# Patient Record
Sex: MALE | Race: BLACK OR AFRICAN AMERICAN | NOT HISPANIC OR LATINO | Employment: OTHER | URBAN - METROPOLITAN AREA
[De-identification: names, ages, dates, MRNs, and addresses within clinical notes are randomized per-mention and may not be internally consistent; named-entity substitution may affect disease eponyms.]

---

## 2018-03-20 LAB
ALBUMIN SERPL BCP-MCNC: 4.2 G/DL (ref 3.5–5.7)
ALP SERPL-CCNC: 49 IU/L (ref 55–165)
ALT SERPL W P-5'-P-CCNC: 14 IU/L (ref 10–35)
ANION GAP SERPL CALCULATED.3IONS-SCNC: 11 MM/L
AST SERPL W P-5'-P-CCNC: 14 U/L (ref 8–27)
BASOPHILS # BLD AUTO: 0 X3/UL (ref 0–0.3)
BASOPHILS # BLD AUTO: 0.3 % (ref 0–2)
BILIRUB SERPL-MCNC: 0.4 MG/DL (ref 0.3–1)
BUN SERPL-MCNC: 36 MG/DL (ref 7–25)
CALCIUM SERPL-MCNC: 9.2 MG/DL (ref 8.6–10.5)
CHLORIDE SERPL-SCNC: 97 MM/L (ref 98–107)
CHOLEST SERPL-MCNC: 86 MG/DL (ref 0–200)
CO2 SERPL-SCNC: 36 MM/L (ref 21–31)
CREAT SERPL-MCNC: 7.78 MG/DL (ref 0.7–1.3)
DEPRECATED RDW RBC AUTO: 16.4 % (ref 11.5–14.5)
EGFR (HISTORICAL): 7 GFR
EGFR AFRICAN AMERICAN (HISTORICAL): 8 GFR
EOSINOPHIL # BLD AUTO: 0.1 X3/UL (ref 0–0.5)
EOSINOPHIL NFR BLD AUTO: 2.8 % (ref 0–5)
GLUCOSE (HISTORICAL): 117 MG/DL (ref 65–99)
HCT VFR BLD AUTO: 35.9 % (ref 42–52)
HDLC SERPL-MCNC: 30 MG/DL (ref 40–60)
HGB BLD-MCNC: 12 G/DL (ref 14–18)
LDLC SERPL CALC-MCNC: 36.9 MG/DL (ref 75–193)
LYMPHOCYTES # BLD AUTO: 1.4 X3/UL (ref 1.2–4.2)
LYMPHOCYTES NFR BLD AUTO: 29.9 % (ref 20.5–51.1)
MCH RBC QN AUTO: 29.7 PG (ref 26–34)
MCHC RBC AUTO-ENTMCNC: 33.3 G/DL (ref 31–36)
MCV RBC AUTO: 89.2 FL (ref 81–99)
MONOCYTES # BLD AUTO: 0.7 X3/UL (ref 0–1)
MONOCYTES NFR BLD AUTO: 15 % (ref 1.7–12)
NEUTROPHILS # BLD AUTO: 2.4 X3/UL (ref 1.4–6.5)
NEUTS SEG NFR BLD AUTO: 52 % (ref 42.2–75.2)
OSMOLALITY, SERUM (HISTORICAL): 287 MOSM (ref 262–291)
OVALOCYTOSIS (HISTORICAL): ABNORMAL
PLATELET # BLD AUTO: 91 X3/UL (ref 130–400)
PLATELET ESTIMATE (HISTORICAL): ABNORMAL
PMV BLD AUTO: 10.7 FL (ref 8.6–11.7)
POTASSIUM SERPL-SCNC: 4.7 MM/L (ref 3.5–5.5)
RBC # BLD AUTO: 4.02 X6/UL (ref 4.3–5.9)
SODIUM SERPL-SCNC: 139 MM/L (ref 134–143)
TOTAL PROTEIN (HISTORICAL): 7.7 G/DL (ref 6.4–8.9)
TRIGL SERPL-MCNC: 95 MG/DL (ref 44–166)
VLDL CHOLESTEROL (HISTORICAL): 19 MG/DL (ref 5–51)
WBC # BLD AUTO: 4.6 X3/UL (ref 4.8–10.8)

## 2018-03-21 LAB
EST. AVERAGE GLUCOSE BLD GHB EST-MCNC: 144 MG/DL
HBA1C MFR BLD HPLC: 6.6 % (ref 4–6.2)

## 2018-03-24 LAB — HEPATITIS B SURFACE ANTIGEN (HISTORICAL): NEGATIVE

## 2019-03-28 ENCOUNTER — TRANSCRIBE ORDERS (OUTPATIENT)
Dept: LAB | Facility: CLINIC | Age: 69
End: 2019-03-28

## 2019-03-28 ENCOUNTER — APPOINTMENT (OUTPATIENT)
Dept: LAB | Facility: CLINIC | Age: 69
End: 2019-03-28
Payer: MEDICARE

## 2019-03-28 DIAGNOSIS — E11.649 UNCONTROLLED TYPE 2 DIABETES MELLITUS WITH HYPOGLYCEMIA, UNSPECIFIED HYPOGLYCEMIA COMA STATUS (HCC): ICD-10-CM

## 2019-03-28 DIAGNOSIS — E78.00 PURE HYPERCHOLESTEROLEMIA: ICD-10-CM

## 2019-03-28 DIAGNOSIS — E78.00 PURE HYPERCHOLESTEROLEMIA: Primary | ICD-10-CM

## 2019-03-28 LAB
ALBUMIN SERPL BCP-MCNC: 3.5 G/DL (ref 3.5–5)
ALP SERPL-CCNC: 54 U/L (ref 46–116)
ALT SERPL W P-5'-P-CCNC: 20 U/L (ref 12–78)
ANION GAP SERPL CALCULATED.3IONS-SCNC: 3 MMOL/L (ref 4–13)
AST SERPL W P-5'-P-CCNC: 18 U/L (ref 5–45)
BASOPHILS # BLD AUTO: 0.02 THOUSANDS/ΜL (ref 0–0.1)
BASOPHILS NFR BLD AUTO: 0 % (ref 0–1)
BILIRUB SERPL-MCNC: 0.35 MG/DL (ref 0.2–1)
BUN SERPL-MCNC: 26 MG/DL (ref 5–25)
CALCIUM SERPL-MCNC: 8.9 MG/DL (ref 8.3–10.1)
CHLORIDE SERPL-SCNC: 99 MMOL/L (ref 100–108)
CHOLEST SERPL-MCNC: 79 MG/DL (ref 50–200)
CO2 SERPL-SCNC: 33 MMOL/L (ref 21–32)
CREAT SERPL-MCNC: 7.03 MG/DL (ref 0.6–1.3)
EOSINOPHIL # BLD AUTO: 0.14 THOUSAND/ΜL (ref 0–0.61)
EOSINOPHIL NFR BLD AUTO: 3 % (ref 0–6)
ERYTHROCYTE [DISTWIDTH] IN BLOOD BY AUTOMATED COUNT: 15.3 % (ref 11.6–15.1)
EST. AVERAGE GLUCOSE BLD GHB EST-MCNC: 131 MG/DL
GFR SERPL CREATININE-BSD FRML MDRD: 7 ML/MIN/1.73SQ M
GLUCOSE P FAST SERPL-MCNC: 119 MG/DL (ref 65–99)
HBA1C MFR BLD: 6.2 % (ref 4.2–6.3)
HCT VFR BLD AUTO: 35.3 % (ref 36.5–49.3)
HDLC SERPL-MCNC: 37 MG/DL (ref 40–60)
HGB BLD-MCNC: 10.9 G/DL (ref 12–17)
IMM GRANULOCYTES # BLD AUTO: 0.01 THOUSAND/UL (ref 0–0.2)
IMM GRANULOCYTES NFR BLD AUTO: 0 % (ref 0–2)
LDLC SERPL CALC-MCNC: 25 MG/DL (ref 0–100)
LYMPHOCYTES # BLD AUTO: 1.33 THOUSANDS/ΜL (ref 0.6–4.47)
LYMPHOCYTES NFR BLD AUTO: 25 % (ref 14–44)
MCH RBC QN AUTO: 29 PG (ref 26.8–34.3)
MCHC RBC AUTO-ENTMCNC: 30.9 G/DL (ref 31.4–37.4)
MCV RBC AUTO: 94 FL (ref 82–98)
MONOCYTES # BLD AUTO: 0.72 THOUSAND/ΜL (ref 0.17–1.22)
MONOCYTES NFR BLD AUTO: 14 % (ref 4–12)
NEUTROPHILS # BLD AUTO: 3.03 THOUSANDS/ΜL (ref 1.85–7.62)
NEUTS SEG NFR BLD AUTO: 58 % (ref 43–75)
NONHDLC SERPL-MCNC: 42 MG/DL
NRBC BLD AUTO-RTO: 0 /100 WBCS
PMV BLD AUTO: 12.3 FL (ref 8.9–12.7)
POTASSIUM SERPL-SCNC: 4.5 MMOL/L (ref 3.5–5.3)
PROT SERPL-MCNC: 8 G/DL (ref 6.4–8.2)
RBC # BLD AUTO: 3.76 MILLION/UL (ref 3.88–5.62)
SODIUM SERPL-SCNC: 135 MMOL/L (ref 136–145)
TRIGL SERPL-MCNC: 84 MG/DL
WBC # BLD AUTO: 5.25 THOUSAND/UL (ref 4.31–10.16)

## 2019-03-28 PROCEDURE — 80053 COMPREHEN METABOLIC PANEL: CPT

## 2019-03-28 PROCEDURE — 80061 LIPID PANEL: CPT

## 2019-03-28 PROCEDURE — 83036 HEMOGLOBIN GLYCOSYLATED A1C: CPT

## 2019-03-28 PROCEDURE — 85025 COMPLETE CBC W/AUTO DIFF WBC: CPT

## 2019-03-28 PROCEDURE — 36415 COLL VENOUS BLD VENIPUNCTURE: CPT

## 2020-03-19 ENCOUNTER — APPOINTMENT (OUTPATIENT)
Dept: LAB | Facility: CLINIC | Age: 70
End: 2020-03-19
Payer: MEDICARE

## 2020-03-19 ENCOUNTER — TRANSCRIBE ORDERS (OUTPATIENT)
Dept: LAB | Facility: CLINIC | Age: 70
End: 2020-03-19

## 2020-03-19 DIAGNOSIS — Z99.2 ENCOUNTER FOR EXTRACORPOREAL DIALYSIS (HCC): ICD-10-CM

## 2020-03-19 DIAGNOSIS — E11.65 UNCONTROLLED TYPE 2 DIABETES MELLITUS WITH HYPERGLYCEMIA (HCC): ICD-10-CM

## 2020-03-19 DIAGNOSIS — N18.6 END STAGE RENAL DISEASE (HCC): ICD-10-CM

## 2020-03-19 DIAGNOSIS — E78.2 MIXED HYPERLIPIDEMIA: ICD-10-CM

## 2020-03-19 DIAGNOSIS — E11.22 TYPE 2 DIABETES MELLITUS WITH DIABETIC CHRONIC KIDNEY DISEASE, UNSPECIFIED CKD STAGE, UNSPECIFIED WHETHER LONG TERM INSULIN USE (HCC): Primary | ICD-10-CM

## 2020-03-19 DIAGNOSIS — E11.22 TYPE 2 DIABETES MELLITUS WITH DIABETIC CHRONIC KIDNEY DISEASE, UNSPECIFIED CKD STAGE, UNSPECIFIED WHETHER LONG TERM INSULIN USE (HCC): ICD-10-CM

## 2020-03-19 LAB
ALBUMIN SERPL BCP-MCNC: 3.6 G/DL (ref 3.5–5)
ALP SERPL-CCNC: 53 U/L (ref 46–116)
ALT SERPL W P-5'-P-CCNC: 16 U/L (ref 12–78)
ANION GAP SERPL CALCULATED.3IONS-SCNC: 0 MMOL/L (ref 4–13)
AST SERPL W P-5'-P-CCNC: 13 U/L (ref 5–45)
BASOPHILS # BLD AUTO: 0.03 THOUSANDS/ΜL (ref 0–0.1)
BASOPHILS NFR BLD AUTO: 1 % (ref 0–1)
BILIRUB SERPL-MCNC: 0.41 MG/DL (ref 0.2–1)
BUN SERPL-MCNC: 28 MG/DL (ref 5–25)
CALCIUM SERPL-MCNC: 9.4 MG/DL (ref 8.3–10.1)
CHLORIDE SERPL-SCNC: 99 MMOL/L (ref 100–108)
CHOLEST SERPL-MCNC: 91 MG/DL (ref 50–200)
CO2 SERPL-SCNC: 37 MMOL/L (ref 21–32)
CREAT SERPL-MCNC: 7.64 MG/DL (ref 0.6–1.3)
EOSINOPHIL # BLD AUTO: 0.22 THOUSAND/ΜL (ref 0–0.61)
EOSINOPHIL NFR BLD AUTO: 4 % (ref 0–6)
ERYTHROCYTE [DISTWIDTH] IN BLOOD BY AUTOMATED COUNT: 16.4 % (ref 11.6–15.1)
EST. AVERAGE GLUCOSE BLD GHB EST-MCNC: 137 MG/DL
GFR SERPL CREATININE-BSD FRML MDRD: 7 ML/MIN/1.73SQ M
GLUCOSE P FAST SERPL-MCNC: 101 MG/DL (ref 65–99)
HBA1C MFR BLD: 6.4 %
HCT VFR BLD AUTO: 33.9 % (ref 36.5–49.3)
HDLC SERPL-MCNC: 40 MG/DL
HGB BLD-MCNC: 10.3 G/DL (ref 12–17)
IMM GRANULOCYTES # BLD AUTO: 0.01 THOUSAND/UL (ref 0–0.2)
IMM GRANULOCYTES NFR BLD AUTO: 0 % (ref 0–2)
LDLC SERPL CALC-MCNC: 35 MG/DL (ref 0–100)
LYMPHOCYTES # BLD AUTO: 1.41 THOUSANDS/ΜL (ref 0.6–4.47)
LYMPHOCYTES NFR BLD AUTO: 28 % (ref 14–44)
MCH RBC QN AUTO: 28.8 PG (ref 26.8–34.3)
MCHC RBC AUTO-ENTMCNC: 30.4 G/DL (ref 31.4–37.4)
MCV RBC AUTO: 95 FL (ref 82–98)
MONOCYTES # BLD AUTO: 0.78 THOUSAND/ΜL (ref 0.17–1.22)
MONOCYTES NFR BLD AUTO: 15 % (ref 4–12)
NEUTROPHILS # BLD AUTO: 2.63 THOUSANDS/ΜL (ref 1.85–7.62)
NEUTS SEG NFR BLD AUTO: 52 % (ref 43–75)
NONHDLC SERPL-MCNC: 51 MG/DL
NRBC BLD AUTO-RTO: 0 /100 WBCS
PLATELET # BLD AUTO: 94 THOUSANDS/UL (ref 149–390)
PMV BLD AUTO: 11.5 FL (ref 8.9–12.7)
POTASSIUM SERPL-SCNC: 5.4 MMOL/L (ref 3.5–5.3)
PROT SERPL-MCNC: 8.1 G/DL (ref 6.4–8.2)
RBC # BLD AUTO: 3.58 MILLION/UL (ref 3.88–5.62)
SODIUM SERPL-SCNC: 136 MMOL/L (ref 136–145)
TRIGL SERPL-MCNC: 78 MG/DL
WBC # BLD AUTO: 5.08 THOUSAND/UL (ref 4.31–10.16)

## 2020-03-19 PROCEDURE — 85025 COMPLETE CBC W/AUTO DIFF WBC: CPT

## 2020-03-19 PROCEDURE — 36415 COLL VENOUS BLD VENIPUNCTURE: CPT

## 2020-03-19 PROCEDURE — 80053 COMPREHEN METABOLIC PANEL: CPT

## 2020-03-19 PROCEDURE — 80061 LIPID PANEL: CPT

## 2020-03-19 PROCEDURE — 83036 HEMOGLOBIN GLYCOSYLATED A1C: CPT

## 2021-01-18 ENCOUNTER — HOSPITAL ENCOUNTER (INPATIENT)
Facility: HOSPITAL | Age: 71
LOS: 2 days | Discharge: HOME | DRG: 304 | End: 2021-01-20
Attending: INTERNAL MEDICINE | Admitting: HOSPITALIST
Payer: COMMERCIAL

## 2021-01-18 ENCOUNTER — APPOINTMENT (EMERGENCY)
Dept: RADIOLOGY | Facility: HOSPITAL | Age: 71
DRG: 304 | End: 2021-01-18
Payer: COMMERCIAL

## 2021-01-18 DIAGNOSIS — R77.8 ELEVATED TROPONIN: ICD-10-CM

## 2021-01-18 DIAGNOSIS — N18.6 ESRD (END STAGE RENAL DISEASE) (HCC): ICD-10-CM

## 2021-01-18 DIAGNOSIS — I21.4 NSTEMI (NON-ST ELEVATED MYOCARDIAL INFARCTION) (HCC): Primary | ICD-10-CM

## 2021-01-18 DIAGNOSIS — I10 ESSENTIAL HYPERTENSION: ICD-10-CM

## 2021-01-18 PROBLEM — Z79.4 TYPE 2 DIABETES MELLITUS WITHOUT COMPLICATION, WITH LONG-TERM CURRENT USE OF INSULIN (HCC): Status: ACTIVE | Noted: 2021-01-18

## 2021-01-18 PROBLEM — E11.9 TYPE 2 DIABETES MELLITUS WITHOUT COMPLICATION, WITH LONG-TERM CURRENT USE OF INSULIN (HCC): Status: ACTIVE | Noted: 2021-01-18

## 2021-01-18 PROBLEM — R07.9 CHEST PAIN, UNSPECIFIED: Status: ACTIVE | Noted: 2021-01-18

## 2021-01-18 PROBLEM — E78.2 MIXED HYPERLIPIDEMIA: Status: ACTIVE | Noted: 2021-01-18

## 2021-01-18 LAB
ALBUMIN SERPL BCP-MCNC: 4.2 G/DL (ref 3.5–5.7)
ALP SERPL-CCNC: 36 U/L (ref 55–165)
ALT SERPL W P-5'-P-CCNC: 6 U/L (ref 7–52)
ANION GAP SERPL CALCULATED.3IONS-SCNC: 7 MMOL/L (ref 4–13)
ANISOCYTOSIS BLD QL SMEAR: PRESENT
AST SERPL W P-5'-P-CCNC: 10 U/L (ref 13–39)
ATRIAL RATE: 70 BPM
BASOPHILS # BLD AUTO: 0 THOUSANDS/ΜL (ref 0–0.1)
BASOPHILS NFR BLD AUTO: 0 % (ref 0–2)
BILIRUB SERPL-MCNC: 0.4 MG/DL (ref 0.2–1)
BUN SERPL-MCNC: 23 MG/DL (ref 7–25)
CALCIUM SERPL-MCNC: 9.9 MG/DL (ref 8.6–10.5)
CHLORIDE SERPL-SCNC: 92 MMOL/L (ref 98–107)
CO2 SERPL-SCNC: 36 MMOL/L (ref 21–31)
CREAT SERPL-MCNC: 5.43 MG/DL (ref 0.7–1.3)
D DIMER PPP FEU-MCNC: 1 UG/ML FEU
DACRYOCYTES BLD QL SMEAR: PRESENT
EOSINOPHIL # BLD AUTO: 0.1 THOUSAND/ΜL (ref 0–0.61)
EOSINOPHIL NFR BLD AUTO: 2 % (ref 0–5)
ERYTHROCYTE [DISTWIDTH] IN BLOOD BY AUTOMATED COUNT: 15.7 % (ref 11.5–14.5)
GFR SERPL CREATININE-BSD FRML MDRD: 11 ML/MIN/1.73SQ M
GLUCOSE SERPL-MCNC: 120 MG/DL (ref 65–99)
HCT VFR BLD AUTO: 36.9 % (ref 42–47)
HGB BLD-MCNC: 11.8 G/DL (ref 14–18)
LYMPHOCYTES # BLD AUTO: 1 THOUSANDS/ΜL (ref 0.6–4.47)
LYMPHOCYTES NFR BLD AUTO: 18 % (ref 21–51)
MCH RBC QN AUTO: 28.4 PG (ref 26–34)
MCHC RBC AUTO-ENTMCNC: 31.9 G/DL (ref 31–37)
MCV RBC AUTO: 89 FL (ref 81–99)
MONOCYTES # BLD AUTO: 0.7 THOUSAND/ΜL (ref 0.17–1.22)
MONOCYTES NFR BLD AUTO: 12 % (ref 2–12)
NEUTROPHILS # BLD AUTO: 3.8 THOUSANDS/ΜL (ref 1.4–6.5)
NEUTS SEG NFR BLD AUTO: 67 % (ref 42–75)
OVALOCYTES BLD QL SMEAR: PRESENT
P AXIS: 72 DEGREES
PLATELET # BLD AUTO: 87 THOUSANDS/UL (ref 149–390)
PLATELET BLD QL SMEAR: ABNORMAL
PMV BLD AUTO: 8 FL (ref 8.6–11.7)
POIKILOCYTOSIS BLD QL SMEAR: PRESENT
POTASSIUM SERPL-SCNC: 3.8 MMOL/L (ref 3.5–5.5)
PR INTERVAL: 174 MS
PROT SERPL-MCNC: 8 G/DL (ref 6.4–8.9)
QRS AXIS: 61 DEGREES
QRSD INTERVAL: 94 MS
QT INTERVAL: 392 MS
QTC INTERVAL: 423 MS
RBC # BLD AUTO: 4.15 MILLION/UL (ref 4.3–5.9)
RBC MORPH BLD: ABNORMAL
SODIUM SERPL-SCNC: 135 MMOL/L (ref 134–143)
T WAVE AXIS: 75 DEGREES
TARGETS BLD QL SMEAR: PRESENT
TROPONIN I SERPL-MCNC: 0.13 NG/ML
TROPONIN I SERPL-MCNC: 0.17 NG/ML
TROPONIN I SERPL-MCNC: 0.23 NG/ML
VENTRICULAR RATE: 70 BPM
WBC # BLD AUTO: 5.7 THOUSAND/UL (ref 4.8–10.8)

## 2021-01-18 PROCEDURE — 84484 ASSAY OF TROPONIN QUANT: CPT | Performed by: INTERNAL MEDICINE

## 2021-01-18 PROCEDURE — 84484 ASSAY OF TROPONIN QUANT: CPT | Performed by: STUDENT IN AN ORGANIZED HEALTH CARE EDUCATION/TRAINING PROGRAM

## 2021-01-18 PROCEDURE — 93005 ELECTROCARDIOGRAM TRACING: CPT

## 2021-01-18 PROCEDURE — 99285 EMERGENCY DEPT VISIT HI MDM: CPT

## 2021-01-18 PROCEDURE — 85379 FIBRIN DEGRADATION QUANT: CPT | Performed by: INTERNAL MEDICINE

## 2021-01-18 PROCEDURE — 99285 EMERGENCY DEPT VISIT HI MDM: CPT | Performed by: INTERNAL MEDICINE

## 2021-01-18 PROCEDURE — 93010 ELECTROCARDIOGRAM REPORT: CPT | Performed by: INTERNAL MEDICINE

## 2021-01-18 PROCEDURE — 85025 COMPLETE CBC W/AUTO DIFF WBC: CPT | Performed by: INTERNAL MEDICINE

## 2021-01-18 PROCEDURE — 99223 1ST HOSP IP/OBS HIGH 75: CPT | Performed by: STUDENT IN AN ORGANIZED HEALTH CARE EDUCATION/TRAINING PROGRAM

## 2021-01-18 PROCEDURE — 80053 COMPREHEN METABOLIC PANEL: CPT | Performed by: INTERNAL MEDICINE

## 2021-01-18 PROCEDURE — 71045 X-RAY EXAM CHEST 1 VIEW: CPT

## 2021-01-18 PROCEDURE — 36415 COLL VENOUS BLD VENIPUNCTURE: CPT | Performed by: INTERNAL MEDICINE

## 2021-01-18 RX ORDER — ATORVASTATIN CALCIUM 40 MG/1
40 TABLET, FILM COATED ORAL DAILY
COMMUNITY

## 2021-01-18 RX ORDER — CARVEDILOL 25 MG/1
25 TABLET ORAL 2 TIMES DAILY WITH MEALS
Status: DISCONTINUED | OUTPATIENT
Start: 2021-01-19 | End: 2021-01-20 | Stop reason: HOSPADM

## 2021-01-18 RX ORDER — CARVEDILOL 25 MG/1
25 TABLET ORAL 2 TIMES DAILY WITH MEALS
COMMUNITY

## 2021-01-18 RX ORDER — ASPIRIN 81 MG/1
TABLET ORAL
COMMUNITY

## 2021-01-18 RX ORDER — DOXAZOSIN MESYLATE 4 MG/1
4 TABLET ORAL
COMMUNITY

## 2021-01-18 RX ORDER — ATORVASTATIN CALCIUM 40 MG/1
40 TABLET, FILM COATED ORAL DAILY
Status: DISCONTINUED | OUTPATIENT
Start: 2021-01-19 | End: 2021-01-20 | Stop reason: HOSPADM

## 2021-01-18 RX ORDER — LISINOPRIL 40 MG/1
40 TABLET ORAL DAILY
COMMUNITY
End: 2022-01-13

## 2021-01-18 RX ORDER — ONDANSETRON 2 MG/ML
4 INJECTION INTRAMUSCULAR; INTRAVENOUS EVERY 6 HOURS PRN
Status: DISCONTINUED | OUTPATIENT
Start: 2021-01-18 | End: 2021-01-20 | Stop reason: HOSPADM

## 2021-01-18 RX ORDER — NITROGLYCERIN 0.4 MG/1
0.4 TABLET SUBLINGUAL
Status: DISCONTINUED | OUTPATIENT
Start: 2021-01-18 | End: 2021-01-20 | Stop reason: HOSPADM

## 2021-01-18 RX ORDER — SEVELAMER CARBONATE 800 MG/1
800 TABLET, FILM COATED ORAL 3 TIMES DAILY
COMMUNITY
Start: 2021-01-09

## 2021-01-18 RX ORDER — HEPARIN SODIUM 5000 [USP'U]/ML
5000 INJECTION, SOLUTION INTRAVENOUS; SUBCUTANEOUS EVERY 8 HOURS SCHEDULED
Status: DISCONTINUED | OUTPATIENT
Start: 2021-01-18 | End: 2021-01-20 | Stop reason: HOSPADM

## 2021-01-18 RX ORDER — DOXAZOSIN 2 MG/1
4 TABLET ORAL DAILY
Status: DISCONTINUED | OUTPATIENT
Start: 2021-01-19 | End: 2021-01-20 | Stop reason: HOSPADM

## 2021-01-18 RX ORDER — SODIUM CHLORIDE 9 MG/ML
3 INJECTION INTRAVENOUS
Status: DISCONTINUED | OUTPATIENT
Start: 2021-01-18 | End: 2021-01-20 | Stop reason: HOSPADM

## 2021-01-18 RX ORDER — HYDRALAZINE HYDROCHLORIDE 20 MG/ML
10 INJECTION INTRAMUSCULAR; INTRAVENOUS EVERY 6 HOURS PRN
Status: DISCONTINUED | OUTPATIENT
Start: 2021-01-18 | End: 2021-01-20 | Stop reason: HOSPADM

## 2021-01-18 RX ORDER — AMLODIPINE BESYLATE 5 MG/1
2.5 TABLET ORAL
COMMUNITY

## 2021-01-18 RX ORDER — AMLODIPINE BESYLATE 2.5 MG/1
2.5 TABLET ORAL DAILY
Status: DISCONTINUED | OUTPATIENT
Start: 2021-01-19 | End: 2021-01-18

## 2021-01-18 RX ORDER — SEVELAMER HYDROCHLORIDE 800 MG/1
1600 TABLET, FILM COATED ORAL
Status: DISCONTINUED | OUTPATIENT
Start: 2021-01-18 | End: 2021-01-20 | Stop reason: HOSPADM

## 2021-01-18 RX ORDER — LISINOPRIL 20 MG/1
40 TABLET ORAL DAILY
Status: DISCONTINUED | OUTPATIENT
Start: 2021-01-19 | End: 2021-01-20 | Stop reason: HOSPADM

## 2021-01-18 RX ORDER — ASPIRIN 81 MG/1
81 TABLET ORAL DAILY
Status: DISCONTINUED | OUTPATIENT
Start: 2021-01-19 | End: 2021-01-20 | Stop reason: HOSPADM

## 2021-01-18 RX ORDER — INSULIN ASPART 100 [IU]/ML
INJECTION, SOLUTION INTRAVENOUS; SUBCUTANEOUS 3 TIMES DAILY
COMMUNITY
Start: 2020-08-12

## 2021-01-18 RX ORDER — ACETAMINOPHEN 325 MG/1
650 TABLET ORAL EVERY 6 HOURS PRN
Status: DISCONTINUED | OUTPATIENT
Start: 2021-01-18 | End: 2021-01-20 | Stop reason: HOSPADM

## 2021-01-18 RX ORDER — HYDRALAZINE HYDROCHLORIDE 25 MG/1
50 TABLET, FILM COATED ORAL EVERY 8 HOURS SCHEDULED
Status: DISCONTINUED | OUTPATIENT
Start: 2021-01-18 | End: 2021-01-20 | Stop reason: HOSPADM

## 2021-01-18 RX ORDER — AMLODIPINE BESYLATE 10 MG/1
10 TABLET ORAL DAILY
Status: DISCONTINUED | OUTPATIENT
Start: 2021-01-18 | End: 2021-01-20 | Stop reason: HOSPADM

## 2021-01-18 RX ADMIN — SEVELAMER HYDROCHLORIDE 1600 MG: 800 TABLET, FILM COATED PARENTERAL at 19:07

## 2021-01-18 RX ADMIN — HYDRALAZINE HYDROCHLORIDE 50 MG: 25 TABLET, FILM COATED ORAL at 21:44

## 2021-01-18 RX ADMIN — HEPARIN SODIUM 5000 UNITS: 5000 INJECTION INTRAVENOUS; SUBCUTANEOUS at 21:43

## 2021-01-18 RX ADMIN — AMLODIPINE BESYLATE 10 MG: 10 TABLET ORAL at 19:38

## 2021-01-18 RX ADMIN — HYDRALAZINE HYDROCHLORIDE 10 MG: 20 INJECTION INTRAMUSCULAR; INTRAVENOUS at 19:38

## 2021-01-18 NOTE — PLAN OF CARE
Problem: CARDIOVASCULAR - ADULT  Goal: Maintains optimal cardiac output and hemodynamic stability  Description: INTERVENTIONS:  - Monitor I/O, vital signs and rhythm  - Monitor for S/S and trends of decreased cardiac output  - Administer and titrate ordered vasoactive medications to optimize hemodynamic stability  - Assess quality of pulses, skin color and temperature  - Assess for signs of decreased coronary artery perfusion  - Instruct patient to report change in severity of symptoms  Outcome: Progressing     Problem: SKIN/TISSUE INTEGRITY - ADULT  Goal: Skin integrity remains intact  Description: INTERVENTIONS  - Identify patients at risk for skin breakdown  - Assess and monitor skin integrity  - Assess and monitor nutrition and hydration status  - Monitor labs (i e  albumin)  - Assess for incontinence   - Turn and reposition patient  - Assist with mobility/ambulation  - Relieve pressure over bony prominences  - Avoid friction and shearing  - Provide appropriate hygiene as needed including keeping skin clean and dry  - Evaluate need for skin moisturizer/barrier cream  - Collaborate with interdisciplinary team (i e  Nutrition, Rehabilitation, etc )   - Patient/family teaching  Outcome: Progressing     Problem: SAFETY ADULT  Goal: Patient will remain free of falls  Description: INTERVENTIONS:  - Assess patient frequently for physical needs  -  Identify cognitive and physical deficits and behaviors that affect risk of falls    -  [place] fall precautions as indicated by assessment   - Educate patient/family on patient safety including physical limitations  - Instruct patient to call for assistance with activity based on assessment  - Modify environment to reduce risk of injury  - Consider OT/PT consult to assist with strengthening/mobility  Outcome: Progressing  Goal: Maintain or return to baseline ADL function  Description: INTERVENTIONS:  -  Assess patient's ability to carry out ADLs; assess patient's baseline for ADL function and identify physical deficits which impact ability to perform ADLs (bathing, care of mouth/teeth, toileting, grooming, dressing, etc )  - Assess/evaluate cause of self-care deficits   - Assess range of motion  - Assess patient's mobility; develop plan if impaired  - Assess patient's need for assistive devices and provide as appropriate  - Encourage maximum independence but intervene and supervise when necessary  - Involve family in performance of ADLs  - Assess for home care needs following discharge   - Consider OT consult to assist with ADL evaluation and planning for discharge  - Provide patient education as appropriate  Outcome: Progressing  Goal: Maintain or return mobility status to optimal level  Description: INTERVENTIONS:  - Assess patient's baseline mobility status (ambulation, transfers, stairs, etc )    - Identify cognitive and physical deficits and behaviors that affect mobility  - Identify mobility aids required to assist with transfers and/or ambulation (gait belt, sit-to-stand, lift, walker, cane, etc )  - [place] fall precautions as indicated by assessment  - Record patient progress and toleration of activity level on Mobility SBAR; progress patient to next Phase/Stage  - Instruct patient to call for assistance with activity based on assessment  - Consider rehabilitation consult to assist with strengthening/weightbearing, etc   Outcome: Progressing

## 2021-01-18 NOTE — ASSESSMENT & PLAN NOTE
28-year-old male with past medical history of ESRD, CAD, hypertension and type 2 diabetes presented with acute onset of chest pains  Chest pains reportedly began last night in bed, recurred again today during dialysis  Troponins elevated at 0 13  ED discussed with Cardiology, recommend trending troponins, or any 2D echo and Cardiology will follow along tomorrow  EKG shows NSR, non specific ST changes  CXR neg for acute processes  Cardiology consult  Trend troponins  Monitor on telemetry  Nitroglycerin p r n    Aspirin, statin, beta-blockers  2D echo

## 2021-01-18 NOTE — ASSESSMENT & PLAN NOTE
Lab Results   Component Value Date    EGFR 11 01/18/2021    EGFR 7 03/19/2020    EGFR 7 03/28/2019    CREATININE 5 43 (H) 01/18/2021    CREATININE 7 64 (H) 03/19/2020    CREATININE 7 03 (H) 03/28/2019     End-stage renal disease schedule Monday Wednesday Friday, av fistula left forearm  Nephrologist at [facility]  Nephrology consulted

## 2021-01-18 NOTE — ASSESSMENT & PLAN NOTE
Lab Results   Component Value Date    HGBA1C 6 4 (H) 03/19/2020     Home insulin regimen Lantus 6 units with Accu-Cheks sliding scale  Sinus scale, Accu-Cheks      No results for input(s): POCGLU in the last 72 hours      Blood Sugar Average: Last 72 hrs:

## 2021-01-18 NOTE — ED PROVIDER NOTES
History  Chief Complaint   Patient presents with    Chest Pain     STARTED LAST NIGHT , FEELS BETTER AFTER VOMITING THIS AM     Very pleasant 64-year-old male accompanied by his wife presents emergency room complaining of chest pain  The patient states he had chest pain after going to bed last evening and upon vomiting last evening or early this morning x1 his pain went away  Patient states the pain was midsternal nonradiating it was not associated with shortness of breath he did have some nausea he vomited this morning  The patient denies any lightheadedness dizziness or diaphoresis  The patient went to dialysis today he was dialyzed any felt a lot better at this time  Patient has a complicated history had an MI in 2016 which presented differently at that time he state he had a lot of pressure and heaviness and had some radiation  Patient underwent a cardiac catheterization [place] states there was a blockage but no stents or bypass was performed  Patient has a history of hypertension, end-stage renal disease on dialysis, diabetes and hyperlipidemia  He is a former smoker he quit about 20 years ago  Has about a 10-15 pack-year history as per his wife  At this time the patient states he has no pain pressure heaviness tightness or shortness of breath  None       Past Medical History:   Diagnosis Date    Coronary artery disease     [facility] mellitus ([facility] [address] )     Hypertension     Renal disorder        Past Surgical History:   Procedure Laterality Date    DIALYSIS FISTULA CREATION         History reviewed  No pertinent family history  I have reviewed and agree with the history as documented      E-Cigarette/Vaping     E-Cigarette/Vaping Substances     Social History     Tobacco Use    Smoking status: Former Smoker    Smokeless tobacco: Never Used    Tobacco comment: STATES QUIT 20 YEARS AGO   Substance Use Topics    Alcohol use: Not Currently    Drug use: Never       Review of Systems Constitutional: Negative  HENT: Negative  Respiratory: Negative  Cardiovascular: Positive for chest pain  Negative for palpitations and leg swelling  Gastrointestinal: Negative  Genitourinary: Negative  Skin: Negative  Neurological: Negative  Hematological: Negative  Psychiatric/Behavioral: Negative  Physical Exam  Physical Exam  Vitals signs and nursing note reviewed  Constitutional:       General: He is not in acute distress  Appearance: He is well-developed and normal weight  He is not ill-appearing, toxic-appearing or diaphoretic  HENT:      Head: Normocephalic and atraumatic  Eyes:      Extraocular Movements: Extraocular movements intact  Pupils: Pupils are equal, round, and reactive to light  Neck:      Musculoskeletal: Normal range of motion and neck supple  Cardiovascular:      Rate and Rhythm: Normal rate and regular rhythm  Heart sounds: Normal heart sounds  Pulmonary:      Effort: Pulmonary effort is normal  No tachypnea or respiratory distress  Breath sounds: Normal breath sounds  Chest:      Chest wall: No mass or deformity  Abdominal:      General: Bowel sounds are normal       Palpations: Abdomen is soft  Skin:     General: Skin is warm and dry  Neurological:      General: No focal deficit present  Mental Status: He is alert and oriented to person, place, and time     Psychiatric:         Mood and Affect: Mood normal          Behavior: Behavior normal          Vital Signs  ED Triage Vitals   Temperature Pulse Respirations Blood Pressure SpO2   01/18/21 1558 01/18/21 1554 01/18/21 1554 01/18/21 1554 01/18/21 1554   (!) 96 9 °F (36 1 °C) 68 18 (!) 218/94 100 %      Temp Source Heart Rate Source Patient Position - Orthostatic VS BP Location FiO2 (%)   01/18/21 1558 01/18/21 1554 -- 01/18/21 1554 --   Tympanic Monitor  Left arm       Pain Score       01/18/21 1554       No Pain           Vitals:    01/18/21 1554   BP: (!) 218/94 Pulse: 68         Visual Acuity      ED Medications  Medications - No data to display    Diagnostic Studies  Results Reviewed     None                 No orders to display              Procedures  Procedures         ED Course                                           MDM    Disposition  Final diagnoses:   None     ED Disposition     None      Follow-up Information    None         Patient's Medications    No medications on file     No discharge procedures on file      PDMP Review     None          ED Provider  Electronically Signed by           Rosemarie Hurtado MD  01/18/21 8235

## 2021-01-18 NOTE — H&P
H&P- Delvin Lancaster 1950, 80 y o  male MRN: [id number]    Unit/Bed#: ICU 01 Encounter: [id number]    Primary Care Provider: Onel Florian MD   Date and time admitted to hospital: 1/18/2021  3:51 PM        Type 2 diabetes mellitus without complication, with long-term current use of insulin [facility])  Assessment & Plan  Lab Results   Component Value Date    HGBA1C 6 4 (H) 03/19/2020     Home insulin regimen Lantus 6 units with Accu-Cheks sliding scale  Sinus scale, Accu-Cheks      No results for input(s): POCGLU in the last 72 hours  Blood Sugar Average: Last 72 hrs:      Essential hypertension  Assessment & Plan  Continue blood pressure medications amlodipine, Coreg, Cardura and lisinopril    ESRD (end stage renal disease) [facility])  Assessment & Plan  Lab Results   Component Value Date    EGFR 11 01/18/2021    EGFR 7 03/19/2020    EGFR 7 03/28/2019    CREATININE 5 43 (H) 01/18/2021    CREATININE 7 64 (H) 03/19/2020    CREATININE 7 03 (H) 03/28/2019     End-stage renal disease schedule Monday Wednesday Friday, av fistula left forearm  Nephrologist at [facility]  Nephrology consulted    * Chest pain, unspecified  Assessment & Plan  50-year-old male with past medical history of ESRD, CAD, hypertension and type 2 diabetes presented with acute onset of chest pains  Chest pains reportedly began last night in bed, recurred again today during dialysis  Troponins elevated at 0 13  ED discussed with Cardiology, recommend trending troponins, or any 2D echo and Cardiology will follow along tomorrow  EKG shows NSR, non specific ST changes  CXR neg for acute processes  Cardiology consult  Trend troponins  Monitor on telemetry  Nitroglycerin p r n    Aspirin, statin, beta-blockers  2D echo      VTE Prophylaxis: Heparin  / sequential compression device   Code Status: Full Code  POLST: There is no POLST form on file for this patient (pre-hospital)  Discussion with family: Patient    Anticipated Length of Stay:  Patient will be admitted on an Inpatient basis with an anticipated length of stay of  2 midnights  Justification for Hospital Stay: Chest pains    Total Time for Visit, including Counseling / Coordination of Care: 45 minutes  Greater than 50% of this total time spent on direct patient counseling and coordination of care  Chief Complaint:   CP    History of Present Illness:    Cami An is a 80 y o  male who presents with CP  Hx of ESRD MWF, CAD, HTN and DM2  CP this morning when he woke up  Had CP again at dialysis  Substernal, non-radiating, denies any SOB or diaphoresis  BP was noted to be elevated 190 and in the  systolic  Seen today, reports no further CP  States he last had a cardiac cath 3 years ago with known cardiac disease after an abnormal stress test  Denies any tobacco history, abdominal pain, nausea, vomiting or     Review of Systems:    Review of Systems    Past Medical and Surgical History:     Past Medical History:   Diagnosis Date    Coronary artery disease     Diabetes mellitus ([facility] [address] )     Hypertension     Renal disorder        Past Surgical History:   Procedure Laterality Date    DIALYSIS FISTULA CREATION         Meds/Allergies:    Prior to Admission medications    Medication Sig Start Date End Date Taking?  Authorizing Provider   sevelamer carbonate (Renvela) 800 mg tablet Take 2 tablets by mouth Three times a day 1/9/21  Yes Historical Provider, MD   amLODIPine (NORVASC) 2 5 mg tablet Take 2 5 mg by mouth daily    Historical Provider, MD   aspirin (Aspirin 81) 81 mg EC tablet Take by mouth    Historical Provider, MD   atorvastatin (LIPITOR) 40 mg tablet Take 40 mg by mouth daily    Historical Provider, MD   CALCIUM ACETATE-MAGNESIUM CARB PO Take 1 tablet by mouth    Historical Provider, MD   carvedilol (COREG) 25 mg tablet Take by mouth    Historical Provider, MD   doxazosin (CARDURA) 4 mg tablet Take 4 mg by mouth    Historical Provider, MD   insulin aspart (NovoLOG FlexPen) 100 UNIT/ML injection pen Inject under the skin Three times a day 8/12/20   Historical Provider, MD   insulin glargine (LANTUS SOLOSTAR) 100 units/mL injection pen Inject 6 Units under the skin    Historical Provider, MD   lisinopril (ZESTRIL) 40 mg tablet Take 40 mg by mouth    Historical Provider, MD     I have reviewed home medications with patient personally  Allergies: No Known Allergies    Social History:     Marital Status: /Civil Union   Occupation: Retired  Patient Pre-hospital Living Situation: home  Patient Pre-hospital Level of Mobility: ambulatory  Patient Pre-hospital Diet Restrictions: none  Substance Use History:   Social History     Substance and Sexual Activity   Alcohol Use Not Currently     Social History     Tobacco Use   Smoking Status Former Smoker   Smokeless Tobacco Never Used   Tobacco Comment    STATES QUIT 20 YEARS AGO     Social History     Substance and Sexual Activity   Drug Use Never       Family History:    History reviewed  No pertinent family history  Physical Exam:     Vitals:   Blood Pressure: (!) 190/84 (01/18/21 1836)  Pulse: 72 (01/18/21 1836)  Temperature: 97 8 °F (36 6 °C) (01/18/21 1836)  Temp Source: Tympanic (01/18/21 1836)  Respirations: 20 (01/18/21 1836)  Height: 6' (182 9 cm) (01/18/21 1836)  Weight - Scale: 96 4 kg (212 lb 8 4 oz) (01/18/21 1836)  SpO2: 98 % (01/18/21 1836)    Physical Exam  Vitals signs and nursing note reviewed  Constitutional:       Appearance: Normal appearance  He is normal weight  HENT:      Head: Normocephalic and atraumatic  Eyes:      Conjunctiva/sclera: Conjunctivae normal       Pupils: Pupils are equal, round, and reactive to light  Cardiovascular:      Rate and Rhythm: Normal rate and regular rhythm  Heart sounds: Normal heart sounds  Pulmonary:      Breath sounds: Normal breath sounds  No wheezing or rhonchi  Abdominal:      General: Bowel sounds are normal  There is no distension  Palpations: Abdomen is soft  Tenderness: There is no abdominal tenderness  Musculoskeletal: Normal range of motion  General: No swelling  Comments: Left forearm av fistula   Skin:     General: Skin is warm and dry  Neurological:      General: No focal deficit present  Mental Status: He is alert and oriented to person, place, and time  Mental status is at baseline  Additional Data:     Lab Results: I have personally reviewed pertinent reports  Results from last 7 days   Lab Units 01/18/21  1622   WBC Thousand/uL 5 70   HEMOGLOBIN g/dL 11 8*   HEMATOCRIT % 36 9*   PLATELETS Thousands/uL 87*   NEUTROS PCT % 67   LYMPHS PCT % 18*   MONOS PCT % 12   EOS PCT % 2     Results from last 7 days   Lab Units 01/18/21  1622   SODIUM mmol/L 135   POTASSIUM mmol/L 3 8   CHLORIDE mmol/L 92*   CO2 mmol/L 36*   BUN mg/dL 23   CREATININE mg/dL 5 43*   ANION GAP mmol/L 7   CALCIUM mg/dL 9 9   ALBUMIN g/dL 4 2   TOTAL BILIRUBIN mg/dL 0 40   ALK PHOS U/L 36*   ALT U/L 6*   AST U/L 10*   GLUCOSE RANDOM mg/dL 120*                       Imaging: I have personally reviewed pertinent reports  X-ray chest 1 view portable    (Results Pending)       EKG, Pathology, and Other Studies Reviewed on Admission:   · EKG: NSR, nonspecific ST changes    Allscripts / Epic Records Reviewed: Yes     ** Please Note: This note has been constructed using a voice recognition system   **

## 2021-01-19 ENCOUNTER — APPOINTMENT (INPATIENT)
Dept: NON INVASIVE DIAGNOSTICS | Facility: HOSPITAL | Age: 71
DRG: 304 | End: 2021-01-19
Payer: COMMERCIAL

## 2021-01-19 PROBLEM — R79.89 ELEVATED TROPONIN: Status: ACTIVE | Noted: 2021-01-19

## 2021-01-19 PROBLEM — R77.8 ELEVATED TROPONIN: Status: ACTIVE | Noted: 2021-01-19

## 2021-01-19 LAB
ANION GAP SERPL CALCULATED.3IONS-SCNC: 10 MMOL/L (ref 4–13)
ANISOCYTOSIS BLD QL SMEAR: PRESENT
BASOPHILS # BLD AUTO: 0 THOUSANDS/ΜL (ref 0–0.1)
BASOPHILS NFR BLD AUTO: 0 % (ref 0–2)
BUN SERPL-MCNC: 35 MG/DL (ref 7–25)
CALCIUM SERPL-MCNC: 9.4 MG/DL (ref 8.6–10.5)
CHLORIDE SERPL-SCNC: 94 MMOL/L (ref 98–107)
CHOLEST SERPL-MCNC: 79 MG/DL (ref 0–200)
CO2 SERPL-SCNC: 31 MMOL/L (ref 21–31)
CREAT SERPL-MCNC: 7.84 MG/DL (ref 0.7–1.3)
DACRYOCYTES BLD QL SMEAR: PRESENT
EOSINOPHIL # BLD AUTO: 0.1 THOUSAND/ΜL (ref 0–0.61)
EOSINOPHIL NFR BLD AUTO: 2 % (ref 0–5)
ERYTHROCYTE [DISTWIDTH] IN BLOOD BY AUTOMATED COUNT: 15.4 % (ref 11.5–14.5)
EST. AVERAGE GLUCOSE BLD GHB EST-MCNC: 151 MG/DL
GFR SERPL CREATININE-BSD FRML MDRD: 7 ML/MIN/1.73SQ M
GLUCOSE SERPL-MCNC: 103 MG/DL (ref 65–140)
GLUCOSE SERPL-MCNC: 108 MG/DL (ref 65–99)
GLUCOSE SERPL-MCNC: 138 MG/DL (ref 65–140)
GLUCOSE SERPL-MCNC: 197 MG/DL (ref 65–140)
GLUCOSE SERPL-MCNC: 97 MG/DL (ref 65–140)
HBA1C MFR BLD: 6.9 %
HCT VFR BLD AUTO: 33.5 % (ref 42–47)
HDLC SERPL-MCNC: 30 MG/DL
HGB BLD-MCNC: 11.1 G/DL (ref 14–18)
LDLC SERPL CALC-MCNC: 34 MG/DL (ref 0–100)
LYMPHOCYTES # BLD AUTO: 1.1 THOUSANDS/ΜL (ref 0.6–4.47)
LYMPHOCYTES NFR BLD AUTO: 19 % (ref 21–51)
MCH RBC QN AUTO: 29.3 PG (ref 26–34)
MCHC RBC AUTO-ENTMCNC: 33 G/DL (ref 31–37)
MCV RBC AUTO: 89 FL (ref 81–99)
MONOCYTES # BLD AUTO: 1 THOUSAND/ΜL (ref 0.17–1.22)
MONOCYTES NFR BLD AUTO: 16 % (ref 2–12)
NEUTROPHILS # BLD AUTO: 3.8 THOUSANDS/ΜL (ref 1.4–6.5)
NEUTS SEG NFR BLD AUTO: 63 % (ref 42–75)
NONHDLC SERPL-MCNC: 49 MG/DL
OVALOCYTES BLD QL SMEAR: PRESENT
PLATELET # BLD AUTO: 87 THOUSANDS/UL (ref 149–390)
PLATELET BLD QL SMEAR: ABNORMAL
PMV BLD AUTO: 8.7 FL (ref 8.6–11.7)
POTASSIUM SERPL-SCNC: 4.7 MMOL/L (ref 3.5–5.5)
RBC # BLD AUTO: 3.77 MILLION/UL (ref 4.3–5.9)
RBC MORPH BLD: ABNORMAL
SODIUM SERPL-SCNC: 135 MMOL/L (ref 134–143)
TRIGL SERPL-MCNC: 76 MG/DL (ref 44–166)
TROPONIN I SERPL-MCNC: 0.41 NG/ML
WBC # BLD AUTO: 6 THOUSAND/UL (ref 4.8–10.8)

## 2021-01-19 PROCEDURE — 80048 BASIC METABOLIC PNL TOTAL CA: CPT | Performed by: STUDENT IN AN ORGANIZED HEALTH CARE EDUCATION/TRAINING PROGRAM

## 2021-01-19 PROCEDURE — 87081 CULTURE SCREEN ONLY: CPT | Performed by: HOSPITALIST

## 2021-01-19 PROCEDURE — 85025 COMPLETE CBC W/AUTO DIFF WBC: CPT | Performed by: STUDENT IN AN ORGANIZED HEALTH CARE EDUCATION/TRAINING PROGRAM

## 2021-01-19 PROCEDURE — 93306 TTE W/DOPPLER COMPLETE: CPT | Performed by: INTERNAL MEDICINE

## 2021-01-19 PROCEDURE — 82948 REAGENT STRIP/BLOOD GLUCOSE: CPT

## 2021-01-19 PROCEDURE — 99223 1ST HOSP IP/OBS HIGH 75: CPT | Performed by: INTERNAL MEDICINE

## 2021-01-19 PROCEDURE — 83036 HEMOGLOBIN GLYCOSYLATED A1C: CPT | Performed by: STUDENT IN AN ORGANIZED HEALTH CARE EDUCATION/TRAINING PROGRAM

## 2021-01-19 PROCEDURE — 99222 1ST HOSP IP/OBS MODERATE 55: CPT | Performed by: INTERNAL MEDICINE

## 2021-01-19 PROCEDURE — 99232 SBSQ HOSP IP/OBS MODERATE 35: CPT | Performed by: HOSPITALIST

## 2021-01-19 PROCEDURE — 93306 TTE W/DOPPLER COMPLETE: CPT

## 2021-01-19 PROCEDURE — 80061 LIPID PANEL: CPT | Performed by: STUDENT IN AN ORGANIZED HEALTH CARE EDUCATION/TRAINING PROGRAM

## 2021-01-19 PROCEDURE — 84484 ASSAY OF TROPONIN QUANT: CPT | Performed by: PHYSICIAN ASSISTANT

## 2021-01-19 RX ORDER — INSULIN GLARGINE 100 [IU]/ML
6 INJECTION, SOLUTION SUBCUTANEOUS
Status: DISCONTINUED | OUTPATIENT
Start: 2021-01-19 | End: 2021-01-20 | Stop reason: HOSPADM

## 2021-01-19 RX ADMIN — HYDRALAZINE HYDROCHLORIDE 50 MG: 25 TABLET, FILM COATED ORAL at 22:16

## 2021-01-19 RX ADMIN — INSULIN GLARGINE 6 UNITS: 100 INJECTION, SOLUTION SUBCUTANEOUS at 22:14

## 2021-01-19 RX ADMIN — HEPARIN SODIUM 5000 UNITS: 5000 INJECTION INTRAVENOUS; SUBCUTANEOUS at 14:10

## 2021-01-19 RX ADMIN — AMLODIPINE BESYLATE 10 MG: 10 TABLET ORAL at 08:27

## 2021-01-19 RX ADMIN — SEVELAMER HYDROCHLORIDE 1600 MG: 800 TABLET, FILM COATED PARENTERAL at 16:49

## 2021-01-19 RX ADMIN — CARVEDILOL 25 MG: 25 TABLET, FILM COATED ORAL at 16:49

## 2021-01-19 RX ADMIN — SEVELAMER HYDROCHLORIDE 1600 MG: 800 TABLET, FILM COATED PARENTERAL at 11:34

## 2021-01-19 RX ADMIN — HEPARIN SODIUM 5000 UNITS: 5000 INJECTION INTRAVENOUS; SUBCUTANEOUS at 05:05

## 2021-01-19 RX ADMIN — LISINOPRIL 40 MG: 20 TABLET ORAL at 08:28

## 2021-01-19 RX ADMIN — ASPIRIN 81 MG: 81 TABLET, COATED ORAL at 08:27

## 2021-01-19 RX ADMIN — ATORVASTATIN CALCIUM 40 MG: 40 TABLET, FILM COATED ORAL at 08:28

## 2021-01-19 RX ADMIN — HYDRALAZINE HYDROCHLORIDE 50 MG: 25 TABLET, FILM COATED ORAL at 14:10

## 2021-01-19 RX ADMIN — SEVELAMER HYDROCHLORIDE 1600 MG: 800 TABLET, FILM COATED PARENTERAL at 07:45

## 2021-01-19 RX ADMIN — INSULIN LISPRO 1 UNITS: 100 INJECTION, SOLUTION INTRAVENOUS; SUBCUTANEOUS at 11:34

## 2021-01-19 RX ADMIN — HYDRALAZINE HYDROCHLORIDE 50 MG: 25 TABLET, FILM COATED ORAL at 05:05

## 2021-01-19 RX ADMIN — HEPARIN SODIUM 5000 UNITS: 5000 INJECTION INTRAVENOUS; SUBCUTANEOUS at 22:15

## 2021-01-19 RX ADMIN — CARVEDILOL 25 MG: 25 TABLET, FILM COATED ORAL at 07:45

## 2021-01-19 RX ADMIN — DOXAZOSIN 4 MG: 2 TABLET ORAL at 08:28

## 2021-01-19 NOTE — NURSING NOTE
Patient resting comfortably in bed, NSR on monitor  Verbalizing needs  Call bell in reach  Set up for supper

## 2021-01-19 NOTE — NURSING NOTE
Resting with HOB at [address]  No c/o chest discomfort, lightheadedness or dizziness  O2 sats remaining in mid 90's  Will receive dialysis tomorrow after stress test  Breath sounds remaining clear  No distress for now  Rails up times 3 ; callbell in reach

## 2021-01-19 NOTE — UTILIZATION REVIEW
Initial Clinical Review    Admission: Date/Time/Statement:   Admission Orders (From admission, onward)     Ordered        01/18/21 1731  Inpatient Admission  Once                   Orders Placed This Encounter   Procedures    Inpatient Admission     Standing Status:   Standing     Number of Occurrences:   1     Order Specific Question:   Level of Care     Answer:   Critical Care [15]     Order Specific Question:   Estimated length of stay     Answer:   More than 2 Midnights     Order Specific Question:   Certification     Answer:   I certify that inpatient services are medically necessary for this patient for a duration of greater than two midnights  See H&P and MD Progress Notes for additional information about the patient's course of treatment  ED Arrival Information     Expected Arrival Acuity Means of Arrival Escorted By Service Admission Type    - 1/18/2021 15:38 Urgent Walk-In Spouse Critical Care/ICU Urgent    Arrival Complaint    chest tightness has cardiac history        Chief Complaint   Patient presents with    Chest Pain     STARTED LAST NIGHT , FEELS BETTER AFTER VOMITING THIS AM     Assessment/Plan:   80 yom to er from home c/o substernal chest pain when he woke up & again at dialysis, with associated N&V  Hx ESRD MWF, CAD, HTN and DM2  Presents hypertensive  Admission work-up showing +troponin, elevated d-dimer  Admitted to inpatient status for chest pain, placed on telemetry, cardio & renal consulted  1/19:  Tmax 99 5  Chest pain with elevated troponin likely secondary to the initial hypertensive urgency versus unstable angina the setting having history of CAD, diabetes and end-stage renal disease  Per cardio:  Type II NSTEMI due to accel HTN with underlying ESRD, known (99%) D1 and (70%) small RPDA disease to PTCI in 2017  Echo no RWMA, Mod LVH  High risk patient, scheduled for inpatient LNST    Per renal:  1  ESRD hemodialysis dependent at [facility] MWF    Last treatment (full treatment) yesterday  He left at 1 5 kg above EDW of 94-95  Etiology of ESRD hypertensive nephrosclerosis  He has been on dialysis for 5 years  Will arrange a treatment tomorrow after his stress test in patient  2   Access  LUE AVF - functional    ED Triage Vitals   Temperature Pulse Respirations Blood Pressure SpO2   01/18/21 1558 01/18/21 1554 01/18/21 1554 01/18/21 1554 01/18/21 1554   (!) 96 9 °F (36 1 °C) 68 18 (!) 218/94 100 %      Temp Source Heart Rate Source Patient Position - Orthostatic VS BP Location FiO2 (%)   01/18/21 1558 01/18/21 1554 01/18/21 1836 01/18/21 1554 --   Tympanic Monitor Sitting Left arm       Pain Score       01/18/21 1554       No Pain          Wt Readings from Last 1 Encounters:   01/18/21 96 4 kg (212 lb 8 4 oz)     Additional Vital Signs:   01/18/21 1945    69  25  Abnormal   198/91  Abnormal   131  97 %       01/18/21 1930    67  22  205/141  Abnormal   167  97 %       01/18/21 1915    67  22  206/88  Abnormal   126  96 %  None (Room air)  Lying   01/18/21 1900    65  28  Abnormal   181/92  Abnormal   125  96 %       01/18/21 1845    63  34  Abnormal   185/90  Abnormal   129  97 %  None (Room air)  Lying   01/18/21 1836  97 8 °F (36 6 °C)  72  20  190/84  Abnormal   121  98 %  None (Room air)  Sitting   01/18/21 1558  96 9 °F (36 1 °C)Abnormal                  01/18/21 1554    68  18  218/94  Abnormal     100 %  None (Room air)       Pertinent Labs/Diagnostic Test Results:   Results from last 7 days   Lab Units 01/19/21  0503 01/18/21  1622   WBC Thousand/uL 6 00 5 70   HEMOGLOBIN g/dL 11 1* 11 8*   HEMATOCRIT % 33 5* 36 9*   PLATELETS Thousands/uL 87* 87*   NEUTROS ABS Thousands/µL 3 80 3 80     Results from last 7 days   Lab Units 01/19/21  0504 01/18/21  1622   SODIUM mmol/L 135 135   POTASSIUM mmol/L 4 7 3 8   CHLORIDE mmol/L 94* 92*   CO2 mmol/L 31 36*   ANION GAP mmol/L 10 7   BUN mg/dL 35* 23   CREATININE mg/dL 7 84* 5 43*   EGFR ml/min/1 73sq m 7 11 CALCIUM mg/dL 9 4 9 9     Results from last 7 days   Lab Units 01/18/21  1622   AST U/L 10*   ALT U/L 6*   ALK PHOS U/L 36*   TOTAL PROTEIN g/dL 8 0   ALBUMIN g/dL 4 2   TOTAL BILIRUBIN mg/dL 0 40     Results from last 7 days   Lab Units 01/19/21  0509   POC GLUCOSE mg/dl 97     Results from last 7 days   Lab Units 01/19/21  0504 01/18/21  1622   GLUCOSE RANDOM mg/dL 108* 120*     Results from last 7 days   Lab Units 01/19/21  0503 01/18/21  2204 01/18/21  1858 01/18/21  1622   TROPONIN I ng/mL 0 41* 0 23* 0 17* 0 13*     Results from last 7 days   Lab Units 01/18/21  1622   D-DIMER QUANTITATIVE ug/ml FEU 1 00*     1/18  Cxr=  No acute cardiopulmonary disease    Ekg=   Normal sinus rhythm    Past Medical History:   Diagnosis Date    Coronary artery disease     Diabetes mellitus ([facility] )     Hypertension     Renal disorder      Admitting Diagnosis: Chest pain [R07 9]  Elevated troponin [R77 8]  [facility] [I21 4]  Age/Sex: 80 y o  male  Admission Orders:  Cont pulse ox  accuchecks with coverage scale  Telemetry  Consult cardio  Scd/foot pumps  Consult renal  Hemodialysis 3x/wk    Scheduled Medications:  amLODIPine, 10 mg, Oral, Daily  aspirin, 81 mg, Oral, Daily  atorvastatin, 40 mg, Oral, Daily  carvedilol, 25 mg, Oral, BID With Meals  doxazosin, 4 mg, Oral, Daily  heparin (porcine), 5,000 Units, Subcutaneous, Q8H ETHAN  hydrALAZINE, 50 mg, Oral, Q8H ETHAN  insulin lispro, 1-5 Units, Subcutaneous, TID AC  lisinopril, 40 mg, Oral, Daily  sevelamer, 1,600 mg, Oral, TID With Meals    PRN Meds:  acetaminophen, 650 mg, Oral, Q6H PRN  hydrALAZINE, 10 mg, Intravenous, Q6H PRN  nitroglycerin, 0 4 mg, Sublingual, Q5 Min PRN  ondansetron, 4 mg, Intravenous, Q6H PRN  sodium chloride (PF), 3 mL, Intravenous, Q1H PRN    Network Utilization Review Department  ATTENTION: Please call with any questions or concerns to [phone] and carefully listen to the prompts so that you are directed to the right person  All voicemails are confidential   Higinio Stroud all requests for admission clinical reviews, approved or denied determinations and any other requests to dedicated fax number below belonging to the campus where the patient is receiving treatment   List of dedicated fax numbers for the Facilities:  [address] [place] [address] DENIALS (Administrative/Medical Necessity) [phone]   [address] (Maternity/NICU/Pediatrics) [phone]   [address] Juliet Carrillo 3714 (  John Soriano [facility]" 103) [address] [place] [address] Sonia Valenzuela [address] [place], [place] [address] [phone]

## 2021-01-19 NOTE — CONSULTS
Consult- Naseem Shepherd 1950, 80 y o  male MRN: [id number]    Unit/Bed#: ICU 01 Encounter: [id number]    Primary Care Provider: Joshua Hamilton MD   Date and time admitted to hospital: 1/18/2021  3:51 PM      Inpatient consult to Cardiology  Consult performed by: LAN Whittington  Consult ordered by: Jt Rai MD          Elevated troponin  Assessment & Plan  Likely related to hypertensive urgency on admission  Pt with ESRD as well    Essential hypertension  Assessment & Plan  BP markedly elevated on admission   Improved at this time    Continue Norvasc, Coreg, Hydralazine, Lisinopril     ESRD (end stage renal disease) [facility])  Assessment & Plan  Lab Results   Component Value Date    EGFR 7 01/19/2021    EGFR 11 01/18/2021    EGFR 7 03/19/2020    CREATININE 7 84 (H) 01/19/2021    CREATININE 5 43 (H) 01/18/2021    CREATININE 7 64 (H) 03/19/2020     Continue HD, M/W/F per renal    * Chest pain, unspecified  Assessment & Plan  Chest pain, resolved   Known CAD (per pt report)    Trop mildly elevated, no ischemic changes on EKG, echo without wall motion abnormalities    Plan for in-patient nuclear stress 1/20/2021        Other summary comments:   Chest pain free  Was likely related to hypertensive urgency on admission  Echo without WMA so will plan for inpatient nuclear stress test tomorrow  Continue current BP medications    OK for M/S    Outpatient Cardiologist:  none     HPI: Naseem Shepherd is a 80y o  year old male who presented with chest pain  He states the discomfort started out on Sunday evening and was persistent until he went to dialysis on Monday  He describes it as a mild substernal discomfort  It was less severe than what he experienced prior to an NSTEMI in 2012  There was no radiation or associated symptoms  He is currently chest pain free  Review of records available in [facility], show that he had a NSTEMI in 9/2012 with no intervention at that time    He underwent stress testing for renal transplant evaluation in 2017  The nuclear stress testing was abnormal and a cath was performed  This revealed a 70% lesion of a small R PDA and a 99% stenosis of D1 (previously noted on the cath in )  Neither lesion was amenable to PCI  He has been medically treated since that time  An echocardiogram was performed this morning; no wall motion abnormalities were noted  Other medical history is significant for htn, ESRD on HD since 2016, HLD and DM2  [id number] NSR      MOST  RECENT CARDIAC IMAGING:   Echo 2021 EF 75%, no WMA, moderate LVH  Very mild dynamic obstruction at rest, with a peak velocity of 1 5 cm/s    Review of Systems: a 10 point review of systems was conducted and is negative except for as mentioned in the HPI or as below          Historical Information   Past Medical History:   Diagnosis Date    Coronary artery disease     Diabetes mellitus ([address] )     Hypertension     Renal disorder      Past Surgical History:   Procedure Laterality Date    DIALYSIS FISTULA CREATION       Social History     Substance and Sexual Activity   Alcohol Use Not Currently     Social History     Substance and Sexual Activity   Drug Use Never     Social History     Tobacco Use   Smoking Status Former Smoker   Smokeless Tobacco Never Used   Tobacco Comment    STATES QUIT 21 YEARS AGO       Family History:   Non-contributory    Meds/Allergies   all current active meds have been reviewed  Medications Prior to Admission   Medication    amLODIPine (NORVASC) 2 5 mg tablet    aspirin (Aspirin 81) 81 mg EC tablet    atorvastatin (LIPITOR) 40 mg tablet    CALCIUM ACETATE-MAGNESIUM CARB PO    carvedilol (COREG) 25 mg tablet    doxazosin (CARDURA) 4 mg tablet    insulin aspart (NovoLOG FlexPen) 100 UNIT/ML injection pen    insulin glargine (LANTUS SOLOSTAR) 100 units/mL injection pen    lisinopril (ZESTRIL) 40 mg tablet    sevelamer carbonate (Renvela) 800 mg tablet       No Known Allergies    Objective   Vitals: Blood pressure 141/65, pulse 78, temperature 98 6 °F (37 °C), temperature source Tympanic, resp  rate 22, height 6' (1 829 m), weight 96 4 kg (212 lb 8 4 oz), SpO2 95 %  , Body mass index is 28 82 kg/m² ,   Orthostatic Blood Pressures      Most Recent Value   Blood Pressure  141/65 filed at 01/19/2021 0827   Patient Position - Orthostatic VS  Lying filed at 01/19/2021 4134          Systolic (15VDV), VDF:036 , Min:141 , GFF:231     Diastolic (30XUN), YZI:01, Min:61, Max:141    Physical Exam:    General:  Normal appearance in no distress  Eyes:  Anicteric  Oral mucosa:  Moist   Neck:  No JVD  Carotid upstrokes are brisk without bruits  No masses  Chest:  Clear to auscultation and percussion  Cardiac:  No palpable PMI  Normal S1 and S2  No murmur gallop or rub  Abdomen:  Soft and nontender  No palpable organomegaly or aortic enlargement  Extremities:  No peripheral edema  Musculoskeletal:  Symmetric  Vascular:  Pedal pulses are intact  L forearm bruit and thrill  Neuro:  Grossly symmetric  Psych:  Alert and oriented x3          Lab Results:     Troponins:   Results from last 7 days   Lab Units 01/19/21  0503 01/18/21  2204 01/18/21  1858   TROPONIN I ng/mL 0 41* 0 23* 0 17*     BNP:       CBC :   Results from last 7 days   Lab Units 01/19/21  0503 01/18/21  1622   WBC Thousand/uL 6 00 5 70   HEMOGLOBIN g/dL 11 1* 11 8*   HEMATOCRIT % 33 5* 36 9*   MCV fL 89 89   PLATELETS Thousands/uL 87* 87*     TSH:     CMP:   Results from last 7 days   Lab Units 01/19/21  0504 01/18/21  1622   POTASSIUM mmol/L 4 7 3 8   CHLORIDE mmol/L 94* 92*   CO2 mmol/L 31 36*   BUN mg/dL 35* 23   CREATININE mg/dL 7 84* 5 43*   AST U/L  --  10*   ALT U/L  --  6*   EGFR ml/min/1 73sq m 7 11     Lipid Profile:   Results from last 7 days   Lab Units 01/19/21  0504   TRIGLYCERIDES mg/dL 76   HDL mg/dL 30*     Coags:

## 2021-01-19 NOTE — NURSING NOTE
Pt admit to icu room 1  Pt was able to ambulate without difficulty to the bed  Full assessment complete and documented  Pt attached to all monitors and oriented to room and call bell  pts bp high  Notified hospitalist orders received for meds and pt medicated per orders  Will cont to monitor

## 2021-01-19 NOTE — ASSESSMENT & PLAN NOTE
BP markedly elevated on admission   Improved at this time    Continue Norvasc, Coreg, Hydralazine, Lisinopril

## 2021-01-19 NOTE — PLAN OF CARE
Problem: PAIN - ADULT  Goal: Verbalizes/displays adequate comfort level or baseline comfort level  Description: Interventions:  - Encourage patient to monitor pain and request assistance  - Assess pain using appropriate pain scale  - Administer analgesics based on type and severity of pain and evaluate response  - Implement non-pharmacological measures as appropriate and evaluate response  - Consider cultural and social influences on pain and pain management  - Notify physician/advanced practitioner if interventions unsuccessful or patient reports new pain  Outcome: Progressing     Problem: INFECTION - ADULT  Goal: Absence or prevention of progression during hospitalization  Description: INTERVENTIONS:  - Assess and monitor for signs and symptoms of infection  - Monitor lab/diagnostic results  - Monitor all insertion sites, i e  indwelling lines, tubes, and drains  - Monitor endotracheal if appropriate and nasal secretions for changes in amount and color  - [place] appropriate cooling/warming therapies per order  - Administer medications as ordered  - Instruct and encourage patient and family to use good hand hygiene technique  - Identify and instruct in appropriate isolation precautions for identified infection/condition  Outcome: Progressing     Problem: SAFETY ADULT  Goal: Patient will remain free of falls  Description: INTERVENTIONS:  - Assess patient frequently for physical needs  -  Identify cognitive and physical deficits and behaviors that affect risk of falls    -  [place] fall precautions as indicated by assessment   - Educate patient/family on patient safety including physical limitations  - Instruct patient to call for assistance with activity based on assessment  - Modify environment to reduce risk of injury  - Consider OT/PT consult to assist with strengthening/mobility  Outcome: Progressing  Goal: Maintain or return to baseline ADL function  Description: INTERVENTIONS:  -  Assess patient's ability to carry out ADLs; assess patient's baseline for ADL function and identify physical deficits which impact ability to perform ADLs (bathing, care of mouth/teeth, toileting, grooming, dressing, etc )  - Assess/evaluate cause of self-care deficits   - Assess range of motion  - Assess patient's mobility; develop plan if impaired  - Assess patient's need for assistive devices and provide as appropriate  - Encourage maximum independence but intervene and supervise when necessary  - Involve family in performance of ADLs  - Assess for home care needs following discharge   - Consider OT consult to assist with ADL evaluation and planning for discharge  - Provide patient education as appropriate  Outcome: Progressing  Goal: Maintain or return mobility status to optimal level  Description: INTERVENTIONS:  - Assess patient's baseline mobility status (ambulation, transfers, stairs, etc )    - Identify cognitive and physical deficits and behaviors that affect mobility  - Identify mobility aids required to assist with transfers and/or ambulation (gait belt, sit-to-stand, lift, walker, cane, etc )  - [place] fall precautions as indicated by assessment  - Record patient progress and toleration of activity level on Mobility SBAR; progress patient to next Phase/Stage  - Instruct patient to call for assistance with activity based on assessment  - Consider rehabilitation consult to assist with strengthening/weightbearing, etc   Outcome: Progressing     Problem: DISCHARGE PLANNING  Goal: Discharge to home or other facility with appropriate resources  Description: INTERVENTIONS:  - Identify barriers to discharge w/patient and caregiver  - Arrange for needed discharge resources and transportation as appropriate  - Identify discharge learning needs (meds, wound care, etc )  - Arrange for interpretive services to assist at discharge as needed  - Refer to Case Management Department for coordinating discharge planning if the patient needs post-hospital services based on physician/advanced practitioner order or complex needs related to functional status, cognitive ability, or social support system  Outcome: Progressing     Problem: Knowledge Deficit  Goal: Patient/family/caregiver demonstrates understanding of disease process, treatment plan, medications, and discharge instructions  Description: Complete learning assessment and assess knowledge base    Interventions:  - Provide teaching at level of understanding  - Provide teaching via preferred learning methods  Outcome: Progressing     Problem: CARDIOVASCULAR - ADULT  Goal: Maintains optimal cardiac output and hemodynamic stability  Description: INTERVENTIONS:  - Monitor I/O, vital signs and rhythm  - Monitor for S/S and trends of decreased cardiac output  - Administer and titrate ordered vasoactive medications to optimize hemodynamic stability  - Assess quality of pulses, skin color and temperature  - Assess for signs of decreased coronary artery perfusion  - Instruct patient to report change in severity of symptoms  Outcome: Progressing     Problem: SKIN/TISSUE INTEGRITY - ADULT  Goal: Skin integrity remains intact  Description: INTERVENTIONS  - Identify patients at risk for skin breakdown  - Assess and monitor skin integrity  - Assess and monitor nutrition and hydration status  - Monitor labs (i e  albumin)  - Assess for incontinence   - Turn and reposition patient  - Assist with mobility/ambulation  - Relieve pressure over bony prominences  - Avoid friction and shearing  - Provide appropriate hygiene as needed including keeping skin clean and dry  - Evaluate need for skin moisturizer/barrier cream  - Collaborate with interdisciplinary team (i e  Nutrition, Rehabilitation, etc )   - Patient/family teaching  Outcome: Progressing     Problem: HEMATOLOGIC - ADULT  Goal: Maintains hematologic stability  Description: INTERVENTIONS  - Assess for signs and symptoms of bleeding or hemorrhage  - Monitor labs  - Administer supportive blood products/factors as ordered and appropriate  Outcome: Progressing     Problem: Nutrition/Hydration-ADULT  Goal: Nutrient/Hydration intake appropriate for improving, restoring or maintaining nutritional needs  Description: Monitor and assess patient's nutrition/hydration status for malnutrition  Collaborate with interdisciplinary team and initiate plan and interventions as ordered  Monitor patient's weight and dietary intake as ordered or per policy  Utilize nutrition screening tool and intervene as necessary  Determine patient's food preferences and provide high-protein, high-caloric foods as appropriate  INTERVENTIONS:  - Monitor oral intake, urinary output, labs, and treatment plans  - Assess nutrition and hydration status and recommend course of action  - Evaluate amount of meals eaten  - Assist patient with eating if necessary   - Allow adequate time for meals  - Recommend/ encourage appropriate diets, oral nutritional supplements, and vitamin/mineral supplements  - Order, calculate, and assess calorie counts as needed  - Recommend, monitor, and adjust tube feedings and TPN/PPN based on assessed needs  - Assess need for intravenous fluids  - Provide specific nutrition/hydration education as appropriate  - Include patient/family/caregiver in decisions related to nutrition  Outcome: Progressing     Problem: Potential for Falls  Goal: Patient will remain free of falls  Description: INTERVENTIONS:  - Assess patient frequently for physical needs  -  Identify cognitive and physical deficits and behaviors that affect risk of falls    -  [place] fall precautions as indicated by assessment   - Educate patient/family on patient safety including physical limitations  - Instruct patient to call for assistance with activity based on assessment  - Modify environment to reduce risk of injury  - Consider OT/PT consult to assist with strengthening/mobility  Outcome: Progressing

## 2021-01-19 NOTE — ASSESSMENT & PLAN NOTE
· Troponin trend 0 13> 0 17> 0 23> 0 41  · Most likely secondary to hypertensive urgency in the setting having end-stage renal disease hemodialysis  · Type 2 non STEMI  · Further management as per Cardiology

## 2021-01-19 NOTE — CASE MANAGEMENT
Evaluated the pt at the bedside  Pt was admitted to the hospital for CP  Explained the role of CM and the options of discharge planning with the pt  Pt states he lives in a 2 story home with his wife  Pt has 2 ROSANNE and 13 steps to the bedroom  Pt has a bathroom on both levels  Pt states he is independent in ADL's, IADl's and can drive  Pt 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  Pt has no DME at home  Pt PCP is Dr Garry Campa  Pt gets his medications at [facility] in [place]  Pt indicated he would have no needs upon discharge  Pt had a cardiology consult  Pt will be having a stress test tomorrow  Pt reports his wife will transport him home upon discharge  Patient/caregiver received discharge checklist   Content reviewed  Patient/caregiver encouraged to participate in discharge plan of care prior to discharge home  CM reviewed d/c planning process including the following: identifying help at home, patient preference for d/c planning needs, availability of treatment team to discuss questions or concerns patient and/or family may have regarding understanding medications and recognizing signs and symptoms once discharged  CM also encouraged patient to follow up with all recommended appointments after discharge  Patient advised of importance for patient and family to participate in managing patients medical well being

## 2021-01-19 NOTE — CONSULTS
Consultation - Nephrology   Abundio Singh 80 y o  male MRN: [id number]  Unit/Bed#: ICU 01 Encounter: [id number]      A/P:  1  ESRD hemodialysis dependent at [address] [facility] MWF  Last treatment (full treatment) yesterday  He left at 1 5 kg above EDW of 94-95  Etiology of ESRD hypertensive nephrosclerosis  He has been on dialysis for 5 years  Will arrange a treatment tomorrow after his stress test in patient  2  Access  LUE AVF - functional  3  Anemia  Hemoglobin 11 1 g - no indication for erythropoietin  4  Acid base  Bicarbonate excellent at 31 mmol/l  5  Bone Mineral  No phosphorus available  He is followed at his outpatient clinch  6  Chest pain  Had mid sternal chest pain with mild dyspnea ssociated with hypertensive urgency  He will  Have a stress test tomorrow  He says he monitors his blood pressure at home daily and it does drop with medications  He is not on outpatient diuretic therapy on non dialysis days which might be helpful           Thank you for allowing us to participate in the care of your patient  Please feel free to contact us regarding the care of this patient, or any other questions/concerns that may be applicable  Patient Active Problem List   Diagnosis    Chest pain, unspecified    ESRD (end stage renal disease) ([facility] )    Essential hypertension    Type 2 diabetes mellitus without complication, with long-term current use of insulin (HCC)    Mixed hyperlipidemia    Elevated troponin       History of Present Illness   Physician Requesting Consult: Inessa Sprague MD  Reason for Consult / Principal Problem: ESRD  Hx and PE limited by:   HPI: Abundio Singh is a 80y o  year old male who presents with chest pain starting on Sunday night  It was described as midsternal   He was not sure but was cardiac  Went to dialysis on Monday at 10:00 a m  He told the nurse he did have chest discomfort but it was better  Was placed on the machine and the chest pain did audrey    Was 4 kilos over his dry weight of 94-95 kilos  However, it was the weekend he does gain more  After his treatment his wife took him to the emergency department as he still had chest discomfort  He has a history of hypertensive nephrosclerosis and type 2 diabetes mellitus  He has been on dialysis for 5 years  Still urinates but is not on the diuretic therapy on non dialysis day    History obtained from chart review and the patient    Review of Systems - Negative except as mentioned above in HPI, more specifics as mentioned below  Review of Systems - General ROS: negative  Ophthalmic ROS: negative  ENT ROS: negative  Respiratory ROS: positive for - shortness of breath  Cardiovascular ROS: positive for - chest pain and dyspnea on exertion  Gastrointestinal ROS: no abdominal pain, change in bowel habits, or black or bloody stools  Genito-Urinary ROS: no dysuria, trouble voiding, or hematuria  Musculoskeletal ROS: negative  Neurological ROS: no TIA or stroke symptoms  Dermatological ROS: negative    Historical Information   Past Medical History:   Diagnosis Date    Coronary artery disease     Diabetes mellitus ([facility] [address] )     Hypertension     Renal disorder      Past Surgical History:   Procedure Laterality Date    DIALYSIS FISTULA CREATION       Social History   Social History     Substance and Sexual Activity   Alcohol Use Not Currently     Social History     Substance and Sexual Activity   Drug Use Never     Social History     Tobacco Use   Smoking Status Former Smoker   Smokeless Tobacco Never Used   Tobacco Comment    STATES QUIT 20 YEARS AGO     History reviewed  No pertinent family history      Meds/Allergies   all current active meds have been reviewed, current meds:   Current Facility-Administered Medications   Medication Dose Route Frequency    acetaminophen (TYLENOL) tablet 650 mg  650 mg Oral Q6H PRN    amLODIPine (NORVASC) tablet 10 mg  10 mg Oral Daily    aspirin (ECOTRIN LOW STRENGTH) EC tablet 81 mg  81 mg Oral Daily    atorvastatin (LIPITOR) tablet 40 mg  40 mg Oral Daily    carvedilol (COREG) tablet 25 mg  25 mg Oral BID With Meals    doxazosin (CARDURA) tablet 4 mg  4 mg Oral Daily    heparin (porcine) subcutaneous injection 5,000 Units  5,000 Units Subcutaneous Q8H [address]    hydrALAZINE (APRESOLINE) injection 10 mg  10 mg Intravenous Q6H PRN    hydrALAZINE (APRESOLINE) tablet 50 mg  50 mg Oral Q8H [address]    insulin glargine (LANTUS) subcutaneous injection 6 Units 0 06 mL  6 Units Subcutaneous HS    insulin lispro (HumaLOG) 100 units/mL subcutaneous injection 1-5 Units  1-5 Units Subcutaneous TID AC    lisinopril (ZESTRIL) tablet 40 mg  40 mg Oral Daily    nitroglycerin (NITROSTAT) SL tablet 0 4 mg  0 4 mg Sublingual Q5 Min PRN    ondansetron (ZOFRAN) injection 4 mg  4 mg Intravenous Q6H PRN    sevelamer (RENAGEL) tablet 1,600 mg  1,600 mg Oral TID With Meals    sodium chloride (PF) 0 9 % injection 3 mL  3 mL Intravenous Q1H PRN    and PTA meds:    Medications Prior to Admission   Medication    amLODIPine (NORVASC) 2 5 mg tablet    aspirin (Aspirin 81) 81 mg EC tablet    atorvastatin (LIPITOR) 40 mg tablet    CALCIUM ACETATE-MAGNESIUM CARB PO    carvedilol (COREG) 25 mg tablet    doxazosin (CARDURA) 4 mg tablet    insulin aspart (NovoLOG FlexPen) 100 UNIT/ML injection pen    insulin glargine (LANTUS SOLOSTAR) 100 units/mL injection pen    lisinopril (ZESTRIL) 40 mg tablet    sevelamer carbonate (Renvela) 800 mg tablet         No Known Allergies    Objective     Intake/Output Summary (Last 24 hours) at 1/19/2021 1216  Last data filed at 1/19/2021 1156  Gross per 24 hour   Intake 360 ml   Output 200 ml   Net 160 ml       Invasive Devices:        Physical Exam      I/O last 3 completed shifts:  In: -   Out: 100 [Urine:100]    Vitals:    01/19/21 1100   BP: 153/68   Pulse: 65   Resp: 16   Temp:    SpO2: 95%       Gen: in NAD, Alert/Awake  HEENT: no sclerous icterus, MMM, neck supple  CV: +S1/S2, RRR  Lungs: CTA bilaterally  Abd: +BS, soft NT/ND  Ext: all four extremities are warm with functional fistula left forearm  Skin: no rashes noted  Neuro: CN II-XII intact    Current Weight: Weight - Scale: 96 4 kg (212 lb 8 4 oz)  First Weight: Weight - Scale: 95 3 kg (210 lb)    Lab Results:  I have personally reviewed pertinent labs  CBC:   Lab Results   Component Value Date    WBC 6 00 01/19/2021    HGB 11 1 (L) 01/19/2021    HCT 33 5 (L) 01/19/2021    MCV 89 01/19/2021    PLT 87 (L) 01/19/2021    MCH 29 3 01/19/2021    MCHC 33 0 01/19/2021    RDW 15 4 (H) 01/19/2021    MPV 8 7 01/19/2021     CMP:   Lab Results   Component Value Date    K 4 7 01/19/2021    CL 94 (L) 01/19/2021    CO2 31 01/19/2021    BUN 35 (H) 01/19/2021    CREATININE 7 84 (H) 01/19/2021    CALCIUM 9 4 01/19/2021    AST 10 (L) 01/18/2021    ALT 6 (L) 01/18/2021    ALKPHOS 36 (L) 01/18/2021    EGFR 7 01/19/2021     Phosphorus: No results found for: PHOS  Magnesium: No results found for: MG  Urinalysis: No results found for: COLORU, CLARITYU, SPECGRAV, PHUR, LEUKOCYTESUR, NITRITE, PROTEINUA, GLUCOSEU, KETONESU, BILIRUBINUR, BLOODU  Ionized Calcium: No results found for: CAION  Coagulation: No results found for: PT, INR, APTT  Troponin:   Lab Results   Component Value Date    TROPONINI 0 41 (H) 01/19/2021     ABG: No results found for: PHART, KYP0BYQ, PO2ART, SIQ6KGM, D1VJDODF, BEART, SOURCE    Results from last 7 days   Lab Units 01/19/21  0504 01/18/21  1622   POTASSIUM mmol/L 4 7 3 8   CHLORIDE mmol/L 94* 92*   CO2 mmol/L 31 36*   BUN mg/dL 35* 23   CREATININE mg/dL 7 84* 5 43*   CALCIUM mg/dL 9 4 9 9   ALK PHOS U/L  --  36*   ALT U/L  --  6*   AST U/L  --  10*       Radiology review:  Procedure: X-ray Chest 1 View Portable    Result Date: 1/19/2021  Narrative: CHEST INDICATION:   chest pain   COMPARISON:  9/4/2012 EXAM PERFORMED/VIEWS:  XR CHEST PORTABLE FINDINGS:  Monitoring leads and clips project over the chest  Cardiomediastinal silhouette appears unremarkable  The lungs are clear  No pneumothorax or pleural effusion  Osseous structures appear within normal limits for patient age  Impression: No acute cardiopulmonary disease  Workstation performed: [id number]         EKG, Pathology, and Other Studies: I have reviewed all notes and labs in [facility] and in Care Everywhere  Dialysis arranged for tomorrow      Counseling / Coordination of Care  Total ADDITIONAL floor / unit time spent today 30 minutes  Greater than 50% of total time was spent with the patient and / or family counseling and / or coordination of care  A description of the counseling / coordination of care: follows    Miya Low MD      This consultation note was produced in part using a dictation device which may document imprecise wording from author's original intent

## 2021-01-19 NOTE — ASSESSMENT & PLAN NOTE
Chest pain, resolved   Known CAD (per pt report)    Trop mildly elevated, no ischemic changes on EKG, echo without wall motion abnormalities    Plan for in-patient nuclear stress 1/20/2021

## 2021-01-19 NOTE — ASSESSMENT & PLAN NOTE
· Currently resolved  · Possibly secondary to the initial hypertensive urgency versus unstable angina the setting having history of CAD, diabetes and end-stage renal disease  · Status post a Cardiology evaluation  · Status post 2D echocardiogram testing-no regional wall motion abnormalities  · Case reviewed with Cardiology- patient to remain in-house to get an inpatient nuclear stress test tomorrow on 01/20/2021  · Continue current cardiac based medications which include aspirin, carvedilol, lisinopril, and atorvastatin  · Potential discharge planning for 01/20/2021 after the stress test

## 2021-01-19 NOTE — ASSESSMENT & PLAN NOTE
Lab Results   Component Value Date    EGFR 7 01/19/2021    EGFR 11 01/18/2021    EGFR 7 03/19/2020    CREATININE 7 84 (H) 01/19/2021    CREATININE 5 43 (H) 01/18/2021    CREATININE 7 64 (H) 03/19/2020     Continue HD, M/W/F per renal

## 2021-01-19 NOTE — ASSESSMENT & PLAN NOTE
Lab Results   Component Value Date    EGFR 7 01/19/2021    EGFR 11 01/18/2021    EGFR 7 03/19/2020    CREATININE 7 84 (H) 01/19/2021    CREATININE 5 43 (H) 01/18/2021    CREATININE 7 64 (H) 03/19/2020     · Dialysis days Mondays, Wednesdays and Fridays  · Formal nephrology consultation is pending  · Patient usually follows with [facility]

## 2021-01-20 ENCOUNTER — APPOINTMENT (INPATIENT)
Dept: NUCLEAR MEDICINE | Facility: HOSPITAL | Age: 71
DRG: 304 | End: 2021-01-20
Payer: COMMERCIAL

## 2021-01-20 ENCOUNTER — APPOINTMENT (INPATIENT)
Dept: NON INVASIVE DIAGNOSTICS | Facility: HOSPITAL | Age: 71
DRG: 304 | End: 2021-01-20
Payer: COMMERCIAL

## 2021-01-20 ENCOUNTER — APPOINTMENT (INPATIENT)
Dept: DIALYSIS | Facility: HOSPITAL | Age: 71
DRG: 304 | End: 2021-01-20
Payer: COMMERCIAL

## 2021-01-20 VITALS
WEIGHT: 215.17 LBS | BODY MASS INDEX: 29.14 KG/M2 | RESPIRATION RATE: 32 BRPM | SYSTOLIC BLOOD PRESSURE: 154 MMHG | HEIGHT: 72 IN | HEART RATE: 65 BPM | DIASTOLIC BLOOD PRESSURE: 67 MMHG | TEMPERATURE: 97.2 F | OXYGEN SATURATION: 96 %

## 2021-01-20 LAB
ANION GAP SERPL CALCULATED.3IONS-SCNC: 10 MMOL/L (ref 4–13)
ANISOCYTOSIS BLD QL SMEAR: PRESENT
BASOPHILS # BLD AUTO: 0 THOUSANDS/ΜL (ref 0–0.1)
BASOPHILS NFR BLD AUTO: 0 % (ref 0–2)
BUN SERPL-MCNC: 51 MG/DL (ref 7–25)
CALCIUM SERPL-MCNC: 9.2 MG/DL (ref 8.6–10.5)
CHLORIDE SERPL-SCNC: 93 MMOL/L (ref 98–107)
CO2 SERPL-SCNC: 29 MMOL/L (ref 21–31)
CREAT SERPL-MCNC: 10.07 MG/DL (ref 0.7–1.3)
DACRYOCYTES BLD QL SMEAR: PRESENT
EOSINOPHIL # BLD AUTO: 0.1 THOUSAND/ΜL (ref 0–0.61)
EOSINOPHIL NFR BLD AUTO: 2 % (ref 0–5)
ERYTHROCYTE [DISTWIDTH] IN BLOOD BY AUTOMATED COUNT: 15.3 % (ref 11.5–14.5)
GFR SERPL CREATININE-BSD FRML MDRD: 5 ML/MIN/1.73SQ M
GLUCOSE SERPL-MCNC: 107 MG/DL (ref 65–140)
GLUCOSE SERPL-MCNC: 205 MG/DL (ref 65–140)
GLUCOSE SERPL-MCNC: 95 MG/DL (ref 65–99)
HCT VFR BLD AUTO: 34.3 % (ref 42–47)
HGB BLD-MCNC: 11.1 G/DL (ref 14–18)
LYMPHOCYTES # BLD AUTO: 1.4 THOUSANDS/ΜL (ref 0.6–4.47)
LYMPHOCYTES NFR BLD AUTO: 22 % (ref 21–51)
MCH RBC QN AUTO: 29.1 PG (ref 26–34)
MCHC RBC AUTO-ENTMCNC: 32.4 G/DL (ref 31–37)
MCV RBC AUTO: 90 FL (ref 81–99)
MONOCYTES # BLD AUTO: 0.9 THOUSAND/ΜL (ref 0.17–1.22)
MONOCYTES NFR BLD AUTO: 15 % (ref 2–12)
NEUTROPHILS # BLD AUTO: 3.9 THOUSANDS/ΜL (ref 1.4–6.5)
NEUTS SEG NFR BLD AUTO: 61 % (ref 42–75)
OVALOCYTES BLD QL SMEAR: PRESENT
PLATELET # BLD AUTO: 83 THOUSANDS/UL (ref 149–390)
PLATELET BLD QL SMEAR: ABNORMAL
PMV BLD AUTO: 8.9 FL (ref 8.6–11.7)
POTASSIUM SERPL-SCNC: 5.4 MMOL/L (ref 3.5–5.5)
RBC # BLD AUTO: 3.83 MILLION/UL (ref 4.3–5.9)
RBC MORPH BLD: ABNORMAL
SODIUM SERPL-SCNC: 132 MMOL/L (ref 134–143)
TROPONIN I SERPL-MCNC: 0.41 NG/ML
WBC # BLD AUTO: 6.3 THOUSAND/UL (ref 4.8–10.8)

## 2021-01-20 PROCEDURE — 5A1D70Z PERFORMANCE OF URINARY FILTRATION, INTERMITTENT, LESS THAN 6 HOURS PER DAY: ICD-10-PCS | Performed by: HOSPITALIST

## 2021-01-20 PROCEDURE — NC001 PR NO CHARGE: Performed by: INTERNAL MEDICINE

## 2021-01-20 PROCEDURE — 99232 SBSQ HOSP IP/OBS MODERATE 35: CPT | Performed by: NURSE PRACTITIONER

## 2021-01-20 PROCEDURE — 84484 ASSAY OF TROPONIN QUANT: CPT | Performed by: INTERNAL MEDICINE

## 2021-01-20 PROCEDURE — 99239 HOSP IP/OBS DSCHRG MGMT >30: CPT | Performed by: HOSPITALIST

## 2021-01-20 PROCEDURE — 82948 REAGENT STRIP/BLOOD GLUCOSE: CPT

## 2021-01-20 PROCEDURE — A9502 TC99M TETROFOSMIN: HCPCS

## 2021-01-20 PROCEDURE — 93017 CV STRESS TEST TRACING ONLY: CPT

## 2021-01-20 PROCEDURE — 85025 COMPLETE CBC W/AUTO DIFF WBC: CPT | Performed by: HOSPITALIST

## 2021-01-20 PROCEDURE — 99232 SBSQ HOSP IP/OBS MODERATE 35: CPT | Performed by: INTERNAL MEDICINE

## 2021-01-20 PROCEDURE — 80048 BASIC METABOLIC PNL TOTAL CA: CPT | Performed by: HOSPITALIST

## 2021-01-20 PROCEDURE — 93016 CV STRESS TEST SUPVJ ONLY: CPT | Performed by: INTERNAL MEDICINE

## 2021-01-20 PROCEDURE — 78452 HT MUSCLE IMAGE SPECT MULT: CPT

## 2021-01-20 PROCEDURE — 93018 CV STRESS TEST I&R ONLY: CPT | Performed by: INTERNAL MEDICINE

## 2021-01-20 PROCEDURE — G1004 CDSM NDSC: HCPCS

## 2021-01-20 RX ORDER — HYDRALAZINE HYDROCHLORIDE 50 MG/1
50 TABLET, FILM COATED ORAL EVERY 8 HOURS SCHEDULED
Qty: 90 TABLET | Refills: 0 | Status: SHIPPED | OUTPATIENT
Start: 2021-01-20 | End: 2021-02-19

## 2021-01-20 RX ADMIN — HYDRALAZINE HYDROCHLORIDE 50 MG: 25 TABLET, FILM COATED ORAL at 05:32

## 2021-01-20 RX ADMIN — SEVELAMER HYDROCHLORIDE 1600 MG: 800 TABLET, FILM COATED PARENTERAL at 16:48

## 2021-01-20 RX ADMIN — REGADENOSON 0.4 MG: 0.08 INJECTION, SOLUTION INTRAVENOUS at 09:10

## 2021-01-20 RX ADMIN — ASPIRIN 81 MG: 81 TABLET, COATED ORAL at 10:52

## 2021-01-20 RX ADMIN — CARVEDILOL 25 MG: 25 TABLET, FILM COATED ORAL at 07:27

## 2021-01-20 RX ADMIN — ATORVASTATIN CALCIUM 40 MG: 40 TABLET, FILM COATED ORAL at 10:52

## 2021-01-20 RX ADMIN — INSULIN LISPRO 1 UNITS: 100 INJECTION, SOLUTION INTRAVENOUS; SUBCUTANEOUS at 11:48

## 2021-01-20 RX ADMIN — DOXAZOSIN 4 MG: 2 TABLET ORAL at 10:51

## 2021-01-20 RX ADMIN — LISINOPRIL 40 MG: 20 TABLET ORAL at 10:53

## 2021-01-20 RX ADMIN — SEVELAMER HYDROCHLORIDE 1600 MG: 800 TABLET, FILM COATED PARENTERAL at 07:27

## 2021-01-20 RX ADMIN — HEPARIN SODIUM 5000 UNITS: 5000 INJECTION INTRAVENOUS; SUBCUTANEOUS at 05:30

## 2021-01-20 RX ADMIN — AMLODIPINE BESYLATE 10 MG: 10 TABLET ORAL at 10:51

## 2021-01-20 RX ADMIN — CARVEDILOL 25 MG: 25 TABLET, FILM COATED ORAL at 16:48

## 2021-01-20 RX ADMIN — SEVELAMER HYDROCHLORIDE 1600 MG: 800 TABLET, FILM COATED PARENTERAL at 11:48

## 2021-01-20 RX ADMIN — INSULIN LISPRO 2 UNITS: 100 INJECTION, SOLUTION INTRAVENOUS; SUBCUTANEOUS at 16:48

## 2021-01-20 NOTE — ASSESSMENT & PLAN NOTE
Lab Results   Component Value Date    EGFR 5 01/20/2021    EGFR 7 01/19/2021    EGFR 11 01/18/2021    CREATININE 10 07 (H) 01/20/2021    CREATININE 7 84 (H) 01/19/2021    CREATININE 5 43 (H) 01/18/2021     Continue HD, M/W/F per renal

## 2021-01-20 NOTE — PLAN OF CARE
Problem: PAIN - ADULT  Goal: Verbalizes/displays adequate comfort level or baseline comfort level  Description: Interventions:  - Encourage patient to monitor pain and request assistance  - Assess pain using appropriate pain scale  - Administer analgesics based on type and severity of pain and evaluate response  - Implement non-pharmacological measures as appropriate and evaluate response  - Consider cultural and social influences on pain and pain management  - Notify physician/advanced practitioner if interventions unsuccessful or patient reports new pain  Outcome: Completed     Problem: INFECTION - ADULT  Goal: Absence or prevention of progression during hospitalization  Description: INTERVENTIONS:  - Assess and monitor for signs and symptoms of infection  - Monitor lab/diagnostic results  - Monitor all insertion sites, i e  indwelling lines, tubes, and drains  - Monitor endotracheal if appropriate and nasal secretions for changes in amount and color  - [place] appropriate cooling/warming therapies per order  - Administer medications as ordered  - Instruct and encourage patient and family to use good hand hygiene technique  - Identify and instruct in appropriate isolation precautions for identified infection/condition  Outcome: Completed     Problem: SAFETY ADULT  Goal: Patient will remain free of falls  Description: INTERVENTIONS:  - Assess patient frequently for physical needs  -  Identify cognitive and physical deficits and behaviors that affect risk of falls    -  [place] fall precautions as indicated by assessment   - Educate patient/family on patient safety including physical limitations  - Instruct patient to call for assistance with activity based on assessment  - Modify environment to reduce risk of injury  - Consider OT/PT consult to assist with strengthening/mobility  Outcome: Completed  Goal: Maintain or return to baseline ADL function  Description: INTERVENTIONS:  -  Assess patient's ability to carry out ADLs; assess patient's baseline for ADL function and identify physical deficits which impact ability to perform ADLs (bathing, care of mouth/teeth, toileting, grooming, dressing, etc )  - Assess/evaluate cause of self-care deficits   - Assess range of motion  - Assess patient's mobility; develop plan if impaired  - Assess patient's need for assistive devices and provide as appropriate  - Encourage maximum independence but intervene and supervise when necessary  - Involve family in performance of ADLs  - Assess for home care needs following discharge   - Consider OT consult to assist with ADL evaluation and planning for discharge  - Provide patient education as appropriate  Outcome: Completed  Goal: Maintain or return mobility status to optimal level  Description: INTERVENTIONS:  - Assess patient's baseline mobility status (ambulation, transfers, stairs, etc )    - Identify cognitive and physical deficits and behaviors that affect mobility  - Identify mobility aids required to assist with transfers and/or ambulation (gait belt, sit-to-stand, lift, walker, cane, etc )  - [place] fall precautions as indicated by assessment  - Record patient progress and toleration of activity level on Mobility SBAR; progress patient to next Phase/Stage  - Instruct patient to call for assistance with activity based on assessment  - Consider rehabilitation consult to assist with strengthening/weightbearing, etc   Outcome: Completed     Problem: DISCHARGE PLANNING  Goal: Discharge to home or other facility with appropriate resources  Description: INTERVENTIONS:  - Identify barriers to discharge w/patient and caregiver  - Arrange for needed discharge resources and transportation as appropriate  - Identify discharge learning needs (meds, wound care, etc )  - Arrange for interpretive services to assist at discharge as needed  - Refer to Case Management Department for coordinating discharge planning if the patient needs post-hospital services based on physician/advanced practitioner order or complex needs related to functional status, cognitive ability, or social support system  Outcome: Completed     Problem: Knowledge Deficit  Goal: Patient/family/caregiver demonstrates understanding of disease process, treatment plan, medications, and discharge instructions  Description: Complete learning assessment and assess knowledge base    Interventions:  - Provide teaching at level of understanding  - Provide teaching via preferred learning methods  Outcome: Completed     Problem: CARDIOVASCULAR - ADULT  Goal: Maintains optimal cardiac output and hemodynamic stability  Description: INTERVENTIONS:  - Monitor I/O, vital signs and rhythm  - Monitor for S/S and trends of decreased cardiac output  - Administer and titrate ordered vasoactive medications to optimize hemodynamic stability  - Assess quality of pulses, skin color and temperature  - Assess for signs of decreased coronary artery perfusion  - Instruct patient to report change in severity of symptoms  Outcome: Completed     Problem: SKIN/TISSUE INTEGRITY - ADULT  Goal: Skin integrity remains intact  Description: INTERVENTIONS  - Identify patients at risk for skin breakdown  - Assess and monitor skin integrity  - Assess and monitor nutrition and hydration status  - Monitor labs (i e  albumin)  - Assess for incontinence   - Turn and reposition patient  - Assist with mobility/ambulation  - Relieve pressure over bony prominences  - Avoid friction and shearing  - Provide appropriate hygiene as needed including keeping skin clean and dry  - Evaluate need for skin moisturizer/barrier cream  - Collaborate with interdisciplinary team (i e  Nutrition, Rehabilitation, etc )   - Patient/family teaching  Outcome: Completed     Problem: HEMATOLOGIC - ADULT  Goal: Maintains hematologic stability  Description: INTERVENTIONS  - Assess for signs and symptoms of bleeding or hemorrhage  - Monitor labs  - Administer supportive blood products/factors as ordered and appropriate  Outcome: Completed     Problem: Nutrition/Hydration-ADULT  Goal: Nutrient/Hydration intake appropriate for improving, restoring or maintaining nutritional needs  Description: Monitor and assess patient's nutrition/hydration status for malnutrition  Collaborate with interdisciplinary team and initiate plan and interventions as ordered  Monitor patient's weight and dietary intake as ordered or per policy  Utilize nutrition screening tool and intervene as necessary  Determine patient's food preferences and provide high-protein, high-caloric foods as appropriate  INTERVENTIONS:  - Monitor oral intake, urinary output, labs, and treatment plans  - Assess nutrition and hydration status and recommend course of action  - Evaluate amount of meals eaten  - Assist patient with eating if necessary   - Allow adequate time for meals  - Recommend/ encourage appropriate diets, oral nutritional supplements, and vitamin/mineral supplements  - Order, calculate, and assess calorie counts as needed  - Recommend, monitor, and adjust tube feedings and TPN/PPN based on assessed needs  - Assess need for intravenous fluids  - Provide specific nutrition/hydration education as appropriate  - Include patient/family/caregiver in decisions related to nutrition  Outcome: Completed     Problem: Potential for Falls  Goal: Patient will remain free of falls  Description: INTERVENTIONS:  - Assess patient frequently for physical needs  -  Identify cognitive and physical deficits and behaviors that affect risk of falls    -  [place] fall precautions as indicated by assessment   - Educate patient/family on patient safety including physical limitations  - Instruct patient to call for assistance with activity based on assessment  - Modify environment to reduce risk of injury  - Consider OT/PT consult to assist with strengthening/mobility  Outcome: Completed

## 2021-01-20 NOTE — ASSESSMENT & PLAN NOTE
Lab Results   Component Value Date    EGFR 5 01/20/2021    EGFR 7 01/19/2021    EGFR 11 01/18/2021    CREATININE 10 07 (H) 01/20/2021    CREATININE 7 84 (H) 01/19/2021    CREATININE 5 43 (H) 01/18/2021     · Patient be dialyzed today, after which she can be discharged home  · Patient usually follows with [facility]  · He will follow-up with them in the outpatient setting for continued regularly scheduled dialysis

## 2021-01-20 NOTE — DISCHARGE SUMMARY
Discharge- Nathen Guardado 1950, 80 y o  male MRN: [id number]    Unit/Bed#: ICU 01 Encounter: [id number]    Primary Care Provider: Gayatri Lux MD   Date and time admitted to hospital: 1/18/2021  3:51 PM        * Chest pain, unspecified  Assessment & Plan  · Currently resolved  · In retrospect most likely secondary to the initial hypertensive urgency in the setting of having CAD, diabetes, and end-stage renal disease  · Status post a Cardiology evaluation  · Status post 2D echocardiogram testing-no regional wall motion abnormalities  · Status post a nuclear adenosine stress test earlier this morning-no ischemia  · Case reviewed with Cardiology - irasema for DC home current cardiac based medications which include aspirin, carvedilol, lisinopril, and atorvastatin  · DC home  · Outpatient follow-up with PCP and Cardiology    Elevated troponin  Assessment & Plan  · Troponin trend 0 13> 0 17> 0 23> 0 41  · Most likely secondary to hypertensive urgency in the setting having end-stage renal disease hemodialysis  · Non MI related troponinemia    Type 2 diabetes mellitus without complication, with long-term current use of insulin ([facility] [address] )  Assessment & Plan  · DC home on pre-admit meds at pre-admit dosages    Essential hypertension  Assessment & Plan  · Continue blood pressure medications amlodipine, Coreg, Cardura and lisinopril  · Additionally for discharge, hydralazine has been added-prescription provided for the same    ESRD (end stage renal disease) [facility])  Assessment & Plan  Lab Results   Component Value Date    EGFR 5 01/20/2021    EGFR 7 01/19/2021    EGFR 11 01/18/2021    CREATININE 10 07 (H) 01/20/2021    CREATININE 7 84 (H) 01/19/2021    CREATININE 5 43 (H) 01/18/2021     · Patient be dialyzed today, after which she can be discharged home  · Patient usually follows with [facility] at [facility]  · He will follow-up with them in the outpatient setting for continued regularly scheduled dialysis    Mixed hyperlipidemia  Assessment & Plan  · Continue atorvastatin          Discharging Physician / Practitioner: Peyman Jolley MD  PCP: Vic Flowers MD  Admission Date:   Admission Orders (From admission, onward)     Ordered        01/18/21 1731  Inpatient Admission  Once                   Discharge Date: 01/20/21    Resolved Problems  Date Reviewed: 1/20/2021    None          Consultations During Hospital Stay:  · Cardiology  · Nephrology    Procedures Performed:   · None    Significant Findings / Test Results:   · Chest x-ray-no acute cardiopulmonary disease  · 2D echocardiogram-Systolic function was vigorous  Ejection fraction was estimated to be 75 %  There were no regional wall motion abnormalities  Wall thickness was moderately increased  There was moderate concentric hypertrophy  There was very mild dynamic obstruction at rest, with a peak velocity of 1 5 cm/s  Unable to cooperate for Brittani Joe  Doppler parameters were consistent with abnormal left ventricular relaxation (grade 1 diastolic dysfunction)  · Nuclear adenosine stress test: IMPRESSIONS: No significant ischemia or infarct after pharmacologic vasodilation  Myocardial perfusion imaging was normal at rest and with stress  Left ventricular systolic function was normal     Incidental Findings:   · None     Test Results Pending at Discharge (will require follow up): · None     Outpatient Tests Requested:  · None    Complications:     None    Reason for Admission:  Chest pain rule out ACS    Hospital Course:     Lucas Knott is a 80 y o  male patient who originally presented to the hospital on 1/18/2021 due to chest pain  Please refer to the initial history and physical examination completed by Dr Lorna Payton for the initial presenting features and complaints  In brief, the patient is a pleasant 67-year-old male, was admitted to a step-down level unit, after he came into the emergency room complaining of chest pain    He was also found to have accelerated hypertension at the time  Of note, the patient has a history of end-stage renal disease on hemodialysis  He is dialyzed every Monday, Wednesday and Friday  After being admitted to the hospital, he had minimally elevated troponins  High as troponin was 0 41, low is troponin was 0 13  A cardiology consultation was obtained  After performing a routine 2D echocardiogram, and it was noted that the patient did not have any regional wall motion abnormalities  In view of his history of having coronary artery disease, cardiology felt that the patient would benefit from an inpatient adenosine stress test   The patient went down to the cardiac lab on the morning of 01/20/2021, and had an inpatient cardiac stress test performed  The results were negative  Patient was deemed stable from a cardiac standpoint for discharge  Additionally, nephrology were consulted for maintenance dialysis  On 01/20/2021 the patient was also dialyzed after his stress test was completed  He was deemed medically stable for discharge  In regards to his accelerated hypertension, he was given a new prescription for hydralazine 50 mg Q 8 hours  He was given a prescription for the same  He will follow up in the outpatient setting with his PCP, and Cardiology  He was discharged home on all of his other pre-admission medications at the preadmission dosages  Please see above list of diagnoses and related plan for additional information       Condition at Discharge: good     Discharge Day Visit / Exam:     Subjective:  Patient seen and examined, doing well, no complaints, no distress  Vitals: Blood Pressure: (!) 183/80 (01/20/21 1216)  Pulse: 69 (01/20/21 1216)  Temperature: (!) 97 2 °F (36 2 °C) (01/20/21 1216)  Temp Source: Tympanic (01/20/21 1216)  Respirations: 20 (01/20/21 1216)  Height: 6' (182 9 cm) (01/18/21 1836)  Weight - Scale: 97 6 kg (215 lb 2 7 oz) (01/20/21 0500)  SpO2: 96 % (01/20/21 1216)  Exam: Physical Exam  Vitals signs and nursing note reviewed  Constitutional:       General: He is not in acute distress  Appearance: Normal appearance  He is not ill-appearing  HENT:      Head: Normocephalic and atraumatic  Nose: Nose normal    Eyes:      Extraocular Movements: Extraocular movements intact  Pupils: Pupils are equal, round, and reactive to light  Neck:      Musculoskeletal: Normal range of motion and neck supple  No neck rigidity or muscular tenderness  Cardiovascular:      Rate and Rhythm: Normal rate and regular rhythm  Pulses: Normal pulses  Heart sounds: Normal heart sounds  No murmur  No friction rub  No gallop  Pulmonary:      Effort: Pulmonary effort is normal       Breath sounds: Normal breath sounds  Abdominal:      General: There is no distension  Palpations: Abdomen is soft  There is no mass  Tenderness: There is no abdominal tenderness  There is no guarding or rebound  Musculoskeletal: Normal range of motion  General: No swelling or tenderness  Right lower leg: No edema  Left lower leg: No edema  Skin:     General: Skin is warm  Capillary Refill: Capillary refill takes less than 2 seconds  Findings: No erythema or rash  Neurological:      General: No focal deficit present  Mental Status: He is alert and oriented to person, place, and time  Mental status is at baseline  Psychiatric:         Mood and Affect: Mood normal          Behavior: Behavior normal          Discussion with Family:  Patient did not want me to call any family member to update them    Discharge instructions/Information to patient and family:   See after visit summary for information provided to patient and family  Provisions for Follow-Up Care:  See after visit summary for information related to follow-up care and any pertinent home health orders        Disposition:     Home      Planned Readmission:    None     Discharge Statement:  I spent 45 minutes discharging the patient  This time was spent on the day of discharge  I had direct contact with the patient on the day of discharge  Greater than 50% of the total time was spent examining patient, answering all patient questions, arranging and discussing plan of care with patient as well as directly providing post-discharge instructions  Additional time then spent on discharge activities  Discharge Medications:  See after visit summary for reconciled discharge medications provided to patient and family        ** Please Note: This note has been constructed using a voice recognition system **

## 2021-01-20 NOTE — CASE MANAGEMENT
As per SLIM the pt will be able to be discharged to home today after dialysis  Spoke to pt about [facility] services, pt declined same  Wife will transport home after dialysis

## 2021-01-20 NOTE — NURSING NOTE
DC instructions reviewed with patient; voicing full understanding  No current needs at this time; will discharge home when ride available  Safety maintained

## 2021-01-20 NOTE — PROGRESS NOTES
NEPHROLOGY PROGRESS NOTE   Inez Kwon 80 y o  male MRN: [id number]  Unit/Bed#: ICU 01 Encounter: [id number]    Assessment/Plan:    In summary this is a 80-year-old male with ESRD on HD admitted 01/18 for chest pain status post stress test today and being followed closely by Cardiology  Renal following along for dialysis needs  He has tentative discharge today after dialysis    1  ESRD on HD at [facility] Monday, Wednesday, Friday  · Last hemodialysis treatment was 1/18  He is for treatment today and then he will be discharged  His estimated dry weight is around 94-95 kg  Etiology of chronicity is hypertensive nephrosclerosis and has been dialysis dependent 5 years  · Recommend renal diet with fluid restriction   2  Access  · Left upper extremity AV fistula with bruit and thrill  3  Anemia of ESRD  · Hemoglobin 11 1 grams/deciliter and no MARTHA is warranted at this time  4  Secondary hyperparathyroidism of renal origin  · Continue to monitor PTH and phosphorus in the outpatient setting  5  Chest pain  · Being followed closely by Cardiology  Status post stress test today  6  Hypertensive nephrosclerosis  · Blood pressure is elevated on exam today  Cardiology is considering addition in all therapy if he is still hypertensive post dialysis treatment  Appreciate their recommendations  Will attempt to control blood pressure with volume removal    ROS  No Physical complaints on exam states he is going home today  A complete 10 point review of systems have been performed and are otherwise negative         Historical Information   Past Medical History:   Diagnosis Date    Coronary artery disease     Diabetes mellitus ([address] )     Hypertension     Renal disorder      Past Surgical History:   Procedure Laterality Date    DIALYSIS FISTULA CREATION       Social History   Social History     Substance and Sexual Activity   Alcohol Use Not Currently     Social History     Substance and Sexual Activity   Drug Use Never Social History     Tobacco Use   Smoking Status Former Smoker   Smokeless Tobacco Never Used   Tobacco Comment    STATES QUIT 20 YEARS AGO       Family History:   History reviewed  No pertinent family history  Medications:  Pertinent medications were reviewed  Current Facility-Administered Medications   Medication Dose Route Frequency Provider Last Rate    acetaminophen  650 mg Oral Q6H PRN Abad Leyva MD      amLODIPine  10 mg Oral Daily Abad Leyva MD      aspirin  81 mg Oral Daily Abad Leyva MD      atorvastatin  40 mg Oral Daily Abad Leyva MD      carvedilol  25 mg Oral BID With Meals Abad Leyva MD      doxazosin  4 mg Oral Daily Abad Leyva MD      heparin (porcine)  5,000 Units Subcutaneous [facility] Abad Leyva MD      hydrALAZINE  10 mg Intravenous Q6H PRN Abad Leyva MD      hydrALAZINE  50 mg Oral [facility] Abad Leyva MD      insulin glargine  6 Units Subcutaneous HS Abad Leyva MD      insulin lispro  1-5 Units Subcutaneous TID [facility] Abad Leyva MD      lisinopril  40 mg Oral Daily Abad Leyva MD      nitroglycerin  0 4 mg Sublingual Q5 Min PRN Abad Leyva MD      ondansetron  4 mg Intravenous Q6H PRN Abad Leyva MD      sevelamer  1,600 mg Oral TID With Meals Abad Leyva MD      sodium chloride (PF)  3 mL Intravenous Q1H PRN Abad Leyva MD           No Known Allergies      Vitals:   BP (!) 183/80 ([place] Right arm)   Pulse 69   Temp (!) 97 2 °F (36 2 °C) (Tympanic)   Resp 20   Ht 6' (1 829 m)   Wt 97 6 kg (215 lb 2 7 oz)   SpO2 96%   BMI 29 18 kg/m²   Body mass index is 29 18 kg/m²    SpO2: 96 %,   SpO2 Activity: At Rest,   O2 Device: None (Room air)      Intake/Output Summary (Last 24 hours) at 1/20/2021 1249  Last data filed at 1/20/2021 1216  Gross per 24 hour   Intake 600 ml   Output 75 ml   Net 525 ml Invasive Devices     Peripheral Intravenous Line            Peripheral IV 01/18/21 Right Antecubital 1 day          Line            Hemodialysis AV Fistula 01/18/21 Forearm 1 day                Physical Exam  General: conscious, cooperative, in no acute distress  Eyes: conjunctivae pink, anicteric sclerae  ENT: lips and mucous membranes moist  Neck: supple, no JVD, no masses  Chest: clear breath sounds bilateral, no crackles, ronchus or wheezings  CVS: distinct S1 & S2, normal rate, regular rhythm  Abdomen: soft, non-tender, non-distended, normoactive bowel sounds  Extremities: no edema of both legs,lt fistula  Skin: no rash  Neuro: awake, alert, oriented      Diagnostic Data:  Lab: I have personally reviewed pertinent lab results  ,   CBC:  Results from last 7 days   Lab Units 01/20/21  0542   WBC Thousand/uL 6 30   HEMOGLOBIN g/dL 11 1*   HEMATOCRIT % 34 3*   PLATELETS Thousands/uL 83*      CMP:   Lab Results   Component Value Date    SODIUM 132 (L) 01/20/2021    K 5 4 01/20/2021    CL 93 (L) 01/20/2021    CO2 29 01/20/2021    BUN 51 (H) 01/20/2021    CREATININE 10 07 (H) 01/20/2021    CALCIUM 9 2 01/20/2021    EGFR 5 01/20/2021   ,   PT/INR: No results found for: PT, INR,   Magnesium: No components found for: MAG,  Phosphorous: No results found for: PHOS    Microbiology:  @LABRCNTIP,(urinecx:7)@        [facility] [place]    Portions of the record may have been created with voice recognition software  Occasional wrong word or "sound a like" substitutions may have occurred due to the inherent limitations of voice recognition software  Read the chart carefully and recognize, using context, where substitutions have occurred

## 2021-01-20 NOTE — ASSESSMENT & PLAN NOTE
· Troponin trend 0 13> 0 17> 0 23> 0 41  · Most likely secondary to hypertensive urgency in the setting having end-stage renal disease hemodialysis  · Non MI related troponinemia

## 2021-01-20 NOTE — ASSESSMENT & PLAN NOTE
· Continue blood pressure medications amlodipine, Coreg, Cardura and lisinopril  · Additionally for discharge, hydralazine has been added-prescription provided for the same

## 2021-01-20 NOTE — PROGRESS NOTES
Renal Quick Note  Patient had an uneventful night  He is currently having a stress test  He is off the floor   Will have dialysis later

## 2021-01-20 NOTE — ASSESSMENT & PLAN NOTE
· Currently resolved  · In retrospect most likely secondary to the initial hypertensive urgency in the setting of having CAD, diabetes, and end-stage renal disease  · Status post a Cardiology evaluation  · Status post 2D echocardiogram testing-no regional wall motion abnormalities  · Status post a nuclear adenosine stress test earlier this morning-no ischemia  · Case reviewed with Cardiology - oksven for DC home current cardiac based medications which include aspirin, carvedilol, lisinopril, and atorvastatin  · DC home  · Outpatient follow-up with PCP and Cardiology

## 2021-01-20 NOTE — ASSESSMENT & PLAN NOTE
Chest pain, resolved   Known CAD     Trop mildly elevated, no ischemic changes on EKG, echo without wall motion abnormalities    Nuclear stress completed, no ischemia

## 2021-01-20 NOTE — PLAN OF CARE
Problem: PAIN - ADULT  Goal: Verbalizes/displays adequate comfort level or baseline comfort level  Description: Interventions:  - Encourage patient to monitor pain and request assistance  - Assess pain using appropriate pain scale  - Administer analgesics based on type and severity of pain and evaluate response  - Implement non-pharmacological measures as appropriate and evaluate response  - Consider cultural and social influences on pain and pain management  - Notify physician/advanced practitioner if interventions unsuccessful or patient reports new pain  Outcome: Completed     Problem: INFECTION - ADULT  Goal: Absence or prevention of progression during hospitalization  Description: INTERVENTIONS:  - Assess and monitor for signs and symptoms of infection  - Monitor lab/diagnostic results  - Monitor all insertion sites, i e  indwelling lines, tubes, and drains  - Monitor endotracheal if appropriate and nasal secretions for changes in amount and color  - [place] appropriate cooling/warming therapies per order  - Administer medications as ordered  - Instruct and encourage patient and family to use good hand hygiene technique  - Identify and instruct in appropriate isolation precautions for identified infection/condition  Outcome: Completed     Problem: SAFETY ADULT  Goal: Patient will remain free of falls  Description: INTERVENTIONS:  - Assess patient frequently for physical needs  -  Identify cognitive and physical deficits and behaviors that affect risk of falls    -  [place] fall precautions as indicated by assessment   - Educate patient/family on patient safety including physical limitations  - Instruct patient to call for assistance with activity based on assessment  - Modify environment to reduce risk of injury  - Consider OT/PT consult to assist with strengthening/mobility  Outcome: Completed  Goal: Maintain or return to baseline ADL function  Description: INTERVENTIONS:  -  Assess patient's ability to carry out ADLs; assess patient's baseline for ADL function and identify physical deficits which impact ability to perform ADLs (bathing, care of mouth/teeth, toileting, grooming, dressing, etc )  - Assess/evaluate cause of self-care deficits   - Assess range of motion  - Assess patient's mobility; develop plan if impaired  - Assess patient's need for assistive devices and provide as appropriate  - Encourage maximum independence but intervene and supervise when necessary  - Involve family in performance of ADLs  - Assess for home care needs following discharge   - Consider OT consult to assist with ADL evaluation and planning for discharge  - Provide patient education as appropriate  Outcome: Completed  Goal: Maintain or return mobility status to optimal level  Description: INTERVENTIONS:  - Assess patient's baseline mobility status (ambulation, transfers, stairs, etc )    - Identify cognitive and physical deficits and behaviors that affect mobility  - Identify mobility aids required to assist with transfers and/or ambulation (gait belt, sit-to-stand, lift, walker, cane, etc )  - [place] fall precautions as indicated by assessment  - Record patient progress and toleration of activity level on Mobility SBAR; progress patient to next Phase/Stage  - Instruct patient to call for assistance with activity based on assessment  - Consider rehabilitation consult to assist with strengthening/weightbearing, etc   Outcome: Completed     Problem: DISCHARGE PLANNING  Goal: Discharge to home or other facility with appropriate resources  Description: INTERVENTIONS:  - Identify barriers to discharge w/patient and caregiver  - Arrange for needed discharge resources and transportation as appropriate  - Identify discharge learning needs (meds, wound care, etc )  - Arrange for interpretive services to assist at discharge as needed  - Refer to Case Management Department for coordinating discharge planning if the patient needs post-hospital services based on physician/advanced practitioner order or complex needs related to functional status, cognitive ability, or social support system  Outcome: Completed     Problem: Knowledge Deficit  Goal: Patient/family/caregiver demonstrates understanding of disease process, treatment plan, medications, and discharge instructions  Description: Complete learning assessment and assess knowledge base    Interventions:  - Provide teaching at level of understanding  - Provide teaching via preferred learning methods  Outcome: Completed     Problem: CARDIOVASCULAR - ADULT  Goal: Maintains optimal cardiac output and hemodynamic stability  Description: INTERVENTIONS:  - Monitor I/O, vital signs and rhythm  - Monitor for S/S and trends of decreased cardiac output  - Administer and titrate ordered vasoactive medications to optimize hemodynamic stability  - Assess quality of pulses, skin color and temperature  - Assess for signs of decreased coronary artery perfusion  - Instruct patient to report change in severity of symptoms  Outcome: Completed     Problem: SKIN/TISSUE INTEGRITY - ADULT  Goal: Skin integrity remains intact  Description: INTERVENTIONS  - Identify patients at risk for skin breakdown  - Assess and monitor skin integrity  - Assess and monitor nutrition and hydration status  - Monitor labs (i e  albumin)  - Assess for incontinence   - Turn and reposition patient  - Assist with mobility/ambulation  - Relieve pressure over bony prominences  - Avoid friction and shearing  - Provide appropriate hygiene as needed including keeping skin clean and dry  - Evaluate need for skin moisturizer/barrier cream  - Collaborate with interdisciplinary team (i e  Nutrition, Rehabilitation, etc )   - Patient/family teaching  Outcome: Completed     Problem: HEMATOLOGIC - ADULT  Goal: Maintains hematologic stability  Description: INTERVENTIONS  - Assess for signs and symptoms of bleeding or hemorrhage  - Monitor labs  - Administer supportive blood products/factors as ordered and appropriate  Outcome: Completed     Problem: Nutrition/Hydration-ADULT  Goal: Nutrient/Hydration intake appropriate for improving, restoring or maintaining nutritional needs  Description: Monitor and assess patient's nutrition/hydration status for malnutrition  Collaborate with interdisciplinary team and initiate plan and interventions as ordered  Monitor patient's weight and dietary intake as ordered or per policy  Utilize nutrition screening tool and intervene as necessary  Determine patient's food preferences and provide high-protein, high-caloric foods as appropriate  INTERVENTIONS:  - Monitor oral intake, urinary output, labs, and treatment plans  - Assess nutrition and hydration status and recommend course of action  - Evaluate amount of meals eaten  - Assist patient with eating if necessary   - Allow adequate time for meals  - Recommend/ encourage appropriate diets, oral nutritional supplements, and vitamin/mineral supplements  - Order, calculate, and assess calorie counts as needed  - Recommend, monitor, and adjust tube feedings and TPN/PPN based on assessed needs  - Assess need for intravenous fluids  - Provide specific nutrition/hydration education as appropriate  - Include patient/family/caregiver in decisions related to nutrition  Outcome: Completed     Problem: Potential for Falls  Goal: Patient will remain free of falls  Description: INTERVENTIONS:  - Assess patient frequently for physical needs  -  Identify cognitive and physical deficits and behaviors that affect risk of falls    -  [place] fall precautions as indicated by assessment   - Educate patient/family on patient safety including physical limitations  - Instruct patient to call for assistance with activity based on assessment  - Modify environment to reduce risk of injury  - Consider OT/PT consult to assist with strengthening/mobility  Outcome: Completed

## 2021-01-20 NOTE — NURSING NOTE
Noted to chart; IV site removed with no redness/swelling  2X2 to cover with silk tape to secure  All valuables with patient; awaiting transportion for discharge home  Safety maintained

## 2021-01-20 NOTE — DISCHARGE INSTR - AVS FIRST PAGE
Dear Megan Granados,     It was our pleasure to care for you here at [facility]  It is our hope that we were always able to exceed the expected standards for your care during your stay  You were hospitalized due to chest pain  You were cared for on the ICU floor by Ana Connell MD with the [facility] who covers for your primary care physician (PCP), Barbie Feliciano MD, while you were hospitalized  You were also seen by the [facility] Rust  If you have any questions or concerns related to this hospitalization, you may contact us at [phone]  For follow up as well as any medication refills, we recommend that you follow up with your primary care physician  A registered nurse will reach out to you by phone within a few days after your discharge to answer any additional questions that you may have after going home  However, at this time we provide for you here, the most important instructions / recommendations at discharge:     · Notable Medication Adjustments -   · New medication hydralazine 50 mg-take 1 tablet 3 times a day for blood pressure  · Okay to resume all other pre-admission medications at the preadmission dosages  · Testing Required after Discharge -   · To be further determined by your outpatient primary care provider, and or cardiology  · Important follow up information -   · Please follow-up with the providers as outlined in this discharge package  · Other Instructions -   · Please maintain a healthy diabetic, renal, cardiac diet  · Please follow-up with your regularly scheduled dialysis sessions  · Please review this entire after visit summary as additional general instructions including medication list, appointments, activity, diet, any pertinent wound care, and other additional recommendations from your care team that may be provided for you        Sincerely,     Ana Connell MD

## 2021-01-20 NOTE — NURSING NOTE
Alert and oriented x 4  Denies pain  Moves well  No focal deficit  Heart tones are distant  Denies chest pain  Pulses are weak to both lower extremities  No edema noted  Skin is warm to touch  + bruit and thrill to left forearm AV graft  Lungs are clear with no distress  Abdomen is softly distended  No void seen

## 2021-01-20 NOTE — PROGRESS NOTES
Progress Note - Krunal Malhotra 1950, 80 y o  male MRN: [id number]    Unit/Bed#: ICU 01 Encounter: [id number]    Primary Care Provider: Ria Mehta MD   Date and time admitted to hospital: 1/18/2021  3:51 PM    Elevated troponin  Assessment & Plan  Likely related to hypertensive urgency on admission  Pt with ESRD as well    ESRD (end stage renal disease) [facility])  Assessment & Plan  Lab Results   Component Value Date    EGFR 5 01/20/2021    EGFR 7 01/19/2021    EGFR 11 01/18/2021    CREATININE 10 07 (H) 01/20/2021    CREATININE 7 84 (H) 01/19/2021    CREATININE 5 43 (H) 01/18/2021     Continue HD, M/W/F per renal    * Chest pain, unspecified  Assessment & Plan  Chest pain, resolved   Known CAD     Trop mildly elevated, no ischemic changes on EKG, echo without wall motion abnormalities    Nuclear stress completed, no ischemia          Subjective:   Patient seen and examined  No significant events overnight  Feels well rested, denies CP, SOB  Nuclear stress just completed  Scheduled for HD later today  Summary comments:  BP elevated at the moment, but pt just back from stress test   Overall, BP much better controlled  Denies further chest pain  Nuclear stress neg for ischemia  Stable for discharge from cardiac standpoint  Will arrange for outpatient follow up  Telemetry/ECG/Cardiac testing:   Tele:      Nuclear Stress ronna 1/20/2021: EF 78%, no ischemia, normal myocardial perfusion    Echo 1/19/2021 EF 75%, no WMA, mod LVH, mild DD    Vitals: Blood pressure (!) 197/84, pulse 65, temperature 97 5 °F (36 4 °C), temperature source Tympanic, resp   rate 19, height 6' (1 829 m), weight 97 6 kg (215 lb 2 7 oz), SpO2 97 % ,   Orthostatic Blood Pressures      Most Recent Value   Blood Pressure  (!) 197/84 filed at 01/20/2021 1051   Patient Position - Orthostatic VS  Lying filed at 01/20/2021 0745      ,   Weight (last 2 days)     Date/Time   Weight    01/20/21 0500   97 6 (215 17)    01/18/21 1836 96 4 (212 52)    01/18/21 1602   95 3 (210)              Physical Exam:    General:  Normal appearance in no distress  Eyes:  Anicteric  Oral mucosa:  Moist   Neck:  No JVD  Carotid upstrokes are brisk without bruits  No masses  Chest:  Clear to auscultation and percussion  Cardiac:  No palpable PMI  Normal S1 and S2  No murmur gallop or rub  Abdomen:  Soft and nontender  No palpable organomegaly or aortic enlargement  Extremities:  No peripheral edema  Neuro:  Grossly symmetric  Psych:  Alert and oriented x3        Medications:      Current Facility-Administered Medications:     acetaminophen (TYLENOL) tablet 650 mg, 650 mg, Oral, Q6H PRN, Brittany Doan MD    amLODIPine (NORVASC) tablet 10 mg, 10 mg, Oral, Daily, Brittany Doan MD, 10 mg at 01/20/21 1051    aspirin (ECOTRIN LOW STRENGTH) EC tablet 81 mg, 81 mg, Oral, Daily, Brittany Doan MD, 81 mg at 01/20/21 1052    atorvastatin (LIPITOR) tablet 40 mg, 40 mg, Oral, Daily, Brittany Doan MD, 40 mg at 01/20/21 1052    carvedilol (COREG) tablet 25 mg, 25 mg, Oral, BID With Meals, Brittany Doan MD, 25 mg at 01/20/21 4629    doxazosin (CARDURA) tablet 4 mg, 4 mg, Oral, Daily, Brittany Doan MD, 4 mg at 01/20/21 1051    heparin (porcine) subcutaneous injection 5,000 Units, 5,000 Units, Subcutaneous, Q8H [facility], 5,000 Units at 01/20/21 0530 **AND** [CANCELED] Platelet count, , , Once, Linda Triplett MD    hydrALAZINE (APRESOLINE) injection 10 mg, 10 mg, Intravenous, Q6H PRN, Brittany Doan MD, 10 mg at 01/18/21 1938    hydrALAZINE (APRESOLINE) tablet 50 mg, 50 mg, Oral, Q8H [facility], Brittany Doan MD, 50 mg at 01/20/21 0532    insulin glargine (LANTUS) subcutaneous injection 6 Units 0 06 mL, 6 Units, Subcutaneous, HS, Brittany Doan MD, 6 Units at 01/19/21 1532    insulin lispro (HumaLOG) 100 units/mL subcutaneous injection 1-5 Units, 1-5 Units, Subcutaneous, TID AC, 1 Units at 01/19/21 1136 **AND** Fingerstick Glucose (POCT), , , TID AC, Bhaskar Rahman MD    lisinopril (ZESTRIL) tablet 40 mg, 40 mg, Oral, Daily, Bhaskar Rahman MD, 40 mg at 01/20/21 1053    nitroglycerin (NITROSTAT) SL tablet 0 4 mg, 0 4 mg, Sublingual, Q5 Min PRN, Bhaskar Rahman MD    ondansetron [facility]) injection 4 mg, 4 mg, Intravenous, Q6H PRN, Bhaskar Rahman MD    sevelamer (RENAGEL) tablet 1,600 mg, 1,600 mg, Oral, TID With Meals, Bhaskar Rahman MD, 1,600 mg at 01/20/21 0727    Insert peripheral IV, , , Once **AND** sodium chloride (PF) 0 9 % injection 3 mL, 3 mL, Intravenous, Q1H PRN, Bhaskar Rahman MD     Labs & Results:    Troponins:   Results from last 7 days   Lab Units 01/20/21  0541 01/19/21  0503 01/18/21  2204   TROPONIN I ng/mL 0 41* 0 41* 0 23*      BNP:       CBC with diff:   Results from last 7 days   Lab Units 01/20/21  0542 01/19/21  0503   WBC Thousand/uL 6 30 6 00   HEMOGLOBIN g/dL 11 1* 11 1*   HEMATOCRIT % 34 3* 33 5*   MCV fL 90 89   PLATELETS Thousands/uL 83* 87*     TSH:     CMP:   Results from last 7 days   Lab Units 01/20/21  0539 01/19/21  0504 01/18/21  1622   POTASSIUM mmol/L 5 4 4 7 3 8   CHLORIDE mmol/L 93* 94* 92*   CO2 mmol/L 29 31 36*   BUN mg/dL 51* 35* 23   CREATININE mg/dL 10 07* 7 84* 5 43*   AST U/L  --   --  10*   ALT U/L  --   --  6*   EGFR ml/min/1 73sq m 5 7 11     Lipid Profile:   Results from last 7 days   Lab Units 01/19/21  0504   TRIGLYCERIDES mg/dL 76   HDL mg/dL 30*     Coags:

## 2021-01-20 NOTE — NURSING NOTE
Hemodialysis continuing for now  Will discharge home this evening when stable  Stress test completed as charted  No current needs  Stable; safety maintained

## 2021-01-20 NOTE — PLAN OF CARE
Post-Dialysis RN Treatment Note    Blood Pressure:  Pre 154/67 mm/Hg  Post 131/63 mmHg   EDW  95 kg    Weight:  Pre 97 9 kg   Post 95 9 kg   Mode of weight measurement:  bed scale   Volume Removed  2305 ml    Treatment duration 180 minutes    NS given:  100 ml x 1 for sbp <100    Treatment shortened:  no   Medications given during Rx:  none   Estimated Kt/V:  none   Access type:  left forearm fistula   Access Status:  maintains 400 bfr   Report called to primary nurse:  Cesilia Kemp RN              Chronic hemodialysis treatment planned for 180 minutes using 2 K+ bath for serum potassium 5 4 this morning  Fluid goal 2500 ml per orders of Dr Shepard Blizzard        Problem: METABOLIC, FLUID AND ELECTROLYTES - ADULT  Goal: Electrolytes maintained within normal limits  Description: INTERVENTIONS:  - Monitor labs and assess patient for signs and symptoms of electrolyte imbalances  - Administer electrolyte replacement as ordered  - Monitor response to electrolyte replacements, including repeat lab results as appropriate  - Instruct patient on fluid and nutrition as appropriate  Outcome: Progressing  Goal: Fluid balance maintained  Description: INTERVENTIONS:  - Monitor labs   - Monitor I/O and WT  - Instruct patient on fluid and nutrition as appropriate  - Assess for signs & symptoms of volume excess or deficit  Outcome: Progressing

## 2021-01-21 LAB
ATRIAL RATE: 68 BPM
ATRIAL RATE: 69 BPM
CHEST PAIN STATEMENT: NORMAL
GLUCOSE SERPL-MCNC: 232 MG/DL (ref 65–140)
MAX DIASTOLIC BP: 68 MMHG
MAX HEART RATE: 81 BPM
MAX PREDICTED HEART RATE: 150 BPM
MAX. SYSTOLIC BP: 132 MMHG
MRSA NOSE QL CULT: NORMAL
P AXIS: 72 DEGREES
P AXIS: 72 DEGREES
PR INTERVAL: 180 MS
PR INTERVAL: 180 MS
PROTOCOL NAME: NORMAL
QRS AXIS: 66 DEGREES
QRS AXIS: 70 DEGREES
QRSD INTERVAL: 92 MS
QRSD INTERVAL: 96 MS
QT INTERVAL: 390 MS
QT INTERVAL: 410 MS
QTC INTERVAL: 417 MS
QTC INTERVAL: 435 MS
REASON FOR TERMINATION: NORMAL
T WAVE AXIS: 78 DEGREES
T WAVE AXIS: 79 DEGREES
TARGET HR FORMULA: NORMAL
TEST INDICATION: NORMAL
TIME IN EXERCISE PHASE: NORMAL
VENTRICULAR RATE: 68 BPM
VENTRICULAR RATE: 69 BPM

## 2021-01-21 PROCEDURE — 93010 ELECTROCARDIOGRAM REPORT: CPT | Performed by: INTERNAL MEDICINE

## 2021-02-11 ENCOUNTER — OFFICE VISIT (OUTPATIENT)
Dept: CARDIOLOGY CLINIC | Facility: CLINIC | Age: 71
End: 2021-02-11
Payer: COMMERCIAL

## 2021-02-11 VITALS
SYSTOLIC BLOOD PRESSURE: 160 MMHG | HEART RATE: 76 BPM | DIASTOLIC BLOOD PRESSURE: 80 MMHG | WEIGHT: 211.64 LBS | HEIGHT: 72 IN | BODY MASS INDEX: 28.67 KG/M2

## 2021-02-11 DIAGNOSIS — E78.2 MIXED HYPERLIPIDEMIA: ICD-10-CM

## 2021-02-11 DIAGNOSIS — R07.9 CHEST PAIN, UNSPECIFIED TYPE: ICD-10-CM

## 2021-02-11 DIAGNOSIS — I10 ESSENTIAL HYPERTENSION: Primary | ICD-10-CM

## 2021-02-11 DIAGNOSIS — R77.8 ELEVATED TROPONIN: ICD-10-CM

## 2021-02-11 PROCEDURE — 99214 OFFICE O/P EST MOD 30 MIN: CPT | Performed by: NURSE PRACTITIONER

## 2021-02-11 RX ORDER — SODIUM ZIRCONIUM CYCLOSILICATE 10 G/10G
POWDER, FOR SUSPENSION ORAL
COMMUNITY
Start: 2020-12-18

## 2021-02-11 RX ORDER — CALCIUM ACETATE 667 MG/1
CAPSULE ORAL 3 TIMES DAILY
COMMUNITY
Start: 2021-01-14

## 2021-02-11 NOTE — ASSESSMENT & PLAN NOTE
Recent hospitalization for chest pain with mild trop elevation  Underwent nuclear stress test which was non-ischemic  No recurrence since hospitalization

## 2021-02-11 NOTE — ASSESSMENT & PLAN NOTE
Mild trop elevation during hospitalization 1/18/2021 likely Type 2  In the setting of hypertensive urgency and pt with ESRD  Nuclear stress test negative

## 2021-02-11 NOTE — ASSESSMENT & PLAN NOTE
Blood pressure a little bit high  Patient states he was prescribed hydralazine by his nephrologist but he has not yet started this    Encouraged him to begin this medication as directed by Renal

## 2021-02-11 NOTE — PROGRESS NOTES
Patient ID: Vanessa Ricardo is a 80 y o  male  Plan:      Mixed hyperlipidemia  Continue Lipitor    Chest pain, unspecified  Recent hospitalization for chest pain with mild trop elevation  Underwent nuclear stress test which was non-ischemic  No recurrence since hospitalization     Elevated troponin  Mild trop elevation during hospitalization 1/18/2021 likely Type 2  In the setting of hypertensive urgency and pt with ESRD  Nuclear stress test negative    Essential hypertension  Blood pressure a little bit high  Patient states he was prescribed hydralazine by his nephrologist but he has not yet started this  Encouraged him to begin this medication as directed by Renal        Follow up Plan/Summary Comments:  [facility] is stable from a cardiac standpoint  He has not experienced any further chest pain or discomfort since his hospitalization  Blood pressure remains elevated  He has been prescribed hydralazine by his nephrologist, however he has not yet started this medication  I encouraged him to do so  I will see him back in 6 months for a follow-up visit, sooner if needed  HPI:   I had the pleasure of seeing the well in the office today for a hospital follow-up visit  Julissa Menjivar was hospitalized at [facility] on January 18th with chest pain and hypertension  During his hospitalization, his troponins were noted to be slightly elevated  This was thought to be related to hypertensive urgency and his end-stage renal disease  No wall motion abnormalities were noted on echo and no ischemic changes were noted on EKG  Prior to discharge he underwent stress testing which was negative for any ischemia and normal LV function  Julissa Menjivar presents today for follow-up and states he has been doing well  He has not experienced any recurrent chest pain, discomfort, shortness of breath  He continues with dialysis treatments  He notes that his blood pressure has been a little elevated    He was prescribed hydralazine by his nephrologist at the dialysis clinic, however he has not yet started this medication  He tells me he has been trying different dietary changes to improve his blood pressure, unfortunately this has not been successful  He is agreeable to begin the hydralazine for improved blood pressure control  Cardiac history is significant for known coronary artery disease,  Identified by catheterization after a non-STEMI in September 2012  He underwent stress testing for renal transplant evaluation in 2017  This was abnormal therefore a catheterization was performed  This revealed a 70% lesion of a small R PDA and a 99% stenosis of D1, previously noted on the catheterization in 2012  Neither lesion was amenable to PCI  He has been medically treated since that time  Other medical history is significant for hypertension, ESRD on HD since 09/2016, hyperlipidemia and diabetes type 2  Review of Systems   10  point ROS  was otherwise non pertinent or negative except as per HPI or as below  Gait: Normal      Most recent or relevant cardiac/vascular testing:    Echo 1/19/2021 EF 75%, no WMA, moderate LVH  Very mild dynamic obstruction at rest, with a peak velocity of 1 5 cm/s    Nuclear stress test 01/20/2021 no ischemia, LV systolic function was normal      Objective:     /80   Pulse 76   Ht 6' (1 829 m)   Wt 96 kg (211 lb 10 3 oz)   BMI 28 70 kg/m²     PHYSICAL EXAM:    General:  Normal appearance, no acute distress  Eyes:  Anicteric  Oral mucosa:  Wearing a mask  Neck:  No JVD  Carotid upstrokes are brisk without bruits  No masses  Chest:  Clear to auscultation   Cardiac:  No palpable PMI  Normal S1 and S2  No murmur gallop or rub  Abdomen:  Soft and nontender  No palpable organomegaly or aortic enlargement  Extremities:  No peripheral edema  Left forearm fistula, positive bruit and thrill  Musculoskeletal:  Symmetric  Vascular: Pedal pulses are intact    Neuro:  Grossly symmetric  Psych:  Alert and oriented x3  No Known Allergies    Current Outpatient Medications:     amLODIPine (NORVASC) 2 5 mg tablet, Take 2 5 mg by mouth daily, Disp: , Rfl:     aspirin (Aspirin 81) 81 mg EC tablet, Take by mouth, Disp: , Rfl:     atorvastatin (LIPITOR) 40 mg tablet, Take 40 mg by mouth daily, Disp: , Rfl:     calcium acetate (PHOSLO) capsule, 3 (three) times a day Not on dialysis days, Disp: , Rfl:     CALCIUM ACETATE-MAGNESIUM CARB PO, Take 1 tablet by mouth, Disp: , Rfl:     carvedilol (COREG) 25 mg tablet, Take by mouth, Disp: , Rfl:     doxazosin (CARDURA) 4 mg tablet, Take 4 mg by mouth, Disp: , Rfl:     hydrALAZINE (APRESOLINE) 50 mg tablet, Take 1 tablet (50 mg total) by mouth every 8 (eight) hours, Disp: 90 tablet, Rfl: 0    insulin aspart (NovoLOG FlexPen) 100 UNIT/ML injection pen, Inject under the skin Three times a day , Disp: , Rfl:     insulin glargine (LANTUS SOLOSTAR) 100 units/mL injection pen, Inject 6 Units under the skin, Disp: , Rfl:     lisinopril (ZESTRIL) 40 mg tablet, Take 40 mg by mouth, Disp: , Rfl:     sevelamer carbonate (Renvela) 800 mg tablet, Take 2 tablets by mouth Three times a day, Disp: , Rfl:     Lokelma 10 g PACK, EMPTY CONTENTS OF 1 PACKET IN 3 TABLESPOONSFUL OF WATER OR AS DIRECTED BY CLINIC  STIR WELL AND DRINK IMMEDIATELY ON NON-DIALYSIS DAYS, Disp: , Rfl:   Past Medical History:   Diagnosis Date    Coronary artery disease     Diabetes mellitus     End stage renal disease     Hyperlipidemia     Hypertension     NSTEMI (non-ST elevated myocardial infarction) 2012     Past Surgical History:   Procedure Laterality Date    CARDIAC CATHETERIZATION  2017    70% lesion of a small R PDA and a 99% stenosis of D1 (previously noted on the cath in 2012)  Neither lesion was amenable to PCI  Medical management      DIALYSIS FISTULA CREATION         CMP:   Lab Results   Component Value Date     03/20/2018    K 5 4 01/20/2021    K 4 7 03/20/2018    CL 93 (L) 01/20/2021    CL 97 (L) 03/20/2018    CO2 29 01/20/2021    CO2 36 (H) 03/20/2018    BUN 51 (H) 01/20/2021    BUN 36 (H) 03/20/2018    CREATININE 10 07 (H) 01/20/2021    CREATININE 7 78 (HH) 03/20/2018    EGFR 5 01/20/2021     Lipid Profile:    Lab Results   Component Value Date    CHOL 86 03/20/2018    TRIG 76 01/19/2021    TRIG 95 03/20/2018    HDL 30 (L) 01/19/2021    HDL 30 (L) 03/20/2018         Social History     Tobacco Use   Smoking Status Former Smoker   Smokeless Tobacco Never Used   Tobacco Comment    STATES QUIT 20 YEARS AGO

## 2021-05-30 ENCOUNTER — HOSPITAL ENCOUNTER (EMERGENCY)
Facility: HOSPITAL | Age: 71
Discharge: HOME | End: 2021-05-30
Attending: EMERGENCY MEDICINE | Admitting: EMERGENCY MEDICINE
Payer: COMMERCIAL

## 2021-05-30 ENCOUNTER — APPOINTMENT (EMERGENCY)
Dept: RADIOLOGY | Facility: HOSPITAL | Age: 71
End: 2021-05-30
Payer: COMMERCIAL

## 2021-05-30 VITALS
DIASTOLIC BLOOD PRESSURE: 94 MMHG | TEMPERATURE: 98.4 F | HEART RATE: 68 BPM | SYSTOLIC BLOOD PRESSURE: 180 MMHG | OXYGEN SATURATION: 98 % | RESPIRATION RATE: 16 BRPM

## 2021-05-30 DIAGNOSIS — R05.9 COUGH: Primary | ICD-10-CM

## 2021-05-30 LAB
ALBUMIN SERPL BCP-MCNC: 3.6 G/DL (ref 3.5–5.7)
ALP SERPL-CCNC: 42 U/L (ref 55–165)
ALT SERPL W P-5'-P-CCNC: 5 U/L (ref 7–52)
ANION GAP SERPL CALCULATED.3IONS-SCNC: 16 MMOL/L (ref 4–13)
AST SERPL W P-5'-P-CCNC: 11 U/L (ref 13–39)
BASOPHILS # BLD AUTO: 0 THOUSANDS/ΜL (ref 0–0.1)
BASOPHILS NFR BLD AUTO: 1 % (ref 0–2)
BILIRUB SERPL-MCNC: 0.3 MG/DL (ref 0.2–1)
BUN SERPL-MCNC: 63 MG/DL (ref 7–25)
CALCIUM SERPL-MCNC: 9 MG/DL (ref 8.6–10.5)
CHLORIDE SERPL-SCNC: 93 MMOL/L (ref 98–107)
CO2 SERPL-SCNC: 26 MMOL/L (ref 21–31)
CREAT SERPL-MCNC: 10.39 MG/DL (ref 0.7–1.3)
EOSINOPHIL # BLD AUTO: 0.2 THOUSAND/ΜL (ref 0–0.61)
EOSINOPHIL NFR BLD AUTO: 2 % (ref 0–5)
ERYTHROCYTE [DISTWIDTH] IN BLOOD BY AUTOMATED COUNT: 17.3 % (ref 11.5–14.5)
GFR SERPL CREATININE-BSD FRML MDRD: 5 ML/MIN/1.73SQ M
GLUCOSE SERPL-MCNC: 257 MG/DL (ref 65–99)
HCT VFR BLD AUTO: 34.9 % (ref 42–47)
HGB BLD-MCNC: 11.2 G/DL (ref 14–18)
LYMPHOCYTES # BLD AUTO: 1.2 THOUSANDS/ΜL (ref 0.6–4.47)
LYMPHOCYTES NFR BLD AUTO: 18 % (ref 21–51)
MCH RBC QN AUTO: 29.2 PG (ref 26–34)
MCHC RBC AUTO-ENTMCNC: 32.2 G/DL (ref 31–37)
MCV RBC AUTO: 91 FL (ref 81–99)
MONOCYTES # BLD AUTO: 1.2 THOUSAND/ΜL (ref 0.17–1.22)
MONOCYTES NFR BLD AUTO: 19 % (ref 2–12)
NEUTROPHILS # BLD AUTO: 4 THOUSANDS/ΜL (ref 1.4–6.5)
NEUTS SEG NFR BLD AUTO: 60 % (ref 42–75)
PLATELET # BLD AUTO: 104 THOUSANDS/UL (ref 149–390)
PMV BLD AUTO: 8.1 FL (ref 8.6–11.7)
POTASSIUM SERPL-SCNC: 4.7 MMOL/L (ref 3.5–5.5)
PROT SERPL-MCNC: 7.5 G/DL (ref 6.4–8.9)
RBC # BLD AUTO: 3.85 MILLION/UL (ref 4.3–5.9)
SODIUM SERPL-SCNC: 135 MMOL/L (ref 134–143)
WBC # BLD AUTO: 6.6 THOUSAND/UL (ref 4.8–10.8)

## 2021-05-30 PROCEDURE — 36415 COLL VENOUS BLD VENIPUNCTURE: CPT | Performed by: EMERGENCY MEDICINE

## 2021-05-30 PROCEDURE — 80053 COMPREHEN METABOLIC PANEL: CPT | Performed by: EMERGENCY MEDICINE

## 2021-05-30 PROCEDURE — 99283 EMERGENCY DEPT VISIT LOW MDM: CPT

## 2021-05-30 PROCEDURE — 99284 EMERGENCY DEPT VISIT MOD MDM: CPT | Performed by: EMERGENCY MEDICINE

## 2021-05-30 PROCEDURE — 85025 COMPLETE CBC W/AUTO DIFF WBC: CPT | Performed by: EMERGENCY MEDICINE

## 2021-05-30 PROCEDURE — 71045 X-RAY EXAM CHEST 1 VIEW: CPT

## 2021-05-30 RX ORDER — BENZONATATE 100 MG/1
100 CAPSULE ORAL EVERY 8 HOURS
Qty: 15 CAPSULE | Refills: 0 | Status: SHIPPED | OUTPATIENT
Start: 2021-05-30 | End: 2021-06-04

## 2021-05-30 NOTE — DISCHARGE INSTRUCTIONS
Try Tessalon Perles as directed for cough  I would recommend you consider talking your family doctor about seeing a pulmonologist for further evaluation of this cough  Please talk to your doctor about this during the week  You may also try taking medication like a antihistamine which may decrease postnasal drip and decreasing cough  Your medication, lisinopril, has also been known to cause cough

## 2021-05-30 NOTE — ED PROVIDER NOTES
History  Chief Complaint   Patient presents with    Cough     78-year-old male presents emergency room noting that he has had a chronic mostly nonproductive cough since the beginning of the year  He denies any fever chills, but he notes that at times the cough seems to be worse at night to where he has to sit up due to gasping from a chronic cough  States that initially he did not have any mucus production but as of more recently, he has used medications like Mucinex with some increase in clear sputum production  Patient has a history of renal failure and is on dialysis  He states he asked his primary care and his nephrologist what he can do for a chronic cough and they had pointed him in the direction of taking over-the-counter cough and cold medicines  Patient notes that the symptoms are concerning him because of the length of time that he has had them so he decided come into the ER  He denies any chest pain or shortness of breath at present  He denies any fever chills or URI symptoms  Patient does not smoke          Prior to Admission Medications   Prescriptions Last Dose Informant Patient Reported? Taking? CALCIUM ACETATE-MAGNESIUM CARB PO   Yes No   Sig: Take 1 tablet by mouth   Lokelma 10 g PACK   Yes Yes   Sig: EMPTY CONTENTS OF 1 PACKET IN 3 TABLESPOONSFUL OF WATER OR AS DIRECTED BY CLINIC   STIR WELL AND DRINK IMMEDIATELY ON NON-DIALYSIS DAYS   amLODIPine (NORVASC) 2 5 mg tablet   Yes Yes   Sig: Take 2 5 mg by mouth daily   aspirin (Aspirin 81) 81 mg EC tablet   Yes Yes   Sig: Take by mouth   atorvastatin (LIPITOR) 40 mg tablet   Yes Yes   Sig: Take 40 mg by mouth daily   calcium acetate (PHOSLO) capsule   Yes Yes   Sig: 3 (three) times a day Not on dialysis days   carvedilol (COREG) 25 mg tablet   Yes Yes   Sig: Take by mouth   doxazosin (CARDURA) 4 mg tablet   Yes Yes   Sig: Take 4 mg by mouth   hydrALAZINE (APRESOLINE) 50 mg tablet   No No   Sig: Take 1 tablet (50 mg total) by mouth every 8 (eight) hours   insulin aspart (NovoLOG FlexPen) 100 UNIT/ML injection pen   Yes Yes   Sig: Inject under the skin Three times a day    insulin glargine (LANTUS SOLOSTAR) 100 units/mL injection pen   Yes Yes   Sig: Inject 6 Units under the skin   lisinopril (ZESTRIL) 40 mg tablet   Yes Yes   Sig: Take 40 mg by mouth   sevelamer carbonate (Renvela) 800 mg tablet   Yes Yes   Sig: Take 2 tablets by mouth Three times a day      Facility-Administered Medications: None       Past Medical History:   Diagnosis Date    Coronary artery disease     Diabetes mellitus     End stage renal disease     Hyperlipidemia     Hypertension     NSTEMI (non-ST elevated myocardial infarction) 2012       Past Surgical History:   Procedure Laterality Date    CARDIAC CATHETERIZATION  2017    70% lesion of a small R PDA and a 99% stenosis of D1 (previously noted on the cath in 2012)  Neither lesion was amenable to PCI  Medical management   DIALYSIS FISTULA CREATION         History reviewed  No pertinent family history  I have reviewed and agree with the history as documented  E-Cigarette/Vaping    E-Cigarette Use Never User      E-Cigarette/Vaping Substances     Social History     Tobacco Use    Smoking status: Former Smoker    Smokeless tobacco: Never Used    Tobacco comment: STATES QUIT 20 YEARS AGO   Substance Use Topics    Alcohol use: Not Currently    Drug use: Never       Review of Systems   Constitutional: Negative for chills and fever  HENT: Negative for ear pain and sore throat  Eyes: Negative for pain and visual disturbance  Respiratory: Positive for cough  Negative for choking and shortness of breath  Cardiovascular: Negative for chest pain and palpitations  Gastrointestinal: Negative for abdominal pain and vomiting  Genitourinary: Negative for dysuria and hematuria  Musculoskeletal: Negative for arthralgias and back pain  Skin: Negative for color change and rash     Neurological: Negative for seizures, syncope and weakness  All other systems reviewed and are negative  Physical Exam  Physical Exam  Constitutional:       General: He is not in acute distress  Appearance: Normal appearance  He is normal weight  He is not ill-appearing  HENT:      Head: Normocephalic and atraumatic  Right Ear: External ear normal       Left Ear: External ear normal       Nose: Nose normal       Mouth/Throat:      Mouth: Mucous membranes are moist       Pharynx: Oropharyngeal exudate present  No posterior oropharyngeal erythema  Comments: Torus palatinus is appreciated  Patient notes that is been there for many years  Eyes:      Conjunctiva/sclera: Conjunctivae normal    Neck:      Musculoskeletal: Normal range of motion  Cardiovascular:      Rate and Rhythm: Normal rate and regular rhythm  Pulses: Normal pulses  Heart sounds: Normal heart sounds  Pulmonary:      Effort: Pulmonary effort is normal       Breath sounds: Normal breath sounds  Abdominal:      General: Abdomen is flat  There is no distension  Palpations: Abdomen is soft  There is no mass  Musculoskeletal: Normal range of motion  General: No swelling, tenderness or deformity  Skin:     General: Skin is warm and dry  Capillary Refill: Capillary refill takes 2 to 3 seconds  Coloration: Skin is not pale  Neurological:      General: No focal deficit present  Mental Status: He is alert and oriented to person, place, and time  Mental status is at baseline     Psychiatric:         Mood and Affect: Mood normal          Vital Signs  ED Triage Vitals   Temperature Pulse Respirations Blood Pressure SpO2   05/30/21 1050 05/30/21 1050 05/30/21 1050 05/30/21 1052 05/30/21 1050   98 4 °F (36 9 °C) 76 16 (!) 235/100 99 %      Temp Source Heart Rate Source Patient Position - Orthostatic VS BP Location FiO2 (%)   05/30/21 1050 05/30/21 1050 -- 05/30/21 1215 --   Oral Monitor  Left arm       Pain Score       -- Vitals:    05/30/21 1052 05/30/21 1057 05/30/21 1159 05/30/21 1215   BP: (!) 235/100 (!) 209/96 (!) 231/102 (!) 180/94   Pulse:   72 68         Visual Acuity      ED Medications  Medications - No data to display    Diagnostic Studies  Results Reviewed     Procedure Component Value Units Date/Time    Comprehensive metabolic panel [[id number]]  (Abnormal) Collected: 05/30/21 1124    Lab Status: Final result Specimen: Blood from Arm, Right Updated: 05/30/21 1153     Sodium 135 mmol/L      Potassium 4 7 mmol/L      Chloride 93 mmol/L      CO2 26 mmol/L      ANION GAP 16 mmol/L      BUN 63 mg/dL      Creatinine 10 39 mg/dL      Glucose 257 mg/dL      Calcium 9 0 mg/dL      AST 11 U/L      ALT 5 U/L      Alkaline Phosphatase 42 U/L      Total Protein 7 5 g/dL      Albumin 3 6 g/dL      Total Bilirubin 0 30 mg/dL      eGFR 5 ml/min/1 73sq m     Narrative:      National Kidney Disease Foundation guidelines for Chronic Kidney Disease (CKD):     Stage 1 with normal or high GFR (GFR > 90 mL/min/1 73 square meters)    Stage 2 Mild CKD (GFR = 60-89 mL/min/1 73 square meters)    Stage 3A Moderate CKD (GFR = 45-59 mL/min/1 73 square meters)    Stage 3B Moderate CKD (GFR = 30-44 mL/min/1 73 square meters)    Stage 4 Severe CKD (GFR = 15-29 mL/min/1 73 square meters)    Stage 5 End Stage CKD (GFR <15 mL/min/1 73 square meters)  Note: GFR calculation is accurate only with a steady state creatinine    CBC and differential [[id number]]  (Abnormal) Collected: 05/30/21 1124    Lab Status: Final result Specimen: Blood from Arm, Right Updated: 05/30/21 1148     WBC 6 60 Thousand/uL      RBC 3 85 Million/uL      Hemoglobin 11 2 g/dL      Hematocrit 34 9 %      MCV 91 fL      MCH 29 2 pg      MCHC 32 2 g/dL      RDW 17 3 %      MPV 8 1 fL      Platelets 421 Thousands/uL      Neutrophils Relative 60 %      Lymphocytes Relative 18 %      Monocytes Relative 19 %      Eosinophils Relative 2 %      Basophils Relative 1 % Neutrophils Absolute 4 00 Thousands/µL      Lymphocytes Absolute 1 20 Thousands/µL      Monocytes Absolute 1 20 Thousand/µL      Eosinophils Absolute 0 20 Thousand/µL      Basophils Absolute 0 00 Thousands/µL                  XR chest 1 view portable   ED Interpretation by Candis Lyles DO (05/30 1153)   No definitive acute pulmonary pathology  Final Result by Zaheer Gilbert MD (05/30 1215)      No acute cardiopulmonary disease  Workstation performed: [id number]                    Procedures  Procedures         ED Course  ED Course as of May 30 1813   Isrrael Audra May 30, 2021   1117 Discussed with patient that if symptoms persist, the patient will likely need to have a bronchoscopy, or least a pulmonologist evaluation to start  It is also possible that the patient has a cough from his ACE-inhibitor  1149 CBC reviewed and shows similar results as prior lab studies  CBC and differential(!)   1156 Blood pressure elevated however patient is not yet taking his morning medicines  The patient was urged to do so  MDM    Disposition  Final diagnoses:   Cough     Time reflects when diagnosis was documented in both MDM as applicable and the Disposition within this note     Time User Action Codes Description Comment    5/30/2021 11:53 AM Inessa Florian Add [R05] Cough       ED Disposition     ED Disposition Condition Date/Time Comment    Discharge Stable Sun May 30, 2021 11:54 AM Keily Magallanes discharge to home/self care              Follow-up Information     Follow up With Specialties Details Why Contact Info    Angelito Smyth MD Family Medicine In 3 days  [address] [facility]  [phone]            Discharge Medication List as of 5/30/2021 11:56 AM      START taking these medications    Details   benzonatate (TESSALON PERLES) 100 mg capsule Take 1 capsule (100 mg total) by mouth every 8 (eight) hours for 15 doses, Starting Sun 5/30/2021, Until Fri 6/4/2021, Normal         CONTINUE these medications which have NOT CHANGED    Details   amLODIPine (NORVASC) 2 5 mg tablet Take 2 5 mg by mouth daily, Historical Med      aspirin (Aspirin 81) 81 mg EC tablet Take by mouth, Historical Med      atorvastatin (LIPITOR) 40 mg tablet Take 40 mg by mouth daily, Historical Med      calcium acetate (PHOSLO) capsule 3 (three) times a day Not on dialysis days, Starting Thu 1/14/2021, Historical Med      carvedilol (COREG) 25 mg tablet Take by mouth, Historical Med      doxazosin (CARDURA) 4 mg tablet Take 4 mg by mouth, Historical Med      insulin aspart (NovoLOG FlexPen) 100 UNIT/ML injection pen Inject under the skin Three times a day , Starting Wed 8/12/2020, Historical Med      insulin glargine (LANTUS SOLOSTAR) 100 units/mL injection pen Inject 6 Units under the skin, Historical Med      lisinopril (ZESTRIL) 40 mg tablet Take 40 mg by mouth, Historical Med      Lokelma 10 g PACK EMPTY CONTENTS OF 1 PACKET IN 3 TABLESPOONSFUL OF WATER OR AS DIRECTED BY CLINIC  STIR WELL AND DRINK IMMEDIATELY ON NON-DIALYSIS DAYS, Historical Med      sevelamer carbonate (Renvela) 800 mg tablet Take 2 tablets by mouth Three times a day, Starting Sat 1/9/2021, Historical Med      CALCIUM ACETATE-MAGNESIUM CARB PO Take 1 tablet by mouth, Historical Med      hydrALAZINE (APRESOLINE) 50 mg tablet Take 1 tablet (50 mg total) by mouth every 8 (eight) hours, Starting Wed 1/20/2021, Until Fri 2/19/2021, Normal           No discharge procedures on file      PDMP Review     None          ED Provider  Electronically Signed by           Iona Barrett DO  05/30/21 6153

## 2021-06-01 ENCOUNTER — OFFICE VISIT (OUTPATIENT)
Dept: PULMONOLOGY | Facility: CLINIC | Age: 71
End: 2021-06-01
Payer: COMMERCIAL

## 2021-06-01 VITALS
TEMPERATURE: 97.8 F | HEIGHT: 72 IN | WEIGHT: 218.6 LBS | SYSTOLIC BLOOD PRESSURE: 136 MMHG | DIASTOLIC BLOOD PRESSURE: 42 MMHG | OXYGEN SATURATION: 96 % | RESPIRATION RATE: 16 BRPM | BODY MASS INDEX: 29.61 KG/M2 | HEART RATE: 75 BPM

## 2021-06-01 DIAGNOSIS — R05.9 COUGH: Primary | ICD-10-CM

## 2021-06-01 DIAGNOSIS — G47.33 OSA (OBSTRUCTIVE SLEEP APNEA): ICD-10-CM

## 2021-06-01 PROCEDURE — 99204 OFFICE O/P NEW MOD 45 MIN: CPT | Performed by: INTERNAL MEDICINE

## 2021-06-01 RX ORDER — HEPARIN SODIUM 1000 [USP'U]/ML
INJECTION, SOLUTION INTRAVENOUS; SUBCUTANEOUS
COMMUNITY
Start: 2021-02-19 | End: 2022-02-18

## 2021-06-01 RX ORDER — PEN NEEDLE, DIABETIC 31 GX5/16"
NEEDLE, DISPOSABLE MISCELLANEOUS
COMMUNITY
Start: 2021-03-24 | End: 2021-06-01

## 2021-06-01 RX ORDER — SODIUM POLYSTYRENE SULFONATE 15 G/60ML
SUSPENSION ORAL; RECTAL
COMMUNITY
Start: 2021-05-27 | End: 2022-01-13

## 2021-06-01 RX ORDER — LIDOCAINE AND PRILOCAINE 25; 25 MG/G; MG/G
CREAM TOPICAL
COMMUNITY
Start: 2021-03-26

## 2021-06-01 NOTE — PROGRESS NOTES
Pulmonary Consultation   Zakiya Wynne 80 y o  male MRN: [id number]    Encounter: [id number]      Reason for consultation:   Cough    Requesting physician:  Cassie Marroquin MD       Impressions:   · Cough  · Obstructive sleep apnea  · Chronic kidney disease on hemodialysis  · Seven hypertension  Recommendations:  · Substitute lisinopril with another pressure medication  · Polysomnogram home study  · Follow-up in 3 months  Discussion:  The patient's cough is most likely due to the lisinopril  I have advised patient to call his family doctor and have the the syncopal substitute it with another blood pressure medication  He needs close monitoring of his blood pressure after the solution is done  He has clinical picture of obstructive sleep apnea  I have ordered a polysomnogram and the patient prefers a home study  If cough does not resolve after the lisinopril has been substituted then will do further testing including PFTs and may consider bronchoscopy  I will see him after the sleep study is done  History of Present Illness   HPI:  Zakiya Wynne is a 80 y o  male who is here for evaluation of cough  That the cough started about 5 months ago  It is dry  He denies shortness of breath  Denies any chest pain or palpitations  No wheezing  Denies postnasal dripping  No heartburn  Denies dysphagia  The patient has chronic kidney disease on hemodialysis 3 times a week  Also has essential hypertension  One of his medications is lisinopril  Review of systems:    12 point review of systems was completed and was otherwise negative except as listed in HPI        Historical Information   Past Medical History:   Diagnosis Date    Coronary artery disease     Diabetes mellitus     End stage renal disease     Hyperlipidemia     Hypertension     NSTEMI (non-ST elevated myocardial infarction) 2012     Past Surgical History:   Procedure Laterality Date    CARDIAC CATHETERIZATION  2017 70% lesion of a small R PDA and a 99% stenosis of D1 (previously noted on the cath in 2012)  Neither lesion was amenable to PCI  Medical management   DIALYSIS FISTULA CREATION       History reviewed  No pertinent family history  Family History:  No family history of chronic lung disease  Social History:  The patient is from [place]  No significant smoking history  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  No pets  Meds/Allergies   No current facility-administered medications for this visit  (Not in a hospital admission)    No Known Allergies    Vitals: Blood pressure (!) 136/42, pulse 75, temperature 97 8 °F (36 6 °C), resp  rate 16, height 6' (1 829 m), weight 99 2 kg (218 lb 9 6 oz), SpO2 96 %  ,      Physical exam:        Head/eyes:    Normocephalic, without obvious abnormality, atraumatic,         PERRL, extraocular muscles intact, no scleral icterus    Nose:   Nares normal, septum midline, mucosa normal, no drainage    or sinus tenderness   Throat:   Moist mucous membranes, no thrush   Neck:   Supple, trachea midline, no adenopathy; no carotid    bruit or JVD   Lungs:     Clear breath sounds  No wheezing or rhonchi  Heart:    Regular rate and rhythm, S1 and S2 normal, no murmur, rub   or gallop   Abdomen:     Soft, non-tender, bowel sounds active all four quadrants,     no masses, no organomegaly   Extremities:   Extremities normal, atraumatic, no cyanosis or edema   Skin:   Warm, dry, turgor normal, no rashes or lesions   Neurologic:   CNII-XII intact, normal strength, non-focal             Imaging and other studies: I have personally reviewed pertinent films in PACS chest ray is reviewed on the [facility] system  There is no acute or chronic pulmonary findings      Lab Results   Component Value Date    WBC 6 60 05/30/2021    HGB 11 2 (L) 05/30/2021    HCT 34 9 (L) 05/30/2021    MCV 91 05/30/2021     (L) 05/30/2021     Lab Results Component Value Date    SODIUM 135 05/30/2021    K 4 7 05/30/2021    CL 93 (L) 05/30/2021    CO2 26 05/30/2021    BUN 63 (H) 05/30/2021    CREATININE 10 39 (H) 05/30/2021    GLUC 257 (H) 05/30/2021    CALCIUM 9 0 05/30/2021             Forrest Venegas MD

## 2021-06-04 ENCOUNTER — TELEPHONE (OUTPATIENT)
Dept: SLEEP CENTER | Facility: CLINIC | Age: 71
End: 2021-06-04

## 2021-08-31 ENCOUNTER — OFFICE VISIT (OUTPATIENT)
Dept: CARDIOLOGY CLINIC | Facility: CLINIC | Age: 71
End: 2021-08-31
Payer: COMMERCIAL

## 2021-08-31 VITALS
HEART RATE: 64 BPM | HEIGHT: 72 IN | WEIGHT: 216 LBS | SYSTOLIC BLOOD PRESSURE: 120 MMHG | BODY MASS INDEX: 29.26 KG/M2 | DIASTOLIC BLOOD PRESSURE: 64 MMHG

## 2021-08-31 DIAGNOSIS — I10 ESSENTIAL HYPERTENSION: Primary | ICD-10-CM

## 2021-08-31 DIAGNOSIS — E78.2 MIXED HYPERLIPIDEMIA: ICD-10-CM

## 2021-08-31 DIAGNOSIS — N18.6 ESRD (END STAGE RENAL DISEASE) (HCC): ICD-10-CM

## 2021-08-31 PROCEDURE — 99214 OFFICE O/P EST MOD 30 MIN: CPT | Performed by: NURSE PRACTITIONER

## 2021-08-31 RX ORDER — LOSARTAN POTASSIUM 100 MG/1
TABLET ORAL
COMMUNITY
Start: 2021-08-29 | End: 2022-01-13

## 2021-09-01 NOTE — ASSESSMENT & PLAN NOTE
Blood pressure well controlled   Since his last visit, lisinopril has been discontinued by another provider due to a suspected ACE-inhibitor induced cough  He has started losartan and has been doing well      Continue losartan 100 mg daily, amlodipine 2 5 mg daily

## 2021-09-01 NOTE — ASSESSMENT & PLAN NOTE
Lab Results   Component Value Date    EGFR 5 05/30/2021    EGFR 5 01/20/2021    EGFR 7 01/19/2021    CREATININE 10 39 (H) 05/30/2021    CREATININE 10 07 (H) 01/20/2021    CREATININE 7 84 (H) 01/19/2021      hemodialysis Monday Wednesday Friday

## 2021-09-01 NOTE — PROGRESS NOTES
Patient ID: Divine Kim is a 80 y o  male  Plan:      Essential hypertension  Blood pressure well controlled   Since his last visit, lisinopril has been discontinued by another provider due to a suspected ACE-inhibitor induced cough  He has started losartan and has been doing well  Continue losartan 100 mg daily, amlodipine 2 5 mg daily    Mixed hyperlipidemia   Continue Lipitor    ESRD (end stage renal disease) ([facility] [address] )  Lab Results   Component Value Date    EGFR 5 05/30/2021    EGFR 5 01/20/2021    EGFR 7 01/19/2021    CREATININE 10 39 (H) 05/30/2021    CREATININE 10 07 (H) 01/20/2021    CREATININE 7 84 (H) 01/19/2021      hemodialysis Monday Wednesday Friday       Follow up Plan/Summary Comments:  Continue current medications without change  Blood pressure remains adequately controlled on his current regimen  No indication for additional cardiac testing at this time  Follow-up in 9 months, sooner if a problem should arise  HPI:  I had the pleasure seeing Ellen Strickland in the office today for a follow-up visit  Patric has been doing well from a cardiac standpoint  He denies any recurrent episodes of chest pain  Blood pressure has remained well controlled on his current regimen which includes a recent change due to an ACE-inhibitor induced cough  He notes that his cough has improved since the discontinuation of lisinopril  He denies any chest pain, pressure, tightness, burning  He denies any palpitations or changes in breathing  Review of Systems   10  point ROS  was otherwise non pertinent or negative except as per HPI or as below     Gait: Normal      Most recent or relevant cardiac/vascular testing:    Echo 1/19/2021 EF 75%, no WMA, moderate LVH  Very mild dynamic obstruction at rest, with a peak velocity of 1 5 cm/s     Nuclear stress test 01/20/2021 no ischemia, LV systolic function was normal    Objective:     /64   Pulse 64   Ht 6' (1 829 m)   Wt 98 kg (216 lb)   BMI 29 29 kg/m²     PHYSICAL EXAM:    General:  Normal appearance, no acute distress  Eyes:  Anicteric  Oral mucosa:   Wearing a mask  Neck:  No JVD  Carotid upstrokes are brisk without bruits  No masses  Chest:  Clear to auscultation   Cardiac:  No palpable PMI  Normal S1 and S2  No murmur gallop or rub  Abdomen:  Soft and nontender  No palpable organomegaly or aortic enlargement  Extremities:  No peripheral edema  Left forearm fistula  Musculoskeletal:  Symmetric  Vascular:  Pedal pulses are intact  Neuro:  Grossly symmetric  Psych:  Alert and oriented x3  No Known Allergies    Current Outpatient Medications:     amLODIPine (NORVASC) 2 5 mg tablet, Take 2 5 mg by mouth daily, Disp: , Rfl:     aspirin (Aspirin 81) 81 mg EC tablet, Take by mouth, Disp: , Rfl:     atorvastatin (LIPITOR) 40 mg tablet, Take 40 mg by mouth daily, Disp: , Rfl:     calcium acetate (PHOSLO) capsule, 3 (three) times a day Not on dialysis days, Disp: , Rfl:     carvedilol (COREG) 25 mg tablet, Take 25 mg by mouth 2 (two) times a day with meals , Disp: , Rfl:     doxazosin (CARDURA) 4 mg tablet, Take 4 mg by mouth daily at bedtime , Disp: , Rfl:     Heparin Sodium, Porcine, (heparin, porcine,) 1,000 units/mL, At dialysis, Disp: , Rfl:     insulin aspart (NovoLOG FlexPen) 100 UNIT/ML injection pen, Inject under the skin Three times a day , Disp: , Rfl:     insulin glargine (LANTUS SOLOSTAR) 100 units/mL injection pen, Inject 6 Units under the skin, Disp: , Rfl:     Insulin Pen Needle (UltiCare Short Pen Needles) 31G X 8 MM MISC, INJECT 4 TIMES DAILY;E11 9, Disp: , Rfl:     lidocaine-prilocaine (EMLA) cream, APPLY SMALL AMOUNT TO ACCESS SITE (AVF) 1 TO 2 HOURS BEFORE DIALYSIS  COVER WITH OCCLUSIVE DRESSING (SARAN WRAP), Disp: , Rfl:     Lokelma 10 g PACK, EMPTY CONTENTS OF 1 PACKET IN 3 TABLESPOONSFUL OF WATER OR AS DIRECTED BY CLINIC   STIR WELL AND DRINK IMMEDIATELY ON NON-DIALYSIS DAYS, Disp: , Rfl:     losartan (COZAAR) 100 MG tablet, , Disp: , Rfl:     Methoxy PEG-Epoetin Beta (MIRCERA IJ), 100 mcg Once every 2 weeks , Disp: , Rfl:     sevelamer carbonate (Renvela) 800 mg tablet, Take 800 mg by mouth Three times a day , Disp: , Rfl:     CALCIUM ACETATE-MAGNESIUM CARB PO, Take 1 tablet by mouth (Patient not taking: Reported on 8/31/2021), Disp: , Rfl:     hydrALAZINE (APRESOLINE) 50 mg tablet, Take 1 tablet (50 mg total) by mouth every 8 (eight) hours, Disp: 90 tablet, Rfl: 0    lisinopril (ZESTRIL) 40 mg tablet, Take 40 mg by mouth daily , Disp: , Rfl:     SPS 15 GM/60ML suspension, , Disp: , Rfl:     Tuberculin PPD (TUBERSOL ID), Inject 0 1 mL into the skin (Patient not taking: Reported on 8/31/2021), Disp: , Rfl:     VITAMIN D PO, Take 1 mcg by mouth (Patient not taking: Reported on 8/31/2021), Disp: , Rfl:   Past Medical History:   Diagnosis Date    Coronary artery disease     Diabetes mellitus     End stage renal disease     Hyperlipidemia     Hypertension     NSTEMI (non-ST elevated myocardial infarction) 2012     Past Surgical History:   Procedure Laterality Date    CARDIAC CATHETERIZATION  2017    70% lesion of a small R PDA and a 99% stenosis of D1 (previously noted on the cath in 2012)  Neither lesion was amenable to PCI  Medical management      DIALYSIS FISTULA CREATION         CMP:   Lab Results   Component Value Date     03/20/2018    K 4 7 05/30/2021    K 4 7 03/20/2018    CL 93 (L) 05/30/2021    CL 97 (L) 03/20/2018    CO2 26 05/30/2021    CO2 36 (H) 03/20/2018    BUN 63 (H) 05/30/2021    BUN 36 (H) 03/20/2018    CREATININE 10 39 (H) 05/30/2021    CREATININE 7 78 (HH) 03/20/2018    EGFR 5 05/30/2021     Lipid Profile:    Lab Results   Component Value Date    CHOL 86 03/20/2018    TRIG 76 01/19/2021    TRIG 95 03/20/2018    HDL 30 (L) 01/19/2021    HDL 30 (L) 03/20/2018         Social History     Tobacco Use   Smoking Status Former Smoker    Types: Cigarettes   Smokeless Tobacco Never Used Tobacco Comment    STATES QUIT 30 YEARS AGO/ ABOUT ONE CIGARETTE DAILY

## 2021-11-17 ENCOUNTER — TELEPHONE (OUTPATIENT)
Dept: PULMONOLOGY | Facility: CLINIC | Age: 71
End: 2021-11-17

## 2021-11-18 ENCOUNTER — OFFICE VISIT (OUTPATIENT)
Dept: PULMONOLOGY | Facility: CLINIC | Age: 71
End: 2021-11-18
Payer: COMMERCIAL

## 2021-11-18 VITALS
SYSTOLIC BLOOD PRESSURE: 154 MMHG | DIASTOLIC BLOOD PRESSURE: 74 MMHG | TEMPERATURE: 97.6 F | HEART RATE: 68 BPM | HEIGHT: 72 IN | BODY MASS INDEX: 29.26 KG/M2 | WEIGHT: 216 LBS | OXYGEN SATURATION: 97 % | RESPIRATION RATE: 18 BRPM

## 2021-11-18 DIAGNOSIS — G47.33 OSA (OBSTRUCTIVE SLEEP APNEA): ICD-10-CM

## 2021-11-18 DIAGNOSIS — R05.9 COUGH: Primary | ICD-10-CM

## 2021-11-18 PROCEDURE — 99213 OFFICE O/P EST LOW 20 MIN: CPT | Performed by: INTERNAL MEDICINE

## 2022-01-13 ENCOUNTER — APPOINTMENT (EMERGENCY)
Dept: CT IMAGING | Facility: HOSPITAL | Age: 72
DRG: 069 | End: 2022-01-13
Payer: COMMERCIAL

## 2022-01-13 ENCOUNTER — APPOINTMENT (EMERGENCY)
Dept: RADIOLOGY | Facility: HOSPITAL | Age: 72
DRG: 069 | End: 2022-01-13
Payer: COMMERCIAL

## 2022-01-13 ENCOUNTER — HOSPITAL ENCOUNTER (INPATIENT)
Facility: HOSPITAL | Age: 72
LOS: 1 days | Discharge: HOME | DRG: 069 | End: 2022-01-15
Attending: EMERGENCY MEDICINE | Admitting: STUDENT IN AN ORGANIZED HEALTH CARE EDUCATION/TRAINING PROGRAM
Payer: COMMERCIAL

## 2022-01-13 DIAGNOSIS — G45.9 TIA (TRANSIENT ISCHEMIC ATTACK): ICD-10-CM

## 2022-01-13 DIAGNOSIS — E16.2 HYPOGLYCEMIA: ICD-10-CM

## 2022-01-13 DIAGNOSIS — N18.6 ESRD (END STAGE RENAL DISEASE) (HCC): ICD-10-CM

## 2022-01-13 DIAGNOSIS — R29.90 STROKE-LIKE SYMPTOMS: Primary | ICD-10-CM

## 2022-01-13 PROBLEM — I10 ESSENTIAL HYPERTENSION: Chronic | Status: ACTIVE | Noted: 2021-01-18

## 2022-01-13 PROBLEM — E78.2 MIXED HYPERLIPIDEMIA: Chronic | Status: ACTIVE | Noted: 2021-01-18

## 2022-01-13 PROBLEM — R77.8 ELEVATED TROPONIN: Status: RESOLVED | Noted: 2021-01-19 | Resolved: 2022-01-13

## 2022-01-13 PROBLEM — E11.9 TYPE 2 DIABETES MELLITUS WITHOUT COMPLICATION, WITH LONG-TERM CURRENT USE OF INSULIN (HCC): Chronic | Status: ACTIVE | Noted: 2021-01-18

## 2022-01-13 PROBLEM — R79.89 ELEVATED TROPONIN: Status: RESOLVED | Noted: 2021-01-19 | Resolved: 2022-01-13

## 2022-01-13 PROBLEM — Z79.4 TYPE 2 DIABETES MELLITUS WITHOUT COMPLICATION, WITH LONG-TERM CURRENT USE OF INSULIN (HCC): Chronic | Status: ACTIVE | Noted: 2021-01-18

## 2022-01-13 PROBLEM — R07.9 CHEST PAIN, UNSPECIFIED: Status: RESOLVED | Noted: 2021-01-18 | Resolved: 2022-01-13

## 2022-01-13 LAB
2HR DELTA HS TROPONIN: -2 NG/L
ANION GAP SERPL CALCULATED.3IONS-SCNC: 12 MMOL/L (ref 4–13)
APTT PPP: 28 SECONDS (ref 23–37)
BUN SERPL-MCNC: 43 MG/DL (ref 5–25)
CALCIUM SERPL-MCNC: 10.1 MG/DL (ref 8.4–10.2)
CARDIAC TROPONIN I PNL SERPL HS: 15 NG/L
CARDIAC TROPONIN I PNL SERPL HS: 17 NG/L
CHLORIDE SERPL-SCNC: 91 MMOL/L (ref 96–108)
CO2 SERPL-SCNC: 33 MMOL/L (ref 21–32)
CREAT SERPL-MCNC: 8.48 MG/DL (ref 0.6–1.3)
ERYTHROCYTE [DISTWIDTH] IN BLOOD BY AUTOMATED COUNT: 16.9 % (ref 11.6–15.1)
FLUAV RNA RESP QL NAA+PROBE: NEGATIVE
FLUBV RNA RESP QL NAA+PROBE: NEGATIVE
GFR SERPL CREATININE-BSD FRML MDRD: 5 ML/MIN/1.73SQ M
GLUCOSE SERPL-MCNC: 179 MG/DL (ref 65–140)
GLUCOSE SERPL-MCNC: 182 MG/DL (ref 65–140)
GLUCOSE SERPL-MCNC: 38 MG/DL (ref 65–140)
HCT VFR BLD AUTO: 42.1 % (ref 36.5–49.3)
HGB BLD-MCNC: 13 G/DL (ref 12–17)
INR PPP: 1.12 (ref 0.84–1.19)
MCH RBC QN AUTO: 28.3 PG (ref 26.8–34.3)
MCHC RBC AUTO-ENTMCNC: 30.9 G/DL (ref 31.4–37.4)
MCV RBC AUTO: 92 FL (ref 82–98)
PLATELET # BLD AUTO: 113 THOUSANDS/UL (ref 149–390)
PMV BLD AUTO: 10.6 FL (ref 8.9–12.7)
POTASSIUM SERPL-SCNC: 5.3 MMOL/L (ref 3.5–5.3)
PROTHROMBIN TIME: 14.3 SECONDS (ref 11.6–14.5)
RBC # BLD AUTO: 4.59 MILLION/UL (ref 3.88–5.62)
RSV RNA RESP QL NAA+PROBE: NEGATIVE
SARS-COV-2 RNA RESP QL NAA+PROBE: NEGATIVE
SODIUM SERPL-SCNC: 136 MMOL/L (ref 135–147)
WBC # BLD AUTO: 6.54 THOUSAND/UL (ref 4.31–10.16)

## 2022-01-13 PROCEDURE — 85610 PROTHROMBIN TIME: CPT | Performed by: EMERGENCY MEDICINE

## 2022-01-13 PROCEDURE — 99285 EMERGENCY DEPT VISIT HI MDM: CPT

## 2022-01-13 PROCEDURE — 70496 CT ANGIOGRAPHY HEAD: CPT

## 2022-01-13 PROCEDURE — 96374 THER/PROPH/DIAG INJ IV PUSH: CPT

## 2022-01-13 PROCEDURE — 99285 EMERGENCY DEPT VISIT HI MDM: CPT | Performed by: EMERGENCY MEDICINE

## 2022-01-13 PROCEDURE — 82948 REAGENT STRIP/BLOOD GLUCOSE: CPT

## 2022-01-13 PROCEDURE — 36415 COLL VENOUS BLD VENIPUNCTURE: CPT | Performed by: EMERGENCY MEDICINE

## 2022-01-13 PROCEDURE — 80048 BASIC METABOLIC PNL TOTAL CA: CPT | Performed by: EMERGENCY MEDICINE

## 2022-01-13 PROCEDURE — 71045 X-RAY EXAM CHEST 1 VIEW: CPT

## 2022-01-13 PROCEDURE — 0241U HB NFCT DS VIR RESP RNA 4 TRGT: CPT | Performed by: EMERGENCY MEDICINE

## 2022-01-13 PROCEDURE — 84484 ASSAY OF TROPONIN QUANT: CPT | Performed by: EMERGENCY MEDICINE

## 2022-01-13 PROCEDURE — 99220 PR INITIAL OBSERVATION CARE/DAY 70 MINUTES: CPT | Performed by: PHYSICIAN ASSISTANT

## 2022-01-13 PROCEDURE — G1004 CDSM NDSC: HCPCS

## 2022-01-13 PROCEDURE — 70498 CT ANGIOGRAPHY NECK: CPT

## 2022-01-13 PROCEDURE — 85027 COMPLETE CBC AUTOMATED: CPT | Performed by: EMERGENCY MEDICINE

## 2022-01-13 PROCEDURE — 85730 THROMBOPLASTIN TIME PARTIAL: CPT | Performed by: EMERGENCY MEDICINE

## 2022-01-13 PROCEDURE — 93005 ELECTROCARDIOGRAM TRACING: CPT

## 2022-01-13 RX ORDER — AMLODIPINE BESYLATE 2.5 MG/1
2.5 TABLET ORAL
Status: DISCONTINUED | OUTPATIENT
Start: 2022-01-13 | End: 2022-01-15 | Stop reason: HOSPADM

## 2022-01-13 RX ORDER — SEVELAMER HYDROCHLORIDE 800 MG/1
800 TABLET, FILM COATED ORAL
Status: DISCONTINUED | OUTPATIENT
Start: 2022-01-14 | End: 2022-01-15 | Stop reason: HOSPADM

## 2022-01-13 RX ORDER — ASPIRIN 81 MG/1
81 TABLET ORAL DAILY
Status: DISCONTINUED | OUTPATIENT
Start: 2022-01-14 | End: 2022-01-15 | Stop reason: HOSPADM

## 2022-01-13 RX ORDER — ATORVASTATIN CALCIUM 40 MG/1
40 TABLET, FILM COATED ORAL EVERY EVENING
Status: DISCONTINUED | OUTPATIENT
Start: 2022-01-13 | End: 2022-01-15 | Stop reason: HOSPADM

## 2022-01-13 RX ORDER — HYDRALAZINE HYDROCHLORIDE 20 MG/ML
10 INJECTION INTRAMUSCULAR; INTRAVENOUS EVERY 6 HOURS PRN
Status: DISCONTINUED | OUTPATIENT
Start: 2022-01-13 | End: 2022-01-15 | Stop reason: HOSPADM

## 2022-01-13 RX ORDER — CLOPIDOGREL BISULFATE 75 MG/1
600 TABLET ORAL ONCE
Status: COMPLETED | OUTPATIENT
Start: 2022-01-13 | End: 2022-01-13

## 2022-01-13 RX ORDER — ONDANSETRON 2 MG/ML
4 INJECTION INTRAMUSCULAR; INTRAVENOUS EVERY 6 HOURS PRN
Status: DISCONTINUED | OUTPATIENT
Start: 2022-01-13 | End: 2022-01-15 | Stop reason: HOSPADM

## 2022-01-13 RX ORDER — CALCIUM ACETATE 667 MG/1
667 CAPSULE ORAL
Status: DISCONTINUED | OUTPATIENT
Start: 2022-01-14 | End: 2022-01-14

## 2022-01-13 RX ORDER — DEXTROSE MONOHYDRATE 25 G/50ML
50 INJECTION, SOLUTION INTRAVENOUS ONCE
Status: COMPLETED | OUTPATIENT
Start: 2022-01-13 | End: 2022-01-13

## 2022-01-13 RX ORDER — HEPARIN SODIUM 5000 [USP'U]/ML
5000 INJECTION, SOLUTION INTRAVENOUS; SUBCUTANEOUS EVERY 8 HOURS SCHEDULED
Status: DISCONTINUED | OUTPATIENT
Start: 2022-01-13 | End: 2022-01-15 | Stop reason: HOSPADM

## 2022-01-13 RX ORDER — DOXAZOSIN MESYLATE 4 MG/1
4 TABLET ORAL
Status: DISCONTINUED | OUTPATIENT
Start: 2022-01-13 | End: 2022-01-15 | Stop reason: HOSPADM

## 2022-01-13 RX ORDER — CLOPIDOGREL BISULFATE 75 MG/1
75 TABLET ORAL DAILY
Status: DISCONTINUED | OUTPATIENT
Start: 2022-01-14 | End: 2022-01-15 | Stop reason: HOSPADM

## 2022-01-13 RX ORDER — DEXTROSE AND SODIUM CHLORIDE 5; .45 G/100ML; G/100ML
75 INJECTION, SOLUTION INTRAVENOUS CONTINUOUS
Status: DISPENSED | OUTPATIENT
Start: 2022-01-13 | End: 2022-01-14

## 2022-01-13 RX ADMIN — ATORVASTATIN CALCIUM 40 MG: 40 TABLET, FILM COATED ORAL at 22:29

## 2022-01-13 RX ADMIN — DEXTROSE AND SODIUM CHLORIDE 75 ML/HR: 5; .45 INJECTION, SOLUTION INTRAVENOUS at 22:37

## 2022-01-13 RX ADMIN — HEPARIN SODIUM 5000 UNITS: 5000 INJECTION INTRAVENOUS; SUBCUTANEOUS at 22:31

## 2022-01-13 RX ADMIN — HYDRALAZINE HYDROCHLORIDE 10 MG: 20 INJECTION INTRAMUSCULAR; INTRAVENOUS at 22:42

## 2022-01-13 RX ADMIN — DEXTROSE MONOHYDRATE 50 ML: 500 INJECTION PARENTERAL at 19:34

## 2022-01-13 RX ADMIN — CLOPIDOGREL BISULFATE 600 MG: 75 TABLET ORAL at 20:29

## 2022-01-13 RX ADMIN — IOHEXOL 90 ML: 350 INJECTION, SOLUTION INTRAVENOUS at 18:42

## 2022-01-13 NOTE — QUICK NOTE
Stroke Alert    Called 5:42pm, prehospital    72 y/o M with HTN, HLD, DM, CAD, and ESRD that presents with onset of L facial droop and dysarthria, now resolved  LKN 2pm today  Was then found at 4:50pm by wife, who saw pt on the ground with dysarthria and L facial droop  EMS was called and during transport to the ED, pt's symptoms completely resolved  Upon arrival to the ED, NIHSS now 0  No deficits noted  HCT and CTA reviewed - no evidence of acute ischemia/hemorrhage; no significant vascular abnormality  Not a tPA candidate given no disabling deficit  Load with 600mg Plavix and continue 75mg daily along with home ASA 81mg daily  Admit to stroke pathway

## 2022-01-13 NOTE — ED PROVIDER NOTES
History  Chief Complaint   Patient presents with   Hra[address] 22     found down by wife, diaphoretic, not following command and facial droop  completely resolved on arrival      70 yo male presenting to the ed as prehospital stroke alert as he was found down by his wife around [phone] today on the ground and she called ems and was noted to have L sided facial droop and slurred speech  Last known well was about 1400  Resolution of symptoms en-route with ems  Patient states he doesn't remember what happened or why he was on the floor but without complaints at this itme  No hx of seizures, no reported loss of bowel or bladder control  Reportedly diaphoretic when found  Does state he has had a recent URI  Is fully vaccinated and boosted  Prior to Admission Medications   Prescriptions Last Dose Informant Patient Reported? Taking? CALCIUM ACETATE-MAGNESIUM CARB PO  Self Yes No   Sig: Take 1 tablet by mouth   Patient not taking: Reported on 2021   Heparin Sodium, Porcine, (heparin, porcine,) 1,000 units/mL  Self Yes No   Sig: At dialysis   Insulin Pen Needle (UltiCare Short Pen Needles) 31G X 8 MM MISC  Self Yes No   Sig: INJECT 4 TIMES DAILY;E11 9   Lokelma 10 g PACK  Self Yes No   Sig: EMPTY CONTENTS OF 1 PACKET IN 3 TABLESPOONSFUL OF WATER OR AS DIRECTED BY CLINIC   STIR WELL AND DRINK IMMEDIATELY ON NON-DIALYSIS DAYS   Methoxy PEG-Epoetin Beta (MIRCERA IJ)  Self Yes No   Si mcg Once every 2 weeks    SPS 15 GM/60ML suspension  Self Yes No   Patient not taking: Reported on 2021   Tuberculin PPD (TUBERSOL ID)  Self Yes No   Sig: Inject 0 1 mL into the skin   Patient not taking: Reported on 2021   VITAMIN D PO  Self Yes No   Sig: Take 1 mcg by mouth   Patient not taking: Reported on 2021   amLODIPine (NORVASC) 2 5 mg tablet  Self Yes No   Sig: Take 2 5 mg by mouth daily   aspirin (Aspirin 81) 81 mg EC tablet  Self Yes No   Sig: Take by mouth   atorvastatin (LIPITOR) 40 mg tablet Self Yes No   Sig: Take 40 mg by mouth daily   calcium acetate (PHOSLO) capsule  Self Yes No   Sig: 3 (three) times a day Not on dialysis days   carvedilol (COREG) 25 mg tablet  Self Yes No   Sig: Take 25 mg by mouth 2 (two) times a day with meals    doxazosin (CARDURA) 4 mg tablet  Self Yes No   Sig: Take 4 mg by mouth daily at bedtime     hydrALAZINE (APRESOLINE) 50 mg tablet   No No   Sig: Take 1 tablet (50 mg total) by mouth every 8 (eight) hours   Patient not taking: Reported on 11/18/2021    insulin aspart (NovoLOG FlexPen) 100 UNIT/ML injection pen  Self Yes No   Sig: Inject under the skin Three times a day    insulin glargine (LANTUS SOLOSTAR) 100 units/mL injection pen  Self Yes No   Sig: Inject 6 Units under the skin   lidocaine-prilocaine (EMLA) cream  Self Yes No   Sig: APPLY SMALL AMOUNT TO ACCESS SITE (AVF) 1 TO 2 HOURS BEFORE DIALYSIS  COVER WITH OCCLUSIVE DRESSING (SARAN WRAP)   lisinopril (ZESTRIL) 40 mg tablet  Self Yes No   Sig: Take 40 mg by mouth daily     losartan (COZAAR) 100 MG tablet  Self Yes No   Patient not taking: Reported on 11/18/2021    sevelamer carbonate (Renvela) 800 mg tablet  Self Yes No   Sig: Take 800 mg by mouth Three times a day       Facility-Administered Medications: None       Past Medical History:   Diagnosis Date    Coronary artery disease     Diabetes mellitus     End stage renal disease     Hyperlipidemia     Hypertension     NSTEMI (non-ST elevated myocardial infarction) 2012       Past Surgical History:   Procedure Laterality Date    CARDIAC CATHETERIZATION  2017    70% lesion of a small R PDA and a 99% stenosis of D1 (previously noted on the cath in 2012)  Neither lesion was amenable to PCI  Medical management   DIALYSIS FISTULA CREATION         History reviewed  No pertinent family history  I have reviewed and agree with the history as documented      E-Cigarette/Vaping    E-Cigarette Use Never User      E-Cigarette/Vaping Substances    Nicotine No  THC No     CBD No     Flavoring No     Other No     Unknown No      Social History     Tobacco Use    Smoking status: Former Smoker     Packs/day: 1 00     Years: 20 00     Pack years: 20 00     Types: Cigarettes     Start date:      Quit date:      Years since quittin 0    Smokeless tobacco: Never Used    Tobacco comment: STATES QUIT 30 YEARS AGO/ ABOUT ONE CIGARETTE DAILY   Vaping Use    Vaping Use: Never used   Substance Use Topics    Alcohol use: Not Currently    Drug use: Never       Review of Systems   Neurological: Positive for syncope, facial asymmetry and speech difficulty  All other systems reviewed and are negative  Physical Exam  Physical Exam  Vitals and nursing note reviewed  Constitutional:       General: He is not in acute distress  Appearance: He is well-developed  He is not diaphoretic  HENT:      Head: Normocephalic and atraumatic  Right Ear: External ear normal       Left Ear: External ear normal       Nose: Nose normal       Mouth/Throat:      Mouth: Mucous membranes are moist       Pharynx: Oropharynx is clear  No oropharyngeal exudate or posterior oropharyngeal erythema  Comments: No injuries noted to the tongue  Eyes:      General: No visual field deficit or scleral icterus  Right eye: No discharge  Left eye: No discharge  Conjunctiva/sclera: Conjunctivae normal       Pupils: Pupils are equal, round, and reactive to light  Cardiovascular:      Rate and Rhythm: Normal rate and regular rhythm  Heart sounds: Normal heart sounds  No murmur heard  No friction rub  No gallop  Pulmonary:      Effort: Pulmonary effort is normal  No respiratory distress  Breath sounds: Normal breath sounds  No wheezing or rales  Abdominal:      General: Bowel sounds are normal  There is no distension  Palpations: Abdomen is soft  There is no mass  Tenderness: There is no abdominal tenderness  There is no guarding  Musculoskeletal:         General: No tenderness or deformity  Normal range of motion  Cervical back: Normal range of motion and neck supple  Right lower leg: No edema  Left lower leg: No edema  Skin:     General: Skin is warm and dry  Capillary Refill: Capillary refill takes less than 2 seconds  Coloration: Skin is not pale  Findings: No erythema or rash  Neurological:      General: No focal deficit present  Mental Status: He is alert and oriented to person, place, and time  Mental status is at baseline  GCS: GCS eye subscore is 4  GCS verbal subscore is 5  GCS motor subscore is 6  Cranial Nerves: Cranial nerves are intact  No cranial nerve deficit, dysarthria or facial asymmetry  Sensory: Sensation is intact  No sensory deficit  Motor: Motor function is intact  No weakness, tremor, atrophy, abnormal muscle tone, seizure activity or pronator drift  Coordination: Coordination is intact  Coordination normal  Finger-Nose-Finger Test normal       Comments: 5/5 strength x 4 extremities  Gross sensation intact  CN intact  No Dysmetria or Dysdiadochokinesia  GCS 15  No pronator drift     Psychiatric:         Mood and Affect: Mood normal          Behavior: Behavior normal          Thought Content:  Thought content normal          Judgment: Judgment normal          Vital Signs  ED Triage Vitals   Temperature Pulse Respirations Blood Pressure SpO2   01/13/22 1802 01/13/22 1802 01/13/22 1802 01/13/22 1809 01/13/22 1802   (!) 91 6 °F (33 1 °C) (!) 50 16 (!) 200/88 99 %      Temp Source Heart Rate Source Patient Position - Orthostatic VS BP Location FiO2 (%)   01/13/22 1802 01/13/22 1802 01/13/22 1817 -- --   Tympanic Monitor Lying        Pain Score       --                  Vitals:    01/13/22 1832 01/13/22 1847 01/13/22 1927 01/13/22 1957   BP: (!) 198/82 (!) 197/77 (!) 218/99 (!) 223/84   Pulse: (!) 50 (!) 52 (!) 54 59   Patient Position - Orthostatic VS: Lying Lying Lying         Visual Acuity  Visual Acuity      Most Recent Value   L Pupil Size (mm) 3   R Pupil Size (mm) 3          ED Medications  Medications   atorvastatin (LIPITOR) tablet 40 mg (has no administration in time range)   ondansetron (ZOFRAN) injection 4 mg (has no administration in time range)   clopidogrel (PLAVIX) tablet 75 mg (has no administration in time range)   heparin (porcine) subcutaneous injection 5,000 Units (has no administration in time range)   dextrose 5 % and sodium chloride 0 45 % infusion (has no administration in time range)   hydrALAZINE (APRESOLINE) injection 10 mg (has no administration in time range)   iohexol (OMNIPAQUE) 350 MG/ML injection (SINGLE-DOSE) 90 mL (90 mL Intravenous Given 1/13/22 1842)   dextrose 50 % IV solution 50 mL (50 mL Intravenous Given 1/13/22 1934)   clopidogrel (PLAVIX) tablet 600 mg (600 mg Oral Given 1/13/22 2029)       Diagnostic Studies  Results Reviewed     Procedure Component Value Units Date/Time    HS Troponin I 4hr [[id number]]     Lab Status: No result Specimen: Blood     Fingerstick Glucose (POCT) [[id number]]  (Abnormal) Collected: 01/13/22 1956    Lab Status: Final result Updated: 01/13/22 2002     POC Glucose 182 mg/dl     COVID/FLU/RSV - 2 hour TAT [[id number]]  (Normal) Collected: 01/13/22 1853    Lab Status: Final result Specimen: Nares from Nose Updated: 01/13/22 1950     SARS-CoV-2 Negative     INFLUENZA A PCR Negative     INFLUENZA B PCR Negative     RSV PCR Negative    Narrative:      FOR PEDIATRIC PATIENTS - copy/paste COVID Guidelines URL to browser: [email or web address]    SARS-CoV-2 assay is a Nucleic Acid Amplification assay intended for the  qualitative detection of nucleic acid from SARS-CoV-2 in nasopharyngeal  swabs  Results are for the presumptive identification of SARS-CoV-2 RNA      Positive results are indicative of infection with SARS-CoV-2, the virus  causing COVID-19, but do not rule out bacterial infection or co-infection  with other viruses  Laboratories within the [place] and its  territories are required to report all positive results to the appropriate  public health authorities  Negative results do not preclude SARS-CoV-2  infection and should not be used as the sole basis for treatment or other  patient management decisions  Negative results must be combined with  clinical observations, patient history, and epidemiological information  This test has not been FDA cleared or approved  This test has been authorized by FDA under an Emergency Use Authorization  (EUA)  This test is only authorized for the duration of time the  declaration that circumstances exist justifying the authorization of the  emergency use of an in vitro diagnostic tests for detection of SARS-CoV-2  virus and/or diagnosis of COVID-19 infection under section 564(b)(1) of  the Act, 21 U  S C  123FWO-8(J)(5), unless the authorization is terminated  or revoked sooner  The test has been validated but independent review by FDA  and CLIA is pending  Test performed using [employer] Gene[employer]pert: This RT-PCR assay targets N2,  a region unique to SARS-CoV-2  A conserved region in the E-gene was chosen  for pan-Sarbecovirus detection which includes SARS-CoV-2      HS Troponin 0hr (reflex protocol) [[id number]]  (Normal) Collected: 01/13/22 1853    Lab Status: Final result Specimen: Blood from Arm, Right Updated: 01/13/22 1943     hs TnI 0hr 17 ng/L     HS Troponin I 2hr [[id number]]     Lab Status: No result Specimen: Blood     CBC and Platelet [[id number]]  (Abnormal) Collected: 01/13/22 1853    Lab Status: Final result Specimen: Blood from Arm, Right Updated: 01/13/22 1934     WBC 6 54 Thousand/uL      RBC 4 59 Million/uL      Hemoglobin 13 0 g/dL      Hematocrit 42 1 %      MCV 92 fL      MCH 28 3 pg      MCHC 30 9 g/dL      RDW 16 9 %      Platelets 922 Thousands/uL      MPV 10 6 fL     Basic metabolic panel [[id number]]  (Abnormal) Collected: 01/13/22 1853    Lab Status: Final result Specimen: Blood from Arm, Right Updated: 01/13/22 1931     Sodium 136 mmol/L      Potassium 5 3 mmol/L      Chloride 91 mmol/L      CO2 33 mmol/L      ANION GAP 12 mmol/L      BUN 43 mg/dL      Creatinine 8 48 mg/dL      Glucose 38 mg/dL      Calcium 10 1 mg/dL      eGFR 5 ml/min/1 73sq m     Narrative:      [place] guidelines for Chronic Kidney Disease (CKD):     Stage 1 with normal or high GFR (GFR > 90 mL/min/1 73 square meters)    Stage 2 Mild CKD (GFR = 60-89 mL/min/1 73 square meters)    Stage 3A Moderate CKD (GFR = 45-59 mL/min/1 73 square meters)    Stage 3B Moderate CKD (GFR = 30-44 mL/min/1 73 square meters)    Stage 4 Severe CKD (GFR = 15-29 mL/min/1 73 square meters)    Stage 5 End Stage CKD (GFR <15 mL/min/1 73 square meters)  Note: GFR calculation is accurate only with a steady state creatinine    Protime-INR [[id number]]  (Normal) Collected: 01/13/22 1853    Lab Status: Final result Specimen: Blood from Arm, Right Updated: 01/13/22 1925     Protime 14 3 seconds      INR 1 12    APTT [[id number]]  (Normal) Collected: 01/13/22 1853    Lab Status: Final result Specimen: Blood from Arm, Right Updated: 01/13/22 1925     PTT 28 seconds                  CT stroke alert brain    (Results Pending)   CTA stroke alert (head/neck)    (Results Pending)   X-ray chest 1 view portable    (Results Pending)   MRI Inpatient Order    (Results Pending)              Procedures  ECG 12 Lead Documentation Only    Date/Time: 1/13/2022 6:48 PM  Performed by: Mallorie Ortiz DO  Authorized by: Mallorie Ortiz DO     Patient location:  ED  Interpretation:     Interpretation: normal    Rate:     ECG rate:  52    ECG rate assessment: bradycardic    Rhythm:     Rhythm: sinus bradycardia    Ectopy:     Ectopy: none    QRS:     QRS axis:  Normal    QRS intervals:  Normal  Conduction:     Conduction: normal    ST segments:     ST segments:  Normal  T waves:     T waves: normal               ED Course  ED Course as of 01/13/22 2155   Thu Jan 13, 2022   9623 Procedure Note for Ultrasound Guided Peripheral Line:  Skin prepared using Chloraprep  In the R basilic vein, using ultrasound guidance (CPT code 59556), an 20G peripheral IV long angiocatheter was placed successfully by physician secondary to difficulty placing IV by nursing staff  Successful  One attempt  Good blood return  Good palpable flush  Adhered to skin using Tegederm  U/S image in chart  2018 POC Glucose(!): 182  After D50 administered             HEART Risk Score      Most Recent Value   Heart Score Risk Calculator    History 0 Filed at: 01/13/2022 2155   ECG 1 Filed at: 01/13/2022 2155   Age 2 Filed at: 01/13/2022 2155   Risk Factors 2 Filed at: 01/13/2022 2155   Troponin 1 Filed at: 01/13/2022 2155   HEART Score 6 Filed at: 01/13/2022 2155           Stroke Assessment     Row Name 01/13/22 1815             NIH Stroke Scale    Interval Baseline      Level of Consciousness (1a ) 0      LOC Questions (1b ) 0      LOC Commands (1c ) 0      Best Gaze (2 ) 0      Visual (3 ) 0      Facial Palsy (4 ) 0      Motor Arm, Left (5a ) 0      Motor Arm, Right (5b ) 0      Motor Leg, Left (6a ) 0      Motor Leg, Right (6b ) 0      Limb Ataxia (7 ) 0      Sensory (8 ) 0      Best Language (9 ) 0      Dysarthria (10 ) 0      Extinction and Inattention (11 ) (Formerly Neglect) 0      Total 0                Most Recent Value   TPA Decision Options    TPA Decision Patient not a TPA candidate  Patient is not a candidate options Symptoms resolved/clearly non disabling                                    MDM  Number of Diagnoses or Management Options  Hypoglycemia  Stroke-like symptoms  TIA (transient ischemic attack)  Diagnosis management comments: Pre-hospital stroke alert with resolution of symptoms prior to arrival    Stroke workup and admission  Patient placed on [employer] as he was noted to be hypothermic  Noted to be hypoglycemic and given D50  Inpatient team notified the patient will require dialysis tomorrow as is his normal day for dialysis  Also received contrast load  Placed on Plavix secondary to Neurology recommendation  Disposition  Final diagnoses:   Stroke-like symptoms   TIA (transient ischemic attack)   Hypoglycemia     Time reflects when diagnosis was documented in both MDM as applicable and the Disposition within this note     Time User Action Codes Description Comment    1/13/2022  6:05 PM Sheryl Adryan Add [R29 90] Stroke-like symptoms     1/13/2022  8:43 PM Sheryl Adryan Add [G45 9] TIA (transient ischemic attack)     1/13/2022  8:43 PM Sheryl Adryan Add [E16 2] Hypoglycemia       ED Disposition     ED Disposition Condition Date/Time Comment    Admit Stable Thu Jan 13, 2022  8:42 PM Case was discussed with DOUG and the patient's admission status was agreed to be Admission Status: observation status to the service of Dr Natalya John   Follow-up Information    None         Patient's Medications   Discharge Prescriptions    No medications on file       No discharge procedures on file      PDMP Review     None          ED Provider  Electronically Signed by           Huber Arango DO  01/13/22 7733

## 2022-01-14 ENCOUNTER — APPOINTMENT (OUTPATIENT)
Dept: NON INVASIVE DIAGNOSTICS | Facility: HOSPITAL | Age: 72
DRG: 069 | End: 2022-01-14
Payer: COMMERCIAL

## 2022-01-14 ENCOUNTER — APPOINTMENT (INPATIENT)
Dept: MRI IMAGING | Facility: HOSPITAL | Age: 72
DRG: 069 | End: 2022-01-14
Payer: COMMERCIAL

## 2022-01-14 LAB
ANION GAP SERPL CALCULATED.3IONS-SCNC: 12 MMOL/L (ref 4–13)
AORTIC ROOT: 3.7 CM
AORTIC VALVE MEAN VELOCITY: 9.7 M/S
APICAL FOUR CHAMBER EJECTION FRACTION: 43 %
ATRIAL RATE: 70 BPM
AV LVOT MEAN GRADIENT: 4 MMHG
AV LVOT PEAK GRADIENT: 7 MMHG
AV MEAN GRADIENT: 4 MMHG
AV PEAK GRADIENT: 9 MMHG
AV VELOCITY RATIO: 0.9
BUN SERPL-MCNC: 53 MG/DL (ref 5–25)
CALCIUM SERPL-MCNC: 9 MG/DL (ref 8.4–10.2)
CHLORIDE SERPL-SCNC: 93 MMOL/L (ref 96–108)
CHOLEST SERPL-MCNC: 127 MG/DL
CO2 SERPL-SCNC: 29 MMOL/L (ref 21–32)
CREAT SERPL-MCNC: 9.69 MG/DL (ref 0.6–1.3)
DOP CALC AO PEAK VEL: 1.47 M/S
DOP CALC AO VTI: 35.41 CM
DOP CALC LVOT PEAK VEL VTI: 33.1 CM
DOP CALC LVOT PEAK VEL: 1.32 M/S
DOP CALC MV VTI: 30.69 CM
DOP CALC RVOT PEAK VEL: 0.95 M/S
DOP CALC RVOT VTI: 18.87 CM
E WAVE DECELERATION TIME: 286 MS
EST. AVERAGE GLUCOSE BLD GHB EST-MCNC: 146 MG/DL
FRACTIONAL SHORTENING: 60 % (ref 28–44)
GFR SERPL CREATININE-BSD FRML MDRD: 4 ML/MIN/1.73SQ M
GLUCOSE P FAST SERPL-MCNC: 160 MG/DL (ref 65–99)
GLUCOSE SERPL-MCNC: 113 MG/DL (ref 65–140)
GLUCOSE SERPL-MCNC: 116 MG/DL (ref 65–140)
GLUCOSE SERPL-MCNC: 123 MG/DL (ref 65–140)
GLUCOSE SERPL-MCNC: 124 MG/DL (ref 65–140)
GLUCOSE SERPL-MCNC: 130 MG/DL (ref 65–140)
GLUCOSE SERPL-MCNC: 145 MG/DL (ref 65–140)
GLUCOSE SERPL-MCNC: 160 MG/DL (ref 65–140)
GLUCOSE SERPL-MCNC: 161 MG/DL (ref 65–140)
GLUCOSE SERPL-MCNC: 166 MG/DL (ref 65–140)
GLUCOSE SERPL-MCNC: 177 MG/DL (ref 65–140)
GLUCOSE SERPL-MCNC: 192 MG/DL (ref 65–140)
GLUCOSE SERPL-MCNC: 193 MG/DL (ref 65–140)
GLUCOSE SERPL-MCNC: 231 MG/DL (ref 65–140)
GLUCOSE SERPL-MCNC: 232 MG/DL (ref 65–140)
GLUCOSE SERPL-MCNC: 245 MG/DL (ref 65–140)
GLUCOSE SERPL-MCNC: 256 MG/DL (ref 65–140)
HBA1C MFR BLD: 6.7 %
HDLC SERPL-MCNC: 35 MG/DL
INTERVENTRICULAR SEPTUM IN DIASTOLE (PARASTERNAL SHORT AXIS VIEW): 1.5 CM
LDLC SERPL CALC-MCNC: 74 MG/DL (ref 0–100)
LEFT ATRIUM AREA SYSTOLE SINGLE PLANE A4C: 21.8 CM2
LEFT ATRIUM SIZE: 3.8 CM
LEFT INTERNAL DIMENSION IN SYSTOLE: 1.9 CM (ref 2.1–4)
LEFT VENTRICULAR INTERNAL DIMENSION IN DIASTOLE: 4.8 CM (ref 6.91–10.3)
LEFT VENTRICULAR POSTERIOR WALL IN END DIASTOLE: 1.4 CM
LEFT VENTRICULAR STROKE VOLUME: 98 ML
MAGNESIUM SERPL-MCNC: 2.5 MG/DL (ref 1.9–2.7)
MV E'TISSUE VEL-SEP: 5 CM/S
MV MEAN GRADIENT: 1 MMHG
MV PEAK A VEL: 1.04 M/S
MV PEAK E VEL: 83 CM/S
MV PEAK GRADIENT: 4 MMHG
P AXIS: 72 DEGREES
PHOSPHATE SERPL-MCNC: 6.1 MG/DL (ref 2.3–4.1)
POTASSIUM SERPL-SCNC: 4.4 MMOL/L (ref 3.5–5.3)
PR INTERVAL: 180 MS
PV MEAN GRADIENT: 2 MMHG
PV MEAN VELOCITY: 68 CM/S
PV PEAK GRADIENT: 4 MMHG
PV VTI: 21.64 CM
QRS AXIS: 72 DEGREES
QRSD INTERVAL: 88 MS
QT INTERVAL: 434 MS
QTC INTERVAL: 468 MS
RIGHT ATRIUM AREA SYSTOLE A4C: 19.6 CM2
RIGHT VENTRICLE ID DIMENSION: 3.8 CM
SL CV LV EF: 80
SL CV PED ECHO LEFT VENTRICLE DIASTOLIC VOLUME (MOD BIPLANE) 2D: 110 ML
SL CV PED ECHO LEFT VENTRICLE SYSTOLIC VOLUME (MOD BIPLANE) 2D: 12 ML
SL CV RVOT MAX GRADIENT: 4 MMHG
SL CV RVOT MEAN GRADIENT: 2 MMHG
SL CV RVOT VMEAN: 0.58 M/S
SODIUM SERPL-SCNC: 134 MMOL/L (ref 135–147)
T WAVE AXIS: 78 DEGREES
TRIGL SERPL-MCNC: 90 MG/DL
VENTRICULAR RATE: 70 BPM
Z-SCORE OF LEFT VENTRICULAR DIMENSION IN END SYSTOLE: -5.64

## 2022-01-14 PROCEDURE — 80048 BASIC METABOLIC PNL TOTAL CA: CPT | Performed by: PHYSICIAN ASSISTANT

## 2022-01-14 PROCEDURE — 97162 PT EVAL MOD COMPLEX 30 MIN: CPT

## 2022-01-14 PROCEDURE — G1004 CDSM NDSC: HCPCS

## 2022-01-14 PROCEDURE — 93306 TTE W/DOPPLER COMPLETE: CPT

## 2022-01-14 PROCEDURE — 93306 TTE W/DOPPLER COMPLETE: CPT | Performed by: INTERNAL MEDICINE

## 2022-01-14 PROCEDURE — 80061 LIPID PANEL: CPT | Performed by: PHYSICIAN ASSISTANT

## 2022-01-14 PROCEDURE — 99232 SBSQ HOSP IP/OBS MODERATE 35: CPT | Performed by: PHYSICIAN ASSISTANT

## 2022-01-14 PROCEDURE — 92523 SPEECH SOUND LANG COMPREHEN: CPT

## 2022-01-14 PROCEDURE — 87081 CULTURE SCREEN ONLY: CPT | Performed by: STUDENT IN AN ORGANIZED HEALTH CARE EDUCATION/TRAINING PROGRAM

## 2022-01-14 PROCEDURE — 84100 ASSAY OF PHOSPHORUS: CPT | Performed by: PHYSICIAN ASSISTANT

## 2022-01-14 PROCEDURE — G0427 INPT/ED TELECONSULT70: HCPCS | Performed by: PSYCHIATRY & NEUROLOGY

## 2022-01-14 PROCEDURE — 83036 HEMOGLOBIN GLYCOSYLATED A1C: CPT | Performed by: PHYSICIAN ASSISTANT

## 2022-01-14 PROCEDURE — 70551 MRI BRAIN STEM W/O DYE: CPT

## 2022-01-14 PROCEDURE — 99223 1ST HOSP IP/OBS HIGH 75: CPT | Performed by: INTERNAL MEDICINE

## 2022-01-14 PROCEDURE — 82948 REAGENT STRIP/BLOOD GLUCOSE: CPT

## 2022-01-14 PROCEDURE — 93010 ELECTROCARDIOGRAM REPORT: CPT | Performed by: INTERNAL MEDICINE

## 2022-01-14 PROCEDURE — 83735 ASSAY OF MAGNESIUM: CPT | Performed by: PHYSICIAN ASSISTANT

## 2022-01-14 PROCEDURE — 97166 OT EVAL MOD COMPLEX 45 MIN: CPT

## 2022-01-14 RX ORDER — CALCIUM ACETATE 667 MG/1
1334 CAPSULE ORAL
Status: DISCONTINUED | OUTPATIENT
Start: 2022-01-14 | End: 2022-01-15 | Stop reason: HOSPADM

## 2022-01-14 RX ORDER — DEXTROSE AND SODIUM CHLORIDE 5; .45 G/100ML; G/100ML
50 INJECTION, SOLUTION INTRAVENOUS CONTINUOUS
Status: DISCONTINUED | OUTPATIENT
Start: 2022-01-14 | End: 2022-01-14

## 2022-01-14 RX ADMIN — AMLODIPINE BESYLATE 2.5 MG: 2.5 TABLET ORAL at 00:38

## 2022-01-14 RX ADMIN — CLOPIDOGREL BISULFATE 75 MG: 75 TABLET ORAL at 09:26

## 2022-01-14 RX ADMIN — ATORVASTATIN CALCIUM 40 MG: 40 TABLET, FILM COATED ORAL at 19:11

## 2022-01-14 RX ADMIN — HEPARIN SODIUM 5000 UNITS: 5000 INJECTION INTRAVENOUS; SUBCUTANEOUS at 16:54

## 2022-01-14 RX ADMIN — HYDRALAZINE HYDROCHLORIDE 10 MG: 20 INJECTION INTRAMUSCULAR; INTRAVENOUS at 00:11

## 2022-01-14 RX ADMIN — AMLODIPINE BESYLATE 2.5 MG: 2.5 TABLET ORAL at 22:24

## 2022-01-14 RX ADMIN — CALCIUM ACETATE 1334 MG: 667 CAPSULE ORAL at 16:53

## 2022-01-14 RX ADMIN — DOXAZOSIN 4 MG: 4 TABLET ORAL at 22:24

## 2022-01-14 RX ADMIN — DEXTROSE AND SODIUM CHLORIDE 50 ML/HR: 5; .45 INJECTION, SOLUTION INTRAVENOUS at 06:23

## 2022-01-14 RX ADMIN — HYDRALAZINE HYDROCHLORIDE 10 MG: 20 INJECTION INTRAMUSCULAR; INTRAVENOUS at 20:13

## 2022-01-14 RX ADMIN — CALCIUM ACETATE 667 MG: 667 CAPSULE ORAL at 09:26

## 2022-01-14 RX ADMIN — SEVELAMER HYDROCHLORIDE 800 MG: 800 TABLET, FILM COATED PARENTERAL at 16:53

## 2022-01-14 RX ADMIN — DOXAZOSIN 4 MG: 4 TABLET ORAL at 00:38

## 2022-01-14 RX ADMIN — HEPARIN SODIUM 5000 UNITS: 5000 INJECTION INTRAVENOUS; SUBCUTANEOUS at 22:24

## 2022-01-14 RX ADMIN — INSULIN LISPRO 3 UNITS: 100 INJECTION, SOLUTION INTRAVENOUS; SUBCUTANEOUS at 13:27

## 2022-01-14 RX ADMIN — HEPARIN SODIUM 5000 UNITS: 5000 INJECTION INTRAVENOUS; SUBCUTANEOUS at 05:58

## 2022-01-14 RX ADMIN — CALCIUM ACETATE 667 MG: 667 CAPSULE ORAL at 12:17

## 2022-01-14 RX ADMIN — SEVELAMER HYDROCHLORIDE 800 MG: 800 TABLET, FILM COATED PARENTERAL at 12:17

## 2022-01-14 RX ADMIN — ASPIRIN 81 MG: 81 TABLET, COATED ORAL at 09:27

## 2022-01-14 NOTE — ASSESSMENT & PLAN NOTE
Lab Results   Component Value Date    EGFR 5 01/13/2022    EGFR 5 05/30/2021    EGFR 5 01/20/2021    CREATININE 8 48 (H) 01/13/2022    CREATININE 10 39 (H) 05/30/2021    CREATININE 10 07 (H) 01/20/2021   · Patient had contrast with CT  · Normally receives dialysis Monday Wednesday Friday  · Nephrology notified

## 2022-01-14 NOTE — CONSULTS
Consultation - Nephrology   Anika Patel 71 y o  male MRN: [id number]  Unit/Bed#: ICU 03-01 Encounter: [id number]      A/P:  1  End stage renal disease dialyzed MWF at [facility]   - Due to nurse availability and his lab work, will defer dialysis till tomorrow morning   - Orders written and dialysis nurse consulted   - Has underlying diabetic nephropathy and hypertensive nephrosclerosis    2  Secondary hyperparathyroidism of renal origin   - Will receive phosphate binders for a phosphorus of 6 1   - Inc calcium acetate to 1334 tid    3  Volume status   - He exams compensated    4  Hypertension with chronic kidney disease   - BP running 150-160   - Defer to hospitalist and neurology for goal BP    5  Stroke like symptoms   - Had a left facial drooop and dysarthria   - Was hypoglycemic as glucose was 30   - Treated with Plavix and MRI obtained    7  Mixed hyperlipidemia   - Aim for an LDL < 70 with statin therapy    8  Type 2 DM with long term current use of insulin   - Sugars will be covered         Thank you for allowing us to participate in the care of your patient  Please feel free to contact us regarding the care of this patient, or any other questions/concerns that may be applicable  Patient Active Problem List   Diagnosis    [facility]    Essential hypertension    Type 2 diabetes mellitus without complication, with long-term current use of insulin ([facility] )    Mixed hyperlipidemia    Stroke-like symptoms       History of Present Illness   Physician Requesting Consult: Nilsa Aschoff, DO  Reason for Consult / Principal Problem: ESRD  Hx and PE limited by:   HPI: Anika Patel is a 71y o  year old male who presents with stroke like symptoms which he attributed to hypoglycemia from too much insulin coverage  He has ahistory of eSRD hemodialysis dependent for 6 years at [facility]  He had his last treatment on Wednesday   He had facial dropping and weakness and was brought to the eD as a stroke alert  He has a history of diabetic nephropathy with diabetes since 2000  He has hypertension contributing to ESRD and he has had an MI    History obtained from chart review and the patient    Constitutional ROS- Denies fatigue, fever, chills, night sweats, weight changes  HEENT ROS- Has history of eye surgeries,no  glaucoma, headaches  blurred vision, cataracts   Endocrine ROS- Has history diabetes mellitus since 2000 no thyroid disease  Cardiovascular ROS- Denies chest pain, palpitation, dyspnea exertion, orthopnea, claudication  Pulmonary ROS- Denies history of COPD, asthma  Denies cough, hemoptysis, shortness of breath  GI ROS- Denies abdominal pain, diarrhea, nausea, swallowing problems, vomiting, constipation, blood in stools, fecal incontinence  Denies liver disease,   Patient still has gallbladder and appendix  Hematological ROS- Denies history of easy bruising,   Genitourinary ROS- Denies recent hematuria, pyuria, flank pain, change in urinary stream, decreased urinary output, increased urinary frequency, nocturia, foamy urine, or urinary incontinence  Lymphatic ROS- Denies lymphadenopathy  Musculoskeletal ROS- Denies history of gout, muscle weakness, joint pain  Dermatological ROS- Denies rash, wounds, ulcers, itching, jaundice  Psychiatric ROS- Denies anxiety, depression, hallucinations, disorientation  Neurological ROS- Hadstroke/ symptoms  Denies dizziness, paresthesias, history of stroke, history of peripheral neuropathy  Historical Information   Past Medical History:   Diagnosis Date    Coronary artery disease     Diabetes mellitus     End stage renal disease     Hyperlipidemia     Hypertension     NSTEMI (non-ST elevated myocardial infarction) 2012     Past Surgical History:   Procedure Laterality Date    CARDIAC CATHETERIZATION  2017    70% lesion of a small R PDA and a 99% stenosis of D1 (previously noted on the cath in 2012)  Neither lesion was amenable to PCI  Medical management      DIALYSIS FISTULA CREATION       Social History   Social History     Substance and Sexual Activity   Alcohol Use Never     Social History     Substance and Sexual Activity   Drug Use Never     Social History     Tobacco Use   Smoking Status Former Smoker    Packs/day: 1 00    Years: 20 00    Pack years: 20 00    Types: Cigarettes    Start date:     Quit date: 12    Years since quittin 0   Smokeless Tobacco Never Used   Tobacco Comment    STATES QUIT 27 YEARS AGO/ ABOUT ONE CIGARETTE DAILY     Family History   Problem Relation Age of Onset    Hypertension Mother     Hypertension Father        Meds/Allergies   all current active meds have been reviewed, current meds:   Current Facility-Administered Medications   Medication Dose Route Frequency    amLODIPine (NORVASC) tablet 2 5 mg  2 5 mg Oral HS    aspirin (ECOTRIN LOW STRENGTH) EC tablet 81 mg  81 mg Oral Daily    atorvastatin (LIPITOR) tablet 40 mg  40 mg Oral QPM    calcium acetate (PHOSLO) capsule 667 mg  667 mg Oral TID With Meals    clopidogrel (PLAVIX) tablet 75 mg  75 mg Oral Daily    doxazosin (CARDURA) tablet 4 mg  4 mg Oral HS    heparin (porcine) subcutaneous injection 5,000 Units  5,000 Units Subcutaneous Q8H [facility]    hydrALAZINE (APRESOLINE) injection 10 mg  10 mg Intravenous Q6H PRN    insulin lispro (HumaLOG) 100 units/mL subcutaneous injection 1-5 Units  1-5 Units Subcutaneous HS    insulin lispro (HumaLOG) 100 units/mL subcutaneous injection 1-6 Units  1-6 Units Subcutaneous TID AC    ondansetron (ZOFRAN) injection 4 mg  4 mg Intravenous Q6H PRN    sevelamer (RENAGEL) tablet 800 mg  800 mg Oral TID With Meals    and PTA meds:    Medications Prior to Admission   Medication    amLODIPine (NORVASC) 5 mg tablet    aspirin (Aspirin 81) 81 mg EC tablet    atorvastatin (LIPITOR) 40 mg tablet    calcium acetate (PHOSLO) capsule    Heparin Sodium, Porcine, (heparin, porcine,) 1,000 units/mL    insulin aspart (NovoLOG FlexPen) 100 UNIT/ML injection pen    insulin glargine (LANTUS SOLOSTAR) 100 units/mL injection pen    Insulin Pen Needle (UltiCare Short Pen Needles) 31G X 8 MM MISC    lidocaine-prilocaine (EMLA) cream    Lokelma 10 g PACK    Methoxy PEG-Epoetin Beta (MIRCERA IJ)    sevelamer carbonate (Renvela) 800 mg tablet    carvedilol (COREG) 25 mg tablet    doxazosin (CARDURA) 4 mg tablet    hydrALAZINE (APRESOLINE) 50 mg tablet         No Known Allergies    Objective     Intake/Output Summary (Last 24 hours) at 1/14/2022 1525  Last data filed at 1/14/2022 1301  Gross per 24 hour   Intake 557 5 ml   Output 0 ml   Net 557 5 ml       Invasive Devices:        Physical Exam      I/O last 3 completed shifts: In: 437 5 [I V :437 5]  Out: 0     Vitals:    01/14/22 1416   BP: 144/88   Pulse: 90   Resp:    Temp:    SpO2:        General Appearance:    No acute distress  Cooperative  Appears stated age  Head:    Normocephalic  Atraumatic  Normal jaw occlusion  Eyes:    Lids, conjunctiva normal  No scleral icterus  Ears:    Normal external ears  Nose:   Nares normal  No drainage  Mouth:   Lips, tongue normal  Mucosa normal  Phonation normal    Neck:   Supple  Symmetrical No JVD or bruits or adenopathy   Back:     Symmetric  No CVA tenderness  Lungs:     Normal respiratory effort  Clear to auscultation bilaterally  Chest wall:    No tenderness or deformity  Heart:    Regular rate and rhythm  Normal S1 and S2  No murmur  No JVD  No edema  Abdomen:     Soft  Non-tender  Bowel sounds active  Genitourinary:   No Meehan catheter present  Extremities:   Extremities normal  Atraumatic  No cyanosis  Skin:   Warm and dry  No pallor, jaundice, rash, ecchymoses  Neurologic:   Alert and oriented to person, place, time  No focal deficit           Current Weight: Weight - Scale: 99 3 kg (219 lb)  First Weight: Weight - Scale: 99 7 kg (219 lb 12 8 oz)    Lab Results:  I have personally reviewed pertinent labs  CBC:   Lab Results   Component Value Date    WBC 6 54 01/13/2022    HGB 13 0 01/13/2022    HCT 42 1 01/13/2022    MCV 92 01/13/2022     (L) 01/13/2022    MCH 28 3 01/13/2022    MCHC 30 9 (L) 01/13/2022    RDW 16 9 (H) 01/13/2022    MPV 10 6 01/13/2022     CMP:   Lab Results   Component Value Date    K 4 4 01/14/2022    CL 93 (L) 01/14/2022    CO2 29 01/14/2022    BUN 53 (H) 01/14/2022    CREATININE 9 69 (H) 01/14/2022    CALCIUM 9 0 01/14/2022    EGFR 4 01/14/2022     Phosphorus:   Lab Results   Component Value Date    PHOS 6 1 (H) 01/14/2022     Magnesium:   Lab Results   Component Value Date    MG 2 5 01/14/2022     Urinalysis: No results found for: Andrea Climes, SPECGRAV, PHUR, LEUKOCYTESUR, NITRITE, PROTEINUA, GLUCOSEU, KETONESU, BILIRUBINUR, BLOODU  Ionized Calcium: No results found for: CAION  Coagulation:   Lab Results   Component Value Date    INR 1 12 01/13/2022     Troponin: No results found for: TROPONINI  ABG: No results found for: PHART, DSF7GWZ, PO2ART, OLA1HZH, M2ZPIVRF, BEART, SOURCE    Results from last 7 days   Lab Units 01/14/22  0440 01/13/22  1853   POTASSIUM mmol/L 4 4 5 3   CHLORIDE mmol/L 93* 91*   CO2 mmol/L 29 33*   BUN mg/dL 53* 43*   CREATININE mg/dL 9 69* 8 48*   CALCIUM mg/dL 9 0 10 1       Radiology review:  Procedure: X-ray chest 1 view portable    Result Date: 1/14/2022  Narrative: CHEST INDICATION:   stroke  COMPARISON:  5/30/2021 EXAM PERFORMED/VIEWS:  XR CHEST PORTABLE FINDINGS:  Monitoring leads and clips project over the chest  Cardiomediastinal silhouette appears unremarkable  The lungs are clear  No pneumothorax or pleural effusion  Osseous structures appear within normal limits for patient age  Impression: No acute cardiopulmonary disease  Workstation performed: [id number]     Procedure: CT stroke alert brain    Result Date: 1/14/2022  Narrative: CT BRAIN - STROKE ALERT PROTOCOL INDICATION:   Left facial droop and dysarthria, result  COMPARISON:  None  TECHNIQUE:  CT examination of the brain was performed  In addition to axial images, coronal reformatted images were created and submitted for interpretation  Radiation dose length product (DLP) for this visit:  875 mGy-cm   This examination, like all CT scans performed in the [facility], was performed utilizing techniques to minimize radiation dose exposure, including the use of iterative reconstruction and automated exposure control  IMAGE QUALITY:  Diagnostic  FINDINGS:  PARENCHYMA: Periventricular and subcortical hypoattenuating foci consistent with mild microangiopathic disease  No acute intracranial hemorrhage or mass effect  VENTRICLES AND EXTRA-AXIAL SPACES:  No hydrocephalus or extra-axial collection  VISUALIZED ORBITS AND PARANASAL SINUSES:  Intact globes and orbits  Clear paranasal sinuses  CALVARIUM AND EXTRACRANIAL SOFT TISSUES:  No lytic or blastic lesion  Impression: No evidence of acute vascular territorial infarction, intracranial hemorrhage or mass effect  Findings were directly discussed with Kaylee Collier at 6:58 PM  Workstation performed: [id number]     Procedure: CTA stroke alert (head/neck)    Result Date: 1/14/2022  Narrative: CTA NECK AND BRAIN WITH CONTRAST INDICATION: Dizziness and right-sided weakness  COMPARISON:   1/13/2022  TECHNIQUE:   Post contrast imaging was performed after administration of iodinated contrast through the neck and brain  Post contrast axial 0 625 mm images timed to opacify the arterial system  3D rendering was performed on an independent workstation  MIP reconstructions performed  Coronal reconstructions were performed of the noncontrast portion of the brain  Radiation dose length product (DLP) for this visit:  483 mGy-cm     This examination, like all CT scans performed in the [facility], was performed utilizing techniques to minimize radiation dose exposure, including the use of iterative reconstruction and automated exposure control  IV Contrast:  90 mL of iohexol (OMNIPAQUE)  IMAGE QUALITY:   Diagnostic FINDINGS: CERVICAL VASCULATURE AORTIC ARCH AND GREAT VESSELS:  Three-vessel configuration of the aortic arch  No stenosis in the subclavian arteries  RIGHT VERTEBRAL ARTERY CERVICAL SEGMENT:  Normal origin  The vessel is normal in caliber throughout the neck  LEFT VERTEBRAL ARTERY CERVICAL SEGMENT:  Normal origin  The vessel is normal in caliber throughout the neck  RIGHT EXTRACRANIAL CAROTID SEGMENT:  Normal caliber common carotid artery  Calcified plaque at the bifurcation and internal carotid artery origin without significant stenosis  LEFT EXTRACRANIAL CAROTID SEGMENT:  Normal caliber common carotid artery  Calcified plaque at the bifurcation and internal carotid artery origin without significant stenosis  NASCET criteria was used to determine the degree of internal carotid artery diameter stenosis  INTRACRANIAL VASCULATURE INTERNAL CAROTID ARTERIES:  Calcified plaque throughout the carotid siphons  Mild (less than 50%) stenosis in the right terminal segments  ANTERIOR CIRCULATION:  Nondominant right A1 segment  Anterior communicating artery identified  No stenosis in the proximal anterior cerebral arteries MIDDLE CEREBRAL ARTERY CIRCULATION:  No stenosis in the proximal middle cerebral arteries  DISTAL VERTEBRAL ARTERIES:  Calcified plaque in the bilateral V4 segments resulting in mild narrowing  Posterior inferior cerebellar arteries are patent  BASILAR ARTERY:  Basilar artery is normal in caliber  Patent superior cerebellar arteries  POSTERIOR CEREBRAL ARTERIES: Prominent bilateral posterior to indicating arteries and somewhat atretic P1 segments suggesting nearly fetal type posterior cerebral arteries  No stenosis in the proximal segments  VENOUS STRUCTURES:  Limited evaluation due to contrast bolus timing  NON VASCULAR ANATOMY BONY STRUCTURES:  No lytic or blastic lesion   SOFT TISSUES OF THE NECK:  No mass or lymphadenopathy  THORACIC INLET:  Unremarkable  Impression: No hemodynamically significant stenosis, dissection or occlusion of the carotid or vertebral arteries or visualized proximal portions of the major vessels of the [place] Findings were directly discussed with Jovan Timmons at 6:58 PM  Workstation performed: [id number]         EKG, Pathology, and Other Studies: I have reviewed all labs and notes      Counseling / Coordination of Care  Total ADDITIONAL floor / unit time spent today 40 minutes  Greater than 50% of total time was spent with the patient and / or family counseling and / or coordination of care  A description of the counseling / coordination of care: follows  Case discussed with hospitalist    Tom Yao MD      This consultation note was produced in part using a dictation device which may document imprecise wording from author's original intent

## 2022-01-14 NOTE — OCCUPATIONAL THERAPY NOTE
Occupational Therapy Evaluation      Patient Name: Isidoro Burkitt  [id number] Date: 1/14/2022  Problem List  Principal Problem:    Stroke-like symptoms  Active Problems:    ESRD (end stage renal disease) ([facility] [address] )    Essential hypertension    Type 2 diabetes mellitus without complication, with long-term current use of insulin ([facility] )    Mixed hyperlipidemia          01/14/22 1030   OT Last Visit   OT Visit Date 01/14/22   Note Type   Note type Evaluation   Additional Comments Pt agreeable to OT eval  Upon arrival pt supine in bed with HOB elevated and wife present    Restrictions/Precautions   Weight Bearing Precautions Per Order No   Other Precautions Multiple lines;Telemetry; Fall Risk   Pain Assessment   Pain Assessment Tool 0-10   Pain Score No Pain   Home Living   Type of Home House   Home Layout Two level;Bed/bath upstairs;Stairs to enter with rails  (1 [address]AN[address], 13 steps to 2nd floor )   Bathroom Shower/Tub Tub/shower unit   Bathroom Toilet Standard   Bathroom Equipment   (no DME reported )   [address]   (no AD used at baseline )   Prior Function   Level of [place] Independent with ADLs and functional mobility   Lives With [facility] Help From Family   ADL Assistance Independent   IADLs Independent   Falls in the last 6 months 0  (pt was found down by wife PTA)   Vocational Retired   Comments pt occasionally drives   Lifestyle   Autonomy Patient reporting being independent with ADLs/IADLs, ambulatory with no AD and lives with wife in a two story house   Reciprocal Relationships supportive wife present during eval   Service to Others retired    Intrinsic Gratification enjoys reading and watching TV    ADL   Eating Assistance 7  Independent   Grooming Assistance 7  Independent   [address]  [address]  [address]  6  Modified independent   [address] [facility] Assistance  5  Supervision/Setup   Bed Mobility   Supine to Sit 6  Modified independent   Additional items HOB elevated   Sit to Supine   (DNT: pt seated OOB in recliner at end of eval )   Transfers   Sit to Stand 6  Modified independent   Additional items Bedrails   Stand to Sit 6  Modified independent   Additional items Armrests   Additional Comments pt denies any lightheadedness/dizziness    Functional Mobility   Functional Mobility 7  Independent   Additional Comments Pt ambulated in room with no overt LOB or SOB  Additional items   (pt ambulated with no AD)   Balance   Static Sitting Normal   Dynamic Sitting Normal   Static Standing Good   Dynamic Standing Good   Activity Tolerance   Activity Tolerance Patient tolerated treatment well   Medical Staff Made Aware Pt seen as a co-eval with PT due to the patient's co-morbidities & clinically unstable presentation   Nurse Made Aware RN made aware of outcomes    RUE Assessment   RUE Assessment WFL  (full AROM, 5/5 MMT)   LUE Assessment   LUE Assessment WFL  (full AROM, 5/5 MMT)   Hand Function   Gross Motor Coordination Functional   Fine Motor Coordination Impaired   Sensation   Light Touch Severe deficits in the RUE;Severe deficits in the LUE;Severe deficits in the RLE; Severe deficits in the LLE  (numbness in B hands and feet reported )   Vision-Basic Assessment   Current Vision No visual deficits   Vision - Complex Assessment   Acuity Able to read clock/calendar on wall without difficulty; Able to read employee name badge without difficulty   Cognition   Overall Cognitive Status [facility]   Arousal/Participation Alert; Responsive; Cooperative   Attention Within functional limits   Orientation Level Oriented X4   Memory Within functional limits   Following Commands Follows all commands and directions without difficulty   Assessment   Prognosis Good   Assessment Pt is a 71 y o  male who was admitted to [place] [address] on 1/13/2022 with Stroke-like symptoms    At this time, patient is also affected by the comorbidities of: ESRD, HTN, HLD and DM2  Additionally, patient is affected by the following personal factors:difficulty performing IADLS   Orders placed for OT evaluation/treatment with activity as tolerated orders  Prior to admission, Patient reporting being independent with ADLs/IADLs, ambulatory with no AD and lives with wife in a two story house  Upon OT evaluation, patient requires modified independent  for UB ADLs and supervision/set-up for LB ADLs  Patient presents with functional use of BUEs, with intact prehension and fine motor coordination, and symmetrical muscle strength  Patient appears to be functioning at baseline, no further Acute OT needs identified at this time to warrant OT services  D/C OT and from OT standpoint, recommendation at time of d/c would be No rehabilitation needs  Goals   Patient Goals to go home    Plan   OT Frequency Eval only   Recommendation   OT Discharge Recommendation No rehabilitation needs   OT - OK to Discharge Yes  (Once medically cleared )   Additional Comments  At end of eval, pt seated OOB in recliner with call bell within reach    Additional Comments 2 The patient's raw score on the AM-PAC Daily Activity inpatient short form is 21, standardized score is 44 27, greater than 39 4  Patients at this level are likely to benefit from discharge to home  Please refer to the recommendation of the Occupational Therapist for safe discharge planning     AM-PAC Daily Activity Inpatient   Lower Body Dressing 3   Bathing 3   Toileting 3   Upper Body Dressing 4   Grooming 4   Eating 4   Daily Activity Raw Score 21   Daily Activity Standardized Score (Calc for Raw Score >=11) 44 27   AM-PAC Applied Cognition Inpatient   Following a Speech/Presentation 4   Understanding Ordinary Conversation 4   Taking Medications 4   Remembering Where Things Are Placed or Put Away 4   Remembering List of 4-5 Errands 4   Taking Care of Complicated Tasks 4 Applied Cognition Raw Score 24   Applied Cognition Standardized Score 62 21     Robert Castro, OT

## 2022-01-14 NOTE — ASSESSMENT & PLAN NOTE
· Patient with left facial droop and dysarthria prior to arriving to the ED  · Possibly secondary to hypoglycemia with glucose of 30 was given D50 blood sugar improved to 182  · Evaluated by Neurology recommended admit under stroke pathway and to load with Plavix  · CT head: No evidence of acute vascular territorial infarction, intracranial hemorrhage or mass effect  · CTA head: No hemodynamically significant stenosis, dissection or occlusion of the carotid or vertebral arteries or visualized proximal portions of the major vessels of the [place]  · Echo- reviewed  · MRI brain pending  · Neurology consultation appreciated- no need for permissive HTN as patient is asymptomatic now  Continue aspirin 81 mg daily and Plavix 75 mg daily x 3 weeks, then aspirin monotherapy thereafter if MRI negative for stroke     · Continue Lipitor 40 mg   · PT/OT- no rehab needs  · Outpatient follow-up with Neurology

## 2022-01-14 NOTE — UTILIZATION REVIEW
Initial Clinical Review    Admission: Date/Time/Statement:   Admission Orders (From admission, onward)     Ordered        01/13/22 2043  Place in Observation  Once            01/13/22 2042  Inpatient Admission  Once,   Status:  Canceled                      Orders Placed This Encounter   Procedures    Place in Observation     Standing Status:   Standing     Number of Occurrences:   1     Order Specific Question:   Level of Care     Answer:   Level 2 Stepdown / HOT [14]     ED Arrival Information     Expected Arrival Acuity    - 1/13/2022 17:58 Immediate         Means of arrival Escorted by Service Admission type    Ambulance [facility] Emergency         Ar[facility] complaint    [address] [facility]        Chief Complaint   Patient presents with   [address] 21     found down by wife, diaphoretic, not following command and facial droop  completely resolved on arrival        Initial Presentation:   72 yo male presenting to the ed as prehospital stroke alert as he was found down by his wife around 66 91 21 today on the ground and she called ems and was noted to have L sided facial droop and slurred speech  Last known well was about 1400  Resolution of symptoms en-route with ems  Patient states he doesn't remember what happened or why he was on the floor but without complaints at this itme  No hx of seizures, no reported loss of bowel or bladder control  Reportedly diaphoretic when found  Does state he has had a recent URI  Is fully vaccinated and boosted  Stroke-like symptoms  Assessment & Plan  · Patient with left facial droop and dysarthria prior to arriving to the ED  · Possibly secondary to hypoglycemia with glucose of 30 was given D50 blood sugar improved to 182  · Evaluated by Neurology recommended admit under stroke pathway and to load with Plavix    Per neuro:  70 y/o M with HTN, HLD, DM, CAD, and ESRD that presents with onset of L facial droop and dysarthria, now resolved  LKN 2pm today   Was then found at 4:50pm by wife, who saw pt on the ground with dysarthria and L facial droop  EMS was called and during transport to the ED, pt's symptoms completely resolved  Upon arrival to the ED, NIHSS now 0  No deficits noted      HCT and CTA reviewed - no evidence of acute ischemia/hemorrhage; no significant vascular abnormality      Not a tPA candidate given no disabling deficit  Load with 600mg Plavix and continue 75mg daily along with home ASA 81mg daily  Admit to stroke pathway        ED Triage Vitals   Temperature Pulse Respirations Blood Pressure SpO2   01/13/22 1802 01/13/22 1802 01/13/22 1802 01/13/22 1809 01/13/22 1802   (!) 91 6 °F (33 1 °C) (!) 50 16 (!) 200/88 99 %      Temp Source Heart Rate Source Patient Position - Orthostatic VS BP Location FiO2 (%)   01/13/22 1802 01/13/22 1802 01/13/22 1817 01/13/22 2326 --   Tympanic Monitor Lying Right arm       Pain Score       01/13/22 2326       No Pain          Wt Readings from Last 1 Encounters:   01/13/22 99 7 kg (219 lb 12 8 oz)     Additional Vital Signs:   01/14/22 0500 -- 67 13 136/59 85 94 % None (Room air) Lying   01/14/22 0400 -- 75 11 Abnormal  132/58 84 95 % None (Room air) Lying   01/14/22 0300 -- 69 16 129/59 85 95 % None (Room air) Lying   01/14/22 0200 -- 68 13 148/67 96 95 % None (Room air) Lying   01/14/22 0100 94 7 °F (34 8 °C) Abnormal  70 16 166/71 102 95 % None (Room air) Lying   01/14/22 0000 -- 65 14 195/86 Abnormal  124 96 % None (Room air) Lying   01/13/22 2326 94 1 °F (34 5 °C) Abnormal   71 16 217/94 Abnormal  135 97 % None (Room air) Lying   01/13/22 2237 -- -- -- 222/96 Abnormal  -- -- -- --     Pertinent Labs/Diagnostic Test Results:   Results from last 7 days   Lab Units 01/13/22  1853   SARS-COV-2  Negative     Results from last 7 days   Lab Units 01/13/22  1853   WBC Thousand/uL 6 54   HEMOGLOBIN g/dL 13 0   HEMATOCRIT % 42 1   PLATELETS Thousands/uL 113*         Results from last 7 days   Lab Units 01/14/22  0440 01/13/22 1853 SODIUM mmol/L 134* 136   POTASSIUM mmol/L 4 4 5 3   CHLORIDE mmol/L 93* 91*   CO2 mmol/L 29 33*   ANION GAP mmol/L 12 12   BUN mg/dL 53* 43*   CREATININE mg/dL 9 69* 8 48*   EGFR ml/min/1 73sq m 4 5   CALCIUM mg/dL 9 0 10 1   MAGNESIUM mg/dL 2 5  --    PHOSPHORUS mg/dL 6 1*  --          Results from last 7 days   Lab Units 01/14/22  0601 01/14/22  0503 01/14/22  0405 01/14/22  0305 01/14/22  0203 01/14/22  0122 01/13/22  2325 01/13/22  1956   POC GLUCOSE mg/dl 116 145* 161* 166* 192* 193* 179* 182*     Results from last 7 days   Lab Units 01/14/22  0440 01/13/22  1853   GLUCOSE RANDOM mg/dL 160* 38*             No results found for: BETA-HYDROXYBUTYRATE                   Results from last 7 days   Lab Units 01/13/22  2311 01/13/22  1853   HS TNI 0HR ng/L  --  17   HS TNI 2HR ng/L 15  --    HSTNI D2 ng/L -2  --          Results from last 7 days   Lab Units 01/13/22  1853   PROTIME seconds 14 3   INR  1 12   PTT seconds 28                                                         Results from last 7 days   Lab Units 01/13/22  1853   INFLUENZA A PCR  Negative   INFLUENZA B PCR  Negative   RSV PCR  Negative                                         1/13  CT brain=  No evidence of acute vascular territorial infarction, intracranial hemorrhage or mass effect    CTA head & neck=  No hemodynamically significant stenosis, dissection or occlusion of the carotid or vertebral arteries or visualized proximal portions of the major vessels of the [place]  Cxr=    Ekg=  Rhythm: sinus bradycardia    Ectopy:     Ectopy: none    QRS:     QRS axis:  Normal    QRS intervals:  Normal  Conduction:     Conduction: normal    ST segments:     ST segments:  Normal  T waves:     T waves: normal    1/14  MRI brain=      ED Treatment:   Medication Administration from 01/13/2022 1758 to 01/13/2022 2323       Date/Time Order Dose Route Action     01/13/2022 1842 iohexol (OMNIPAQUE) 350 MG/ML injection (SINGLE-DOSE) 90 mL 90 mL Intravenous Given     01/13/2022 1934 dextrose 50 % IV solution 50 mL 50 mL Intravenous Given     01/13/2022 2029 clopidogrel (PLAVIX) tablet 600 mg 600 mg Oral Given     01/13/2022 2229 atorvastatin (LIPITOR) tablet 40 mg 40 mg Oral Given     01/13/2022 2231 heparin (porcine) subcutaneous injection 5,000 Units 5,000 Units Subcutaneous Given     01/13/2022 2237 dextrose 5 % and sodium chloride 0 45 % infusion 75 mL/hr Intravenous New Bag     01/13/2022 2242 hydrALAZINE (APRESOLINE) injection 10 mg 10 mg Intravenous Given        Past Medical History:   Diagnosis Date    Coronary artery disease     Diabetes mellitus     End stage renal disease     Hyperlipidemia     Hypertension     NSTEMI (non-ST elevated myocardial infarction) 2012     Present on Admission:   [facility] stage [facility] disease) ([facility] [address] )   Essential hypertension   Mixed hyperlipidemia   Stroke-like symptoms      Admitting Diagnosis: TIA (transient ischemic attack) [G45 9]  Hypoglycemia [E16 2]  Stroke-like symptoms [R29 90]  Age/Sex: 71 y o  male  Admission Orders:  Warming blanket  Telemetry  Nursing dysphagia assessment  Consult neuro  Pt/ot/st eval & tx  Neuro checks: Every 1 hour x 4 hours, then every 2 hours x 8 hours, then every 4 hours x 72 hours  Scd/foot pumps    Scheduled Medications:  amLODIPine, 2 5 mg, Oral, HS  aspirin, 81 mg, Oral, Daily  atorvastatin, 40 mg, Oral, QPM  calcium acetate, 667 mg, Oral, TID With Meals  clopidogrel, 75 mg, Oral, Daily  doxazosin, 4 mg, Oral, HS  heparin (porcine), 5,000 Units, Subcutaneous, Q8H ETHAN  sevelamer, 800 mg, Oral, TID With Meals      Continuous IV Infusions:  dextrose 5 % and sodium chloride 0 45 %, 50 mL/hr, Intravenous, Continuous      PRN Meds:  hydrALAZINE, 10 mg, Intravenous, Q6H PRN  ondansetron, 4 mg, Intravenous, Q6H PRN        Network Utilization Review Department  ATTENTION: Please call with any questions or concerns to [phone] and carefully listen to the prompts so that you are directed to the right person  All voicemails are confidential   Marcelo Edwards all requests for admission clinical reviews, approved or denied determinations and any other requests to dedicated fax number below belonging to the campus where the patient is receiving treatment   List of dedicated fax numbers for the Facilities:  [address] DENIALS (Administrative/Medical Necessity) [phone]   [address] (Maternity/NICU/Pediatrics) [phone]   [address]  [address] [address] [facility] [place] [address] [address] [place] [address] [address] [facility] [phone]

## 2022-01-14 NOTE — PROGRESS NOTES
Moe U  67   Progress Note - Belkis Henry 1950, 71 y o  male MRN: [id number]  Unit/Bed#: ICU 03-01 Encounter: [id number]  Primary Care Provider: Paulina Sifuentes MD   Date and time admitted to hospital: 1/13/2022  6:01 PM    * Stroke-like symptoms  Assessment & Plan  · Patient with left facial droop and dysarthria prior to arriving to the ED  · Possibly secondary to hypoglycemia with glucose of 30 was given D50 blood sugar improved to 182  · Evaluated by Neurology recommended admit under stroke pathway and to load with Plavix  · CT head: No evidence of acute vascular territorial infarction, intracranial hemorrhage or mass effect  · CTA head: No hemodynamically significant stenosis, dissection or occlusion of the carotid or vertebral arteries or visualized proximal portions of the major vessels of the [place]  · Echo- reviewed  · MRI brain pending  · Neurology consultation appreciated- no need for permissive HTN as patient is asymptomatic now  Continue aspirin 81 mg daily and Plavix 75 mg daily x 3 weeks, then aspirin monotherapy thereafter if MRI negative for stroke     · Continue Lipitor 40 mg   · PT/OT- no rehab needs  · Outpatient follow-up with Neurology    ESRD (end stage renal disease) [facility])  Assessment & Plan  Lab Results   Component Value Date    EGFR 4 01/14/2022    EGFR 5 01/13/2022    EGFR 5 05/30/2021    CREATININE 9 69 (H) 01/14/2022    CREATININE 8 48 (H) 01/13/2022    CREATININE 10 39 (H) 05/30/2021   · Patient had contrast with CT  · Normally receives dialysis Monday Wednesday Friday  · Nephrology consultation appreciated - HD session tomorrow    Mixed hyperlipidemia  Assessment & Plan  · Atorvastatin 40 mg  Component      Latest Ref Rng & Units 1/14/2022   Cholesterol      See Comment mg/dL 127   Triglycerides      See Comment mg/dL 90   HDL      >=40 mg/dL 35 (L)   LDL Calculated      0 - 100 mg/dL 74       Type 2 diabetes mellitus without complication, with long-term current use of insulin [facility])  Assessment & Plan  Lab Results   Component Value Date    HGBA1C 6 7 (H) 2022       Recent Labs     22  1148 22  1324 22  1408 22  1623   POCGLU 232* 245* 256* 123       Blood Sugar Average: Last 72 hrs:  (P) 177 1875   · With hypoglycemia presenting glucose was 38 given D50 and blood sugar improved  · Patient was also bradycardic and hypothermic on admission  · The patient received D5 half normal at 75 mL an hour, discontinue given hyperglycemia  · Patient's blood sugars are elevated, will order insulin sliding scale only for now given hypoglycemia  · Continue to monitor blood sugars      Essential hypertension  Assessment & Plan  · Goal for normotension per neurology  · Continue Cardura and Norvasc        VTE Pharmacologic Prophylaxis: VTE Score: 3 Moderate Risk (Score 3-4) - Pharmacological DVT Prophylaxis Ordered: heparin  Patient Centered Rounds: I performed bedside rounds with nursing staff today  Discussions with Specialists or Other Care Team Provider: nursing, CM    Education and Discussions with Family / Patient: Patient declined call to   Time Spent for Care: 20 minutes  More than 50% of total time spent on counseling and coordination of care as described above  Current Length of Stay: 1 day(s)  Current Patient Status: Observation   Certification Statement: The patient, admitted on an observation basis, will now require > 2 midnight hospital stay due to workup of stroke like symptoms  Discharge Plan: Anticipate discharge tomorrow to home with home services  Code Status: Level 1 - Full Code    Subjective: The patient was seen and examined  The patient states she's feeling well, denies any complaints       Objective:     Vitals:   Temp (24hrs), Av 2 °F (35 1 °C), Min:91 6 °F (33 1 °C), Max:98 °F (36 7 °C)    Temp:  [91 6 °F (33 1 °C)-98 °F (36 7 °C)] 97 2 °F (36 2 °C)  HR:  [50-90] 63  Resp:  [11-39] 16  BP: (118-223)/(54-99) 173/74  SpO2:  [94 %-99 %] 95 %  Body mass index is 29 7 kg/m²  Input and Output Summary (last 24 hours): Intake/Output Summary (Last 24 hours) at 1/14/2022 1710  Last data filed at 1/14/2022 1301  Gross per 24 hour   Intake 557 5 ml   Output 0 ml   Net 557 5 ml       Physical Exam:   Physical Exam  Vitals and nursing note reviewed  Constitutional:       General: He is awake  Appearance: Normal appearance  Cardiovascular:      Rate and Rhythm: Normal rate and regular rhythm  Pulmonary:      [place]: Pulmonary effort is normal       Breath sounds: Normal breath sounds  Abdominal:      Palpations: Abdomen is soft  Tenderness: There is no abdominal tenderness  Skin:     General: Skin is warm and dry  Neurological:      General: No focal deficit present  Mental Status: He is alert and oriented to person, place, and time  Psychiatric:         Attention and Perception: Attention normal          Mood and Affect: Mood normal          Speech: Speech normal          Behavior: Behavior is cooperative            Additional Data:     Labs:  Results from last 7 days   Lab Units 01/13/22  1853   WBC Thousand/uL 6 54   HEMOGLOBIN g/dL 13 0   HEMATOCRIT % 42 1   PLATELETS Thousands/uL 113*     Results from last 7 days   Lab Units 01/14/22  0440   SODIUM mmol/L 134*   POTASSIUM mmol/L 4 4   CHLORIDE mmol/L 93*   CO2 mmol/L 29   BUN mg/dL 53*   CREATININE mg/dL 9 69*   ANION GAP mmol/L 12   CALCIUM mg/dL 9 0   GLUCOSE RANDOM mg/dL 160*     Results from last 7 days   Lab Units 01/13/22  1853   INR  1 12     Results from last 7 days   Lab Units 01/14/22  1623 01/14/22  1408 01/14/22  1324 01/14/22  1148 01/14/22  1019 01/14/22  0905 01/14/22  0808 01/14/22  0745 01/14/22  0601 01/14/22  0503 01/14/22  0405 01/14/22  0305   POC GLUCOSE mg/dl 123 256* 245* 232* 231* 177* 124 113 116 145* 161* 166*     Results from last 7 days   Lab Units 01/14/22  0440   HEMOGLOBIN A1C % 6 7* Lines/Drains:  Invasive Devices  Report    Peripheral Intravenous Line            Peripheral IV 01/13/22 Right;Upper;Ventral (anterior) Arm <1 day                  Telemetry:  Telemetry Orders (From admission, onward)             48 Hour Telemetry Monitoring  Continuous x 48 hours        References:    Telemetry Guidelines   Question:  Reason for 48 Hour Telemetry  Answer:  Acute CVA (<24 hrs old, hemispheric strokes, selected brainstem strokes, cardiac arrhythmias)                 Telemetry Reviewed: Normal Sinus Rhythm  Indication for Continued Telemetry Use: Arrthymias requiring medical therapy             Imaging: No pertinent imaging reviewed  Recent Cultures (last 7 days):         Last 24 Hours Medication List:   Current Facility-Administered Medications   Medication Dose Route Frequency Provider Last Rate    amLODIPine  2 5 mg Oral HS LAN [place]      aspirin  81 mg Oral Daily [place], [place]      atorvastatin  40 mg Oral QPM [facility] PA-C      calcium acetate  1,334 mg Oral TID With Meals Aranza Thapa MD      clopidogrel  75 mg Oral Daily [place], [place]      doxazosin  4 mg Oral HS Caprice Celestin PA-C      heparin (porcine)  5,000 Units Subcutaneous [place], [place]      hydrALAZINE  10 mg Intravenous Q6H PRN Caprice Celestin PA-C      insulin lispro  1-5 Units Subcutaneous HS Shahzad Kiran PA-C      insulin lispro  1-6 Units Subcutaneous TID AC Segundo Vance PA-C      ondansetron  4 mg Intravenous Q6H PRN Caprice Celestin PA-C      sevelamer  800 mg Oral TID With Meals Caprice Celestin PA-C          Today, Patient Was Seen By: Shahzad Kiran PA-C    **Please Note: This note may have been constructed using a voice recognition system  **

## 2022-01-14 NOTE — CASE MANAGEMENT
Case Management Assessment & Discharge Planning Note    Patient name Prentiss Epley  Location ICU 03/ICU 03- MRN [id number]  : 1950 Date 2022       Current Admission Date: 2022  Current Admission Diagnosis:Stroke-like symptoms   Patient Active Problem List    Diagnosis Date Noted    Stroke-like symptoms 2022    ESRD (end stage renal disease) ([facility] [address] ) 2021    Essential hypertension 2021    Type 2 diabetes [facility] without complication, with long-term [facility]    Mixed hyperlipidemia 2021      LOS (days): 1  Geometric Mean LOS (GMLOS) (days):   Days to GMLOS:     OBJECTIVE:              Current admission status: Inpatient  Referral Reason: Other (d/c planning)    Preferred Pharmacy:   [id number] [place], [place] - [address]  Phone: [phone] Fax: [phone]    Primary Care Provider: Jagjit Garcia MD    Primary Insurance: [facility]  Secondary Insurance:     ASSESSMENT:  [address] 26 Agents    There are no active Health Care Agents on file  Advance Directives  Does patient have a [address]?: No (pt states he is updatred his paperwork)  Was patient offered paperwork?: No (pt is updated his paperwork)  Does patient have Advance Directives?: No  Was patient offered paperwork?: No (pt stated he is updating his paper work)         Readmission Root Cause  30 Day Readmission: No    Patient Information  Admitted from[de-identified] Home  Mental Status: Alert  During Assessment patient was accompanied by: Not accompanied during assessment  Assessment information provided by[de-identified] Patient  Primary Caregiver: Self  Support Systems: Spouse/significant other  [place] of Residence: [address] do you live in?: [address] [facility] entry access options  Select all that apply : Stairs  Number of steps to enter home  : 1  Do the steps have railings?: No  Type of Current Residence: 2 story home  Upon entering residence, is there a bedroom on the main floor (no further steps)?: No  A bedroom is located on the following floor levels of residence (select all that apply):: 2nd Floor  Upon entering residence, is there a bathroom on the main floor (no further steps)?: No  Indicate which floors of current residence have a bathroom (select all the apply):: 2nd Floor  Number of steps to 2nd floor from main floor: One Flight  In the last 12 months, was there a time when you were not able to pay the mortgage or rent on time?: No  In the last 12 months, how many places have you lived?: 1  In the last 12 months, was there a time when you did not have a steady place to sleep or slept in a shelter (including now)?: No  Homeless/housing insecurity resource given?: N/A  Living Arrangements: Lives w/ Spouse/significant other  Is patient a ?: No    Activities of Daily Living Prior to Admission  Functional Status: Independent  Completes ADLs independently?: Yes  Ambulates independently?: Yes  Does patient use assisted devices?: No  Does patient currently own DME?: Yes  What DME does the patient currently own?: Other (Comment) (bp cuff, glucometer)  Does patient have a history of Outpatient Therapy (PT/OT)?: No  Does the patient have a history of Short-Term Rehab?: No  Does patient have a history of HHC?: No  Does patient currently have [address] ?: No         Patient Information Continued  Income Source: Pension/senior care  Does patient have prescription coverage?: Yes  Within the past 12 months, you worried that your food would run out before you got the money to buy more : Never true  Within the past 12 months, the food you bought just didnt last and you didnt have money to get more : Never true  Food insecurity resource given?: N/A  Does patient receive dialysis treatments?: Yes ([employer] m-w-f 10:40 am chair time)  Does patient have a history of substance abuse?: No  Does patient have a history of Mental Health Diagnosis?: No         Means of Transportation  Means of Transport to Appts[de-identified] Family transport (pt does drive on occasion)  In the past 12 months, has lack of transportation kept you from medical appointments or from getting medications?: No  In the past 12 months, has lack of transportation kept you from meetings, work, or from getting things needed for daily living?: No  Was application for public transport provided?: N/A        DISCHARGE DETAILS:    Discharge planning discussed with[de-identified] patient  and wife was called at 09:46am  Freedom of Choice: Yes  Comments - Freedom of Choice: pt denies any d/c needs  CM contacted family/caregiver?: Yes             Contacts  Patient Contacts: Lalo Vidal  Relationship to Patient[de-identified] Family (wife)  Contact Method: Phone  Phone Number: [phone]  Reason/Outcome: Discharge Planning              Other Referral/Resources/Interventions Provided:  Interventions: Dialysis  Referral Comments: milla lopez  10:40 am chair time,  pt does use the ccct to dialysis,    Would you like to participate in our [address] service program?  : No - Declined    Treatment Team Recommendation: Home (with  spouse)  Discharge Destination Plan[de-identified] Home (home wiht spouse and outpt dialysis)  Transport at Discharge : [employer]

## 2022-01-14 NOTE — TELEMEDICINE
TeleConsultation - Stroke   Austin Bruner 71 y o  male MRN: [id number]  Unit/Bed#: ICU 03-01 Encounter: [id number]    REQUIRED DOCUMENTATION:     1  This service was provided via Telemedicine  2  Provider located at [facility]  3  TeleMed provider: Audelia Camargo DO   4  Identify all parties in room with patient during tele consult:  Hanna Robbins, li care assistant  5  Patient was then informed that this was a Telemedicine visit and that the exam was being conducted confidentially over secure lines  My office door was closed  No one else was in the room  Patient acknowledged consent and understanding of privacy and security of the Telemedicine visit, and gave us permission to have the assistant stay in the room in order to assist with the history and to conduct the exam   I informed the patient that I have reviewed their record in Epic and presented the opportunity for them to ask any questions regarding the visit today  The patient agreed to participate  Assessment/Plan   Assessment:   Acute onset of L facial droop and dysarthria in the setting of being found unresponsive, transient and with return to baseline  Found to be hypoglycemic, bradycardic, and hypothermic with uncontrolled HTN  Given significant risk factors, cannot rule out possible TIA but episode could also have been caused by the above factors (including possible arrhythmia)  No current clinical evidence to suggest stroke  TPA Decision: Patient not a TPA candidate  Symptoms resolved/clearly non disabling      Plan:   -continue neurochecks; notify with changes  -HCT reviewed - no evidence of acute ischemia/hemorrhage  -CTA reviewed - no significant vascular abnormality  -obtain MRI brain w/o contrast  -obtain TTE  -telemetry  -no need for permissive HTN as pt is asymptomatic now; goal normotension  -continue ASA 81mg daily and Plavix 75mg daily x 3 weeks then would continue on ASA monotherapy thereafter if MRI negative for stroke; if evidence of stroke, will depend on presumed etiology  -continue Atorvastatin 40mg  -LDL 74, A1c pending  -goal normoglycemia and normothermia; management as per primary team   -PT/OT evals  -follow in stroke clinic as outpatient  -if MRI and TTE unremarkable, no further inpatient recommendations aside from above; call with questions    Won Valentine will need follow up in in 6 weeks with neurovascular attending or advance practitioner  He will not require outpatient neurological testing  Reason for Consult / Principal Problem: stroke alert  Hx and PE limited by: N/A  Patient last known well: 2pm 1/13  Stroke alert called: 5:42pm 1/13  Neurology time of arrival: immediate, by phone    HPI: Won Valentine is a 71 y o  male with HTN, HLD, DM, CAD, and ESRD who presents with L facial droop and dysarthria after being found down by wife  Pt was LKN at 2pm yesterday by wife, who later found pt at 4:50pm that afternoon down on the floor and unresponsive  He was noted to have a L facial droop and was dysarthric  She called EMS and pt was transported to the ED  During transport, his symptoms reportedly completely resolved and by the time of his arrival he no longer had any deficit and was back to baseline, NIHSS 0  A stroke alert was called  HCT and CTA were performed and personally reviewed - both were unremarkable for any acute pathology  He was not deemed a tPA candidate due to his symptoms/deficits now having resolved  Was subsequently found to be hypoglycemic with glucose of 30 - was given D50 and repeat level 182  Pt did not measure his glucose at home around the time of the event  Also found to be bradycardic in the low 50s and significantly hypertensive up to 220s as well as hypothermic at 91 6  Pt was admitted to the ICU after receiving a Plavix load of 300mg  Overnight he continued to do well  Currently normothermic and with heart rate/BP back within normal levels  He has no complaints at this point   Denies this ever happening before  Attributes his symptoms to the glucose  Never had a stroke before  Inpatient consult to Neurology  Consult performed by: Audelia Camargo DO  Consult ordered by: Gianluca Harrington PA-C    Consult to Neurology  Consult performed by: Audelia Camargo DO  Consult ordered by: Mauro Melendrez DO        Review of Systems   Constitutional: Positive for diaphoresis  Neurological: Positive for facial asymmetry and speech difficulty  All other systems reviewed and are negative  Historical Information   Past Medical History:   Diagnosis Date    Coronary artery disease     Diabetes mellitus     End stage renal disease     Hyperlipidemia     Hypertension     NSTEMI (non-ST elevated myocardial infarction)      Past Surgical History:   Procedure Laterality Date    CARDIAC CATHETERIZATION      70% lesion of a small R PDA and a 99% stenosis of D1 (previously noted on the cath in )  Neither lesion was amenable to PCI  Medical management   DIALYSIS FISTULA CREATION       Social History   Social History     Substance and Sexual Activity   Alcohol Use Never     Social History     Substance and Sexual Activity   Drug Use Never     E-Cigarette/Vaping    E-Cigarette Use Never User      E-Cigarette/Vaping Substances    Nicotine No     THC No     CBD No     Flavoring No     Other No     Unknown No      Social History     Tobacco Use   Smoking Status Former Smoker    Packs/day: 1 00    Years: 20 00    Pack years: 20 00    Types: Cigarettes    Start date:     Quit date: 12    Years since quittin 0   Smokeless Tobacco Never Used   Tobacco Comment    STATES QUIT 27 YEARS AGO/ ABOUT ONE CIGARETTE DAILY     Family History:   Family History   Problem Relation Age of Onset    Hypertension Mother     Hypertension Father        Review of previous medical records was completed      Meds/Allergies   all current active meds have been reviewed, current meds:   Current Facility-Administered Medications   Medication Dose Route Frequency    amLODIPine (NORVASC) tablet 2 5 mg  2 5 mg Oral HS    aspirin (ECOTRIN LOW STRENGTH) EC tablet 81 mg  81 mg Oral Daily    atorvastatin (LIPITOR) tablet 40 mg  40 mg Oral QPM    calcium acetate (PHOSLO) capsule 667 mg  667 mg Oral TID With Meals    clopidogrel (PLAVIX) tablet 75 mg  75 mg Oral Daily    dextrose 5 % and sodium chloride 0 45 % infusion  50 mL/hr Intravenous Continuous    doxazosin (CARDURA) tablet 4 mg  4 mg Oral HS    heparin (porcine) subcutaneous injection 5,000 Units  5,000 Units Subcutaneous Q8H [facility]    hydrALAZINE (APRESOLINE) injection 10 mg  10 mg Intravenous Q6H PRN    ondansetron (ZOFRAN) injection 4 mg  4 mg Intravenous Q6H PRN    sevelamer (RENAGEL) tablet 800 mg  800 mg Oral TID With Meals    and PTA meds:   Prior to Admission Medications   Prescriptions Last Dose Informant Patient Reported? Taking? Heparin Sodium, Porcine, (heparin, porcine,) 1,000 units/mL 2022 at Unknown time Self Yes Yes   Sig: At dialysis   Insulin Pen Needle (UltiCare Short Pen Needles) 31G X 8 MM MISC 2022 at Unknown time Self Yes Yes   Sig: INJECT 4 TIMES DAILY;E11 9   Lokelma 10 g PACK 2022 at Unknown time Self Yes Yes   Sig: EMPTY CONTENTS OF 1 PACKET IN 3 TABLESPOONSFUL OF WATER OR AS DIRECTED BY CLINIC   STIR WELL AND DRINK IMMEDIATELY ON NON-DIALYSIS DAYS   Methoxy PEG-Epoetin Beta (MIRCERA IJ) Past Week at Unknown time Self Yes Yes   Si mcg Once every 2 weeks    amLODIPine (NORVASC) 5 mg tablet 2022 at Unknown time Self Yes Yes   Sig: Take 2 5 mg by mouth daily at bedtime     aspirin (Aspirin 81) 81 mg EC tablet 2022 at Unknown time Self Yes Yes   Sig: Take by mouth   atorvastatin (LIPITOR) 40 mg tablet 2022 at Unknown time Self Yes Yes   Sig: Take 40 mg by mouth daily   calcium acetate (PHOSLO) capsule 2022 at Unknown time Self Yes Yes   Sig: 3 (three) times a day Not on dialysis days carvedilol (COREG) 25 mg tablet Unknown at Unknown time Self Yes No   Sig: Take 25 mg by mouth 2 (two) times a day with meals    doxazosin (CARDURA) 4 mg tablet Not Taking at Unknown time Self Yes No   Sig: Take 4 mg by mouth daily at bedtime     Patient not taking: Reported on 1/13/2022    hydrALAZINE (APRESOLINE) 50 mg tablet   No No   Sig: Take 1 tablet (50 mg total) by mouth every 8 (eight) hours   Patient not taking: Reported on 11/18/2021    insulin aspart (NovoLOG FlexPen) 100 UNIT/ML injection pen 1/13/2022 at Unknown time Self Yes Yes   Sig: Inject under the skin Three times a day    insulin glargine (LANTUS SOLOSTAR) 100 units/mL injection pen 1/12/2022 at Unknown time Self Yes Yes   Sig: Inject 6 Units under the skin   lidocaine-prilocaine (EMLA) cream 1/12/2022 at Unknown time Self Yes Yes   Sig: APPLY SMALL AMOUNT TO ACCESS SITE (AVF) 1 TO 2 HOURS BEFORE DIALYSIS  COVER WITH OCCLUSIVE DRESSING (SARAN WRAP)   sevelamer carbonate (Renvela) 800 mg tablet 1/13/2022 at Unknown time Self Yes Yes   Sig: Take 800 mg by mouth Three times a day       Facility-Administered Medications: None       No Known Allergies    Objective   Vitals:Blood pressure 128/60, pulse 73, temperature 98 °F (36 7 °C), temperature source Temporal, resp  rate 16, height 6' (1 829 m), weight 99 7 kg (219 lb 12 8 oz), SpO2 95 %  ,Body mass index is 29 81 kg/m²  Intake/Output Summary (Last 24 hours) at 1/14/2022 0814  Last data filed at 1/14/2022 0600  Gross per 24 hour   Intake 437 5 ml   Output 0 ml   Net 437 5 ml       Invasive Devices: Invasive Devices  Report    Peripheral Intravenous Line            Peripheral IV 01/13/22 Right;Upper;Ventral (anterior) Arm <1 day                Physical Exam  Constitutional:       Appearance: He is well-developed  HENT:      Head: Normocephalic and atraumatic     Eyes:      Extraocular Movements: EOM normal       Conjunctiva/sclera: Conjunctivae normal    Pulmonary:      Effort: No respiratory distress  Abdominal:      General: There is no distension  Musculoskeletal:         General: No swelling  Cervical back: No rigidity  Skin:     Coloration: Skin is not jaundiced  Neurological:      Mental Status: He is alert and oriented to person, place, and time  Coordination: Finger-Nose-Finger Test normal       Deep Tendon Reflexes: Strength normal    Psychiatric:         Speech: Speech normal        Neurologic Exam     Mental Status   Oriented to person, place, and time  Attention: normal  Concentration: normal    Speech: speech is normal   Level of consciousness: alert  Knowledge: good  Able to name object  Able to repeat  Normal comprehension  Cranial Nerves     CN III, IV, VI   Extraocular motions are normal      CN V   Facial sensation intact  CN VII   Facial expression full, symmetric  CN VIII   Hearing: intact    CN XII   Tongue deviation: none    Motor Exam     Strength   Strength 5/5 throughout  Sensory Exam   Light touch normal      Gait, Coordination, and Reflexes     Coordination   Finger to nose coordination: normal      NIHSS:  1a Level of Consciousness: 0 = Alert   1b  LOC Questions: 0 = Answers both correctly   1c  LOC Commands: 0 = Obeys both correctly   2  Best Gaze: 0 = Normal   3  Visual: 0 = No visual field loss   4  Facial Palsy: 0=Normal symmetric movement   5a  Motor Right Arm: 0=No drift, limb holds 90 (or 45) degrees for full 10 seconds   5b  Motor Left Arm: 0=No drift, limb holds 90 (or 45) degrees for full 10 seconds   6a  Motor Right Le=No drift, limb holds 90 (or 45) degrees for full 10 seconds   6b  Motor Left Le=No drift, limb holds 90 (or 45) degrees for full 10 seconds   7  Limb Ataxia:  0=Absent   8  Sensory: 0=Normal; no sensory loss   9  Best Language:  0=No aphasia, normal   10  Dysarthria: 0=Normal articulation   11   Extinction and Inattention (formerly Neglect): 0=No abnormality   Total Score: 0     Time NIHSS was completed: 10am 1/14    Modified [place] Score:  0 (No baseline symptoms/disability)    Lab Results:   CBC:   Results from last 7 days   Lab Units 01/13/22  1853   WBC Thousand/uL 6 54   RBC Million/uL 4 59   HEMOGLOBIN g/dL 13 0   HEMATOCRIT % 42 1   MCV fL 92   PLATELETS Thousands/uL 113*   , BMP/CMP:   Results from last 7 days   Lab Units 01/14/22  0440 01/13/22  1853   SODIUM mmol/L 134* 136   POTASSIUM mmol/L 4 4 5 3   CHLORIDE mmol/L 93* 91*   CO2 mmol/L 29 33*   BUN mg/dL 53* 43*   CREATININE mg/dL 9 69* 8 48*   CALCIUM mg/dL 9 0 10 1   EGFR ml/min/1 73sq m 4 5   , HgBA1C:   , Coagulation:   Results from last 7 days   Lab Units 01/13/22  1853   INR  1 12   , Lipid Profile:   Results from last 7 days   Lab Units 01/14/22  0440   HDL mg/dL 35*   LDL CALC mg/dL 74   TRIGLYCERIDES mg/dL 90     Imaging Studies: I have personally reviewed pertinent films in PACS with a Radiologist   EKG, Pathology, and Other Studies: I have personally reviewed pertinent reports  VTE Prophylaxis: Heparin    Code Status: Level 1 - Full Code    Counseling / Coordination of Care  Total Critical Care time spent 30 minutes excluding procedures, teaching and family updates

## 2022-01-14 NOTE — ASSESSMENT & PLAN NOTE
Lab Results   Component Value Date    EGFR 4 01/14/2022    EGFR 5 01/13/2022    EGFR 5 05/30/2021    CREATININE 9 69 (H) 01/14/2022    CREATININE 8 48 (H) 01/13/2022    CREATININE 10 39 (H) 05/30/2021   · Patient had contrast with CT  · Normally receives dialysis Monday Wednesday Friday  · Nephrology consultation appreciated - HD session tomorrow

## 2022-01-14 NOTE — PHYSICAL THERAPY NOTE
Physical Therapy Evaluation   Time in: 1018  Time out: 1034  Total evaluation time: 16 minutes    Patient's Name: Prentiss Epley    Admitting Diagnosis  TIA (transient ischemic attack) [G45 9]  Hypoglycemia [E16 2]  Stroke-like symptoms [R29 90]    Problem List  Patient Active Problem List   Diagnosis    [facility]) [facility] )    Essential hypertension    Type 2 diabetes mellitus without complication[facility] with [facility]    Mixed hyperlipidemia    Stroke-like symptoms       Past Medical History  Past Medical History:   Diagnosis Date    Coronary artery disease     Diabetes mellitus     End stage renal disease     Hyperlipidemia     Hypertension     NSTEMI (non-ST elevated myocardial infarction) 2012       Past Surgical History  Past Surgical History:   Procedure Laterality Date    CARDIAC CATHETERIZATION  2017    70% lesion of a small R PDA and a 99% stenosis of D1 (previously noted on the cath in 2012)  Neither lesion was amenable to PCI  Medical management   DIALYSIS FISTULA CREATION         PT performed at least 2 patient identifiers during session: Name and wristband  01/14/22 1020   PT Last Visit   PT Visit Date 01/14/22   Note Type   Note type Evaluation   Pain Assessment   Pain Assessment Tool 0-10   Pain Score No Pain   Restrictions/Precautions   Weight Bearing Precautions Per Order No   Other Precautions Multiple lines;Telemetry; Fall Risk   Home Living   Type of [address] [place] [address] Two level;Stairs to enter with rails;Bed/bath upstairs  (1 ROSANNE, 13 steps to 2nd floor)   Bathroom Shower/Tub Tub/shower unit   Bathroom Toilet Standard   Bathroom Equipment   (no DME at baseline)   [address]   (no AD at baseline)   Prior Function   Level of [place] Independent with ADLs and functional mobility   Lives With Spouse   Receives Help From Family   ADL Assistance Independent   IADLs Independent   Falls in the last 6 months 0  (pt was found down by wife PTA)   Vocational Retired   Comments pt occasionally drives   General   Family/Caregiver Present Yes  (pt's wife)   Cognition   Overall Cognitive Status WFL   Arousal/Participation Alert   Attention Within functional limits   Orientation Level Oriented X4   Memory Within functional limits   Following Commands Follows all commands and directions without difficulty   Comments pt agreeable to PT session   Subjective   Subjective "I had low blood sugar"   RUE Assessment   RUE Assessment X  (reduced fine motor in B hands)   LUE Assessment   LUE Assessment X   RLE Assessment   RLE Assessment WFL  (grossly 4+/5 throughout)   LLE Assessment   LLE Assessment WFL  (grossly 4+/5 throughout)   Vision-Basic Assessment   Current Vision No visual deficits   Vestibular   Spontaneous Nystagmus (-) no evidence of nystagmus at rest in room light   Coordination   Movements are Fluid and Coordinated 1   Sensation X  (B feet numbness/reduced sensation)   Finger to Nose & Finger to Finger  Intact   Heel to Shin Intact   Light Touch   RLE Light Touch Grossly intact   LLE Light Touch Grossly intact   Bed Mobility   Supine to Sit 6  Modified independent   Additional items HOB elevated; Impulsive   Additional Comments BP prior to mobility = 152/68   Transfers   Sit to Stand 6  Modified independent   Stand to Sit 6  Modified independent   Additional Comments pt denies any lightheadedness/dizziness   Ambulation/Elevation   Gait pattern WNL; Short stride   Gait Assistance 6  Modified independent   Assistive Device None   Distance 40 ft   Stair Management Assistance Not tested   Balance   Static Sitting Normal   Dynamic Sitting Normal   Static Standing Good   Dynamic Standing Good   Ambulatory Good   Endurance Deficit   Endurance Deficit No   Activity Tolerance   Activity Tolerance Patient tolerated treatment well   Nurse Made Aware Yes, RN made aware of outcomes/recs   Assessment   Prognosis Excellent Problem List Decreased mobility; Decreased safety awareness; Impaired sensation   Assessment Pt is 71 y o  male seen for moderate-complexity PT evaluation s/p [facility] on 1/13/2022 w/ Stroke-like symptoms; pt presented to ED c L facial droop and dysarthria after being found down by wife  PT consulted to assess pt's functional mobility and d/c needs  Order placed for PT eval and tx  Comorbidities affecting pt's physical performance at time of assessment include: HTN, HLD, DM, CAD, ESRD  PTA, pt was residing with wife in 2  c 1 ROSANNE, amb s AD, (I) ADLs/IADLs, drives, retired  Personal factors affecting pt at time of IE include: increased age  Please find objective findings from PT assessment regarding body systems outlined above  The following objective measures performed on IE: -PAC 6-Clicks: 41/66  Pt's clinical presentation is currently unstable/unpredictable seen in pt's presentation of uncontrolled glucose, presented to ED as a stroke alert via EMS  From PT/mobility standpoint, pt appears to be functioning close to or at mobility baseline, therefore no further immediate skilled PT needs are warranted at this time  Pt currently performing all phases of functional mobility at modified independent level without need for AD  Recommend pt continue to mobilize ad susi while here  Recommend pt return to previous living environment once medically cleared and with continued family support  Will sign off, D/C PT  Please reconsult if further immediate skilled PT needs are warranted  Barriers to Discharge None   Goals   Patient Goals "to go home"   PT Treatment Day 0   Plan   Treatment/Interventions Spoke to nursing   PT Frequency   (D/C PT)   Recommendation   PT Discharge Recommendation No rehabilitation needs   Equipment Recommended   (none at this time)   Additional Comments Pt's raw score on the AM-[facility] Basic Mobility inpatient short form is 23, standardized score is 50 88   Patients at this level are likely to benefit from DC to home with no services, however, please refer to therapist recommendation for safe DC planning  AM-PAC Basic Mobility Inpatient   Turning in Bed Without Bedrails 4   Lying on Back to Sitting on Edge of Flat Bed 4   Moving Bed to Chair 4   Standing Up From Chair 4   Walk in Room 4   Climb 3-5 Stairs 3   Basic Mobility Inpatient Raw Score 23   Basic Mobility Standardized Score 50 88   Highest Level Of Mobility   JH-HLM Goal 7: Walk 25 feet or more   JH-HLM Highest Level of Mobility 7: Walk 25 feet or more   JH-HLM Goal Achieved Yes   End of Consult   Patient Position at End of Consult Bedside chair; All needs within reach       Chelsey Guillaume, PT, DPT

## 2022-01-14 NOTE — SPEECH THERAPY NOTE
Speech Language/Pathology  Speech/Language Pathology  Assessment    Patient Name: Prentiss Epley YDEUO'D Date: 1/14/2022     Problem List  Principal Problem:    Stroke-like symptoms  Active Problems:    ESRD (end stage renal disease) ([facility] )    Essential hypertension    Type 2 diabetes mellitus without complication, with long-term current use of insulin ([facility] )    Mixed hyperlipidemia    Past Medical History  Past Medical History:   Diagnosis Date    Coronary artery disease     Diabetes mellitus     End stage renal disease     Hyperlipidemia     Hypertension     NSTEMI (non-ST elevated myocardial infarction) 2012     Past Surgical History  Past Surgical History:   Procedure Laterality Date    CARDIAC CATHETERIZATION  2017    70% lesion of a small R PDA and a 99% stenosis of D1 (previously noted on the cath in 2012)  Neither lesion was amenable to PCI  Medical management   DIALYSIS FISTULA CREATION          01/14/22 0900   Patient Information   Current Medical CVA pathway, suspect hypoglycemia   Past Medical History HTN, HLD, DM, CAD, ESRD   Cognition   Overall Cognitive Status WFL   Arousal/Participation Alert; Responsive; Cooperative   Attention Within functional limits   Orientation Level Oriented X4   Memory Within functional limits   Following Commands Follows all commands and directions without difficulty   Speech/Swallow Mechanism Exam   Labial Symmetry WFL   Labial Strength WFL   Labial ROM WFL   Labial Sensation WFL   Facial Symmetry WFL  (No facial droop present today at 0830)   Facial Strength WFL   Facial ROM WFL   Facial Sensation WFL   Lingual Symmetry WFL   Lingual Strength WFL   Lingual ROM WFL   Lingual Sensation WFL   Dentition Adequate   Volitional Cough Strong   Motor Speech Evaluation   Respiration/Phonation WFL   Vocal Quality WFL   Dysarthria No   Intelligibility Intelligible   Apraxia None present   Auditory Comprehension   Word Level Comprehension WFL   Yes/No Questions WFL   Commands WFL   Comphrehends Conversation Complex   Reading Comprehension   Reading Status WFL   Expression   Primary Mode of Expression Verbal   Verbal Expression   Repetition Sentences; No impairment   Automatic Speech WFL   Naming [facility]   Narrative Speech WFL   Cognitive/Language and Holistic Reasoning   Problem Solving WFL   Numeric Reasoning WFL   Abstract Reasoning WFL   Safety/Judgement WFL   Insight WFL   Flexibility of Thought WFL   Planning [facility]   Summary   Speech/Language Summary Patient evaluated today for s/s of aphasia with the MAST standardized aphasia battery  Patient completed all portions of the evaluation @ 100% acc i'ly including the areas of naming, automatic speech, repetition, yes/no responses, following instructions, reading instructions, verbal fluency and writing  Patient reports no concerns or difficulties at this time, with all symptoms having had resolved on admission  No ST is warranted at this time

## 2022-01-14 NOTE — ASSESSMENT & PLAN NOTE
· Patient with left facial droop and dysarthria prior to arriving to the ED  · Possibly secondary to hypoglycemia with glucose of 30 was given D50 blood sugar improved to 182  · Evaluated by Neurology recommended admit under stroke pathway and to load with Plavix

## 2022-01-14 NOTE — H&P
[address]  H&P- Sánchez Sylvester 1950, 71 y o  male MRN: [id number]  Unit/Bed#: ED 20 Encounter: [id number]  Primary Care Provider: Jamar Mcneil MD   Date and time admitted to hospital: 1/13/2022  6:01 PM    * Stroke-like symptoms  Assessment & Plan  · Patient with left facial droop and dysarthria prior to arriving to the ED  · Possibly secondary to hypoglycemia with glucose of 30 was given D50 blood sugar improved to 182  · Evaluated by Neurology recommended admit under stroke pathway and to load with Plavix    Type 2 diabetes mellitus without complication, with long-term current use of insulin ([facility] )  Assessment & Plan  Lab Results   Component Value Date    HGBA1C 7 0 (H) 12/14/2021       Recent Labs     01/13/22 1956   POCGLU 182*       Blood Sugar Average: Last 72 hrs:  (P) 182   · With hypoglycemia presenting glucose was 38 given D50 and blood sugar improved  · Patient was also bradycardic and hypothermic on admission  · Will give D5 half normal at 75 mL an hour for 5 hours and q 1 hour blood sugar      Essential hypertension  Assessment & Plan  · Permissive hypertension    Mixed hyperlipidemia  Assessment & Plan  · Atorvastatin 40 mg  · Check fasting lipid panel    ESRD (end stage renal disease) ([facility] )  Assessment & Plan  Lab Results   Component Value Date    EGFR 5 01/13/2022    EGFR 5 05/30/2021    EGFR 5 01/20/2021    CREATININE 8 48 (H) 01/13/2022    CREATININE 10 39 (H) 05/30/2021    CREATININE 10 07 (H) 01/20/2021   · Patient had contrast with CT  · Normally receives dialysis Monday Wednesday Friday  · Nephrology notified    VTE Pharmacologic Prophylaxis: VTE Score: 3 Moderate Risk (Score 3-4) - Pharmacological DVT Prophylaxis Ordered: heparin    Code Status: Level 1 - Full Code  Discussion with patient    Anticipated Length of Stay: Patient will be admitted on an observation basis with an anticipated length of stay of less than 2 midnights secondary to Stroke workup, specialist input  Chief Complaint:  Left facial droop and dysarthria    History of Present Illness:  Justo Lazaro is a 71 y o  male with a PMH of hypertension, hyperlipidemia, diabetes mellitus type 2 on insulin, end-stage renal disease on hemodialysis presents with left facial droop and dysarthria  Patient was found by his wife with left facial droop and dysarthria EMS was contacted when the patient arrived in the ED has symptoms resolved NIH stroke scale was 0  He had CT head and CTA with no acute findings  He was seen by Neurology was not felt to be tPA candidate they recommended loading with Plavix and continue 75 mg daily along with his home aspirin and follow stroke pathway  Incidentally blood sugar was 38 he was given D50 50ml and improved to 182  Patient also was noted to be hypothermic and bradycardic in the ER  Patient reports wife found him with facial droop  He reports he didn't feel right but did not take his blood sugar  He reports now feeling at baseline  He has no weakness in arms or legs  ED treatment including Plavix 600 mg and D50 50ml    Review of Systems:  Review of Systems   Constitutional: Negative for chills and fever  HENT: Negative for rhinorrhea and sore throat  Respiratory: Negative for cough and shortness of breath  Cardiovascular: Negative for chest pain and leg swelling  Gastrointestinal: Negative for abdominal pain, diarrhea, nausea and vomiting  Genitourinary: Negative for dysuria and hematuria  Musculoskeletal: Negative for back pain and neck pain  Skin: Negative for rash  Neurological: Negative for dizziness, weakness and headaches  Psychiatric/Behavioral: Negative for agitation and confusion         Past Medical and Surgical History:   Past Medical History:   Diagnosis Date    Coronary artery disease     Diabetes mellitus     End stage renal disease     Hyperlipidemia     Hypertension     NSTEMI (non-ST elevated myocardial infarction) 2012 Past Surgical History:   Procedure Laterality Date    CARDIAC CATHETERIZATION  2017    70% lesion of a small R PDA and a 99% stenosis of D1 (previously noted on the cath in 2012)  Neither lesion was amenable to PCI  Medical management   DIALYSIS FISTULA CREATION         Meds/Allergies:  Prior to Admission medications    Medication Sig Start Date End Date Taking? Authorizing Provider   amLODIPine (NORVASC) 2 5 mg tablet Take 2 5 mg by mouth daily    Historical Provider, MD   aspirin (Aspirin 81) 81 mg EC tablet Take by mouth    Historical Provider, MD   atorvastatin (LIPITOR) 40 mg tablet Take 40 mg by mouth daily    Historical Provider, MD   calcium acetate (PHOSLO) capsule 3 (three) times a day Not on dialysis days 1/14/21   Historical Provider, MD   CALCIUM ACETATE-MAGNESIUM CARB PO Take 1 tablet by mouth  Patient not taking: Reported on 8/31/2021    Historical Provider, MD   carvedilol (COREG) 25 mg tablet Take 25 mg by mouth 2 (two) times a day with meals     Historical Provider, MD   doxazosin (CARDURA) 4 mg tablet Take 4 mg by mouth daily at bedtime      Historical Provider, MD   Heparin Sodium, Porcine, (heparin, porcine,) 1,000 units/mL At dialysis 2/19/21 2/18/22  Historical Provider, MD   hydrALAZINE (APRESOLINE) 50 mg tablet Take 1 tablet (50 mg total) by mouth every 8 (eight) hours  Patient not taking: Reported on 11/18/2021 1/20/21 2/19/21  Paige Gutierrez MD   insulin aspart (NovoLOG FlexPen) 100 UNIT/ML injection pen Inject under the skin Three times a day  8/12/20   Historical Provider, MD   insulin glargine (LANTUS SOLOSTAR) 100 units/mL injection pen Inject 6 Units under the skin    Historical Provider, MD   Insulin Pen Needle (UltiCare Short Pen Needles) 31G X 8 MM MISC INJECT 4 TIMES DAILY;E11 9 3/24/21   Historical Provider, MD   lidocaine-prilocaine (EMLA) cream APPLY SMALL AMOUNT TO ACCESS SITE (AVF) 1 TO 2 HOURS BEFORE DIALYSIS   COVER WITH OCCLUSIVE DRESSING (SARAN WRAP) 3/26/21   Historical Provider, MD   lisinopril (ZESTRIL) 40 mg tablet Take 40 mg by mouth daily      Historical Provider, MD   Lokelma 10 g PACK EMPTY CONTENTS OF 1 PACKET IN 3 TABLESPOONSFUL OF WATER OR AS DIRECTED BY CLINIC  STIR WELL AND DRINK IMMEDIATELY ON NON-DIALYSIS DAYS 20   Historical Provider, MD   losartan (COZAAR) 100 MG tablet  21   Historical Provider, MD   Methoxy PEG-Epoetin Beta (MIRCERA IJ) 100 mcg Once every 2 weeks  21  Historical Provider, MD   sevelamer carbonate (Renvela) 800 mg tablet Take 800 mg by mouth Three times a day  21   Historical Provider, MD   SPS 15 GM/60ML suspension  21   Historical Provider, MD   Tuberculin PPD (TUBERSOL ID) Inject 0 1 mL into the skin  Patient not taking: Reported on 2021   Historical Provider, MD   VITAMIN D PO Take 1 mcg by mouth  Patient not taking: Reported on 2021  Historical Provider, MD     I have reviewed home medications with patient personally      Allergies: No Known Allergies    Social History:  Marital Status: /Civil Union   Occupation: retired  Patient Pre-hospital Living Situation: Home  Patient Pre-hospital Level of Mobility: walks  Patient Pre-hospital Diet Restrictions: None  Substance Use History:   Social History     Substance and Sexual Activity   Alcohol Use Not Currently     Social History     Tobacco Use   Smoking Status Former Smoker    Packs/day: 1 00    Years: 20 00    Pack years: 20 00    Types: Cigarettes    Start date:     Quit date: 12    Years since quittin 0   Smokeless Tobacco Never Used   Tobacco Comment    STATES QUIT 27 YEARS AGO/ ABOUT ONE CIGARETTE DAILY     Social History     Substance and Sexual Activity   Drug Use Never       Family History:  Family History   Problem Relation Age of Onset    Hypertension Mother     Hypertension Father        Physical Exam:     Vitals:   Blood Pressure: (!) 222/96 (22 2237)  Pulse: 59 (01/13/22 1957)  Temperature: (!) 95 9 °F (35 5 °C) (01/13/22 2227)  Temp Source: Tympanic (01/13/22 1802)  Respirations: 18 (01/13/22 1957)  SpO2: 95 % (01/13/22 1957)    Physical Exam  Vitals reviewed  Constitutional:       General: He is not in acute distress  Appearance: Normal appearance  Comments: Pleasant elderly male   HENT:      Head: Normocephalic and atraumatic  Right Ear: External ear normal       Left Ear: External ear normal       Nose: Nose normal    Eyes:      General:         Right eye: No discharge  Left eye: No discharge  Conjunctiva/sclera: Conjunctivae normal       Comments: Glasses in place   Cardiovascular:      Rate and Rhythm: Regular rhythm  Bradycardia present  Heart sounds: Normal heart sounds  No murmur heard  Pulmonary:      Effort: Pulmonary effort is normal  No respiratory distress  Breath sounds: Normal breath sounds  No wheezing or rales  Abdominal:      General: Bowel sounds are normal  There is no distension  Palpations: Abdomen is soft  Tenderness: There is no abdominal tenderness  There is no guarding  Musculoskeletal:         General: Normal range of motion  Cervical back: Normal range of motion  Skin:     General: Skin is warm and dry  Neurological:      Mental Status: He is alert and oriented to person, place, and time  Mental status is at baseline  Motor: No weakness  Psychiatric:         Mood and Affect: Mood normal          Behavior: Behavior normal       National Institutes of Health Stroke Scale (NIHSS)          1a  Level of  Consciousness (LOC) 0 = Alert, keenly responsive  1 = Not alert, but arousable by minor        stimulation  2 = Not alert, required repeated stimulation to         attend  3 = Responds only with reflex motor or totally         unresponsive       1a   0   1b  LOC Questions  Asked to say month and his/her age 0 = Answers both questions correctly     1 = Answers one question correctly        (dysarthric, intubated)  2 = Answers neither question correctly        (aphasic, stupor)  1b   0   1c  LOC Commands  Asked to open & close eyes, then  & release with non-affected hand  0 = Performs both tasks correctly  1 = Performs one task correctly  2 = Performs neither task correctly  1c   0     2  Best Gaze  Asked to follow with eyes through horizontal plane  0 = Normal  1 = Partial gaze palsy  2 = Forced deviation or total gaze paresis  2   0   3  Visual  Visual fields (quadrants) tested with finger counting or visual threat  0 = No visual loss  1 = Partial hemianopia (extinction)  2 = Complete hemianopia  3 = Bilateral hemianopia (including         blindness)  3   0   4  Facial Palsy  Asked to show teeth & raise eyebrows  0 = Normal symmetrical movement  1 = Minor paralysis  2 = Partial paralysis (total/near total         paralysis of lower face)  3 = Complete paralysis of one or both         sides (upper & lower)  4   0   5  Motor Arm  Asked to extend arms (palm down) 90º (if sitting) or 45º (if supine) & hold for 10 seconds  0 = No drift; arm stays at 90º/45º for full 10        seconds  1 = Drift; arm drifts down but does not hit         bed or other support  2 = Some effort against gravity; drifts down         to bed or support  3 = No effort against gravity: arm falls to         bed or support  4 = No movement  9 = Amputation, joint fusion  5a  (Left)       0      5b  (Right)        0    6  Motor Leg  While supine, asked to hold leg at 30º for 5 seconds  0 = No drift; leg stays at 30º for full 5        seconds  1 = Drift; leg drifts down but does not hit         the bed or other support  2 = Some effort against gravity; drifts down         to bed or support  3 = No effort against gravity; leg falls to         bed or support  4 = No movement  9 = Amputation, joint fusion  6a   (Left)       0        6b  (Right)       0   7    Limb Ataxia  Finger - nose & heel shin tests on both sides  0 = Absent  1 = Present in one limb  2 = Present in two limbs  7   0   8  Sensory  Sensation or grimace to pin prick or withdrawal from noxious stimuli in obtunded or aphasic patient   0 = Normal; no sensory loss  1 = Mild / moderate sensory loss; may be        dulled / "not as sharp"  2 = Severe / total sensory loss; coma     8   0   9  Best Language  Asked to describe a picture, name objects & read simple words  (See NIHSS language tools)  0 = No aphasia; normal  1 = Mild / moderate aphasia; some loss of        fluency / comprehension without        limitation of expression of ideas (can        identify picture from patient's        responses)  2 = Severe aphasia (cannot identify         pictures from responses)  3 = Mute; global aphasia; no usable        speech; cannot follow simple        commands  9   0   10  Dysarthria   0 = Normal   1 = Mild / moderate; slurs some words;         can be understood  2 = Severe; so slurred as to be         unintelligible; mute;anarthric     9 = Intubated or other physical barrier  10   0   11  Extinction & Inattention  Look at Visual (from #3) and double simultaneous tactile     0 = No abnormality  1 = Inattention or extinction in one sensory         modality  2 = Profound randa inattention or        inattention to more than one modality;        does not recognize own hand; orients        to only one side of space           11   0     Complete NIHSS Score (0-42): 0               Additional Data:     Lab Results:  Results from last 7 days   Lab Units 01/13/22  1853   WBC Thousand/uL 6 54   HEMOGLOBIN g/dL 13 0   HEMATOCRIT % 42 1   PLATELETS Thousands/uL 113*     Results from last 7 days   Lab Units 01/13/22  1853   SODIUM mmol/L 136   POTASSIUM mmol/L 5 3   CHLORIDE mmol/L 91*   CO2 mmol/L 33*   BUN mg/dL 43*   CREATININE mg/dL 8 48*   ANION GAP mmol/L 12   CALCIUM mg/dL 10 1   GLUCOSE RANDOM mg/dL 38*     Results from last 7 days   Lab Units 01/13/22  1853   INR  1 12     Results from last 7 days   Lab Units 01/13/22  1956   POC GLUCOSE mg/dl 182*     Imaging: formal read pending  CT stroke alert brain    (Results Pending)   CTA stroke alert (head/neck)    (Results Pending)   X-ray chest 1 view portable    (Results Pending)   MRI Inpatient Order    (Results Pending)     ** Please Note: This note has been constructed using a voice recognition system   **

## 2022-01-14 NOTE — ASSESSMENT & PLAN NOTE
· Atorvastatin 40 mg  Component      Latest Ref Rng & Units 1/14/2022   Cholesterol      See Comment mg/dL 127   Triglycerides      See Comment mg/dL 90   HDL      >=40 mg/dL 35 (L)   LDL Calculated      0 - 100 mg/dL 74

## 2022-01-14 NOTE — ASSESSMENT & PLAN NOTE
Lab Results   Component Value Date    HGBA1C 7 0 (H) 12/14/2021       Recent Labs     01/13/22 1956   POCGLU 182*       Blood Sugar Average: Last 72 hrs:  (P) 182   · With hypoglycemia presenting glucose was 38 given D50 and blood sugar improved  · Patient was also bradycardic and hypothermic on admission  · Will give D5 half normal at 75 mL an hour for 5 hours and q 1 hour blood sugar

## 2022-01-14 NOTE — PLAN OF CARE
Problem: Neurological Deficit  Goal: Neurological status is stable or improving  Description: Interventions:  - Monitor and assess patient's level of consciousness, motor function, sensory function, and level of assistance needed for ADLs  - Monitor and report changes from baseline  Collaborate with interdisciplinary team to initiate plan and implement interventions as ordered  - Provide and maintain a safe environment  - Consider seizure precautions  - Consider fall precautions  - Consider aspiration precautions  - Consider bleeding precautions  Outcome: Progressing     Problem: Activity Intolerance/Impaired Mobility  Goal: Mobility/activity is maintained at optimum level for patient  Description: Interventions:  - Assess and monitor patient  barriers to mobility and need for assistive/adaptive devices  - Assess patient's emotional response to limitations  - Collaborate with interdisciplinary team and initiate plans and interventions as ordered  - Encourage independent activity per ability   - Maintain proper body alignment  - Perform active/passive rom as tolerated/ordered  - Plan activities to conserve energy   - Turn patient as appropriate  Outcome: Progressing     Problem: Communication Impairment  Goal: Ability to express needs and understand communication  Description: Assess patient's communication skills and ability to understand information  Patient will demonstrate use of effective communication techniques, alternative methods of communication and understanding even if not able to speak  - Encourage communication and provide alternate methods of communication as needed  - Collaborate with case management/ for discharge needs  - Include patient/family/caregiver in decisions related to communication    Outcome: Progressing     Problem: Potential for Aspiration  Goal: Non-ventilated patient's risk of aspiration is minimized  Description: Assess and monitor vital signs, respiratory status, and labs (WBC)  Monitor for signs of aspiration (tachypnea, cough, rales, wheezing, cyanosis, fever)  - Assess and monitor patient's ability to swallow  - Place patient up in chair to eat if possible  - HOB up at 90 degrees to eat if unable to get patient up into chair   - Supervise patient during oral intake  - Instruct patient/ family to take small bites  - Instruct patient/ family to take small single sips when taking liquids  - Follow patient-specific strategies generated by speech pathologist   Outcome: Progressing  Goal: Ventilated patient's risk of aspiration is minimized  Description: Assess and monitor vital signs, respiratory status, airway cuff pressure, and labs (WBC)  Monitor for signs of aspiration (tachypnea, cough, rales, wheezing, cyanosis, fever)  - Elevate head of bed 30 degrees if patient has tube feeding   - Monitor tube feeding  Outcome: Progressing     Problem: Nutrition  Goal: Nutrition/Hydration status is improving  Description: Monitor and assess patient's nutrition/hydration status for malnutrition (ex- brittle hair, bruises, dry skin, pale skin and conjunctiva, muscle wasting, smooth red tongue, and disorientation)  Collaborate with interdisciplinary team and initiate plan and interventions as ordered  Monitor patient's weight and dietary intake as ordered or per policy  Utilize nutrition screening tool and intervene per policy  Determine patient's food preferences and provide high-protein, high-caloric foods as appropriate  - Assist patient with eating   - Allow adequate time for meals   - Encourage patient to take dietary supplement as ordered  - Collaborate with clinical nutritionist   - Include patient/family/caregiver in decisions related to nutrition    Outcome: Progressing     Problem: PAIN - ADULT  Goal: Verbalizes/displays adequate comfort level or baseline comfort level  Description: Interventions:  - Encourage patient to monitor pain and request assistance  - Assess pain using appropriate pain scale  - Administer analgesics based on type and severity of pain and evaluate response  - Implement non-pharmacological measures as appropriate and evaluate response  - Consider cultural and social influences on pain and pain management  - Notify physician/advanced practitioner if interventions unsuccessful or patient reports new pain  Outcome: Progressing     Problem: INFECTION - ADULT  Goal: Absence or prevention of progression during hospitalization  Description: INTERVENTIONS:  - Assess and monitor for signs and symptoms of infection  - Monitor lab/diagnostic results  - Monitor all insertion sites, i e  indwelling lines, tubes, and drains  - Monitor endotracheal if appropriate and nasal secretions for changes in amount and color  - [place] appropriate cooling/warming therapies per order  - Administer medications as ordered  - Instruct and encourage patient and family to use good hand hygiene technique  - Identify and instruct in appropriate isolation precautions for identified infection/condition  Outcome: Progressing  Goal: Absence of fever/infection during neutropenic period  Description: INTERVENTIONS:  - Monitor WBC    Outcome: Progressing     Problem: SAFETY ADULT  Goal: Patient will remain free of falls  Description: INTERVENTIONS:  - Educate patient/family on patient safety including physical limitations  - Instruct patient to call for assistance with activity   - Consult OT/PT to assist with strengthening/mobility   - Keep Call bell within reach  - Keep bed low and locked with side rails adjusted as appropriate  - Keep care items and personal belongings within reach  - Initiate and maintain comfort rounds  - Make Fall Risk Sign visible to staff  - Offer Toileting every 2 Hours, in advance of need  - Initiate/Maintain fall alarm  - Obtain necessary fall risk management equipment: fall alarm  - Apply yellow socks and bracelet for high fall risk patients  - Consider moving patient to room near nurses station  Outcome: Progressing  Goal: Maintain or return to baseline ADL function  Description: INTERVENTIONS:  -  Assess patient's ability to carry out ADLs; assess patient's baseline for ADL function and identify physical deficits which impact ability to perform ADLs (bathing, care of mouth/teeth, toileting, grooming, dressing, etc )  - Assess/evaluate cause of self-care deficits   - Assess range of motion  - Assess patient's mobility; develop plan if impaired  - Assess patient's need for assistive devices and provide as appropriate  - Encourage maximum independence but intervene and supervise when necessary  - Involve family in performance of ADLs  - Assess for home care needs following discharge   - Consider OT consult to assist with ADL evaluation and planning for discharge  - Provide patient education as appropriate  Outcome: Progressing  Goal: Maintains/Returns to pre admission functional level  Description: INTERVENTIONS:  - Perform BMAT or MOVE assessment daily    - Set and communicate daily mobility goal to care team and patient/family/caregiver  - Collaborate with rehabilitation services on mobility goals if consulted  - Perform Range of Motion 3 times a day  - Reposition patient every 2 hours    - Dangle patient 3 times a day  - Stand patient 3 times a day  - Ambulate patient 3 times a day  - Out of bed to chair 3 times a day   - Out of bed for meals 3 times a day  - Out of bed for toileting  - Record patient progress and toleration of activity level   Outcome: Progressing     Problem: DISCHARGE PLANNING  Goal: Discharge to home or other facility with appropriate resources  Description: INTERVENTIONS:  - Identify barriers to discharge w/patient and caregiver  - Arrange for needed discharge resources and transportation as appropriate  - Identify discharge learning needs (meds, wound care, etc )  - Arrange for interpretive services to assist at discharge as needed  - Refer to Case Management Department for coordinating discharge planning if the patient needs post-hospital services based on physician/advanced practitioner order or complex needs related to functional status, cognitive ability, or social support system  Outcome: Progressing     Problem: Knowledge Deficit  Goal: Patient/family/caregiver demonstrates understanding of disease process, treatment plan, medications, and discharge instructions  Description: Complete learning assessment and assess knowledge base    Interventions:  - Provide teaching at level of understanding  - Provide teaching via preferred learning methods  Outcome: Progressing

## 2022-01-14 NOTE — ASSESSMENT & PLAN NOTE
Lab Results   Component Value Date    HGBA1C 6 7 (H) 01/14/2022       Recent Labs     01/14/22  1148 01/14/22  1324 01/14/22  1408 01/14/22  1623   POCGLU 232* 245* 256* 123       Blood Sugar Average: Last 72 hrs:  (P) 177 1875   · With hypoglycemia presenting glucose was 38 given D50 and blood sugar improved  · Patient was also bradycardic and hypothermic on admission  · The patient received D5 half normal at 75 mL an hour, discontinue given hyperglycemia  · Patient's blood sugars are elevated, will order insulin sliding scale only for now given hypoglycemia     · Continue to monitor blood sugars

## 2022-01-15 ENCOUNTER — APPOINTMENT (INPATIENT)
Dept: DIALYSIS | Facility: HOSPITAL | Age: 72
DRG: 069 | End: 2022-01-15
Payer: COMMERCIAL

## 2022-01-15 VITALS
OXYGEN SATURATION: 98 % | DIASTOLIC BLOOD PRESSURE: 73 MMHG | HEART RATE: 78 BPM | SYSTOLIC BLOOD PRESSURE: 131 MMHG | TEMPERATURE: 95.5 F | WEIGHT: 220.24 LBS | HEIGHT: 72 IN | BODY MASS INDEX: 29.83 KG/M2 | RESPIRATION RATE: 16 BRPM

## 2022-01-15 PROBLEM — G45.9 TIA (TRANSIENT ISCHEMIC ATTACK): Status: ACTIVE | Noted: 2022-01-13

## 2022-01-15 LAB
ANION GAP SERPL CALCULATED.3IONS-SCNC: 13 MMOL/L (ref 4–13)
BASOPHILS # BLD AUTO: 0.02 THOUSANDS/ΜL (ref 0–0.1)
BASOPHILS NFR BLD AUTO: 0 % (ref 0–1)
BUN SERPL-MCNC: 70 MG/DL (ref 5–25)
CALCIUM SERPL-MCNC: 8.9 MG/DL (ref 8.4–10.2)
CHLORIDE SERPL-SCNC: 93 MMOL/L (ref 96–108)
CO2 SERPL-SCNC: 28 MMOL/L (ref 21–32)
CREAT SERPL-MCNC: 11.79 MG/DL (ref 0.6–1.3)
EOSINOPHIL # BLD AUTO: 0.13 THOUSAND/ΜL (ref 0–0.61)
EOSINOPHIL NFR BLD AUTO: 2 % (ref 0–6)
ERYTHROCYTE [DISTWIDTH] IN BLOOD BY AUTOMATED COUNT: 16.2 % (ref 11.6–15.1)
GFR SERPL CREATININE-BSD FRML MDRD: 3 ML/MIN/1.73SQ M
GLUCOSE SERPL-MCNC: 109 MG/DL (ref 65–140)
GLUCOSE SERPL-MCNC: 135 MG/DL (ref 65–140)
GLUCOSE SERPL-MCNC: 169 MG/DL (ref 65–140)
GLUCOSE SERPL-MCNC: 95 MG/DL (ref 65–140)
HCT VFR BLD AUTO: 31.4 % (ref 36.5–49.3)
HGB BLD-MCNC: 10.2 G/DL (ref 12–17)
IMM GRANULOCYTES # BLD AUTO: 0.01 THOUSAND/UL (ref 0–0.2)
IMM GRANULOCYTES NFR BLD AUTO: 0 % (ref 0–2)
LYMPHOCYTES # BLD AUTO: 1.36 THOUSANDS/ΜL (ref 0.6–4.47)
LYMPHOCYTES NFR BLD AUTO: 24 % (ref 14–44)
MCH RBC QN AUTO: 28.5 PG (ref 26.8–34.3)
MCHC RBC AUTO-ENTMCNC: 32.5 G/DL (ref 31.4–37.4)
MCV RBC AUTO: 88 FL (ref 82–98)
MONOCYTES # BLD AUTO: 0.74 THOUSAND/ΜL (ref 0.17–1.22)
MONOCYTES NFR BLD AUTO: 13 % (ref 4–12)
MRSA NOSE QL CULT: NORMAL
NEUTROPHILS # BLD AUTO: 3.38 THOUSANDS/ΜL (ref 1.85–7.62)
NEUTS SEG NFR BLD AUTO: 61 % (ref 43–75)
NRBC BLD AUTO-RTO: 0 /100 WBCS
PLATELET # BLD AUTO: 100 THOUSANDS/UL (ref 149–390)
PMV BLD AUTO: 9.7 FL (ref 8.9–12.7)
POTASSIUM SERPL-SCNC: 5.4 MMOL/L (ref 3.5–5.3)
RBC # BLD AUTO: 3.58 MILLION/UL (ref 3.88–5.62)
SODIUM SERPL-SCNC: 134 MMOL/L (ref 135–147)
WBC # BLD AUTO: 5.64 THOUSAND/UL (ref 4.31–10.16)

## 2022-01-15 PROCEDURE — 85025 COMPLETE CBC W/AUTO DIFF WBC: CPT | Performed by: PHYSICIAN ASSISTANT

## 2022-01-15 PROCEDURE — 80048 BASIC METABOLIC PNL TOTAL CA: CPT | Performed by: PHYSICIAN ASSISTANT

## 2022-01-15 PROCEDURE — 5A1D70Z PERFORMANCE OF URINARY FILTRATION, INTERMITTENT, LESS THAN 6 HOURS PER DAY: ICD-10-PCS | Performed by: INTERNAL MEDICINE

## 2022-01-15 PROCEDURE — 82948 REAGENT STRIP/BLOOD GLUCOSE: CPT

## 2022-01-15 PROCEDURE — 99238 HOSP IP/OBS DSCHRG MGMT 30/<: CPT | Performed by: PHYSICIAN ASSISTANT

## 2022-01-15 PROCEDURE — 99232 SBSQ HOSP IP/OBS MODERATE 35: CPT | Performed by: PHYSICIAN ASSISTANT

## 2022-01-15 RX ORDER — CLOPIDOGREL BISULFATE 75 MG/1
75 TABLET ORAL DAILY
Qty: 30 TABLET | Refills: 0 | Status: SHIPPED | OUTPATIENT
Start: 2022-01-15

## 2022-01-15 RX ADMIN — SEVELAMER HYDROCHLORIDE 800 MG: 800 TABLET, FILM COATED PARENTERAL at 15:45

## 2022-01-15 RX ADMIN — CALCIUM ACETATE 1334 MG: 667 CAPSULE ORAL at 15:45

## 2022-01-15 RX ADMIN — ATORVASTATIN CALCIUM 40 MG: 40 TABLET, FILM COATED ORAL at 15:45

## 2022-01-15 RX ADMIN — CLOPIDOGREL BISULFATE 75 MG: 75 TABLET ORAL at 09:44

## 2022-01-15 RX ADMIN — HEPARIN SODIUM 5000 UNITS: 5000 INJECTION INTRAVENOUS; SUBCUTANEOUS at 06:52

## 2022-01-15 RX ADMIN — SEVELAMER HYDROCHLORIDE 800 MG: 800 TABLET, FILM COATED PARENTERAL at 09:44

## 2022-01-15 RX ADMIN — CALCIUM ACETATE 1334 MG: 667 CAPSULE ORAL at 09:43

## 2022-01-15 RX ADMIN — ASPIRIN 81 MG: 81 TABLET, COATED ORAL at 09:44

## 2022-01-15 NOTE — QUICK NOTE
Neurology Note    Reviewed MRI imaging - no evidence of acute ischemia present  TTE with mild left atrial dilation and EF 80%  Unable to rule out TIA as etiology for his presentation though other factors such as hypoglycemia and arrhythmia may have certainly played a role  Maintain on ASA/Plavix x 3 weeks total then ASA monotherapy thereafter  Continue statin  Follow in stroke clinic as outpatient  No further inpatient recommendations; call with questions

## 2022-01-15 NOTE — CASE MANAGEMENT
Case Management Discharge Planning Note    Patient name Efraín Murdock  Location /-82 MRN [id number]  : 1950 Date 1/15/2022       Current Admission Date: 2022  Current Admission Diagnosis:TIA (transient ischemic attack)   Patient Active Problem List    Diagnosis Date Noted    TIA (transient ischemic attack) 2022    ESRD (end stage renal disease) ([facility] [address] ) 2021    Essential hypertension 2021    Type 2 diabetes mellitus without complication, with long-term current use of insulin [facility] 2021    Mixed hyperlipidemia 2021      LOS (days): 2  Geometric Mean LOS (GMLOS) (days):   Days to GMLOS:     OBJECTIVE:  Risk of Unplanned Readmission Score: 16         Current admission status: Inpatient   Preferred Pharmacy:   [id number] [place], [place] - [address]  Phone: [phone] Fax: [phone]    Primary Care Provider: Frank Hardwick MD    Primary Insurance: [facility]  Secondary Insurance:     DISCHARGE DETAILS:    Discharge planning discussed with[de-identified] patient and wife was called at 9:24 am  Freedom of Choice: Yes  Comments - Freedom of Choice: pt has no rehab needs, pt and wife deny any d/c neeeds, pt and wife are in agreement with the d/c plan home  CM contacted family/caregiver?: Yes  Were Treatment Team discharge recommendations reviewed with patient/caregiver?: Yes  Did patient/caregiver verbalize understanding of patient care needs?: Yes  Were patient/caregiver advised of the risks associated with not following Treatment Team discharge recommendations?: Yes    Contacts  Patient Contacts:  Thelma  Relationship to Patient[de-identified] Family (wife)  Contact Method: Phone  Phone Number: [phone]  Reason/Outcome: Discharge [address]         Is the patient interested in [facility] at discharge?: No    DME Referral Provided  Referral made for DME?: No    Other Referral/Resources/Interventions Provided:  Interventions: Dialysis  Referral Comments: pt to continue with his oupt dialysis  m-w-f 10:40 am chair  pt does use the ccct to dialysis    Would you like to participate in our [address] service program?  : No - Declined       Discharge Destination Plan[de-identified] Home (with wife and outpt dilaysis and outpt follow up)  Transport at Discharge : Silvestre Buchanan by: Family member           Family notified[de-identified] wife was claled  she  will transport the pt home after dialysis at around 4:30-4:45pm she will come, pt and family are in agreement with the d/c and d/c plan

## 2022-01-15 NOTE — ASSESSMENT & PLAN NOTE
· Patient with left facial droop and dysarthria prior to arriving to the ED  · Possibly secondary to hypoglycemia with glucose of 30 was given D50 blood sugar improved to 182  · Evaluated by Neurology recommended admit under stroke pathway and to load with Plavix  · CT head: No evidence of acute vascular territorial infarction, intracranial hemorrhage or mass effect  · CTA head: No hemodynamically significant stenosis, dissection or occlusion of the carotid or vertebral arteries or visualized proximal portions of the major vessels of the [place]  · Echo- reviewed  · MRI brain: No acute ischemic disease  Chronic microangiopathic change  · Neurology consultation appreciated- no need for permissive HTN as patient is asymptomatic now  Continue aspirin 81 mg daily and Plavix 75 mg daily x 3 weeks, then aspirin monotherapy thereafter if MRI negative for stroke     · Continue Lipitor 40 mg   · PT/OT- no rehab needs  · Outpatient follow-up with Neurology

## 2022-01-15 NOTE — PROGRESS NOTES
Progress Note - Nephrology   Ada Calzada 71 y o  male MRN: [id number]  Unit/Bed#: -01 Encounter: [id number]    Assessment and Plan    1  End-stage renal disease on hemodialysis   Outpatient dialysis schedule:  Monday Wednesday Friday at [facility] Nephrologist is Dr Reema Nelson   Etiology is diabetic nephropathy and hypertensive nephrosclerosis   Patient was unable to dialyze yesterday due to staffing shortage   Plan is for 3 5 hour dialysis treatment today with goal net 2 L ultrafiltration   Dialysis treatment times have been shortened per  Nephrology protocol amid COVID-19 pandemic staffing shortage crisis   Patient is stable for discharge from renal standpoint following hemodialysis today with plans for follow-up at [facility] for next treatment Monday 01/17/2022    2  Dialysis access   Left forearm radial artery to cephalic vein AV fistula placed 01/15/2016     3  Anemia of end-stage renal disease   No indication for MARTHA  Avoid short-acting MARTHA due to long-acting Mircera use in outpatient facility  4  Hyperparathyroidism, secondary to renal disease   Follow with increase calcium acetate phosphorus binder  5  Hypertension in patient with end-stage renal disease   No need for permissive hypertension, goal is normotension per Neurology note 01/14/2022   Blood pressure acceptable, 148/69 with heart rate 73  Continue current antihypertensives  6  Volume status of dialysis patient / at risk of volume overload   Patient examines euvolemic  Continue ultrafiltration on hemodialysis  Goal ultrafiltration 2 L today  7  Stroke-like symptoms  · Patient is not a candidate for tPA, no clinical evidence to suggest stroke per neurologist 01/14/2022  Plan is for outpatient neurology follow-up in 6 weeks      Follow up reason for today's visit:  End-stage renal disease on hemodialysis    Stroke-like symptoms    Patient Active Problem List   Diagnosis    ESRD (end stage renal disease) ([facility] [address] )    Essential hypertension    Type 2 diabetes mellitus without complication, with long-term current use of insulin (HCC)    Mixed hyperlipidemia    Stroke-like symptoms         Subjective:   Denies chest pain, shortness of breath, swelling of legs, nausea, vomiting, diarrhea, hemiparesis, facial droop, slurred speech  Patient feels ready for discharge  We discussed dialysis today  A complete 10 point review of systems was performed and is otherwise negative  Objective:     Vitals: Blood pressure 148/69, pulse 73, temperature 98 °F (36 7 °C), temperature source Oral, resp  rate 18, height 6' (1 829 m), weight 99 3 kg (219 lb), SpO2 95 %  ,Body mass index is 29 7 kg/m²  Weight (last 2 days)     Date/Time Weight    01/14/22 1416 99 3 (219)    01/13/22 2326 99 7 (219 8)            Intake/Output Summary (Last 24 hours) at 1/15/2022 0913  Last data filed at 1/14/2022 1700  Gross per 24 hour   Intake 300 ml   Output --   Net 300 ml     I/O last 3 completed shifts: In: 737 5 [P O :300; I V :437 5]  Out: 0          Physical Exam: /69 ([place] Right arm)   Pulse 73   Temp 98 °F (36 7 °C) (Oral)   Resp 18   Ht 6' (1 829 m)   Wt 99 3 kg (219 lb)   SpO2 95%   BMI 29 70 kg/m²     General Appearance:    No acute distress  Cooperative  Appears stated age  Head:    Normocephalic  Atraumatic  Normal jaw occlusion  Eyes:    Lids, conjunctiva normal  No scleral icterus  Ears:    Normal external ears  Nose:   Nares normal  No drainage  Mouth:   Lips, tongue normal  Mucosa normal  Phonation normal    Neck:   Supple  Symmetrical    Back:     Symmetric  No CVA tenderness  Lungs:     Normal respiratory effort  Clear to auscultation bilaterally  Chest wall:    No tenderness or deformity  Heart:    Regular rate and rhythm  Normal S1 and S2  No murmur  No JVD  No edema  Abdomen:     Soft  Non-tender  Bowel sounds active  Genitourinary:   No Meehan catheter present  Extremities:   Extremities normal  Atraumatic  No cyanosis  Skin:   Warm and dry  No pallor, jaundice, rash, ecchymoses  Neurologic:   Alert and oriented to person, place, time  No focal deficit  Lab, Imaging and other studies: I have personally reviewed pertinent labs  CBC:   Lab Results   Component Value Date    WBC 5 64 01/15/2022    HGB 10 2 (L) 01/15/2022    HCT 31 4 (L) 01/15/2022    MCV 88 01/15/2022     (L) 01/15/2022    MCH 28 5 01/15/2022    MCHC 32 5 01/15/2022    RDW 16 2 (H) 01/15/2022    MPV 9 7 01/15/2022    NRBC 0 01/15/2022     CMP:   Lab Results   Component Value Date    K 5 4 (H) 01/15/2022    CL 93 (L) 01/15/2022    CO2 28 01/15/2022    BUN 70 (H) 01/15/2022    CREATININE 11 79 (H) 01/15/2022    CALCIUM 8 9 01/15/2022    EGFR 3 01/15/2022         Results from last 7 days   Lab Units 01/15/22  0624 01/14/22  0440 01/13/22  1853   POTASSIUM mmol/L 5 4* 4 4 5 3   CHLORIDE mmol/L 93* 93* 91*   CO2 mmol/L 28 29 33*   BUN mg/dL 70* 53* 43*   CREATININE mg/dL 11 79* 9 69* 8 48*   CALCIUM mg/dL 8 9 9 0 10 1         Phosphorus: No results found for: PHOS  Magnesium: No results found for: MG  Urinalysis: No results found for: COLORU, CLARITYU, SPECGRAV, PHUR, LEUKOCYTESUR, NITRITE, PROTEINUA, GLUCOSEU, KETONESU, BILIRUBINUR, BLOODU  Ionized Calcium: No results found for: CAION  Coagulation: No results found for: PT, INR, APTT  Troponin: No results found for: TROPONINI  ABG: No results found for: PHART, ATQ4MEC, PO2ART, EUY7OCR, J5NFDRKE, BEART, SOURCE  Radiology review:     IMAGING  Procedure: X-ray chest 1 view portable    Result Date: 1/14/2022  Narrative: CHEST INDICATION:   stroke  COMPARISON:  5/30/2021 EXAM PERFORMED/VIEWS:  XR CHEST PORTABLE FINDINGS:  Monitoring leads and clips project over the chest  Cardiomediastinal silhouette appears unremarkable  The lungs are clear  No pneumothorax or pleural effusion  Osseous structures appear within normal limits for patient age  Impression: No acute cardiopulmonary disease  Workstation performed: [id number]     Procedure: MRI brain wo contrast    Result Date: 1/14/2022  Narrative: MRI BRAIN WITHOUT CONTRAST INDICATION: r/o cva  COMPARISON:   CTA head and neck 1/13/2022 TECHNIQUE:  Sagittal T1, axial T2, axial FLAIR, axial T1, axial [place] and axial diffusion imaging  IMAGE QUALITY:  Diagnostic  FINDINGS: BRAIN PARENCHYMA:  There is no discrete mass, mass effect or midline shift  There is no intracranial hemorrhage  There is no evidence of acute infarction and diffusion imaging is unremarkable  There are pontine and a few periventricular and subcortical  white matter T2/FLAIR hyperintense foci most consistent with microangiopathy  VENTRICLES:  Normal for the patient's age  SELLA AND PITUITARY GLAND:  Normal  ORBITS:  Normal  PARANASAL SINUSES:  Normal  VASCULATURE:  Evaluation of the major intracranial vasculature demonstrates appropriate flow voids  CALVARIUM AND SKULL BASE:  Normal  EXTRACRANIAL SOFT TISSUES:  Normal      Impression: No acute ischemic disease  Chronic microangiopathic change  Workstation performed: [id number]     Procedure: CT stroke alert brain    Result Date: 1/14/2022  Narrative: CT BRAIN - STROKE ALERT PROTOCOL INDICATION:   Left facial droop and dysarthria, result  COMPARISON:  None  TECHNIQUE:  CT examination of the brain was performed  In addition to axial images, coronal reformatted images were created and submitted for interpretation  Radiation dose length product (DLP) for this visit:  875 mGy-cm   This examination, like all CT scans performed in the [facility], was performed utilizing techniques to minimize radiation dose exposure, including the use of iterative reconstruction and automated exposure control  IMAGE QUALITY:  Diagnostic  FINDINGS:  PARENCHYMA: Periventricular and subcortical hypoattenuating foci consistent with mild microangiopathic disease   No acute intracranial hemorrhage or mass effect  VENTRICLES AND EXTRA-AXIAL SPACES:  No hydrocephalus or extra-axial collection  VISUALIZED ORBITS AND PARANASAL SINUSES:  Intact globes and orbits  Clear paranasal sinuses  CALVARIUM AND EXTRACRANIAL SOFT TISSUES:  No lytic or blastic lesion  Impression: No evidence of acute vascular territorial infarction, intracranial hemorrhage or mass effect  Findings were directly discussed with Tasha Chiang at 6:58 PM  Workstation performed: [id number]     Procedure: CTA stroke alert (head/neck)    Result Date: 1/14/2022  Narrative: CTA NECK AND BRAIN WITH CONTRAST INDICATION: Dizziness and right-sided weakness  COMPARISON:   1/13/2022  TECHNIQUE:   Post contrast imaging was performed after administration of iodinated contrast through the neck and brain  Post contrast axial 0 625 mm images timed to opacify the arterial system  3D rendering was performed on an independent workstation  MIP reconstructions performed  Coronal reconstructions were performed of the noncontrast portion of the brain  Radiation dose length product (DLP) for this visit:  483 mGy-cm   This examination, like all CT scans performed in the [facility], was performed utilizing techniques to minimize radiation dose exposure, including the use of iterative reconstruction and automated exposure control  IV Contrast:  90 mL of iohexol (OMNIPAQUE)  IMAGE QUALITY:   Diagnostic FINDINGS: CERVICAL VASCULATURE AORTIC ARCH AND GREAT VESSELS:  Three-vessel configuration of the aortic arch  No stenosis in the subclavian arteries  RIGHT VERTEBRAL ARTERY CERVICAL SEGMENT:  Normal origin  The vessel is normal in caliber throughout the neck  LEFT VERTEBRAL ARTERY CERVICAL SEGMENT:  Normal origin  The vessel is normal in caliber throughout the neck  RIGHT EXTRACRANIAL CAROTID SEGMENT:  Normal caliber common carotid artery    Calcified plaque at the bifurcation and internal carotid artery origin without significant stenosis  LEFT EXTRACRANIAL CAROTID SEGMENT:  Normal caliber common carotid artery  Calcified plaque at the bifurcation and internal carotid artery origin without significant stenosis  NASCET criteria was used to determine the degree of internal carotid artery diameter stenosis  INTRACRANIAL VASCULATURE INTERNAL CAROTID ARTERIES:  Calcified plaque throughout the carotid siphons  Mild (less than 50%) stenosis in the right terminal segments  ANTERIOR CIRCULATION:  Nondominant right A1 segment  Anterior communicating artery identified  No stenosis in the proximal anterior cerebral arteries MIDDLE CEREBRAL ARTERY CIRCULATION:  No stenosis in the proximal middle cerebral arteries  DISTAL VERTEBRAL ARTERIES:  Calcified plaque in the bilateral V4 segments resulting in mild narrowing  Posterior inferior cerebellar arteries are patent  BASILAR ARTERY:  Basilar artery is normal in caliber  Patent superior cerebellar arteries  POSTERIOR CEREBRAL ARTERIES: Prominent bilateral posterior to indicating arteries and somewhat atretic P1 segments suggesting nearly fetal type posterior cerebral arteries  No stenosis in the proximal segments  VENOUS STRUCTURES:  Limited evaluation due to contrast bolus timing  NON VASCULAR ANATOMY BONY STRUCTURES:  No lytic or blastic lesion  SOFT TISSUES OF THE NECK:  No mass or lymphadenopathy  THORACIC INLET:  Unremarkable  Impression: No hemodynamically significant stenosis, dissection or occlusion of the carotid or vertebral arteries or visualized proximal portions of the major vessels of the [place] Findings were directly discussed with Kaylee Collier at 6:58 PM  Workstation performed: [id number]     Procedure: Echo complete w/ contrast if indicated    Result Date: 1/14/2022  Narrative: Chio Beltran  Left Ventricle: Left ventricular cavity size is normal  The left ventricular ejection fraction is 80%  Systolic function is vigorous   Wall motion is normal  Diastolic function is mildly abnormal, consistent with grade I (abnormal) relaxation  Wall thickness is moderately increased  There is moderate concentric hypertrophy, with sigmoid septum  No significant outflow gradient appreciated    Left Atrium: The atrium is mildly dilated    Aortic Valve: There is mild regurgitation    Mitral Valve: There is mild annular calcification  There is trace regurgitation  The valve has normal function  There is systolic anterior motion of the chordal apparatus     No identifiable source of embolism seen within the limits of TTE       Current Facility-Administered Medications   Medication Dose Route Frequency    amLODIPine (NORVASC) tablet 2 5 mg  2 5 mg Oral HS    aspirin (ECOTRIN LOW STRENGTH) EC tablet 81 mg  81 mg Oral Daily    atorvastatin (LIPITOR) tablet 40 mg  40 mg Oral QPM    calcium acetate (PHOSLO) capsule 1,334 mg  1,334 mg Oral TID With Meals    clopidogrel (PLAVIX) tablet 75 mg  75 mg Oral Daily    doxazosin (CARDURA) tablet 4 mg  4 mg Oral HS    heparin (porcine) subcutaneous injection 5,000 Units  5,000 Units Subcutaneous Q8H [address]    hydrALAZINE (APRESOLINE) injection 10 mg  10 mg Intravenous Q6H PRN    insulin lispro (HumaLOG) 100 units/mL subcutaneous injection 1-5 Units  1-5 Units Subcutaneous HS    insulin lispro (HumaLOG) 100 units/mL subcutaneous injection 1-6 Units  1-6 Units Subcutaneous TID AC    ondansetron (ZOFRAN) injection 4 mg  4 mg Intravenous Q6H PRN    sevelamer (RENAGEL) tablet 800 mg  800 mg Oral TID With Meals     Medications Discontinued During This Encounter   Medication Reason    CALCIUM ACETATE-MAGNESIUM CARB PO     Tuberculin PPD (TUBERSOL ID)     VITAMIN D PO     SPS 15 GM/60ML suspension     losartan (COZAAR) 100 MG tablet     lisinopril (ZESTRIL) 40 mg tablet     dextrose 5 % and sodium chloride 0 45 % infusion     calcium acetate (PHOSLO) capsule 667 mg        Nghia Forrest PA-C    Portions of the record may have been created with voice recognition software  Occasional wrong word or "sound a like" substitutions may have occurred due to the inherent limitations of voice recognition software  Read the chart carefully and recognize, using context, where substitutions have occurred

## 2022-01-15 NOTE — DISCHARGE SUMMARY
Genevasanjay 46  Discharge- Marilu Montejo 1950, 71 y o  male MRN: [id number]  Unit/Bed#: -01 Encounter: [id number]  Primary Care Provider: Oswald Torre MD   Date and time admitted to hospital: 1/13/2022  6:01 PM    * TIA (transient ischemic attack)  Assessment & Plan  · Patient with left facial droop and dysarthria prior to arriving to the ED  · Possibly secondary to hypoglycemia with glucose of 30 was given D50 blood sugar improved to 182  · Evaluated by Neurology recommended admit under stroke pathway and to load with Plavix  · CT head: No evidence of acute vascular territorial infarction, intracranial hemorrhage or mass effect  · CTA head: No hemodynamically significant stenosis, dissection or occlusion of the carotid or vertebral arteries or visualized proximal portions of the major vessels of the [place]  · Echo- reviewed  · MRI brain: No acute ischemic disease  Chronic microangiopathic change  · Neurology consultation appreciated- no need for permissive HTN as patient is asymptomatic now  Continue aspirin 81 mg daily and Plavix 75 mg daily x 3 weeks, then aspirin monotherapy thereafter if MRI negative for stroke     · Continue Lipitor 40 mg   · PT/OT- no rehab needs  · Outpatient follow-up with Neurology    ESRD (end stage renal disease) [facility])  Assessment & Plan  Lab Results   Component Value Date    EGFR 3 01/15/2022    EGFR 4 01/14/2022    EGFR 5 01/13/2022    CREATININE 11 79 (H) 01/15/2022    CREATININE 9 69 (H) 01/14/2022    CREATININE 8 48 (H) 01/13/2022   · Patient had contrast with CT  · Normally receives dialysis Monday Wednesday Friday  · Nephrology consultation appreciated - HD session today prior to discharge    Mixed hyperlipidemia  Assessment & Plan  · Atorvastatin 40 mg  Component      Latest Ref Rng & Units 1/14/2022   Cholesterol      See Comment mg/dL 127   Triglycerides      See Comment mg/dL 90   HDL      >=40 mg/dL 35 (L)   LDL Calculated      0 - 100 mg/dL 74       Type 2 diabetes mellitus without complication, with long-term current use of insulin [facility])  Assessment & Plan  Lab Results   Component Value Date    HGBA1C 6 7 (H) 01/14/2022       Recent Labs     01/14/22  1408 01/14/22  1623 01/14/22  2113 01/15/22  0647   POCGLU 256* 123 130 95       Blood Sugar Average: Last 72 hrs:  (P) 170   · With hypoglycemia presenting glucose was 38 given D50 and blood sugar improved  · Patient was also bradycardic and hypothermic on admission  · The patient received D5 half normal at 75 mL an hour, discontinue on 1/14 given hyperglycemia  · Patient's blood sugars were elevated, insulin sliding scale   · Blood sugars are normal with lantus held, patient instructed hold this for now  · Outpatient follow-up with PCP      Essential hypertension  Assessment & Plan  · Goal for normotension per neurology  · Continue Cardura and Norvasc    Medical Problems             Resolved Problems  Date Reviewed: 1/15/2022    None              Discharging Physician / Practitioner: Bettye Del Angel PA-C  PCP: Kavita Brown MD  Admission Date:   Admission Orders (From admission, onward)     Ordered        01/14/22 1726  Inpatient Admission  Once            01/13/22 2043  Place in Observation  Once            01/13/22 2042  Inpatient Admission  Once,   Status:  Canceled                      Discharge Date: 01/15/22    Consultations During Hospital Stay:  · Neurology  · Nephrology    Procedures Performed:   · none    Significant Findings / Test Results:   X-ray chest 1 view portable    Result Date: 1/14/2022  Impression: No acute cardiopulmonary disease  Workstation performed: [id number]     MRI brain wo contrast    Result Date: 1/14/2022  Impression: No acute ischemic disease  Chronic microangiopathic change   Workstation performed: [id number]     CT stroke alert brain    Result Date: 1/14/2022  Impression: No evidence of acute vascular territorial infarction, intracranial hemorrhage or mass effect  Findings were directly discussed with Humera David at 6:58 PM  Workstation performed: [id number]     CTA stroke alert (head/neck)    Result Date: 1/14/2022  · Impression: No hemodynamically significant stenosis, dissection or occlusion of the carotid or vertebral arteries or visualized proximal portions of the major vessels of the [place] Findings were directly discussed with Humera David at 6:58 PM  Workstation performed: [id number]   ·     Incidental Findings:   · none     Test Results Pending at Discharge (will require follow up):   · none     Outpatient Tests Requested:  · none    Complications:  none    Reason for Admission: stroke-like symptoms     Hospital Course:   Zakiya Wynne is a 71 y o  male patient who originally presented to the hospital on 1/13/2022 due to stroke-like symptoms  Per HPI "Zakiya Wynne is a 71 y o  male with a PMH of hypertension, hyperlipidemia, diabetes mellitus type 2 on insulin, end-stage renal disease on hemodialysis presents with left facial droop and dysarthria  Patient was found by his wife with left facial droop and dysarthria EMS was contacted when the patient arrived in the ED has symptoms resolved NIH stroke scale was 0  He had CT head and CTA with no acute findings  He was seen by Neurology was not felt to be tPA candidate they recommended loading with Plavix and continue 75 mg daily along with his home aspirin and follow stroke pathway  Incidentally blood sugar was 38 he was given D50 50ml and improved to 182  Patient also was noted to be hypothermic and bradycardic in the ER  Patient reports wife found him with facial droop  He reports he didn't feel right but did not take his blood sugar  He reports now feeling at baseline  He has no weakness in arms or legs"  Please see above list of diagnoses and related plan for additional information       Condition at Discharge: good    Discharge Day Visit / Exam:   Subjective:    Vitals: Blood Pressure: (!) 184/72 (01/15/22 0952)  Pulse: 64 (01/15/22 0952)  Temperature: 98 6 °F (37 °C) (01/15/22 0952)  Temp Source: Oral (01/15/22 0952)  Respirations: 17 (01/15/22 0952)  Height: 6' (182 9 cm) (01/14/22 1416)  Weight - Scale: 99 9 kg (220 lb 3 8 oz) (01/15/22 1100)  SpO2: 97 % (01/15/22 0961)  Exam:   Physical Exam  Vitals and nursing note reviewed  Constitutional:       Appearance: Normal appearance  HENT:      Head: Normocephalic and atraumatic  Cardiovascular:      Rate and Rhythm: Normal rate and regular rhythm  Pulmonary:      Effort: Pulmonary effort is normal       Breath sounds: Normal breath sounds  Abdominal:      General: There is no distension  Tenderness: There is no abdominal tenderness  Skin:     General: Skin is warm and dry  Neurological:      General: No focal deficit present  Mental Status: He is alert and oriented to person, place, and time  Discussion with Family: patient, wife listening on speaker phone in the room  Discharge instructions/Information to patient and family:   See after visit summary for information provided to patient and family  Provisions for Follow-Up Care:  See after visit summary for information related to follow-up care and any pertinent home health orders  Disposition:   Home    Planned Readmission: no     Discharge Statement:  I spent 25 minutes discharging the patient  This time was spent on the day of discharge  I had direct contact with the patient on the day of discharge  Greater than 50% of the total time was spent examining patient, answering all patient questions, arranging and discussing plan of care with patient as well as directly providing post-discharge instructions  Additional time then spent on discharge activities  Discharge Medications:  See after visit summary for reconciled discharge medications provided to patient and/or family        **Please Note: This note may have been constructed using a voice recognition system**

## 2022-01-15 NOTE — ASSESSMENT & PLAN NOTE
Lab Results   Component Value Date    HGBA1C 6 7 (H) 01/14/2022       Recent Labs     01/14/22  1408 01/14/22  1623 01/14/22  2113 01/15/22  0647   POCGLU 256* 123 130 95       Blood Sugar Average: Last 72 hrs:  (P) 170   · With hypoglycemia presenting glucose was 38 given D50 and blood sugar improved  · Patient was also bradycardic and hypothermic on admission  · The patient received D5 half normal at 75 mL an hour, discontinue on 1/14 given hyperglycemia     · Patient's blood sugars were elevated, insulin sliding scale   · Blood sugars are normal with lantus held, patient instructed hold this for now  · Outpatient follow-up with PCP

## 2022-01-15 NOTE — NURSING NOTE
Pt discharged home  Vss  Pt denies pain, denies shortness of breath  Discharge instructions read and explained to pt, all questions answered  IV removed without complications and tip intact  All belongings with pt  Pt escorted to front entrance and assisted into car of wife

## 2022-01-15 NOTE — HEMODIALYSIS
Post-Dialysis RN Treatment Note    Blood Pressure:  Pre 187/96 mm/Hg  Post 154/79 mmHg   EDW  98 0   kg    Weight:  Fxy154 1  kg   Post  97 8} kg   Mode of weight measurement:  Standing scale   Volume Removed  2512 ml    Treatment duration 180 minutes    NS given   No   Treatment shortened? No   Medications given during Rx  No   Estimated Kt/V  Unable to determine   Access type: Left , lower arm fistula   Access Issues:  No   Report called to primary nurse    Yes    Stable treatment today  Pt  States that he's being discharged today

## 2022-01-15 NOTE — PLAN OF CARE
Problem: Neurological Deficit  Goal: Neurological status is stable or improving  Description: Interventions:  - Monitor and assess patient's level of consciousness, motor function, sensory function, and level of assistance needed for ADLs  - Monitor and report changes from baseline  Collaborate with interdisciplinary team to initiate plan and implement interventions as ordered  - Provide and maintain a safe environment  - Consider seizure precautions  - Consider fall precautions  - Consider aspiration precautions  - Consider bleeding precautions  Outcome: Progressing     Problem: Activity Intolerance/Impaired Mobility  Goal: Mobility/activity is maintained at optimum level for patient  Description: Interventions:  - Assess and monitor patient  barriers to mobility and need for assistive/adaptive devices  - Assess patient's emotional response to limitations  - Collaborate with interdisciplinary team and initiate plans and interventions as ordered  - Encourage independent activity per ability   - Maintain proper body alignment  - Perform active/passive rom as tolerated/ordered  - Plan activities to conserve energy   - Turn patient as appropriate  Outcome: Progressing     Problem: Communication Impairment  Goal: Ability to express needs and understand communication  Description: Assess patient's communication skills and ability to understand information  Patient will demonstrate use of effective communication techniques, alternative methods of communication and understanding even if not able to speak  - Encourage communication and provide alternate methods of communication as needed  - Collaborate with case management/ for discharge needs  - Include patient/family/caregiver in decisions related to communication    Outcome: Progressing     Problem: Potential for Aspiration  Goal: Non-ventilated patient's risk of aspiration is minimized  Description: Assess and monitor vital signs, respiratory status, and labs (WBC)  Monitor for signs of aspiration (tachypnea, cough, rales, wheezing, cyanosis, fever)  - Assess and monitor patient's ability to swallow  - Place patient up in chair to eat if possible  - HOB up at 90 degrees to eat if unable to get patient up into chair   - Supervise patient during oral intake  - Instruct patient/ family to take small bites  - Instruct patient/ family to take small single sips when taking liquids  - Follow patient-specific strategies generated by speech pathologist   Outcome: Progressing  Goal: Ventilated patient's risk of aspiration is minimized  Description: Assess and monitor vital signs, respiratory status, airway cuff pressure, and labs (WBC)  Monitor for signs of aspiration (tachypnea, cough, rales, wheezing, cyanosis, fever)  - Elevate head of bed 30 degrees if patient has tube feeding   - Monitor tube feeding  Outcome: Progressing     Problem: Nutrition  Goal: Nutrition/Hydration status is improving  Description: Monitor and assess patient's nutrition/hydration status for malnutrition (ex- brittle hair, bruises, dry skin, pale skin and conjunctiva, muscle wasting, smooth red tongue, and disorientation)  Collaborate with interdisciplinary team and initiate plan and interventions as ordered  Monitor patient's weight and dietary intake as ordered or per policy  Utilize nutrition screening tool and intervene per policy  Determine patient's food preferences and provide high-protein, high-caloric foods as appropriate  - Assist patient with eating   - Allow adequate time for meals   - Encourage patient to take dietary supplement as ordered  - Collaborate with clinical nutritionist   - Include patient/family/caregiver in decisions related to nutrition    Outcome: Progressing     Problem: PAIN - ADULT  Goal: Verbalizes/displays adequate comfort level or baseline comfort level  Description: Interventions:  - Encourage patient to monitor pain and request assistance  - Assess pain using appropriate pain scale  - Administer analgesics based on type and severity of pain and evaluate response  - Implement non-pharmacological measures as appropriate and evaluate response  - Consider cultural and social influences on pain and pain management  - Notify physician/advanced practitioner if interventions unsuccessful or patient reports new pain  Outcome: Progressing     Problem: INFECTION - ADULT  Goal: Absence or prevention of progression during hospitalization  Description: INTERVENTIONS:  - Assess and monitor for signs and symptoms of infection  - Monitor lab/diagnostic results  - Monitor all insertion sites, i e  indwelling lines, tubes, and drains  - Monitor endotracheal if appropriate and nasal secretions for changes in amount and color  - [place] appropriate cooling/warming therapies per order  - Administer medications as ordered  - Instruct and encourage patient and family to use good hand hygiene technique  - Identify and instruct in appropriate isolation precautions for identified infection/condition  Outcome: Progressing  Goal: Absence of fever/infection during neutropenic period  Description: INTERVENTIONS:  - Monitor WBC    Outcome: Progressing     Problem: SAFETY ADULT  Goal: Patient will remain free of falls  Description: INTERVENTIONS:  - Educate patient/family on patient safety including physical limitations  - Instruct patient to call for assistance with activity   - Consult OT/PT to assist with strengthening/mobility   - Keep Call bell within reach  - Keep bed low and locked with side rails adjusted as appropriate  - Keep care items and personal belongings within reach  - Initiate and maintain comfort rounds  - Make Fall Risk Sign visible to staff  - Offer Toileting every 2 Hours, in advance of need  - Initiate/Maintain fall alarm  - Obtain necessary fall risk management equipment: fall alarm  - Apply yellow socks and bracelet for high fall risk patients  - Consider moving patient to room near nurses station  Outcome: Progressing  Goal: Maintain or return to baseline ADL function  Description: INTERVENTIONS:  -  Assess patient's ability to carry out ADLs; assess patient's baseline for ADL function and identify physical deficits which impact ability to perform ADLs (bathing, care of mouth/teeth, toileting, grooming, dressing, etc )  - Assess/evaluate cause of self-care deficits   - Assess range of motion  - Assess patient's mobility; develop plan if impaired  - Assess patient's need for assistive devices and provide as appropriate  - Encourage maximum independence but intervene and supervise when necessary  - Involve family in performance of ADLs  - Assess for home care needs following discharge   - Consider OT consult to assist with ADL evaluation and planning for discharge  - Provide patient education as appropriate  Outcome: Progressing  Goal: Maintains/Returns to pre admission functional level  Description: INTERVENTIONS:  - Perform BMAT or MOVE assessment daily    - Set and communicate daily mobility goal to care team and patient/family/caregiver  - Collaborate with rehabilitation services on mobility goals if consulted  - Perform Range of Motion 3 times a day  - Reposition patient every 2 hours    - Dangle patient 3 times a day  - Stand patient 3 times a day  - Ambulate patient 3 times a day  - Out of bed to chair 3 times a day   - Out of bed for meals 3 times a day  - Out of bed for toileting  - Record patient progress and toleration of activity level   Outcome: Progressing     Problem: DISCHARGE PLANNING  Goal: Discharge to home or other facility with appropriate resources  Description: INTERVENTIONS:  - Identify barriers to discharge w/patient and caregiver  - Arrange for needed discharge resources and transportation as appropriate  - Identify discharge learning needs (meds, wound care, etc )  - Arrange for interpretive services to assist at discharge as needed  - Refer to Case Management Department for coordinating discharge planning if the patient needs post-hospital services based on physician/advanced practitioner order or complex needs related to functional status, cognitive ability, or social support system  Outcome: Progressing     Problem: Knowledge Deficit  Goal: Patient/family/caregiver demonstrates understanding of disease process, treatment plan, medications, and discharge instructions  Description: Complete learning assessment and assess knowledge base  Interventions:  - Provide teaching at level of understanding  - Provide teaching via preferred learning methods  Outcome: Progressing     Problem: Nutrition/Hydration-ADULT  Goal: Nutrient/Hydration intake appropriate for improving, restoring or maintaining nutritional needs  Description: Monitor and assess patient's nutrition/hydration status for malnutrition  Collaborate with interdisciplinary team and initiate plan and interventions as ordered  Monitor patient's weight and dietary intake as ordered or per policy  Utilize nutrition screening tool and intervene as necessary  Determine patient's food preferences and provide high-protein, high-caloric foods as appropriate       INTERVENTIONS:  - Monitor oral intake, urinary output, labs, and treatment plans  - Assess nutrition and hydration status and recommend course of action  - Evaluate amount of meals eaten  - Assist patient with eating if necessary   - Allow adequate time for meals  - Recommend/ encourage appropriate diets, oral nutritional supplements, and vitamin/mineral supplements  - Order, calculate, and assess calorie counts as needed  - Recommend, monitor, and adjust tube feedings and TPN/PPN based on assessed needs  - Assess need for intravenous fluids  - Provide specific nutrition/hydration education as appropriate  - Include patient/family/caregiver in decisions related to nutrition  Outcome: Progressing     Problem: MOBILITY - ADULT  Goal: Maintain or return to baseline ADL function  Description: INTERVENTIONS:  -  Assess patient's ability to carry out ADLs; assess patient's baseline for ADL function and identify physical deficits which impact ability to perform ADLs (bathing, care of mouth/teeth, toileting, grooming, dressing, etc )  - Assess/evaluate cause of self-care deficits   - Assess range of motion  - Assess patient's mobility; develop plan if impaired  - Assess patient's need for assistive devices and provide as appropriate  - Encourage maximum independence but intervene and supervise when necessary  - Involve family in performance of ADLs  - Assess for home care needs following discharge   - Consider OT consult to assist with ADL evaluation and planning for discharge  - Provide patient education as appropriate  Outcome: Progressing  Goal: Maintains/Returns to pre admission functional level  Description: INTERVENTIONS:  - Perform BMAT or MOVE assessment daily    - Set and communicate daily mobility goal to care team and patient/family/caregiver  - Collaborate with rehabilitation services on mobility goals if consulted  - Perform Range of Motion 3 times a day  - Reposition patient every 2 hours    - Dangle patient 3 times a day  - Stand patient 3 times a day  - Ambulate patient 3 times a day  - Out of bed to chair 3 times a day   - Out of bed for meals 3 times a day  - Out of bed for toileting  - Record patient progress and toleration of activity level   Outcome: Progressing     Problem: Potential for Falls  Goal: Patient will remain free of falls  Description: INTERVENTIONS:  - Educate patient/family on patient safety including physical limitations  - Instruct patient to call for assistance with activity   - Consult OT/PT to assist with strengthening/mobility   - Keep Call bell within reach  - Keep bed low and locked with side rails adjusted as appropriate  - Keep care items and personal belongings within reach  - Initiate and maintain comfort rounds  - Make Fall Risk Sign visible to staff  - Offer Toileting every 2 Hours, in advance of need  - Initiate/Maintain fall alarm  - Obtain necessary fall risk management equipment: fall alarm  - Apply yellow socks and bracelet for high fall risk patients  - Consider moving patient to room near nurses station  Outcome: Progressing

## 2022-01-15 NOTE — ASSESSMENT & PLAN NOTE
Lab Results   Component Value Date    EGFR 3 01/15/2022    EGFR 4 01/14/2022    EGFR 5 01/13/2022    CREATININE 11 79 (H) 01/15/2022    CREATININE 9 69 (H) 01/14/2022    CREATININE 8 48 (H) 01/13/2022   · Patient had contrast with CT  · Normally receives dialysis Monday Wednesday Friday  · Nephrology consultation appreciated - HD session today prior to discharge

## 2022-01-15 NOTE — PLAN OF CARE
Problem: Neurological Deficit  Goal: Neurological status is stable or improving  Description: Interventions:  - Monitor and assess patient's level of consciousness, motor function, sensory function, and level of assistance needed for ADLs  - Monitor and report changes from baseline  Collaborate with interdisciplinary team to initiate plan and implement interventions as ordered  - Provide and maintain a safe environment  - Consider seizure precautions  - Consider fall precautions  - Consider aspiration precautions  - Consider bleeding precautions  Outcome: Progressing     Problem: Activity Intolerance/Impaired Mobility  Goal: Mobility/activity is maintained at optimum level for patient  Description: Interventions:  - Assess and monitor patient  barriers to mobility and need for assistive/adaptive devices  - Assess patient's emotional response to limitations  - Collaborate with interdisciplinary team and initiate plans and interventions as ordered  - Encourage independent activity per ability   - Maintain proper body alignment  - Perform active/passive rom as tolerated/ordered  - Plan activities to conserve energy   - Turn patient as appropriate  Outcome: Progressing     Problem: Communication Impairment  Goal: Ability to express needs and understand communication  Description: Assess patient's communication skills and ability to understand information  Patient will demonstrate use of effective communication techniques, alternative methods of communication and understanding even if not able to speak  - Encourage communication and provide alternate methods of communication as needed  - Collaborate with case management/ for discharge needs  - Include patient/family/caregiver in decisions related to communication    Outcome: Progressing     Problem: Potential for Aspiration  Goal: Non-ventilated patient's risk of aspiration is minimized  Description: Assess and monitor vital signs, respiratory status, and labs (WBC)  Monitor for signs of aspiration (tachypnea, cough, rales, wheezing, cyanosis, fever)  - Assess and monitor patient's ability to swallow  - Place patient up in chair to eat if possible  - HOB up at 90 degrees to eat if unable to get patient up into chair   - Supervise patient during oral intake  - Instruct patient/ family to take small bites  - Instruct patient/ family to take small single sips when taking liquids  - Follow patient-specific strategies generated by speech pathologist   Outcome: Progressing  Goal: Ventilated patient's risk of aspiration is minimized  Description: Assess and monitor vital signs, respiratory status, airway cuff pressure, and labs (WBC)  Monitor for signs of aspiration (tachypnea, cough, rales, wheezing, cyanosis, fever)  - Elevate head of bed 30 degrees if patient has tube feeding   - Monitor tube feeding  Outcome: Progressing     Problem: Nutrition  Goal: Nutrition/Hydration status is improving  Description: Monitor and assess patient's nutrition/hydration status for malnutrition (ex- brittle hair, bruises, dry skin, pale skin and conjunctiva, muscle wasting, smooth red tongue, and disorientation)  Collaborate with interdisciplinary team and initiate plan and interventions as ordered  Monitor patient's weight and dietary intake as ordered or per policy  Utilize nutrition screening tool and intervene per policy  Determine patient's food preferences and provide high-protein, high-caloric foods as appropriate  - Assist patient with eating   - Allow adequate time for meals   - Encourage patient to take dietary supplement as ordered  - Collaborate with clinical nutritionist   - Include patient/family/caregiver in decisions related to nutrition    Outcome: Progressing     Problem: PAIN - ADULT  Goal: Verbalizes/displays adequate comfort level or baseline comfort level  Description: Interventions:  - Encourage patient to monitor pain and request assistance  - Assess pain using appropriate pain scale  - Administer analgesics based on type and severity of pain and evaluate response  - Implement non-pharmacological measures as appropriate and evaluate response  - Consider cultural and social influences on pain and pain management  - Notify physician/advanced practitioner if interventions unsuccessful or patient reports new pain  Outcome: Progressing     Problem: INFECTION - ADULT  Goal: Absence or prevention of progression during hospitalization  Description: INTERVENTIONS:  - Assess and monitor for signs and symptoms of infection  - Monitor lab/diagnostic results  - Monitor all insertion sites, i e  indwelling lines, tubes, and drains  - Monitor endotracheal if appropriate and nasal secretions for changes in amount and color  - [place] appropriate cooling/warming therapies per order  - Administer medications as ordered  - Instruct and encourage patient and family to use good hand hygiene technique  - Identify and instruct in appropriate isolation precautions for identified infection/condition  Outcome: Progressing  Goal: Absence of fever/infection during neutropenic period  Description: INTERVENTIONS:  - Monitor WBC    Outcome: Progressing     Problem: SAFETY ADULT  Goal: Patient will remain free of falls  Description: INTERVENTIONS:  - Educate patient/family on patient safety including physical limitations  - Instruct patient to call for assistance with activity   - Consult OT/PT to assist with strengthening/mobility   - Keep Call bell within reach  - Keep bed low and locked with side rails adjusted as appropriate  - Keep care items and personal belongings within reach  - Initiate and maintain comfort rounds  - Make Fall Risk Sign visible to staff  - Offer Toileting every 2 Hours, in advance of need  - Initiate/Maintain fall alarm  - Obtain necessary fall risk management equipment: fall alarm  - Apply yellow socks and bracelet for high fall risk patients  - Consider moving patient to room near nurses station  Outcome: Progressing  Goal: Maintain or return to baseline ADL function  Description: INTERVENTIONS:  -  Assess patient's ability to carry out ADLs; assess patient's baseline for ADL function and identify physical deficits which impact ability to perform ADLs (bathing, care of mouth/teeth, toileting, grooming, dressing, etc )  - Assess/evaluate cause of self-care deficits   - Assess range of motion  - Assess patient's mobility; develop plan if impaired  - Assess patient's need for assistive devices and provide as appropriate  - Encourage maximum independence but intervene and supervise when necessary  - Involve family in performance of ADLs  - Assess for home care needs following discharge   - Consider OT consult to assist with ADL evaluation and planning for discharge  - Provide patient education as appropriate  Outcome: Progressing  Goal: Maintains/Returns to pre admission functional level  Description: INTERVENTIONS:  - Perform BMAT or MOVE assessment daily    - Set and communicate daily mobility goal to care team and patient/family/caregiver  - Collaborate with rehabilitation services on mobility goals if consulted  - Perform Range of Motion 3 times a day  - Reposition patient every 2 hours    - Dangle patient 3 times a day  - Stand patient 3 times a day  - Ambulate patient 3 times a day  - Out of bed to chair 3 times a day   - Out of bed for meals 3 times a day  - Out of bed for toileting  - Record patient progress and toleration of activity level   Outcome: Progressing     Problem: DISCHARGE PLANNING  Goal: Discharge to home or other facility with appropriate resources  Description: INTERVENTIONS:  - Identify barriers to discharge w/patient and caregiver  - Arrange for needed discharge resources and transportation as appropriate  - Identify discharge learning needs (meds, wound care, etc )  - Arrange for interpretive services to assist at discharge as needed  - Refer to Case Management Department for coordinating discharge planning if the patient needs post-hospital services based on physician/advanced practitioner order or complex needs related to functional status, cognitive ability, or social support system  Outcome: Progressing     Problem: Knowledge Deficit  Goal: Patient/family/caregiver demonstrates understanding of disease process, treatment plan, medications, and discharge instructions  Description: Complete learning assessment and assess knowledge base  Interventions:  - Provide teaching at level of understanding  - Provide teaching via preferred learning methods  Outcome: Progressing     Problem: Nutrition/Hydration-ADULT  Goal: Nutrient/Hydration intake appropriate for improving, restoring or maintaining nutritional needs  Description: Monitor and assess patient's nutrition/hydration status for malnutrition  Collaborate with interdisciplinary team and initiate plan and interventions as ordered  Monitor patient's weight and dietary intake as ordered or per policy  Utilize nutrition screening tool and intervene as necessary  Determine patient's food preferences and provide high-protein, high-caloric foods as appropriate       INTERVENTIONS:  - Monitor oral intake, urinary output, labs, and treatment plans  - Assess nutrition and hydration status and recommend course of action  - Evaluate amount of meals eaten  - Assist patient with eating if necessary   - Allow adequate time for meals  - Recommend/ encourage appropriate diets, oral nutritional supplements, and vitamin/mineral supplements  - Order, calculate, and assess calorie counts as needed  - Recommend, monitor, and adjust tube feedings and TPN/PPN based on assessed needs  - Assess need for intravenous fluids  - Provide specific nutrition/hydration education as appropriate  - Include patient/family/caregiver in decisions related to nutrition  Outcome: Progressing     Problem: MOBILITY - ADULT  Goal: Maintain or return to baseline ADL function  Description: INTERVENTIONS:  -  Assess patient's ability to carry out ADLs; assess patient's baseline for ADL function and identify physical deficits which impact ability to perform ADLs (bathing, care of mouth/teeth, toileting, grooming, dressing, etc )  - Assess/evaluate cause of self-care deficits   - Assess range of motion  - Assess patient's mobility; develop plan if impaired  - Assess patient's need for assistive devices and provide as appropriate  - Encourage maximum independence but intervene and supervise when necessary  - Involve family in performance of ADLs  - Assess for home care needs following discharge   - Consider OT consult to assist with ADL evaluation and planning for discharge  - Provide patient education as appropriate  Outcome: Progressing  Goal: Maintains/Returns to pre admission functional level  Description: INTERVENTIONS:  - Perform BMAT or MOVE assessment daily    - Set and communicate daily mobility goal to care team and patient/family/caregiver  - Collaborate with rehabilitation services on mobility goals if consulted  - Perform Range of Motion 3 times a day  - Reposition patient every 2 hours    - Dangle patient 3 times a day  - Stand patient 3 times a day  - Ambulate patient 3 times a day  - Out of bed to chair 3 times a day   - Out of bed for meals 3 times a day  - Out of bed for toileting  - Record patient progress and toleration of activity level   Outcome: Progressing     Problem: Potential for Falls  Goal: Patient will remain free of falls  Description: INTERVENTIONS:  - Educate patient/family on patient safety including physical limitations  - Instruct patient to call for assistance with activity   - Consult OT/PT to assist with strengthening/mobility   - Keep Call bell within reach  - Keep bed low and locked with side rails adjusted as appropriate  - Keep care items and personal belongings within reach  - Initiate and maintain comfort rounds  - Make Fall Risk Sign visible to staff  - Offer Toileting every 2 Hours, in advance of need  - Initiate/Maintain fall alarm  - Obtain necessary fall risk management equipment: fall alarm  - Apply yellow socks and bracelet for high fall risk patients  - Consider moving patient to room near nurses station  Outcome: Progressing

## 2022-01-17 NOTE — UTILIZATION REVIEW
Inpatient Admission Authorization Request   NOTIFICATION OF INPATIENT ADMISSION/INPATIENT AUTHORIZATION REQUEST   SERVICING FACILITY:   [facility] [address]  [address] [place] [address], [facility], [address]  Tax ID: [id number]  NPI: [id number]  Place of Service: Inpatient [address] [facility]  [address]  Place of Service Code: 24     ATTENDING PROVIDER:  Attending Name and NPI#: Alem Jolly [[id number]]  Address: [address] [place] [address], [facility], [address]  Phone: [phone]     UTILIZATION REVIEW CONTACT:  Mago Mackenzie, Utilization Review Supervisor  Network Utilization Review Department  Phone: [phone]  Fax [phone]  Email: [email or web address]     PHYSICIAN ADVISORY SERVICES:  FOR [id number] REVIEW - MEDICAL NECESSITY DENIAL  Phone: [phone]  Fax: [phone]  Email: [email or web address]     TYPE OF REQUEST:  Inpatient Status     ADMISSION INFORMATION:  ADMISSION DATE/TIME: 1/13/22  8:41 PM  PATIENT DIAGNOSIS CODE/DESCRIPTION:  TIA (transient ischemic attack) [G45 9]  Hypoglycemia [E16 2]  Stroke-like symptoms [R29 90]  DISCHARGE DATE/TIME: 1/15/2022  4:47 PM  DISCHARGE DISPOSITION (IF DISCHARGED): Home/Self Care     IMPORTANT INFORMATION:  Please contact the [facility] directly with any questions or concerns regarding this request  Department voicemails are confidential     Send requests for admission clinical reviews, concurrent reviews, approvals, and administrative denials due to lack of clinical to fax [phone]

## 2022-01-17 NOTE — UTILIZATION REVIEW
Continued Stay Review    WAS OBSERVATION 01/13/2022 @ 2043, CONVERTED TO INPATIENT ADMISSION 01/14/2022 @ 1726, DUE TO CONTINUED STAY REQUIRED TO CARE FOR PATIENT WITH    Stroke like symptoms / Essential hypertension  Date: 01/14/2022                        Inpatient Admission  Once        Transfer Service: Hospitalist       Question Answer Comment   Level of Care Med Surg    Estimated length of stay More than 2 Midnights    Certification I certify that inpatient services are medically necessary for this patient for a duration of greater than two midnights  See H&P and MD Progress Notes for additional information about the patient's course of treatment  01/14/22 1726       Current Patient Class: Inpatient  Current Level of Care: Med/Surg    HPI:72 y o  male initially admitted on 01/13/2022     Assessment/Plan:   01/14/2022  Day 1:    Continue ASA / Plavix for 3 weeks then ASA monotherapy, if MRI negative for stroke  Continue Lipitor  PT/OT  ESRD: no HD today  Continue Cardura & Norvasc    01/15/2022 Day 2:  TIA  Continue ASA / Plavix  Continue lipitor  Continue Cardura & Norvasc  ESRD:  HD today    VTE Pharmacologic Prophylaxis: VTE Score: 3 Moderate Risk (Score 3-4) - Pharmacological DVT Prophylaxis Ordered: heparin      Vital Signs:   Date/Time Temp Pulse Resp BP MAP (mmHg) SpO2 O2 Device Patient Position - Orthostatic VS   01/15/22 15:23:37 -- 78 -- 131/73 92 98 % -- --   01/15/22 1430 -- -- 16 154/79 -- -- -- Sitting   01/15/22 1400 95 5 °F (35 3 °C) Abnormal  -- 18 149/74 -- -- -- Lying   01/15/22 1330 -- -- 16 151/88 -- -- -- Lying   01/15/22 1300 -- -- 18 143/88 -- -- -- Lying   01/15/22 1230 -- -- 16 190/77 Abnormal  -- -- -- Lying   01/15/22 1200 -- 66 16 188/88 Abnormal  -- -- -- Lying   01/15/22 1130 -- 62 16 189/104 Abnormal  -- -- -- Lying   01/15/22 1100 98 5 °F (36 9 °C) -- 16 200/101 Abnormal  -- -- -- Lying   01/15/22 09:52:42 98 6 °F (37 °C) 64 17 184/72 Abnormal  109 97 % None (Room air) Lying   01/15/22 0700 98 °F (36 7 °C) 64 18 160/70 -- 95 % None (Room air) Lying   01/15/22 03:15:16 98 °F (36 7 °C) 73 18 148/69 95 95 % None (Room air) Lying   01/14/22 23:08:10 98 7 °F (37 1 °C) 78 20 176/67 Abnormal  103 96 % None (Room air) Lying   01/14/22 22:22:49 -- 71 -- 168/76 107 96 % -- --   01/14/22 20:38:15 -- 72 -- 183/75 Abnormal  111 95 % -- --   01/14/22 20:06:54 -- 63 -- 205/76 Abnormal  119 97 % -- --   01/14/22 2006 -- -- -- -- -- -- None (Room air) --   01/14/22 19:41:50 -- 64 22 215/74 Abnormal  121 98 % -- --   01/14/22 1700 -- 62 13 170/70 100 96 % -- --   01/14/22 1600 97 2 °F (36 2 °C) Abnormal  63 16 173/74 Abnormal  106 95 % -- --   01/14/22 1500 -- 64 20 172/74 Abnormal  106 95 % -- --   01/14/22 1416 -- 90 -- 144/88 -- -- -- --   01/14/22 1300 -- 69 15 144/78 -- 96 % -- --   01/14/22 1200 -- 68 39 Abnormal  152/86 106 94 % -- --   01/14/22 1100 -- 75 19 169/75 108 97 % -- --   01/14/22 0900 -- 73 19 150/63 90 95 % -- --   01/14/22 0800 98 °F (36 7 °C) 74 12 127/54 78 96 % -- Lying   01/14/22 0700 -- 73 16 128/60 86 95 % -- --   01/14/22 0600 -- 77 21 118/55 79 96 % None (Room air) Lying   01/14/22 0500 -- 67 13 136/59 85 94 % None (Room air) Lying   01/14/22 0400 -- 75 11 Abnormal  132/58 84 95 % None (Room air) Lying   01/14/22 0300 -- 69 16 129/59 85 95 % None (Room air) Lying   01/14/22 0200 -- 68 13 148/67 96 95 % None (Room air) Lying   01/14/22 0100 94 7 °F (34 8 °C) Abnormal  70 16 166/71 102 95 % None (Room air) Lying   01/14/22 0000 -- 65 14 195/86 Abnormal  124 96 % None (Room air) Lying     Date and Time Eye Opening Best Verbal Response Best Motor Response Townsend Coma Scale Score   01/15/22 0954 4 5 6 15   01/14/22 2006 4 5 6 15   01/14/22 1600 4 5 6 15   01/14/22 1200 4 5 6 15   01/14/22 0800 4 5 6 15   01/14/22 0600 4 5 6 15   01/14/22 0400 4 5 6 15   01/14/22 0200 4 5 6 15   01/14/22 0040 4 5 6 15   01/13/22 2340 4 5 6 15   01/13/22 1957 4 5 6 15   01/13/22 1927 4 5 6 15   01/13/22 1847 4 5 6 15   01/13/22 1832 4 5 6 15   01/13/22 1817 4 5 6 15   01/13/22 1802 4 5 6 15     01/14/2022 @ 1847  MRI Brain:  No acute ischemic disease   Chronic microangiopathic change  01/14/2022 @ 0822  Chest X:  No acute cardiopulmonary disease  01/14/2022 @            ECHO:  Interpretation Summary:     Left Ventricle: Left ventricular cavity size is normal  The left ventricular ejection fraction is 80%  Systolic function is vigorous  Wall motion is normal  Diastolic function is mildly abnormal, consistent with grade I (abnormal) relaxation  Wall thickness is moderately increased  There is moderate concentric hypertrophy, with sigmoid septum  No significant outflow gradient appreciated    Left Atrium: The atrium is mildly dilated    Aortic Valve: There is mild regurgitation    Mitral Valve: There is mild annular calcification  There is trace regurgitation  The valve has normal function  There is systolic anterior motion of the chordal apparatus      No identifiable source of embolism seen within the limits of TTE    Pertinent Labs/Diagnostic Results:   Results from last 7 days   Lab Units 01/13/22  1853   SARS-COV-2  Negative     Results from last 7 days   Lab Units 01/15/22  0624 01/13/22  1853   WBC Thousand/uL 5 64 6 54   HEMOGLOBIN g/dL 10 2* 13 0   HEMATOCRIT % 31 4* 42 1   PLATELETS Thousands/uL 100* 113*   NEUTROS ABS Thousands/µL 3 38  --      Results from last 7 days   Lab Units 01/15/22  0624 01/14/22  0440 01/13/22  1853   SODIUM mmol/L 134* 134* 136   POTASSIUM mmol/L 5 4* 4 4 5 3   CHLORIDE mmol/L 93* 93* 91*   CO2 mmol/L 28 29 33*   ANION GAP mmol/L 13 12 12   BUN mg/dL 70* 53* 43*   CREATININE mg/dL 11 79* 9 69* 8 48*   EGFR ml/min/1 73sq m 3 4 5   CALCIUM mg/dL 8 9 9 0 10 1   MAGNESIUM mg/dL  --  2 5  --    PHOSPHORUS mg/dL  --  6 1*  --      Results from last 7 days   Lab Units 01/15/22  1544 01/15/22  1055 01/15/22  0647 01/14/22  2113 01/14/22  1623 01/14/22  1408 01/14/22  1324 01/14/22  1148 01/14/22  1019 01/14/22  0905 01/14/22  0808 01/14/22  0745   POC GLUCOSE mg/dl 135 169* 95 130 123 256* 245* 232* 231* 177* 124 113     Results from last 7 days   Lab Units 01/15/22  0624 01/14/22  0440 01/13/22  1853   GLUCOSE RANDOM mg/dL 109 160* 38*     Results from last 7 days   Lab Units 01/14/22  0440   HEMOGLOBIN A1C % 6 7*   EAG mg/dl 146       Results from last 7 days   Lab Units 01/13/22  2311 01/13/22  1853   HS TNI 0HR ng/L  --  17   HS TNI 2HR ng/L 15  --    HSTNI D2 ng/L -2  --      Results from last 7 days   Lab Units 01/13/22  1853   PROTIME seconds 14 3   INR  1 12   PTT seconds 28     Results from last 7 days   Lab Units 01/13/22  1853   INFLUENZA A PCR  Negative   INFLUENZA B PCR  Negative   RSV PCR  Negative     Medications:   Medications 01/13 01/14 01/15   amLODIPine (NORVASC) tablet 2 5 mg  Dose: 2 5 mg  Freq: Daily at bedtime Route: PO  Start: 01/13/22 2345 End: 01/15/22 1847   Admin Instructions:   LOOK ALIKE SOUND ALIKE MED   Order specific questions:   Hold for systolic blood pressure less than (mmHg) 110        0038     2224         1847-D/C'd      aspirin (ECOTRIN LOW STRENGTH) EC tablet 81 mg  Dose: 81 mg  Freq: Daily Route: PO  Start: 01/14/22 0900 End: 01/15/22 1847   Admin Instructions:   Do Not Crush - Enteric coated  0053         2930     1847-D/C'd      atorvastatin (LIPITOR) tablet 40 mg  Dose: 40 mg  Freq: Every evening Route: PO  Start: 01/13/22 2115 End: 01/15/22 1847       2229         1911         1545     1800 [C]     1847-D/C'd      calcium acetate (PHOSLO) capsule 1,334 mg  Dose: 1,334 mg  Freq: 3 times daily with meals Route: PO  Start: 01/14/22 1630 End: 01/15/22 1847   Admin Instructions:    Take With Food        4019 9791 0198 [C]     2013     1847-D/C'd      calcium acetate (PHOSLO) capsule 667 mg  Dose: 667 mg  Freq: 3 times daily with meals Route: PO  Start: 01/14/22 0730 End: 01/14/22 1532   Admin Instructions: Take With Food        0926     1217     1532-D/C'd       clopidogrel (PLAVIX) tablet 600 mg  Dose: 600 mg  Freq: Once Route: PO  Start: 01/13/22 2030 End: 01/13/22 2029   Admin Instructions:   Do not take with grapefruit or grapefruit juice  LOOK ALIKE SOUND ALI MED       2029          clopidogrel (PLAVIX) tablet 75 mg  Dose: 75 mg  Freq: Daily Route: PO  Start: 01/14/22 0900 End: 01/15/22 1847   Admin Instructions:   Do not take with grapefruit or grapefruit juice  LOOK ALI SOUND [facility] MED        [id number]         [id number]     1847-D/C'd      dextrose 50 % IV solution 50 mL  Dose: 50 mL  Freq: Once Route: IV  Start: 01/13/22 1945 End: 01/13/22 1934 1934          doxazosin (CARDURA) tablet 4 mg  Dose: 4 mg  Freq: Daily at bedtime Route: PO  Start: 01/13/22 2345 End: 01/15/22 1847   Admin Instructions:   Available in EDB omnicell   Order specific questions:   Hold for systolic blood pressure less than (mmHg) 110        0038     2224         1847-D/C'd      heparin (porcine) subcutaneous injection 5,000 Units  Dose: 5,000 Units  Freq: Every 8 hours scheduled Route: SC  Start: 01/13/22 2200 End: 01/15/22 1847   Admin Instructions:   Check for allergies to pork or pork derivatives/dietary restrictions before administration  High alert medication  [facility]       6625         0045     9392     8579         4875     5345 [C]     1847-D/C'd      insulin lispro (HumaLOG) 100 units/mL subcutaneous injection 1-5 Units  Dose: 1-5 Units  Freq: Daily at bedtime Route: SC  Start: 01/14/22 2200 End: 01/15/22 1847   Admin Instructions:   Algorithm 1    Blood Glucose 150 - 224: 1 Unit of Insulin  Blood Glucose 225 - 299: 2 Units of Insulin  Blood Glucose 300 - 374: 3 Units of Insulin  Blood Glucose 375 - 449: 4 Units of Insulin  Blood Glucose greater than or equal to 450: 5 Units of Insulin  HIGH ALERT MEDICATION  **DISPOSE IN 8 GALLON BLACK CONTAINER**   LOOK ALIKE SOUND ALI MED        (2207) 1847-D/C'd      insulin lispro (HumaLOG) 100 units/mL subcutaneous injection 1-6 Units  Dose: 1-6 Units  Freq: 3 times daily before meals Route: SC  Start: 01/14/22 1300 End: 01/15/22 1847   Admin Instructions:   Algorithm 3    Blood Glucose 150 - 189: 1 Unit of Insulin  Blood Glucose 190 - 229: 2 Units of Insulin  Blood Glucose 230 - 269: 3 Units of Insulin  Blood Glucose 270 - 309: 4 Units of Insulin  Blood Glucose 310 - 349: 5 Units of Insulin  Blood Glucose greater than or equal to 350: 6 Units of Insulin  HIGH ALERT MEDICATION  **DISPOSE IN 8 GALLON BLACK CONTAINER**  LOOK ALIKE SOUND ALIKE MED        1327 [C]     (0519) [C]         (9034) 6256 [C]     (2633)     1847-D/C'd      sevelamer (RENAGEL) tablet 800 mg  Dose: 800 mg  Freq: 3 times daily with meals Route: PO  Start: 01/14/22 0730 End: 01/15/22 1847   Admin Instructions:   Must be administered with meals  Swallow whole; do not crush, chew or split  (8822) [C]     013 7671 [C]     7724     1847-D/C'd      Medications 01/13 01/14 01/15           Continuous Meds Sorted by Name  Medications 01/13 01/14 01/15   dextrose 5 % and sodium chloride 0 45 % infusion  Rate: 50 mL/hr Dose: 50 mL/hr  Freq: Continuous Route: IV  Last Dose: Stopped (01/14/22 0941)  Start: 01/14/22 0630 End: 01/14/22 0936        6603     0936-D/C'd  0941         dextrose 5 % and sodium chloride 0 45 % infusion  Rate: 75 mL/hr Dose: 75 mL/hr  Freq: Continuous Route: IV  Last Dose: Stopped (01/14/22 0427)  Start: 01/13/22 2115 End: 01/14/22 0336       2237         0336-D/C'd  0427               PRN Meds Sorted by Name  Medications 01/13 01/14 01/15   hydrALAZINE (APRESOLINE) injection 10 mg  Dose: 10 mg  Freq: Every 6 hours PRN Route: IV  PRN Reason: high blood pressure  PRN Comment: sbp >190  Start: 01/13/22 2110 End: 01/15/22 1847   Admin Instructions:   Administer as slow IV push, 5 mg/min    LOOK ALIKE SOUND ALIKE MED   Order specific questions:   Hold for systolic blood pressure less than (mmHg) 110       2242         0011     2013         1847-D/C'd      iohexol (OMNIPAQUE) 350 MG/ML injection (SINGLE-DOSE) 90 mL  Dose: 90 mL  Freq: Once in imaging Route: IV  PRN Reason: contrast  Start: 01/13/22 1841 End: 01/13/22 1842 1842          ondansetron (ZOFRAN) injection 4 mg  Dose: 4 mg  Freq: Every 6 hours PRN Route: IV  PRN Reasons: nausea,vomiting  Start: 01/13/22 2102 End: 01/15/22 1847   Admin Instructions:   Push over 2 minutes  (3615)          1847-D/C'd              Discharge Plan: D    Network Utilization Review Department  ATTENTION: Please call with any questions or concerns to [phone] and carefully listen to the prompts so that you are directed to the right person  All voicemails are confidential   Glynn Benavidez all requests for admission clinical reviews, approved or denied determinations and any other requests to dedicated fax number below belonging to the campus where the patient is receiving treatment   List of dedicated fax numbers for the Facilities:  [address] DENIALS (Administrative/Medical Necessity) [phone]   [address] (Maternity/NICU/Pediatrics) [phone]   [address] [place] [address] [address] [place] [address] [address] [facility] [place] [address] [phone]

## 2022-01-27 ENCOUNTER — TELEPHONE (OUTPATIENT)
Dept: NEUROLOGY | Facility: CLINIC | Age: 72
End: 2022-01-27

## 2022-01-27 NOTE — TELEPHONE ENCOUNTER
----- Message from Benito Saunders sent at 1/27/2022 11:58 AM EST -----  Regarding: Please call patient to schedule a HFU appointment  Yecenia Vásquez  Please call patient to schedule a HFU appointment  Patient was seen with neurology on 1/15/22 during his hospital stay at [place] [address]  Thank you

## 2022-01-27 NOTE — TELEPHONE ENCOUNTER
1st Attempt LVM to [facility] HFU appt  HFU/SLCA/ATT/AP/TIA/DC1/15    Note from Chart:         Haris Patterson will need follow up in in 6 weeks with neurovascular attending or advance practitioner  He will not require outpatient neurological testing

## 2022-02-04 NOTE — TELEPHONE ENCOUNTER
Patient has been scheduled with Dr Bao Vargas on 5/25 @ 10a in [place] for [facility]

## 2022-02-08 ENCOUNTER — HOSPITAL ENCOUNTER (OUTPATIENT)
Dept: SLEEP CENTER | Facility: CLINIC | Age: 72
Discharge: HOME | End: 2022-02-08
Payer: COMMERCIAL

## 2022-02-08 DIAGNOSIS — G47.33 OSA (OBSTRUCTIVE SLEEP APNEA): ICD-10-CM

## 2022-02-08 PROCEDURE — G0399 HOME SLEEP TEST/TYPE 3 PORTA: HCPCS

## 2022-02-10 NOTE — PROGRESS NOTES
Home Sleep Study Documentation    Pre-Sleep Home Study:    Set-up and instructions performed by: Dania Burger performed demonstration for Patient: yes    Return demonstration performed by Patient: yes    Written instructions provided to Patient: yes    Patient signed consent form: yes        Post-Sleep Home Study:    Additional comments by Patient: N/A    Home Sleep Study Failed:no:    Failure reason: N/A    Reported or Detected: N/A    Scored by: PINEDA

## 2022-02-11 DIAGNOSIS — G47.33 OSA (OBSTRUCTIVE SLEEP APNEA): Primary | ICD-10-CM

## 2022-02-11 PROCEDURE — 95806 SLEEP STUDY UNATT&RESP EFFT: CPT | Performed by: INTERNAL MEDICINE

## 2022-02-14 ENCOUNTER — TELEPHONE (OUTPATIENT)
Dept: SLEEP CENTER | Facility: CLINIC | Age: 72
End: 2022-02-14

## 2022-02-14 NOTE — TELEPHONE ENCOUNTER
Patient of Dr Catia Herring in the [place] pulmonary office  Left message for patient to call office to review sleep study results  Study shows moderate SIVA  CPAP study ordered and needs to be scheduled

## 2022-05-24 ENCOUNTER — OFFICE VISIT (OUTPATIENT)
Dept: NEUROLOGY | Facility: CLINIC | Age: 72
End: 2022-05-24
Payer: COMMERCIAL

## 2022-05-24 VITALS
DIASTOLIC BLOOD PRESSURE: 70 MMHG | SYSTOLIC BLOOD PRESSURE: 130 MMHG | WEIGHT: 220 LBS | HEART RATE: 70 BPM | BODY MASS INDEX: 29.8 KG/M2 | HEIGHT: 72 IN

## 2022-05-24 DIAGNOSIS — E78.2 MIXED HYPERLIPIDEMIA: Chronic | ICD-10-CM

## 2022-05-24 DIAGNOSIS — R06.83 SNORING: ICD-10-CM

## 2022-05-24 DIAGNOSIS — G45.9 TIA (TRANSIENT ISCHEMIC ATTACK): Primary | ICD-10-CM

## 2022-05-24 DIAGNOSIS — G62.9 NEUROPATHY: ICD-10-CM

## 2022-05-24 PROBLEM — R20.0 NUMBNESS AND TINGLING IN BOTH HANDS: Status: ACTIVE | Noted: 2022-05-24

## 2022-05-24 PROBLEM — R20.2 NUMBNESS AND TINGLING IN BOTH HANDS: Status: ACTIVE | Noted: 2022-05-24

## 2022-05-24 PROCEDURE — 99215 OFFICE O/P EST HI 40 MIN: CPT | Performed by: PSYCHIATRY & NEUROLOGY

## 2022-05-24 RX ORDER — LOSARTAN POTASSIUM 100 MG/1
100 TABLET ORAL DAILY
COMMUNITY
Start: 2022-02-27

## 2022-05-24 RX ORDER — ACETAMINOPHEN 10 MG/ML
650 INJECTION, SOLUTION INTRAVENOUS
COMMUNITY
Start: 2022-02-18 | End: 2023-02-17

## 2022-05-24 RX ORDER — GABAPENTIN 300 MG/1
300 CAPSULE ORAL 3 TIMES DAILY
Qty: 90 CAPSULE | Refills: 3 | Status: SHIPPED | OUTPATIENT
Start: 2022-05-24 | End: 2022-06-16 | Stop reason: SDUPTHER

## 2022-05-24 NOTE — PROGRESS NOTES
Patient ID: Nilda Gomez is a 71 y o  male  Assessment/Plan: This is a 67 y/o Male who is here as a follow up for history of TIA  Patient does have ESRD on dialysis, hyperlipidemia and hypertension  PLAN:      Diagnoses and all orders for this visit:    Mixed hyperlipidemia  -c/w atorvastatin    TIA (transient ischemic attack)  -for stroke prevention continue with combination of aspirin and atorvastatin  -check labs - hemoglobin A1c, lipid panel    -BP goal < 130/80, BP is at goal in the office  -LDL goal <70  -does not smoke at this time   -no snoring  -I advised patient to avoid using NSAIDs for headaches or other pain and to stick to tylenol if needed  -Recommend mediterranean diet & regular exercise regimen atleast 4-5 times a week for 20-30 minutes  -I educated patient/family regarding medication compliance    -     Ambulatory referral to Neurology  -     Hemoglobin A1C; Future  -     Lipid panel; Future    Snoring  -patient has a sleep study scheduled  Neuropathy  -recommend EMG  -gabapentin 300mg PO TID, does notice a relief of her neuropathy    -     gabapentin (Neurontin) 300 mg capsule; Take 1 capsule (300 mg total) by mouth in the morning and 1 capsule (300 mg total) in the evening and 1 capsule (300 mg total) before bedtime  -     EMG 1 Upper/1 Lower Neuropathy; Future    Other orders  -     acetaminophen (OFIRMEV); Take 650 mg by mouth  -     losartan (COZAAR) 100 MG tablet; Take 100 mg by mouth in the morning  Follow up - 3 months  I would be happy to see the patient sooner if any new questions/concerns arise  Patient/Guardian was advised to the call the office if they have any questions and concerns in the meantime       Patient/Guardian does understand that if they have any new stroke like symptoms such as facial droop on one side, weakness/paralysis on either side, speech trouble, numbness on one side, balance issues, any vision changes, extreme dizziness or any new headache, to call 9-1-1 immediately or to proceed to the nearest ER immediately  Subjective:    HPI    This is a 72 y/o M who is here as a hospital follow up for TIA  Patient presented to the hospital in 1/22 with stroke like symptoms (left facial droop and dysarthria)  Patient has ESRD on dialysis  Patients symptoms resolved completely  NIHSS = 0  HCT and CTA reviewed - no evidence of any acute findings, no vascular abnormality  He was not a TPA canxdate and was admitted for workup  Patient had MRI brain which I personally reviewed the images of which was negative for any acute findings  Echo was done which showed left atrium is mildly dilated  Patient was recommended aspirin/plavix for 21 days and then plavix was d/c'ed after 21 days  Patient has been doing well since then  Denies any new TIA/CVA like symptoms and is complaint w/ medications and is tolerating them well without any issues  The following portions of the patient's history were reviewed and updated as appropriate:   He  has a past medical history of Coronary artery disease, Diabetes mellitus, End stage renal disease, Hyperlipidemia, Hypertension, NSTEMI (non-ST elevated myocardial infarction) (2012), and TIA (transient ischemic attack)  He   Patient Active Problem List    Diagnosis Date Noted    Snoring 05/24/2022    Numbness and tingling in both hands 05/24/2022    SIVA (obstructive sleep apnea)     TIA (transient ischemic attack) 01/13/2022    ESRD (end stage renal disease) ([facility] [address] ) 01/18/2021    Essential hypertension 01/18/2021    Type 2 diabetes mellitus without complication, with long-term current use of insulin [facility] 01/18/2021    Mixed hyperlipidemia 01/18/2021     He  has a past surgical history that includes Dialysis fistula creation and Cardiac catheterization (2017)  His family history includes Hypertension in his father and mother  He  reports that he quit smoking about 31 years ago   His smoking use included cigarettes  He started smoking about 51 years ago  He has a 20 00 pack-year smoking history  He has never used smokeless tobacco  He reports that he does not drink alcohol and does not use drugs  Current Outpatient Medications   Medication Sig Dispense Refill    acetaminophen (OFIRMEV) Take 650 mg by mouth      amLODIPine (NORVASC) 5 mg tablet Take 2 5 mg by mouth daily at bedtime        aspirin (ECOTRIN LOW STRENGTH) 81 mg EC tablet Take by mouth      atorvastatin (LIPITOR) 40 mg tablet Take 40 mg by mouth daily      calcium acetate (PHOSLO) capsule 3 (three) times a day Not on dialysis days      carvedilol (COREG) 25 mg tablet Take 25 mg by mouth 2 (two) times a day with meals       gabapentin (Neurontin) 300 mg capsule Take 1 capsule (300 mg total) by mouth in the morning and 1 capsule (300 mg total) in the evening and 1 capsule (300 mg total) before bedtime  90 capsule 3    insulin aspart (NovoLOG FlexPen) 100 UNIT/ML injection pen Inject under the skin Three times a day       Insulin Pen Needle (UltiCare Short Pen Needles) 31G X 8 MM MISC INJECT 4 TIMES DAILY;E11 9      lidocaine-prilocaine (EMLA) cream APPLY SMALL AMOUNT TO ACCESS SITE (AVF) 1 TO 2 HOURS BEFORE DIALYSIS  COVER WITH OCCLUSIVE DRESSING (SARAN WRAP)      Lokelma 10 g PACK EMPTY CONTENTS OF 1 PACKET IN 3 TABLESPOONSFUL OF WATER OR AS DIRECTED BY CLINIC  STIR WELL AND DRINK IMMEDIATELY ON NON-DIALYSIS DAYS      clopidogrel (PLAVIX) 75 mg tablet Take 1 tablet (75 mg total) by mouth daily (Patient not taking: Reported on 5/24/2022) 30 tablet 0    doxazosin (CARDURA) 4 mg tablet Take 4 mg by mouth daily at bedtime   (Patient not taking: Reported on 5/24/2022)      hydrALAZINE (APRESOLINE) 50 mg tablet Take 1 tablet (50 mg total) by mouth every 8 (eight) hours (Patient not taking: Reported on 11/18/2021 ) 90 tablet 0    losartan (COZAAR) 100 MG tablet Take 100 mg by mouth in the morning        sevelamer carbonate (RENVELA) 800 mg tablet Take 800 mg by mouth Three times a day  (Patient not taking: Reported on 5/24/2022)       No current facility-administered medications for this visit  Current Outpatient Medications on File Prior to Visit   Medication Sig    acetaminophen (OFIRMEV) Take 650 mg by mouth    amLODIPine (NORVASC) 5 mg tablet Take 2 5 mg by mouth daily at bedtime      aspirin (ECOTRIN LOW STRENGTH) 81 mg EC tablet Take by mouth    atorvastatin (LIPITOR) 40 mg tablet Take 40 mg by mouth daily    calcium acetate (PHOSLO) capsule 3 (three) times a day Not on dialysis days    carvedilol (COREG) 25 mg tablet Take 25 mg by mouth 2 (two) times a day with meals     insulin aspart (NovoLOG FlexPen) 100 UNIT/ML injection pen Inject under the skin Three times a day     Insulin Pen Needle (UltiCare Short Pen Needles) 31G X 8 MM MISC INJECT 4 TIMES DAILY;E11 9    lidocaine-prilocaine (EMLA) cream APPLY SMALL AMOUNT TO ACCESS SITE (AVF) 1 TO 2 HOURS BEFORE DIALYSIS  COVER WITH OCCLUSIVE DRESSING (SARAN WRAP)    Lokelma 10 g PACK EMPTY CONTENTS OF 1 PACKET IN 3 TABLESPOONSFUL OF WATER OR AS DIRECTED BY CLINIC  STIR WELL AND DRINK IMMEDIATELY ON NON-DIALYSIS DAYS    clopidogrel (PLAVIX) 75 mg tablet Take 1 tablet (75 mg total) by mouth daily (Patient not taking: Reported on 5/24/2022)    doxazosin (CARDURA) 4 mg tablet Take 4 mg by mouth daily at bedtime   (Patient not taking: Reported on 5/24/2022)    hydrALAZINE (APRESOLINE) 50 mg tablet Take 1 tablet (50 mg total) by mouth every 8 (eight) hours (Patient not taking: Reported on 11/18/2021 )    losartan (COZAAR) 100 MG tablet Take 100 mg by mouth in the morning   sevelamer carbonate (RENVELA) 800 mg tablet Take 800 mg by mouth Three times a day  (Patient not taking: Reported on 5/24/2022)     No current facility-administered medications on file prior to visit  He has No Known Allergies            Objective:    Blood pressure 130/70, pulse 70, height 6' (1 829 m), weight 99 8 kg (220 lb)  Physical Exam  General - Patient is alert, awake, oriented to time, place and person, follows commands    Neuro:   Cranial nerves: PERRL, EOMI, facial sensation intact, able to raise eyebrows symmetrically, cannot assess facial droop and tongue deviation due to face mask requirement  Motor: RUE and RLE 4-/5 and LUE and LLE 5/5  Sensory - intact to soft touch throughout  Reflexes - 2+ throughout  Coordination - no ataxia/dysmetria noted  Gait - normal     ROS:  Reviewed ROS   Review of Systems   Constitutional: Negative  Negative for appetite change and fever  HENT: Negative  Negative for hearing loss, tinnitus, trouble swallowing and voice change  Eyes: Negative  Negative for photophobia and pain  Respiratory: Negative  Negative for shortness of breath  Cardiovascular: Negative  Negative for palpitations  Gastrointestinal: Negative  Negative for nausea and vomiting  Endocrine: Negative  Negative for cold intolerance  Genitourinary: Negative  Negative for dysuria, frequency and urgency  Musculoskeletal: Positive for gait problem and myalgias  Negative for neck pain  Skin: Negative  Negative for rash  Neurological: Positive for numbness  Negative for dizziness, tremors, seizures, syncope, facial asymmetry, speech difficulty, weakness, light-headedness and headaches  Patient stated that he has bilateral hand and feet numbness and tingling  Hematological: Bruises/bleeds easily  Psychiatric/Behavioral: Negative  Negative for confusion, hallucinations and sleep disturbance

## 2022-05-26 ENCOUNTER — APPOINTMENT (OUTPATIENT)
Dept: LAB | Facility: CLINIC | Age: 72
End: 2022-05-26
Payer: COMMERCIAL

## 2022-05-26 DIAGNOSIS — G45.9 TIA (TRANSIENT ISCHEMIC ATTACK): ICD-10-CM

## 2022-05-26 LAB
CHOLEST SERPL-MCNC: 120 MG/DL
EST. AVERAGE GLUCOSE BLD GHB EST-MCNC: 163 MG/DL
HBA1C MFR BLD: 7.3 %
HDLC SERPL-MCNC: 41 MG/DL
LDLC SERPL CALC-MCNC: 61 MG/DL (ref 0–100)
NONHDLC SERPL-MCNC: 79 MG/DL
TRIGL SERPL-MCNC: 91 MG/DL

## 2022-05-26 PROCEDURE — 80061 LIPID PANEL: CPT

## 2022-05-26 PROCEDURE — 36415 COLL VENOUS BLD VENIPUNCTURE: CPT

## 2022-05-26 PROCEDURE — 83036 HEMOGLOBIN GLYCOSYLATED A1C: CPT

## 2022-05-31 ENCOUNTER — OFFICE VISIT (OUTPATIENT)
Dept: CARDIOLOGY CLINIC | Facility: CLINIC | Age: 72
End: 2022-05-31
Payer: COMMERCIAL

## 2022-05-31 VITALS
WEIGHT: 218 LBS | SYSTOLIC BLOOD PRESSURE: 118 MMHG | HEART RATE: 68 BPM | BODY MASS INDEX: 29.53 KG/M2 | DIASTOLIC BLOOD PRESSURE: 50 MMHG | HEIGHT: 72 IN

## 2022-05-31 DIAGNOSIS — I10 ESSENTIAL HYPERTENSION: Primary | Chronic | ICD-10-CM

## 2022-05-31 DIAGNOSIS — G47.33 OSA (OBSTRUCTIVE SLEEP APNEA): ICD-10-CM

## 2022-05-31 DIAGNOSIS — G45.9 TIA (TRANSIENT ISCHEMIC ATTACK): ICD-10-CM

## 2022-05-31 DIAGNOSIS — E78.2 MIXED HYPERLIPIDEMIA: Chronic | ICD-10-CM

## 2022-05-31 PROCEDURE — 99214 OFFICE O/P EST MOD 30 MIN: CPT | Performed by: INTERNAL MEDICINE

## 2022-05-31 NOTE — PROGRESS NOTES
Cardiology Follow Up    Abundio Singh  [id number]  1950   [facility] [address] [address]      1  Essential hypertension     2  SIVA (obstructive sleep apnea)     3  TIA (transient ischemic attack)     4  Mixed hyperlipidemia         Discussion/Summary:  1  Hypertension  2  Hyperlipidemia  3  Snoring with concern for obstructive sleep apnea  4  Previous TIA  5  NSTEMI      -transthoracic echocardiogram 01/14/2022 showing left ventricular systolic function vigorous estimated LVEF 80% with grade 1 diastolic dysfunction, mild aortic regurgitation and systolic anterior motion of the chordal apparatus of the mitral valve  -nuclear stress test 01/20/2021 showing no significant ischemia or infarct with normal left ventricular function  -as patient's blood pressures appear well controlled will continue amlodipine 2 5 mg daily, carvedilol 25 mg twice daily, losartan 100 mg daily  -patient can continue aspirin 81 mg daily and atorvastatin 40 mg daily  -patient counseled on dietary and lifestyle modifications including following a low-salt low-fat heart healthy diet  -in the setting of snoring with concern for obstructive sleep apnea patient has upcoming appointment with sleep medicine scheduled for June of 2022 was instructed on the importance of keeping this appointment  -patient will be seen by his regular cardiology team in 3 months or sooner if necessary  -patient counseled if he were to have any warning or alarm type symptoms he is to seek emergency medical care immediately      History of Present Illness:  -patient is a 71-year-old male with hypertension, hyperlipidemia, diabetes mellitus, end-stage renal disease on dialysis Monday/Wednesday/Friday with coronary artery disease and history of NSTEMI along with recent concern for TIA January of 2022 who follows with neurology who presents to the office today for scheduled follow-up  -currently in the office patient denies any chest pain, palpitations, lightheadedness or dizziness, loss of consciousness, shortness of breath or lower extremity edema  He denies any new stroke-like symptoms and states that overall he is doing well  Patient notes good control blood pressures at home in the 185-720 mmHg systolic range on current medical therapy without significant issue      Patient Active Problem List   Diagnosis    ESRD (end stage renal disease) ([facility] [address] )    Essential hypertension    Type 2 diabetes mellitus without complication, with long-term current use of insulin ([facility])    Mixed hyperlipidemia    TIA (transient ischemic attack)    SIVA (obstructive sleep apnea)    Snoring    Numbness and tingling in both hands     Past Medical History:   Diagnosis Date    Coronary artery disease     Diabetes mellitus     End stage renal disease     Hyperlipidemia     Hypertension     NSTEMI (non-ST elevated myocardial infarction)     TIA (transient ischemic attack)      Social History     Socioeconomic History    Marital status: /Civil Union     Spouse name: Not on file    Number of children: Not on file    Years of education: Not on file    Highest education level: Not on file   Occupational History    Not on file   Tobacco Use    Smoking status: Former Smoker     Packs/day: 1 00     Years: 20 00     Pack years: 20 00     Types: Cigarettes     Start date:      Quit date:      Years since quittin 4    Smokeless tobacco: Never Used    Tobacco comment: STATES QUIT 27 YEARS AGO/ ABOUT ONE CIGARETTE DAILY   Vaping Use    Vaping Use: Never used   Substance and Sexual Activity    Alcohol use: Never    Drug use: Never    Sexual activity: Not Currently   Other Topics Concern    Not on file   Social History Narrative    Not on file     Social Determinants of Health     Financial Resource Strain: Not on file   Food Insecurity: No Food Insecurity    Worried About Running Out of Food in the Last Year: Never true    Ran Out of Food in the Last Year: Never true   Transportation Needs: No Transportation Needs    Lack of Transportation (Medical): No    Lack of Transportation (Non-Medical): No   Physical Activity: Not on file   Stress: Not on file   Social Connections: Not on file   Intimate Partner Violence: Not on file   Housing Stability: Low Risk     Unable to Pay for Housing in the Last Year: No    Number of Places Lived in the Last Year: 1    Unstable Housing in the Last Year: No      Family History   Problem Relation Age of Onset    Hypertension Mother     Hypertension Father      Past Surgical History:   Procedure Laterality Date    CARDIAC CATHETERIZATION  2017    70% lesion of a small R PDA and a 99% stenosis of D1 (previously noted on the cath in 2012)  Neither lesion was amenable to PCI  Medical management      DIALYSIS FISTULA CREATION         Current Outpatient Medications:     acetaminophen (OFIRMEV), Take 650 mg by mouth, Disp: , Rfl:     amLODIPine (NORVASC) 5 mg tablet, Take 2 5 mg by mouth daily at bedtime  , Disp: , Rfl:     aspirin (ECOTRIN LOW STRENGTH) 81 mg EC tablet, Take by mouth, Disp: , Rfl:     atorvastatin (LIPITOR) 40 mg tablet, Take 40 mg by mouth daily, Disp: , Rfl:     calcium acetate (PHOSLO) capsule, 3 (three) times a day Not on dialysis days, Disp: , Rfl:     carvedilol (COREG) 25 mg tablet, Take 25 mg by mouth 2 (two) times a day with meals , Disp: , Rfl:     clopidogrel (PLAVIX) 75 mg tablet, Take 1 tablet (75 mg total) by mouth daily (Patient not taking: No sig reported), Disp: 30 tablet, Rfl: 0    doxazosin (CARDURA) 4 mg tablet, Take 4 mg by mouth daily at bedtime   (Patient not taking: No sig reported), Disp: , Rfl:     gabapentin (Neurontin) 300 mg capsule, Take 1 capsule (300 mg total) by mouth in the morning and 1 capsule (300 mg total) in the evening and 1 capsule (300 mg total) before bedtime  , Disp: 90 capsule, Rfl: 3    hydrALAZINE (APRESOLINE) 50 mg tablet, Take 1 tablet (50 mg total) by mouth every 8 (eight) hours (Patient not taking: Reported on 11/18/2021 ), Disp: 90 tablet, Rfl: 0    insulin aspart (NovoLOG FlexPen) 100 UNIT/ML injection pen, Inject under the skin Three times a day , Disp: , Rfl:     Insulin Pen Needle (UltiCare Short Pen Needles) 31G X 8 MM MISC, INJECT 4 TIMES DAILY;E11 9, Disp: , Rfl:     lidocaine-prilocaine (EMLA) cream, APPLY SMALL AMOUNT TO ACCESS SITE (AVF) 1 TO 2 HOURS BEFORE DIALYSIS  COVER WITH OCCLUSIVE DRESSING (SARAN WRAP), Disp: , Rfl:     Lokelma 10 g PACK, EMPTY CONTENTS OF 1 PACKET IN 3 TABLESPOONSFUL OF WATER OR AS DIRECTED BY CLINIC  STIR WELL AND DRINK IMMEDIATELY ON NON-DIALYSIS DAYS, Disp: , Rfl:     losartan (COZAAR) 100 MG tablet, Take 100 mg by mouth in the morning , Disp: , Rfl:     sevelamer carbonate (RENVELA) 800 mg tablet, Take 800 mg by mouth Three times a day  (Patient not taking: No sig reported), Disp: , Rfl:   No Known Allergies      Labs:  Appointment on 05/26/2022   Component Date Value    Hemoglobin A1C 05/26/2022 7 3 (A)    EAG 05/26/2022 163     Cholesterol 05/26/2022 120     Triglycerides 05/26/2022 91     HDL, Direct 05/26/2022 41     LDL Calculated 05/26/2022 61     Non-HDL-Chol (CHOL-HDL) 05/26/2022 79         Imaging: No results found  Review of Systems:  Review of Systems   Constitutional: Negative for chills, diaphoresis, fatigue, fever and unexpected weight change  HENT: Negative for trouble swallowing and voice change  Eyes: Negative for pain and redness  Respiratory: Negative for shortness of breath and wheezing  Cardiovascular: Negative for chest pain, palpitations and leg swelling  Gastrointestinal: Negative for abdominal pain, constipation, diarrhea, nausea and vomiting  Genitourinary: Negative for dysuria     Musculoskeletal: Negative for neck pain and neck stiffness  Neurological: Negative for dizziness, syncope, light-headedness and headaches  Psychiatric/Behavioral: Negative for agitation and confusion  All other systems reviewed and are negative  Vitals:    05/31/22 1006   BP: 118/50   Pulse: 68   Weight: 98 9 kg (218 lb)   Height: 6' (1 829 m)     Vitals:    05/31/22 1006   Weight: 98 9 kg (218 lb)     Height: 6' (182 9 cm)     Physical Exam:  Physical Exam  Vitals reviewed  Constitutional:       General: He is not in acute distress  Appearance: Normal appearance  He is not diaphoretic  HENT:      Head: Normocephalic and atraumatic  Eyes:      General:         Right eye: No discharge  Left eye: No discharge  Neck:      Comments: Trachea midline, no JVD present  Cardiovascular:      Rate and Rhythm: Normal rate and regular rhythm  Heart sounds: No friction rub  Pulmonary:      Effort: Pulmonary effort is normal  No respiratory distress  Breath sounds: No wheezing or rhonchi  Abdominal:      General: Bowel sounds are normal       Palpations: Abdomen is soft  Tenderness: There is no abdominal tenderness  Musculoskeletal:      Right lower leg: No edema  Left lower leg: No edema  Skin:     General: Skin is warm and dry  Neurological:      Mental Status: He is alert  Comments: Awake, alert, able to answer questions appropriately, able move extremities bilaterally     Psychiatric:         Mood and Affect: Mood normal          Behavior: Behavior normal

## 2022-06-09 ENCOUNTER — RA CDI HCC (OUTPATIENT)
Dept: OTHER | Facility: HOSPITAL | Age: 72
End: 2022-06-09

## 2022-06-09 PROBLEM — Z99.2 DEPENDENCE ON RENAL DIALYSIS (HCC): Status: ACTIVE | Noted: 2022-06-09

## 2022-06-09 NOTE — PROGRESS NOTES
CQDOC'            [facility]  coding [facility]          Chart Reviewed number of suggestions sent to Provider: 1     Patients Insurance

## 2022-06-16 DIAGNOSIS — G62.9 NEUROPATHY: ICD-10-CM

## 2022-06-16 NOTE — TELEPHONE ENCOUNTER
Pt called and states that Dr Carol Edmonds ordered gabapentin 300 mg tid  He did not start this med yet  Nephrology said gabapentin dosage is a little bit too much  Nephrology is requesting be decreased to 100 mg daily  Requesting script be sent to [facility] pharmacy on file  Rx entered   Pls review and sign off

## 2022-06-17 RX ORDER — GABAPENTIN 100 MG/1
CAPSULE ORAL
Qty: 30 CAPSULE | Refills: 3 | Status: SHIPPED | OUTPATIENT
Start: 2022-06-17

## 2022-06-22 ENCOUNTER — HOSPITAL ENCOUNTER (OUTPATIENT)
Dept: SLEEP CENTER | Facility: CLINIC | Age: 72
Discharge: HOME | End: 2022-06-22
Payer: COMMERCIAL

## 2022-06-22 DIAGNOSIS — G47.33 OSA (OBSTRUCTIVE SLEEP APNEA): ICD-10-CM

## 2022-06-22 PROCEDURE — 95811 POLYSOM 6/>YRS CPAP 4/> PARM: CPT | Performed by: INTERNAL MEDICINE

## 2022-06-22 PROCEDURE — 95811 POLYSOM 6/>YRS CPAP 4/> PARM: CPT

## 2022-06-26 DIAGNOSIS — G47.33 OSA (OBSTRUCTIVE SLEEP APNEA): Primary | ICD-10-CM

## 2022-06-29 ENCOUNTER — TELEPHONE (OUTPATIENT)
Dept: SLEEP CENTER | Facility: CLINIC | Age: 72
End: 2022-06-29

## 2022-06-29 NOTE — TELEPHONE ENCOUNTER
Patient of Dr Lyla Munson in the [place] pulmonary office  Sleep study resulted and CPAP ordered  Left message for patient to call office to review results  Needs DME and compliance follow up

## 2022-07-12 NOTE — TELEPHONE ENCOUNTER
Returned patient's call, left message that the CPAP study was resulted and CPAP ordered by Dr Merida Half  Patient of Dr Lyla Munson at [address] [place] [address]

## 2022-08-23 ENCOUNTER — PROCEDURE VISIT (OUTPATIENT)
Dept: NEUROLOGY | Facility: CLINIC | Age: 72
End: 2022-08-23
Payer: COMMERCIAL

## 2022-08-23 DIAGNOSIS — G62.9 NEUROPATHY: ICD-10-CM

## 2022-08-23 PROCEDURE — 95886 MUSC TEST DONE W/N TEST COMP: CPT | Performed by: PHYSICAL MEDICINE & REHABILITATION

## 2022-08-23 PROCEDURE — 95912 NRV CNDJ TEST 11-12 STUDIES: CPT | Performed by: PHYSICAL MEDICINE & REHABILITATION

## 2022-08-23 NOTE — PROGRESS NOTES
EMG 1 Upper/1 Lower Neuropathy     Date/Time 8/23/2022 2:22 PM     Performed by  Ezra Jasmine MD     Authorized by Danielle Rodriguez MD                [facility] AT [address]  [phone]    Electromyography & Nerve Conduction Studies Report          Full Name: Jun Mejía Gender: Male  MRN: [id number] YOB: 1950      Visit Date: 8/23/2022 1:48 PM  Age: 71 Years  Examining Physician: DR Sean Osborn  Referring Physician: DR Jonny Leon  Medical History: 71 Y with prior history of TIA, diabetes mellitus presents with right shoulder pain and numbness in both hands and feet  He denies any low back pain  Patient is being evaluated for a  peripheral neuropathy  Sensory Nerve Conduction Study       Nerve / Sites Rec  Site Onset Lat Peak Lat  Amp Segments Distance Velocity     ms ms µV  cm m/s   R Median - Dig II (Antidromic)  Wrist Index 1 2 1 9 2 9 Wrist - Index 14 117      Ref  ?3 4  ? 20 0 Ref  ?50   R Ulnar - Dig V (Antidromic)  Wrist Dig V 2 2 2 5 0 84 Wrist - Dig V 12 55      Ref  ?2 9  ? 17 0 Ref  ?50   R Radial - Superficial (Antidromic)      Forearm Wrist 2 9 3 9 26 9 Forearm - Wrist 10 34      Ref  ?2 9 ? 15 0 Ref  ?50   R Sural - (Antidromic)  Calf Ankle 3 3 3 8 3 0 Calf - Ankle 14 42      Ref  ?3 3  ? 6 0 Ref  ?40   R Superficial peroneal - (Antidromic)      Lat leg Ankle NR NR NR Lat leg - Ankle 14 NR      Ref  ?4 4 ? 6 0 Ref  ?40       Motor Nerve Conduction Study       Nerve / Sites Muscle Latency Ref  Amplitude Ref  Segments Distance Lat Diff Velocity Ref  ms ms mV mV  cm ms m/s m/s   R Median - APB      Wrist APB 11 4 ?4 4 2 3 ?4 0 Wrist - APB 7         Elbow APB 7 7  0 2  Elbow - Wrist 27 -3 73 72 ?49   R Ulnar - ADM      Wrist ADM 3 2 ?3 3 6 7 ?6 0 Wrist - ADM 7         B  Elbow ADM 8 8  4 9  B  Elbow - Wrist 26 5 67 46 ?49      A  Elbow ADM 11 5  4 9  A  Elbow - B  Elbow 10 2 71 37 ?49   R Peroneal - EDB      Ankle EDB 6 4 ?6 5 0 4 ?2 0 Ankle - EDB 9         B  Fib Head EDB 18 5  0 5  B  Fib Head - Ankle 33 12 08 27 ?44      A  Fib Head EDB 21 5  0 4  A  Fib Head - B  Fib Head 10 3 06 33 ?44   R Tibial - AH      Ankle AH NR ?5 8 NR ?4 0 Ankle - AH 9         Knee AH NR  NR  Knee - Ankle  NR  ?41   R Peroneal - Tib Ant      Fib Head Tib Ant 6 4 ?6 7 2 3 ?3 0 Fib Head - Tib Ant          Pop fossa Tib Ant 11 6  2 2  Pop fossa - Fib Head 14 5 19 27 ?44       F Waves       Nerve F Latency Ref  ms ms   R Median - APB NR ?31 0   R Ulnar - ADM 25 8 ?32 0   R Peroneal - EDB 62 2 ?56 0   R Tibial - AH NR ?56 0       H Reflex       Nerve H Latency    ms   L Tibial - Soleus 28 9   R Tibial - Soleus 37  1       EMG Summary Table     Spontaneous MUAP Recruitment   Muscle Nerve Roots IA Fib PSW Fasc H F  Dur  Amp PPP Config Pattern   R  Tibialis anterior Deep peroneal (Fibular) L4-L5 NL None None None None NL NL None NL NL   R  Quadriceps Femoral L2-L4 NL None None None None NL NL None NL NL   R  Lumbar paraspinals Spinal L1-L5 NL None None None None NL NL None NL NL   R  Gastrocnemius (Medial head) Tibial S1-S2 NL None None None None NL NL None NL NL   R  Abductor hallucis Tibial S1-S2 NL None None None None Gr  Incr  Gr  Incr  None NL Reduced   R  Biceps femoris (short head) Sciatic (peroneal division) L5-S2 NL None None None None NL NL None NL NL   R  First dorsal interosseous Ulnar C8-T1 NL None None None None Gr  Incr  Arlyce Dunks  Many NL Reduced   R  Deltoid Axillary C5-C6 NL None None None None NL NL None NL NL   R  Biceps brachii Musculocut  C5-C6 NL None None None None NL NL None NL NL   R  Triceps brachii Radial C6-C8 NL None None None None NL NL None NL NL   R  Pronator teres Median C6-C7 NL None None None None NL NL None NL NL   R  Abductor pollicis brevis Median T1-V1 NL None None None None NL Sl  Incr  Few NL Reduced   R  Cervical paraspinals (low)  - NL None None None None NL NL None NL NL   R   Flexor carpi ulnaris Ulnar C7-T1 NL None None None None Sl  Incr  Aileen Pinky  None NL Reduced   R  Extensor digitorum brevis Tibial L5-S1 NL None None None None Gr  Incr  Gr  Incr  Many NL Reduced                                       Summary      Motor and sensory conduction studies were performed on the right median , peroneal , tibial ,and ulnar nerves  The peroneal motor terminal latency while recording from the extensor digitorum brevis was normal with a low compound motor action potential amplitude and a slow conduction velocity distally and across the fibular head  The peroneal motor terminal latency while recording from the tibialis anterior was normal with a low compound motor action potential amplitude and a slow conduction velocity across the fibular head  The tibial compound motor action potential was absent  The median motor terminal latency prolonged with a low compound motor action potential amplitude and a normal conduction velocity across the wrist   Ulnar motor terminal latency was normal with normal compound motor action potential amplitude and a slow conduction velocity distally and across the elbow  Median and tibial F-waves were absent  The peroneal F-wave was prolonged  The ulnar F wave was normal     Median sensory peak latency was normal with a low sensory action potential amplitude and normal conduction velocity across the wrist   The   ulnar sensory peak latency was normal with a low sensory action potential amplitude and normal conduction velocity across the wrist   The superficial radial sensory peak latency was prolonged with a normal sensory action potential amplitude and a slow conduction velocity across the wrist   The sural sensory peak latency was normal with a low sensory action potential amplitude and a normal conduction velocity across the wrist   The superficial peroneal sensory action potential was absent      Concentric needle EMG was performed on various distal and proximal muscles of the right upper and lower  extremity including APB, FDI, pronator teres, flexor carpi ulnaris deltoid, biceps, triceps ,low cervical paraspinal region, short head of biceps, vastus lateralis, tibialis anterior, medial gastrocnemius, extensor digitorum brevis, abductor hallucis and lumbosacral paraspinals  There was no evidence of active denervation in any of the muscles tested  Moderate decreased recruitment of giant and polyphasic motor units was noted in the abductor pollicis brevis, FDI, abductor hallucis, extensor digitorum brevis and flexor carpi ulnaris   The compound motor unit action potentials were of normal configuration with interference patterns being full or full for effort in the remaining muscles tested  Impression:      Abnormal study  There is electrophysiologic evidence of a:    1  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

## 2022-09-01 ENCOUNTER — OFFICE VISIT (OUTPATIENT)
Dept: CARDIOLOGY CLINIC | Facility: CLINIC | Age: 72
End: 2022-09-01
Payer: COMMERCIAL

## 2022-09-01 VITALS
HEART RATE: 62 BPM | HEIGHT: 72 IN | SYSTOLIC BLOOD PRESSURE: 140 MMHG | BODY MASS INDEX: 30.2 KG/M2 | WEIGHT: 223 LBS | DIASTOLIC BLOOD PRESSURE: 60 MMHG

## 2022-09-01 DIAGNOSIS — E78.2 MIXED HYPERLIPIDEMIA: Chronic | ICD-10-CM

## 2022-09-01 DIAGNOSIS — I10 ESSENTIAL HYPERTENSION: Primary | Chronic | ICD-10-CM

## 2022-09-01 DIAGNOSIS — N18.6 ESRD (END STAGE RENAL DISEASE) (HCC): ICD-10-CM

## 2022-09-01 PROCEDURE — 99214 OFFICE O/P EST MOD 30 MIN: CPT | Performed by: NURSE PRACTITIONER

## 2022-09-01 RX ORDER — AMLODIPINE BESYLATE 2.5 MG/1
5 TABLET ORAL
COMMUNITY
Start: 2022-07-19

## 2022-09-01 RX ORDER — LANTHANUM CARBONATE 500 MG/1
TABLET, CHEWABLE ORAL
COMMUNITY
Start: 2022-08-23

## 2022-09-01 NOTE — ASSESSMENT & PLAN NOTE
Lab Results   Component Value Date    EGFR 3 01/15/2022    EGFR 4 01/14/2022    EGFR 5 01/13/2022    CREATININE 11 79 (H) 01/15/2022    CREATININE 9 69 (H) 01/14/2022    CREATININE 8 48 (H) 01/13/2022     Follows with renal  On HD M/W/F

## 2022-09-01 NOTE — PROGRESS NOTES
Patient ID: Lacey Lockhart is a 71 y o  male  Plan:      ESRD (end stage renal disease) ([facility] [address] )  Lab Results   Component Value Date    EGFR 3 01/15/2022    EGFR 4 01/14/2022    EGFR 5 01/13/2022    CREATININE 11 79 (H) 01/15/2022    CREATININE 9 69 (H) 01/14/2022    CREATININE 8 48 (H) 01/13/2022     Follows with renal  On HD M/W/F    Essential hypertension  Blood pressure well controlled  Continue losartan 100 mg, Coreg 25 mg bid, amlodipine 5 mg daily    Mixed hyperlipidemia  Continue Lipitor 40 mg daily       Follow up Plan/Summary Comments:  Moshe Sloan is doing well from a cardiac perspective  He reports that his blood pressures have been well controlled at home  I have not recommended any medication changes today  Follow up in 1 year or sooner with any cardiac concerns  HPI: I had the pleasure of seeing Moshe Sloan in the office today for a routine follow up visit  He was last evaluated in the office 5/31/2022  Since that time, he has undergone a sleep study and was diagnosed with SIVA  He had a CPAP trial and is currently awaiting a machine per his report  From a cardiac perpsective, he has been doing well  He denies any CP, pressure, tightness, burning  He denies any SOB or palpitations  He does note minimal exertional dyspnea when walking on an incline or climbing the steps  This is described as mild and has been present for awhile without change  Review of Systems   10  point ROS  was otherwise non pertinent or negative except as per HPI or as below     Gait: Normal      Most recent or relevant cardiac/vascular testing:    Echo 1/14/2022 EF 80%, mild DD  Mod LVH  Mild AI    Echo 1/19/2021 EF 75%, no WMA, moderate LVH  Very mild dynamic obstruction at rest, with a peak velocity of 1 5 cm/s     Nuclear stress test 01/20/2021 no ischemia, LV systolic function was normal      Objective:     /60   Pulse 62   Ht 6' (1 829 m)   Wt 101 kg (223 lb)   BMI 30 24 kg/m²     PHYSICAL EXAM:    General:  Normal appearance, no acute distress  Eyes:  Anicteric  Oral mucosa:  Moist   Neck:  No JVD  Carotid upstrokes are brisk without bruits  No masses  Chest:  Clear to auscultation   Cardiac:  No palpable PMI  Normal S1 and S2  No murmur gallop or rub  Abdomen:  Soft and nontender  No palpable organomegaly or aortic enlargement  Extremities:  No peripheral edema, L arm AVF + bruit, + thrill  Musculoskeletal:  Symmetric  Vascular:  Pedal pulses are intact  Neuro:  Grossly symmetric  Psych:  Alert and oriented x3  No Known Allergies    Current Outpatient Medications:     acetaminophen (OFIRMEV), Take 650 mg by mouth, Disp: , Rfl:     amLODIPine (NORVASC) 2 5 mg tablet, Take 5 mg by mouth daily at bedtime, Disp: , Rfl:     aspirin (ECOTRIN LOW STRENGTH) 81 mg EC tablet, Take by mouth, Disp: , Rfl:     atorvastatin (LIPITOR) 40 mg tablet, Take 40 mg by mouth daily, Disp: , Rfl:     calcium acetate (PHOSLO) capsule, 3 (three) times a day Not on dialysis days, Disp: , Rfl:     carvedilol (COREG) 25 mg tablet, Take 25 mg by mouth 2 (two) times a day with meals , Disp: , Rfl:     gabapentin (NEURONTIN) 100 mg capsule, Take 1 cap by mouth daily, Disp: 30 capsule, Rfl: 3    insulin aspart (NovoLOG FlexPen) 100 UNIT/ML injection pen, Inject under the skin Three times a day , Disp: , Rfl:     Insulin Pen Needle (UltiCare Short Pen Needles) 31G X 8 MM MISC, INJECT 4 TIMES DAILY;E11 9, Disp: , Rfl:     lanthanum (FOSRENOL) 500 mg chewable tablet, CRUSH OR CHEW AND SWALLOW 1 TABLET THREE TIMES A DAY WITH MEALS, Disp: , Rfl:     lidocaine-prilocaine (EMLA) cream, APPLY SMALL AMOUNT TO ACCESS SITE (AVF) 1 TO 2 HOURS BEFORE DIALYSIS  COVER WITH OCCLUSIVE DRESSING (SARAN WRAP), Disp: , Rfl:     Lokelma 10 g PACK, EMPTY CONTENTS OF 1 PACKET IN 3 TABLESPOONSFUL OF WATER OR AS DIRECTED BY CLINIC   STIR WELL AND DRINK IMMEDIATELY ON NON-DIALYSIS DAYS, Disp: , Rfl:     losartan (COZAAR) 100 MG tablet, Take 100 mg by mouth in the morning , Disp: , Rfl:     sevelamer carbonate (RENVELA) 800 mg tablet, Take 800 mg by mouth Three times a day  (Patient not taking: No sig reported), Disp: , Rfl:   Past Medical History:   Diagnosis Date    Coronary artery disease     Diabetes mellitus     End stage renal disease     Hyperlipidemia     Hypertension     NSTEMI (non-ST elevated myocardial infarction)     TIA (transient ischemic attack)      Past Surgical History:   Procedure Laterality Date    CARDIAC CATHETERIZATION      70% lesion of a small R PDA and a 99% stenosis of D1 (previously noted on the cath in )  Neither lesion was amenable to PCI  Medical management      DIALYSIS FISTULA CREATION         CMP:   Lab Results   Component Value Date     2018    K 5 4 (H) 01/15/2022    K 4 7 2018    CL 93 (L) 01/15/2022    CL 97 (L) 2018    CO2 28 01/15/2022    CO2 36 (H) 2018    BUN 70 (H) 01/15/2022    BUN 36 (H) 2018    CREATININE 11 79 (H) 01/15/2022    CREATININE 7 78 (HH) 2018    EGFR 3 01/15/2022     Lipid Profile:    Lab Results   Component Value Date    CHOL 86 2018    TRIG 91 2022    TRIG 95 2018    HDL 41 2022    HDL 30 (L) 2018         Social History     Tobacco Use   Smoking Status Former Smoker    Packs/day: 1 00    Years: 20 00    Pack years: 20 00    Types: Cigarettes    Start date:     Quit date: 12    Years since quittin 6   Smokeless Tobacco Never Used   Tobacco Comment    STATES QUIT 30 YEARS AGO/ ABOUT ONE CIGARETTE DAILY

## 2022-10-13 LAB
DME PARACHUTE DELIVERY DATE REQUESTED: NORMAL
DME PARACHUTE ITEM DESCRIPTION: NORMAL
DME PARACHUTE ORDER STATUS: NORMAL
DME PARACHUTE SUPPLIER NAME: NORMAL
DME PARACHUTE SUPPLIER PHONE: NORMAL

## 2023-06-19 ENCOUNTER — APPOINTMENT (EMERGENCY)
Dept: RADIOLOGY | Facility: HOSPITAL | Age: 73
DRG: 189 | End: 2023-06-19
Payer: COMMERCIAL

## 2023-06-19 ENCOUNTER — HOSPITAL ENCOUNTER (INPATIENT)
Facility: HOSPITAL | Age: 73
LOS: 3 days | Discharge: HOME HEALTH | DRG: 189 | End: 2023-06-22
Attending: FAMILY MEDICINE | Admitting: ANESTHESIOLOGY
Payer: COMMERCIAL

## 2023-06-19 ENCOUNTER — APPOINTMENT (INPATIENT)
Dept: NON INVASIVE DIAGNOSTICS | Facility: HOSPITAL | Age: 73
DRG: 189 | End: 2023-06-19
Payer: COMMERCIAL

## 2023-06-19 ENCOUNTER — APPOINTMENT (INPATIENT)
Dept: DIALYSIS | Facility: HOSPITAL | Age: 73
DRG: 189 | End: 2023-06-19
Payer: COMMERCIAL

## 2023-06-19 DIAGNOSIS — Z99.2 DEPENDENCE ON RENAL DIALYSIS (HCC): ICD-10-CM

## 2023-06-19 DIAGNOSIS — I12.0 HYPERTENSIVE CKD, ESRD ON DIALYSIS (HCC): ICD-10-CM

## 2023-06-19 DIAGNOSIS — R06.00 DYSPNEA: ICD-10-CM

## 2023-06-19 DIAGNOSIS — Z79.4 TYPE 2 DIABETES MELLITUS WITHOUT COMPLICATION, WITH LONG-TERM CURRENT USE OF INSULIN (HCC): ICD-10-CM

## 2023-06-19 DIAGNOSIS — J81.0 FLASH PULMONARY EDEMA (HCC): Primary | ICD-10-CM

## 2023-06-19 DIAGNOSIS — R06.02 SOB (SHORTNESS OF BREATH): ICD-10-CM

## 2023-06-19 DIAGNOSIS — N18.6 HYPERTENSIVE CKD, ESRD ON DIALYSIS (HCC): ICD-10-CM

## 2023-06-19 DIAGNOSIS — Z99.2 HYPERTENSIVE CKD, ESRD ON DIALYSIS (HCC): ICD-10-CM

## 2023-06-19 DIAGNOSIS — E11.9 TYPE 2 DIABETES MELLITUS WITHOUT COMPLICATION, WITH LONG-TERM CURRENT USE OF INSULIN (HCC): ICD-10-CM

## 2023-06-19 DIAGNOSIS — R09.02 HYPOXIA: ICD-10-CM

## 2023-06-19 DIAGNOSIS — E78.2 MIXED HYPERLIPIDEMIA: Chronic | ICD-10-CM

## 2023-06-19 PROBLEM — J96.01 ACUTE RESPIRATORY FAILURE WITH HYPOXIA (HCC): Status: ACTIVE | Noted: 2023-06-19

## 2023-06-19 PROBLEM — I16.0 HYPERTENSIVE URGENCY: Status: ACTIVE | Noted: 2021-01-18

## 2023-06-19 PROBLEM — R65.10 SIRS (SYSTEMIC INFLAMMATORY RESPONSE SYNDROME) (HCC): Status: ACTIVE | Noted: 2023-06-19

## 2023-06-19 LAB
2HR DELTA HS TROPONIN: 126 NG/L
4HR DELTA HS TROPONIN: 314 NG/L
ANION GAP SERPL CALCULATED.3IONS-SCNC: 15 MMOL/L (ref 4–13)
ATRIAL RATE: 107 BPM
BASOPHILS # BLD AUTO: 0.04 THOUSANDS/ÂΜL (ref 0–0.1)
BASOPHILS NFR BLD AUTO: 0 % (ref 0–1)
BUN SERPL-MCNC: 69 MG/DL (ref 5–25)
CALCIUM SERPL-MCNC: 8.9 MG/DL (ref 8.4–10.2)
CARDIAC TROPONIN I PNL SERPL HS: 179 NG/L
CARDIAC TROPONIN I PNL SERPL HS: 367 NG/L
CARDIAC TROPONIN I PNL SERPL HS: 461 NG/L (ref 8–18)
CARDIAC TROPONIN I PNL SERPL HS: 53 NG/L
CARDIAC TROPONIN I PNL SERPL HS: 533 NG/L (ref 8–18)
CHLORIDE SERPL-SCNC: 95 MMOL/L (ref 96–108)
CO2 SERPL-SCNC: 27 MMOL/L (ref 21–32)
CREAT SERPL-MCNC: 10.89 MG/DL (ref 0.6–1.3)
EOSINOPHIL # BLD AUTO: 0.32 THOUSAND/ÂΜL (ref 0–0.61)
EOSINOPHIL NFR BLD AUTO: 2 % (ref 0–6)
ERYTHROCYTE [DISTWIDTH] IN BLOOD BY AUTOMATED COUNT: 17.1 % (ref 11.6–15.1)
FLUAV RNA RESP QL NAA+PROBE: NEGATIVE
FLUBV RNA RESP QL NAA+PROBE: NEGATIVE
GFR SERPL CREATININE-BSD FRML MDRD: 4 ML/MIN/1.73SQ M
GLUCOSE SERPL-MCNC: 115 MG/DL (ref 65–140)
GLUCOSE SERPL-MCNC: 149 MG/DL (ref 65–140)
GLUCOSE SERPL-MCNC: 182 MG/DL (ref 65–140)
GLUCOSE SERPL-MCNC: 258 MG/DL (ref 65–140)
GLUCOSE SERPL-MCNC: 294 MG/DL (ref 65–140)
GLUCOSE SERPL-MCNC: 349 MG/DL (ref 65–140)
GLUCOSE SERPL-MCNC: 48 MG/DL (ref 65–140)
GLUCOSE SERPL-MCNC: 85 MG/DL (ref 65–140)
GLUCOSE SERPL-MCNC: 93 MG/DL (ref 65–140)
HCT VFR BLD AUTO: 34.1 % (ref 36.5–49.3)
HGB BLD-MCNC: 10.5 G/DL (ref 12–17)
IMM GRANULOCYTES # BLD AUTO: 0.06 THOUSAND/UL (ref 0–0.2)
IMM GRANULOCYTES NFR BLD AUTO: 0 % (ref 0–2)
INR PPP: 1.09 (ref 0.84–1.19)
LYMPHOCYTES # BLD AUTO: 2.95 THOUSANDS/ÂΜL (ref 0.6–4.47)
LYMPHOCYTES NFR BLD AUTO: 19 % (ref 14–44)
MAGNESIUM SERPL-MCNC: 2.5 MG/DL (ref 1.9–2.7)
MCH RBC QN AUTO: 27.4 PG (ref 26.8–34.3)
MCHC RBC AUTO-ENTMCNC: 30.8 G/DL (ref 31.4–37.4)
MCV RBC AUTO: 89 FL (ref 82–98)
MONOCYTES # BLD AUTO: 1.23 THOUSAND/ÂΜL (ref 0.17–1.22)
MONOCYTES NFR BLD AUTO: 8 % (ref 4–12)
NEUTROPHILS # BLD AUTO: 11.03 THOUSANDS/ÂΜL (ref 1.85–7.62)
NEUTS SEG NFR BLD AUTO: 71 % (ref 43–75)
NRBC BLD AUTO-RTO: 0 /100 WBCS
P AXIS: 84 DEGREES
PHOSPHATE SERPL-MCNC: 4.8 MG/DL (ref 2.3–4.1)
PLATELET # BLD AUTO: 166 THOUSANDS/UL (ref 149–390)
PMV BLD AUTO: 10.8 FL (ref 8.9–12.7)
POTASSIUM SERPL-SCNC: 4.9 MMOL/L (ref 3.5–5.3)
PR INTERVAL: 176 MS
PROTHROMBIN TIME: 14.1 SECONDS (ref 11.6–14.5)
QRS AXIS: 58 DEGREES
QRSD INTERVAL: 98 MS
QT INTERVAL: 338 MS
QTC INTERVAL: 451 MS
RBC # BLD AUTO: 3.83 MILLION/UL (ref 3.88–5.62)
RSV RNA RESP QL NAA+PROBE: NEGATIVE
SARS-COV-2 RNA RESP QL NAA+PROBE: NEGATIVE
SODIUM SERPL-SCNC: 137 MMOL/L (ref 135–147)
T WAVE AXIS: 88 DEGREES
VENTRICULAR RATE: 107 BPM
WBC # BLD AUTO: 15.63 THOUSAND/UL (ref 4.31–10.16)

## 2023-06-19 PROCEDURE — 84100 ASSAY OF PHOSPHORUS: CPT | Performed by: FAMILY MEDICINE

## 2023-06-19 PROCEDURE — 5A09357 ASSISTANCE WITH RESPIRATORY VENTILATION, LESS THAN 24 CONSECUTIVE HOURS, CONTINUOUS POSITIVE AIRWAY PRESSURE: ICD-10-PCS | Performed by: INTERNAL MEDICINE

## 2023-06-19 PROCEDURE — 93005 ELECTROCARDIOGRAM TRACING: CPT

## 2023-06-19 PROCEDURE — 94760 N-INVAS EAR/PLS OXIMETRY 1: CPT

## 2023-06-19 PROCEDURE — 80048 BASIC METABOLIC PNL TOTAL CA: CPT | Performed by: FAMILY MEDICINE

## 2023-06-19 PROCEDURE — 5A1D70Z PERFORMANCE OF URINARY FILTRATION, INTERMITTENT, LESS THAN 6 HOURS PER DAY: ICD-10-PCS | Performed by: INTERNAL MEDICINE

## 2023-06-19 PROCEDURE — 84484 ASSAY OF TROPONIN QUANT: CPT | Performed by: FAMILY MEDICINE

## 2023-06-19 PROCEDURE — 0241U HB NFCT DS VIR RESP RNA 4 TRGT: CPT | Performed by: FAMILY MEDICINE

## 2023-06-19 PROCEDURE — 99255 IP/OBS CONSLTJ NEW/EST HI 80: CPT | Performed by: INTERNAL MEDICINE

## 2023-06-19 PROCEDURE — 82948 REAGENT STRIP/BLOOD GLUCOSE: CPT

## 2023-06-19 PROCEDURE — 36415 COLL VENOUS BLD VENIPUNCTURE: CPT | Performed by: FAMILY MEDICINE

## 2023-06-19 PROCEDURE — 96374 THER/PROPH/DIAG INJ IV PUSH: CPT

## 2023-06-19 PROCEDURE — 93010 ELECTROCARDIOGRAM REPORT: CPT | Performed by: INTERNAL MEDICINE

## 2023-06-19 PROCEDURE — 94002 VENT MGMT INPAT INIT DAY: CPT

## 2023-06-19 PROCEDURE — 85025 COMPLETE CBC W/AUTO DIFF WBC: CPT | Performed by: FAMILY MEDICINE

## 2023-06-19 PROCEDURE — 96375 TX/PRO/DX INJ NEW DRUG ADDON: CPT

## 2023-06-19 PROCEDURE — 99285 EMERGENCY DEPT VISIT HI MDM: CPT

## 2023-06-19 PROCEDURE — 99291 CRITICAL CARE FIRST HOUR: CPT | Performed by: INTERNAL MEDICINE

## 2023-06-19 PROCEDURE — 71045 X-RAY EXAM CHEST 1 VIEW: CPT

## 2023-06-19 PROCEDURE — 84484 ASSAY OF TROPONIN QUANT: CPT | Performed by: NURSE PRACTITIONER

## 2023-06-19 PROCEDURE — 83735 ASSAY OF MAGNESIUM: CPT | Performed by: FAMILY MEDICINE

## 2023-06-19 PROCEDURE — 85610 PROTHROMBIN TIME: CPT | Performed by: FAMILY MEDICINE

## 2023-06-19 RX ORDER — LORAZEPAM 2 MG/ML
0.5 INJECTION INTRAMUSCULAR ONCE
Status: COMPLETED | OUTPATIENT
Start: 2023-06-19 | End: 2023-06-19

## 2023-06-19 RX ORDER — AMLODIPINE BESYLATE 5 MG/1
5 TABLET ORAL
Status: DISCONTINUED | OUTPATIENT
Start: 2023-06-19 | End: 2023-06-20

## 2023-06-19 RX ORDER — NICARDIPINE HYDROCHLORIDE 0.1 MG/ML
10 INJECTION INTRAVENOUS
Status: DISCONTINUED | OUTPATIENT
Start: 2023-06-19 | End: 2023-06-19

## 2023-06-19 RX ORDER — ATORVASTATIN CALCIUM 40 MG/1
40 TABLET, FILM COATED ORAL DAILY
Status: DISCONTINUED | OUTPATIENT
Start: 2023-06-19 | End: 2023-06-22 | Stop reason: HOSPADM

## 2023-06-19 RX ORDER — FUROSEMIDE 10 MG/ML
80 INJECTION INTRAMUSCULAR; INTRAVENOUS ONCE
Status: COMPLETED | OUTPATIENT
Start: 2023-06-19 | End: 2023-06-19

## 2023-06-19 RX ORDER — CALCIUM ACETATE 667 MG/1
667 CAPSULE ORAL
Status: DISCONTINUED | OUTPATIENT
Start: 2023-06-19 | End: 2023-06-20

## 2023-06-19 RX ORDER — GABAPENTIN 100 MG/1
100 CAPSULE ORAL DAILY
Status: DISCONTINUED | OUTPATIENT
Start: 2023-06-19 | End: 2023-06-22 | Stop reason: HOSPADM

## 2023-06-19 RX ORDER — IPRATROPIUM BROMIDE AND ALBUTEROL SULFATE .5; 3 MG/3ML; MG/3ML
1 SOLUTION RESPIRATORY (INHALATION) ONCE
Status: COMPLETED | OUTPATIENT
Start: 2023-06-19 | End: 2023-06-19

## 2023-06-19 RX ORDER — DEXTROSE MONOHYDRATE 25 G/50ML
25 INJECTION, SOLUTION INTRAVENOUS ONCE
Status: COMPLETED | OUTPATIENT
Start: 2023-06-19 | End: 2023-06-19

## 2023-06-19 RX ORDER — NITROGLYCERIN 20 MG/100ML
5-200 INJECTION INTRAVENOUS
Status: DISCONTINUED | OUTPATIENT
Start: 2023-06-19 | End: 2023-06-19

## 2023-06-19 RX ORDER — CLONIDINE 0.2 MG/24H
0.2 PATCH, EXTENDED RELEASE TRANSDERMAL WEEKLY
Status: DISCONTINUED | OUTPATIENT
Start: 2023-06-19 | End: 2023-06-22 | Stop reason: HOSPADM

## 2023-06-19 RX ORDER — HEPARIN SODIUM 5000 [USP'U]/ML
5000 INJECTION, SOLUTION INTRAVENOUS; SUBCUTANEOUS EVERY 8 HOURS SCHEDULED
Status: DISCONTINUED | OUTPATIENT
Start: 2023-06-19 | End: 2023-06-22 | Stop reason: HOSPADM

## 2023-06-19 RX ORDER — DEXTROSE MONOHYDRATE 25 G/50ML
INJECTION, SOLUTION INTRAVENOUS
Status: COMPLETED
Start: 2023-06-19 | End: 2023-06-19

## 2023-06-19 RX ORDER — LOSARTAN POTASSIUM 50 MG/1
100 TABLET ORAL DAILY
Status: DISCONTINUED | OUTPATIENT
Start: 2023-06-19 | End: 2023-06-20

## 2023-06-19 RX ORDER — CHLORHEXIDINE GLUCONATE 0.12 MG/ML
15 RINSE ORAL EVERY 12 HOURS SCHEDULED
Status: DISCONTINUED | OUTPATIENT
Start: 2023-06-19 | End: 2023-06-22 | Stop reason: HOSPADM

## 2023-06-19 RX ORDER — LANTHANUM CARBONATE 500 MG/1
500 TABLET, CHEWABLE ORAL
Status: DISCONTINUED | OUTPATIENT
Start: 2023-06-19 | End: 2023-06-22 | Stop reason: HOSPADM

## 2023-06-19 RX ORDER — CARVEDILOL 25 MG/1
25 TABLET ORAL 2 TIMES DAILY WITH MEALS
Status: DISCONTINUED | OUTPATIENT
Start: 2023-06-19 | End: 2023-06-22 | Stop reason: HOSPADM

## 2023-06-19 RX ORDER — ONDANSETRON 2 MG/ML
1 INJECTION INTRAMUSCULAR; INTRAVENOUS ONCE
Status: COMPLETED | OUTPATIENT
Start: 2023-06-19 | End: 2023-06-19

## 2023-06-19 RX ADMIN — HEPARIN SODIUM 5000 UNITS: 5000 INJECTION INTRAVENOUS; SUBCUTANEOUS at 21:08

## 2023-06-19 RX ADMIN — HEPARIN SODIUM 5000 UNITS: 5000 INJECTION INTRAVENOUS; SUBCUTANEOUS at 15:26

## 2023-06-19 RX ADMIN — SODIUM CHLORIDE 9 UNITS/HR: 9 INJECTION, SOLUTION INTRAVENOUS at 11:00

## 2023-06-19 RX ADMIN — DEXTROSE MONOHYDRATE 25 ML: 25 INJECTION, SOLUTION INTRAVENOUS at 15:26

## 2023-06-19 RX ADMIN — CLONIDINE 0.2 MG: 0.2 PATCH TRANSDERMAL at 11:30

## 2023-06-19 RX ADMIN — CALCIUM ACETATE 667 MG: 667 CAPSULE ORAL at 17:26

## 2023-06-19 RX ADMIN — FUROSEMIDE 80 MG: 10 INJECTION, SOLUTION INTRAMUSCULAR; INTRAVENOUS at 08:31

## 2023-06-19 RX ADMIN — LORAZEPAM 0.5 MG: 2 INJECTION INTRAMUSCULAR; INTRAVENOUS at 08:57

## 2023-06-19 RX ADMIN — CARVEDILOL 25 MG: 25 TABLET, FILM COATED ORAL at 17:26

## 2023-06-19 RX ADMIN — CHLORHEXIDINE GLUCONATE 0.12% ORAL RINSE 15 ML: 1.2 LIQUID ORAL at 21:08

## 2023-06-19 RX ADMIN — NITROGLYCERIN 1 INCH: 20 OINTMENT TOPICAL at 08:45

## 2023-06-19 RX ADMIN — NITROGLYCERIN 1 INCH: 20 OINTMENT TOPICAL at 12:31

## 2023-06-19 RX ADMIN — AMLODIPINE BESYLATE 5 MG: 5 TABLET ORAL at 21:08

## 2023-06-19 RX ADMIN — SODIUM CHLORIDE 10 MG/HR: 0.9 INJECTION, SOLUTION INTRAVENOUS at 11:00

## 2023-06-19 RX ADMIN — LANTHANUM CARBONATE 500 MG: 500 TABLET, CHEWABLE ORAL at 18:41

## 2023-06-19 RX ADMIN — NICARDIPINE HYDROCHLORIDE 10 MG/HR: 0.1 INJECTION INTRAVENOUS at 09:39

## 2023-06-19 NOTE — ASSESSMENT & PLAN NOTE
· Likely in the setting of flash pulmonary edema  · EKG without ST changes  · Obtain ECHO  · Continue to trend until peak

## 2023-06-19 NOTE — ASSESSMENT & PLAN NOTE
· Likely in the setting of flash pulmonary edema and volume overload   · Continue BIPAP 14/8 80%, titrate for SpO2>90  · Plan for volume removal with urgent IHD   · Encourage cough, deep breathing, IS, ambulation

## 2023-06-19 NOTE — RESPIRATORY THERAPY NOTE
06/19/23 0828   Respiratory Assessment   Assessment Type Assess only   General Appearance Awake; Alert   Respiratory Pattern Accessory muscle use;Labored;Dyspnea at rest;Dyspnea with exertion;Symmetrical   Chest Assessment Chest expansion symmetrical   Bilateral Breath Sounds Rhonchi;Expiratory wheezes   Resp Comments pt arrived to ED on cpap by EMS  pt reported nausea and zofran was given  evaliated pt for expiratory hweezes and waited for nausea to subside before applying bipap  bipap applied with settings: 12/6 80%   pt tolerating well  will cont to monitor   Non-Invasive Information   O2 Interface Device Full face mask   Non-Invasive Ventilation Mode BiPAP   SpO2 90 %   $ Pulse Oximetry Spot Check Charge Completed   Non-Invasive Settings   IPAP (cm) 12 cm   EPAP (cm) 6 cm   Rate (Set) 12   FiO2 (%) 80   Pressure Support (cm H2O) 6   Rise Time 3   Inspiratory Time (Set) 1 4   Non-Invasive Readings   Skin Intervention Skin intact   Total Rate 35   MV (Mech) 11   Peak Pressure (Obs) 12   Spontaneous Vt (mL) 318   Non-Invasive Alarms   Insp Pressure High (cm H20) 25   Insp Pressure Low (cm H20) 10   Low Insp Pressure Time (sec) 20 sec   MV Low (L/min) 3   Vt High (mL) 1200   Vt Low (mL) 200   High Resp Rate (BPM) 40 BPM   Low Resp Rate (BPM) 10 BPM   Apnea Interval (sec) 20

## 2023-06-19 NOTE — PLAN OF CARE
Emergent hemodialysis treatment for volume overload and pulmonary edema  Plan 240 minutes using a 2 K+ bath for potassium 4 9 this morning  Fluid goal 4500 ml/4000 ml net as tolerated  Post-Dialysis RN Treatment Note    Blood Pressure:  Pre 155/72 mm/Hg  Post 166/79 mmHg   EDW:   TBD    Weight:  Pre 102 1 kg   Post 98 kg   Mode of weight measurement:    Bed scale   Volume Removed:   3555 ml/3000 net ml    Treatment duration:  210  minutes    NS given:   300 ml x 1 for clotting/change system    Treatment shortened:   Yes  Early termination for clotting system  Dr Ferguson Los aware to order heparin bolus at initiation if appropriate     Medications given during Rx:   none   Estimated Kt/V:   1 67   Access type:    LFA fistula   Access Issues:    Maintains 400 bfr   Report called to primary nurse:   Verbal at bedside to Rosina Soliman RN          Problem: METABOLIC, FLUID AND ELECTROLYTES - ADULT  Goal: Electrolytes maintained within normal limits  Description: INTERVENTIONS:  - Monitor labs and assess patient for signs and symptoms of electrolyte imbalances  - Administer electrolyte replacement as ordered  - Monitor response to electrolyte replacements, including repeat lab results as appropriate  - Instruct patient on fluid and nutrition as appropriate  Outcome: Progressing  Goal: Fluid balance maintained  Description: INTERVENTIONS:  - Monitor labs   - Monitor I/O and WT  - Instruct patient on fluid and nutrition as appropriate  - Assess for signs & symptoms of volume excess or deficit  Outcome: Progressing

## 2023-06-19 NOTE — CONSULTS
Consultation - Nephrology   Farheen Harman 68 y o  male MRN: [id number]  Unit/Bed#: ED 23 Encounter: [id number]      A/P:  1  ESRD hemodialysis dependent MWF at [address]   - last treatment was Friday and had fluid indulgence yesterday for Fathers Day   - is markedly volume overloaded and hypoxic   - have arranged a 4 hour dialysis with 4 liters UF as tolerated   - check a bladder scan  He received Lasix 80mg IV and has not voided and does void daily   - etiology of ESRD is diabetic nephropathy    2  Volume overload   - I personally reviewed the chest x-ray which demonstrates severe pulmonary edema   - Currently on 80% oxygen mask with an O2 sat of 90 to 92% with increased respiratory effort   - Hopefully will be able to dialyze prior to requiring intubation   - He received Ativan IV as he was fighting his mask   - will ultrafilter 4 iters today ad 4 liters tomorrow     3  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   - Has been diabetic for 23 years  - Check sugars today  It was not checked this morning although he was diaphoretic    4  Hypertension associated with end-stage renal disease   - Is markedly hypertensive with a blood pressure of 200/100 likely due to volume overload    5  Obstructive sleep apnea   - Will need CPAP at night    6  Coronary artery disease   - And in 2016 and had a catheterization without intervention   - Due to diaphoresis would recommend a cardiology consult  Trend enzymes/troponin           Thank you for allowing us to participate in the care of your patient  Please feel free to contact us regarding the care of this patient, or any other questions/concerns that may be applicable      Patient Active Problem List   Diagnosis   • ESRD (end stage renal disease) ([facility] )   • Essential hypertension   • Type 2 diabetes mellitus without complication, with long-term current use of insulin ([facility] [address] )   • Mixed hyperlipidemia   • TIA (transient ischemic attack)   • SIVA (obstructive sleep apnea)   • Snoring   • Numbness and tingling in both hands   • Dependence on renal dialysis ([facility] [address] )       History of Present Illness   Physician Requesting Consult: Bridgette Diamond MD  Reason for Consult / Principal Problem: Control anxiety  Hx and PE limited by:   HPI: Jesus Cline is a 68y o  year old male who presents with marked volume overload  I obtained the history from his wife due to his severe respiratory distress  Apparently he had dialysis on Friday  He had an unremarkable day on Saturday watching TV  Yesterday on Father's Day he went out for dinner and had tissues which contain salt  He went to bed and this morning he got up to shower and go to dialysis  He began to sweat profusely and felt dreadful so his wife brought him to the emergency department  In the ED he is seen in marked respiratory distress on 80% mask with an oxygen saturation of 90 to 92% with increased work of breathing  Emergent hemodialysis has been arranged    History obtained from spouse and chart review due to patient's respiratory distress  She states he did not have chest pain but had marked shortness of breath  No wheezing  No history of COPD asthma or smoking  He was a remote smoker years ago  He does not use alcohol  He has a history of diabetes mellitus type 2 with long-term current use of insulin for 23 years  He had an MI in 2016  His estimated dry weight is 110 kg  He denies any GI symptoms according to his wife  He still urinates  He complains of anxiety        Historical Information   Past Medical History:   Diagnosis Date   • Coronary artery disease    • Diabetes mellitus    • End stage renal disease    • Hyperlipidemia    • Hypertension    • NSTEMI (non-ST elevated myocardial infarction) 2012   • TIA (transient ischemic attack)      Past Surgical History:   Procedure Laterality Date   • CARDIAC CATHETERIZATION  2017    70% lesion of a small R PDA and a 99% stenosis of D1 (previously noted on the cath in 2012)  Neither lesion was amenable to PCI  Medical management  • DIALYSIS FISTULA CREATION       Social History   Social History     Substance and Sexual Activity   Alcohol Use Never     Social History     Substance and Sexual Activity   Drug Use Never     Social History     Tobacco Use   Smoking Status Former   • Packs/day: 1 00   • Years:    • Total pack years:    • Types: Cigarettes   • Start date:    • Quit date: 12   • Years since quittin 4   Smokeless Tobacco Never   Tobacco Comments    STATES QUIT 27 YEARS AGO/ ABOUT ONE CIGARETTE DAILY     Family History   Problem Relation Age of Onset   • Hypertension Mother    • Hypertension Father        Meds/Allergies   all current active meds have been reviewed, current meds:   Current Facility-Administered Medications   Medication Dose Route Frequency   • niCARdipine (CARDENE-IV) 100 mcg/mL in sodium chloride infusion  10 mg/hr Intravenous Titrated    and PTA meds:  (Not in a hospital admission)        No Known Allergies    Objective   No intake or output data in the 24 hours ending 23 1006    Invasive Devices:        Physical Exam      No intake/output data recorded  Vitals:    23 0959   BP: 160/86   Pulse: 95   Resp: (!) 24   Temp:    SpO2: 90%       General Appearance: In acute distress  Head:    Normocephalic  Atraumatic  Normal jaw occlusion  Eyes:    Lids, conjunctiva normal  No scleral icterus  Ears:    Normal external ears  Nose:   Nares normal  No drainage  Mouth:    Neck:    Back:     Symmetric  No CVA tenderness  Lungs:     Increased respiratory effort  Bilateral rales to auscultation bilaterally  Chest wall:    No tenderness or deformity  Heart:    Tachycardic rate and rhythm  Normal S1 and S2  No murmur  Has 2-3+  edema  Abdomen:     Soft  Non-tender  Bowel sounds active  Genitourinary:   No Meehan catheter present     Extremities: "Extremities normal  Atraumatic  No cyanosis  Skin:   Warm and dry  No pallor, jaundice, rash, ecchymoses  Neurologic:    Very uncomfortable and unable to answer questions due to mask and respiratory distress         Current Weight: Weight - Scale: 108 kg (238 lb)  First Weight: Weight - Scale: 108 kg (238 lb)    Lab Results:  I have personally reviewed pertinent labs  CBC:   Lab Results   Component Value Date    WBC 15 63 (H) 06/19/2023    HGB 10 5 (L) 06/19/2023    HCT 34 1 (L) 06/19/2023    MCV 89 06/19/2023     06/19/2023    RBC 3 83 (L) 06/19/2023    MCH 27 4 06/19/2023    MCHC 30 8 (L) 06/19/2023    RDW 17 1 (H) 06/19/2023    MPV 10 8 06/19/2023    NRBC 0 06/19/2023     CMP:   Lab Results   Component Value Date    K 4 9 06/19/2023    CL 95 (L) 06/19/2023    CO2 27 06/19/2023    BUN 69 (H) 06/19/2023    CREATININE 10 89 (H) 06/19/2023    CALCIUM 8 9 06/19/2023    EGFR 4 06/19/2023     Phosphorus:   Lab Results   Component Value Date    PHOS 4 8 (H) 06/19/2023     Magnesium:   Lab Results   Component Value Date    MG 2 5 06/19/2023     Urinalysis: No results found for: \"COLORU\", \"CLARITYU\", \"SPECGRAV\", \"PHUR\", \"LEUKOCYTESUR\", \"NITRITE\", \"PROTEINUA\", \"GLUCOSEU\", \"KETONESU\", \"BILIRUBINUR\", \"BLOODU\"  Ionized Calcium: No results found for: \"CAION\"  Coagulation:   Lab Results   Component Value Date    INR 1 09 06/19/2023     Troponin: No results found for: \"TROPONINI\"  ABG: No results found for: \"PHART\", \"HMZ4STC\", \"PO2ART\", \"FCC1IYM\", \"H2LBTMJO\", \"BEART\", \"SOURCE\"    Results from last 7 days   Lab Units 06/19/23  0836   POTASSIUM mmol/L 4 9   CHLORIDE mmol/L 95*   CO2 mmol/L 27   BUN mg/dL 69*   CREATININE mg/dL 10 89*   CALCIUM mg/dL 8 9       Radiology review:  No results found  EKG, Pathology, and Other Studies: I personally reviewed the chest x-ray which demonstrates marked fluid overload    I reviewed prior notes including my consult from his prior admission as well as current " notes       Counseling / Coordination of Care  Total ADDITIONAL floor / unit time spent today 55 minutes  Greater than 50% of total time was spent with the patient and / or family counseling and / or coordination of care  A description of the counseling / coordination of care: Discussed case with critical care, the wife at the emergency department and contacted inpatient dialysis and arranged urgent dialysis today    Tomás Lynch MD      This consultation note was produced in part using a dictation device which may document imprecise wording from author's original intent

## 2023-06-19 NOTE — ED PROVIDER NOTES
History  Chief Complaint   Patient presents with   • Shortness of Breath     Pt is MWF dialysis, woke up with sudden onset SOB  EMS, gave 4 mg zofran, nitro paste, and Duo-neb     HPI  70-year-old male with a history of CKD on dialysis Monday Wednesday Friday woke up this morning with sudden onset of shortness of breath  Patient states he was getting ready to go for dialysis when started feeling short of breath  Patient is unable to provide much history  He is not able to speak in full sentences in severe respiratory distress  Patient was placed on CPAP prior to arrival   Patient is given Zofran and nitroglycerin paste  Elevated blood pressure  Denies any chest pain states that my chest feels tight  Denies any exposure to COVID  Denies any recent travel  Prior to Admission Medications   Prescriptions Last Dose Informant Patient Reported? Taking? Insulin Pen Needle (UltiCare Short Pen Needles) 31G X 8 MM MISC  Self Yes No   Sig: INJECT 4 TIMES DAILY;E11 9   Lokelma 10 g PACK  Self Yes No   Sig: EMPTY CONTENTS OF 1 PACKET IN 3 TABLESPOONSFUL OF WATER OR AS DIRECTED BY CLINIC   STIR WELL AND DRINK IMMEDIATELY ON NON-DIALYSIS DAYS   amLODIPine (NORVASC) 2 5 mg tablet   Yes No   Sig: Take 5 mg by mouth daily at bedtime   aspirin (ECOTRIN LOW STRENGTH) 81 mg EC tablet  Self Yes No   Sig: Take by mouth   atorvastatin (LIPITOR) 40 mg tablet  Self Yes No   Sig: Take 40 mg by mouth daily   calcium acetate (PHOSLO) capsule  Self Yes No   Sig: 3 (three) times a day Not on dialysis days   carvedilol (COREG) 25 mg tablet  Self Yes No   Sig: Take 25 mg by mouth 2 (two) times a day with meals    gabapentin (NEURONTIN) 100 mg capsule   No No   Sig: Take 1 cap by mouth daily   insulin aspart (NovoLOG FlexPen) 100 UNIT/ML injection pen  Self Yes No   Sig: Inject under the skin Three times a day    lanthanum (FOSRENOL) 500 mg chewable tablet   Yes No   Sig: CRUSH OR CHEW AND SWALLOW 1 TABLET THREE TIMES A DAY WITH MEALS lidocaine-prilocaine (EMLA) cream  Self Yes No   Sig: APPLY SMALL AMOUNT TO ACCESS SITE (AVF) 1 TO 2 HOURS BEFORE DIALYSIS  COVER WITH OCCLUSIVE DRESSING (SARAN WRAP)   losartan (COZAAR) 100 MG tablet   Yes No   Sig: Take 100 mg by mouth in the morning  sevelamer carbonate (RENVELA) 800 mg tablet   Yes No   Sig: Take 800 mg by mouth Three times a day    Patient not taking: No sig reported      Facility-Administered Medications: None       Past Medical History:   Diagnosis Date   • Coronary artery disease    • Diabetes mellitus    • End stage renal disease    • Hyperlipidemia    • Hypertension    • NSTEMI (non-ST elevated myocardial infarction)    • TIA (transient ischemic attack)        Past Surgical History:   Procedure Laterality Date   • CARDIAC CATHETERIZATION      70% lesion of a small R PDA and a 99% stenosis of D1 (previously noted on the cath in )  Neither lesion was amenable to PCI  Medical management  • DIALYSIS FISTULA CREATION         Family History   Problem Relation Age of Onset   • Hypertension Mother    • Hypertension Father      I have reviewed and agree with the history as documented  E-Cigarette/Vaping   • E-Cigarette Use Never User      E-Cigarette/Vaping Substances   • Nicotine No    • THC No    • CBD No    • Flavoring No    • Other No    • Unknown No      Social History     Tobacco Use   • Smoking status: Former     Packs/day: 1 00     Years: 20 00     Total pack years: 20 00     Types: Cigarettes     Start date:      Quit date:      Years since quittin 4   • Smokeless tobacco: Never   • Tobacco comments:     STATES QUIT 30 YEARS AGO/ ABOUT ONE CIGARETTE DAILY   Vaping Use   • Vaping Use: Never used   Substance Use Topics   • Alcohol use: Never   • Drug use: Never       Review of Systems   Constitutional: Negative  HENT: Negative  Eyes: Negative  Respiratory: Positive for chest tightness and shortness of breath  Cardiovascular: Negative  Gastrointestinal: Positive for nausea  Endocrine: Negative  Genitourinary: Negative  Musculoskeletal: Negative  Skin: Negative  Allergic/Immunologic: Negative  Neurological: Positive for weakness  Hematological: Negative  Psychiatric/Behavioral: The patient is nervous/anxious  Physical Exam  Physical Exam  Vitals and nursing note reviewed  Constitutional:       General: He is in acute distress  Appearance: He is ill-appearing and diaphoretic  HENT:      Head: Normocephalic and atraumatic  Eyes:      Extraocular Movements: Extraocular movements intact  Pupils: Pupils are equal, round, and reactive to light  Cardiovascular:      Rate and Rhythm: Regular rhythm  Tachycardia present  Pulmonary:      Effort: Tachypnea and respiratory distress present  Breath sounds: Rales present  Abdominal:      General: Bowel sounds are normal       Palpations: Abdomen is soft  Musculoskeletal:         General: Normal range of motion  Cervical back: Normal range of motion and neck supple  Right lower leg: Edema present  Left lower leg: Edema (chronic LE edema ) present  Skin:     General: Skin is warm  Neurological:      General: No focal deficit present  Mental Status: He is oriented to person, place, and time  Psychiatric:         Mood and Affect: Mood is anxious           Vital Signs  ED Triage Vitals   Temperature Pulse Respirations Blood Pressure SpO2   06/19/23 0828 06/19/23 0828 06/19/23 0828 06/19/23 0841 06/19/23 0828   (!) 96 8 °F (36 °C) (!) 106 (!) 28 (S) (!) 264/125 90 %      Temp Source Heart Rate Source Patient Position - Orthostatic VS BP Location FiO2 (%)   06/19/23 0828 06/19/23 0828 -- 06/19/23 0854 --   Temporal Monitor  Right arm       Pain Score       06/19/23 1100       No Pain           Vitals:    06/19/23 1530 06/19/23 1600 06/19/23 1615 06/19/23 1630   BP: 139/61 (!) 183/86 153/70 147/71   Pulse: 72 93 78 75         Visual Acuity      ED Medications  Medications   amLODIPine (NORVASC) tablet 5 mg (has no administration in time range)   aspirin (ECOTRIN LOW STRENGTH) EC tablet 81 mg (81 mg Oral Not Given 6/19/23 1123)   atorvastatin (LIPITOR) tablet 40 mg (40 mg Oral Not Given 6/19/23 1124)   calcium acetate (PHOSLO) capsule 667 mg (667 mg Oral Not Given 6/19/23 1158)   carvedilol (COREG) tablet 25 mg (has no administration in time range)   gabapentin (NEURONTIN) capsule 100 mg (100 mg Oral Not Given 6/19/23 1124)   losartan (COZAAR) tablet 100 mg (100 mg Oral Not Given 6/19/23 1158)   lanthanum (FOSRENOL) chewable tablet 500 mg (has no administration in time range)   cloNIDine (CATAPRES-TTS-2) 0 2 mg/24 hr TD weekly patch (0 2 mg Transdermal Medication Applied 6/19/23 1130)   chlorhexidine (PERIDEX) 0 12 % oral rinse 15 mL (15 mL Mouth/Throat Not Given 6/19/23 1158)   heparin (porcine) subcutaneous injection 5,000 Units (5,000 Units Subcutaneous Given 6/19/23 1526)   niCARdipine (CARDENE) 50 mg (DOUBLE CONCENTRATION) IV in sodium chloride 0 9% 250 mL (5 mg/hr Intravenous Rate/Dose Change 6/19/23 1611)   insulin regular (HumuLIN R,NovoLIN R) 1 Units/mL in sodium chloride 0 9 % 100 mL infusion (0 Units/hr Intravenous Stopped 6/19/23 1513)   ondansetron (FOR EMS ONLY) (ZOFRAN) 4 mg/2 mL injection 4 mg (0 mg Does not apply Given to EMS 6/19/23 0840)   ipratropium-albuterol (FOR EMS ONLY) (DUO-NEB) 0 5-2 5 mg/3 mL inhalation solution 3 mL (0 mL Does not apply Given to EMS 6/19/23 0840)   nitroglycerin (FOR EMS ONLY) (NITRO-BID) 2 % TD ointment 1 inch (0 inches Does not apply Given to EMS 6/19/23 0840)   furosemide (LASIX) injection 80 mg (80 mg Intravenous Given 6/19/23 0831)   nitroglycerin (NITRO-BID) 2 % TD ointment 1 inch (1 inch Topical Given 6/19/23 0897)   LORazepam (ATIVAN) injection 0 5 mg (0 5 mg Intravenous Given 6/19/23 0857)   nitroglycerin (NITRO-BID) 2 % TD ointment 1 inch (1 inch Topical Given 6/19/23 1231)   dextrose 50 % IV solution 25 mL (25 mL Intravenous Given 6/19/23 1526)       Diagnostic Studies  Results Reviewed     Procedure Component Value Units Date/Time    HS Troponin I 4hr [[id number]]  (Abnormal) Collected: 06/19/23 1457    Lab Status: Final result Specimen: Blood from Line, Venous Updated: 06/19/23 1527     hs TnI 4hr 367 ng/L      Delta 4hr hsTnI 314 ng/L     HS Troponin I 2hr [[id number]]  (Abnormal) Collected: 06/19/23 1205    Lab Status: Final result Specimen: Blood from Arm, Right Updated: 06/19/23 1303     hs TnI 2hr 179 ng/L      Delta 2hr hsTnI 126 ng/L     Fingerstick Glucose (POCT) [[id number]]  (Abnormal) Collected: 06/19/23 1036    Lab Status: Final result Updated: 06/19/23 1037     POC Glucose 349 mg/dl     FLU/RSV/COVID - if FLU/RSV clinically relevant [[id number]]  (Normal) Collected: 06/19/23 0854    Lab Status: Final result Specimen: Nares from Nose Updated: 06/19/23 0938     SARS-CoV-2 Negative     INFLUENZA A PCR Negative     INFLUENZA B PCR Negative     RSV PCR Negative    Narrative:      FOR PEDIATRIC PATIENTS - copy/paste COVID Guidelines URL to browser: [email or web address]    SARS-CoV-2 assay is a Nucleic Acid Amplification assay intended for the  qualitative detection of nucleic acid from SARS-CoV-2 in nasopharyngeal  swabs  Results are for the presumptive identification of SARS-CoV-2 RNA  Positive results are indicative of infection with SARS-CoV-2, the virus  causing COVID-19, but do not rule out bacterial infection or co-infection  with other viruses  Laboratories within the [place] and its  territories are required to report all positive results to the appropriate  public health authorities  Negative results do not preclude SARS-CoV-2  infection and should not be used as the sole basis for treatment or other  patient management decisions   Negative results must be combined with  clinical observations, patient history, and epidemiological information  This test has not been FDA cleared or approved  This test has been authorized by FDA under an Emergency Use Authorization  (EUA)  This test is only authorized for the duration of time the  declaration that circumstances exist justifying the authorization of the  emergency use of an in vitro diagnostic tests for detection of SARS-CoV-2  virus and/or diagnosis of COVID-19 infection under section 564(b)(1) of  the Act, 21 U  S C  727IMJ-5(A)(5), unless the authorization is terminated  or revoked sooner  The test has been validated but independent review by FDA  and CLIA is pending  Test performed using [employer] GeneXpert: This RT-PCR assay targets N2,  a region unique to SARS-CoV-2  A conserved region in the E-gene was chosen  for pan-Sarbecovirus detection which includes SARS-CoV-2  According to CMS-2020-01-R, this platform meets the definition of high-throughput technology      HS Troponin 0hr (reflex protocol) [[id number]]  (Abnormal) Collected: 06/19/23 0836    Lab Status: Final result Specimen: Blood from Arm, Right Updated: 06/19/23 0906     hs TnI 0hr 53 ng/L     Basic metabolic panel [[id number]]  (Abnormal) Collected: 06/19/23 0836    Lab Status: Final result Specimen: Blood from Arm, Right Updated: 06/19/23 0858     Sodium 137 mmol/L      Potassium 4 9 mmol/L      Chloride 95 mmol/L      CO2 27 mmol/L      ANION GAP 15 mmol/L      BUN 69 mg/dL      Creatinine 10 89 mg/dL      Glucose 258 mg/dL      Calcium 8 9 mg/dL      eGFR 4 ml/min/1 73sq m     Narrative:      [place] guidelines for Chronic Kidney Disease (CKD):   •  Stage 1 with normal or high GFR (GFR > 90 mL/min/1 73 square meters)  •  Stage 2 Mild CKD (GFR = 60-89 mL/min/1 73 square meters)  •  Stage 3A Moderate CKD (GFR = 45-59 mL/min/1 73 square meters)  •  Stage 3B Moderate CKD (GFR = 30-44 mL/min/1 73 square meters)  •  Stage 4 Severe CKD (GFR = 15-29 mL/min/1 73 square meters)  •  Stage 5 End Stage CKD (GFR <15 mL/min/1 73 square meters)  Note: GFR calculation is accurate only with a steady state creatinine    Magnesium [[id number]]  (Normal) Collected: 06/19/23 0836    Lab Status: Final result Specimen: Blood from Arm, Right Updated: 06/19/23 0858     Magnesium 2 5 mg/dL     Phosphorus [[id number]]  (Abnormal) Collected: 06/19/23 0836    Lab Status: Final result Specimen: Blood from Arm, Right Updated: 06/19/23 0858     Phosphorus 4 8 mg/dL     Protime-INR [[id number]]  (Normal) Collected: 06/19/23 0836    Lab Status: Final result Specimen: Blood from Arm, Right Updated: 06/19/23 0854     Protime 14 1 seconds      INR 1 09    CBC and differential [[id number]]  (Abnormal) Collected: 06/19/23 0836    Lab Status: Final result Specimen: Blood from Arm, Right Updated: 06/19/23 0846     WBC 15 63 Thousand/uL      RBC 3 83 Million/uL      Hemoglobin 10 5 g/dL      Hematocrit 34 1 %      MCV 89 fL      MCH 27 4 pg      MCHC 30 8 g/dL      RDW 17 1 %      MPV 10 8 fL      Platelets 226 Thousands/uL      nRBC 0 /100 WBCs      Neutrophils Relative 71 %      Immat GRANS % 0 %      Lymphocytes Relative 19 %      Monocytes Relative 8 %      Eosinophils Relative 2 %      Basophils Relative 0 %      Neutrophils Absolute 11 03 Thousands/µL      Immature Grans Absolute 0 06 Thousand/uL      Lymphocytes Absolute 2 95 Thousands/µL      Monocytes Absolute 1 23 Thousand/µL      Eosinophils Absolute 0 32 Thousand/µL      Basophils Absolute 0 04 Thousands/µL                  XR chest 1 view portable   Final Result by Yahir Pierre MD (06/19 1044)   Prominent central congestion pattern of likely cardiogenic origin        Workstation performed: [id number]                    Procedures  CriticalCare Time    Date/Time: 6/19/2023 5:01 PM    Performed by: Naomi Robertson MD  Authorized by: Naomi Robertson MD    Critical care provider statement:     Critical care time (minutes):  65    Critical care start time:  6/19/2023 8:10 AM Critical care end time:  6/19/2023 9:30 AM    Critical care time was exclusive of:  Separately billable procedures and treating other patients and teaching time    Critical care was necessary to treat or prevent imminent or life-threatening deterioration of the following conditions:  Cardiac failure, circulatory failure, CNS failure or compromise and respiratory failure    Critical care was time spent personally by me on the following activities:  Blood draw for specimens, obtaining history from patient or surrogate, development of treatment plan with patient or surrogate, discussions with consultants, discussions with primary provider, evaluation of patient's response to treatment, examination of patient, review of old charts, re-evaluation of patient's condition, ordering and review of radiographic studies, ordering and review of laboratory studies, ordering and performing treatments and interventions and interpretation of cardiac output measurements    I assumed direction of critical care for this patient from another provider in my specialty: no               ED Course  ED Course as of 06/19/23 1702   Mon Jun 19, 2023   0903 PT continues to remove his mask, states he is going to vomit  We will give 0 5 Ativan   0903 Nephrology consulted    8502 ICU Team by bedside  Requesting to start pt on Nicardipine drip    0926 Patient started to calm down after Ativan  Family by bedside provided with updates  Medical Decision Making  64-year-old male with a history of the CKD on dialysis presented presented to ED with complaint of shortness of breath that started this morning  Patient was brought to the ED in respiratory distress and elevated blood pressure  Initially placed on CPAP and was given nitroglycerin  Upon arrival to the ED patient was placed on BiPAP Lasix were ordered  Will obtain labs chest x-ray COVID    Differential diagnoses include flash pulmonary edema/pneumothorax/pneumonia/COVID/sepsis  Patient symptoms slightly improved on BiPAP Case was discussed with critical care and was admitted under their service  Critical care requesting to place the patient on cardine drip  Please see my ED course for further documentation  Disposition: Admission under critical care service  His case was discussed with the patient family who is by bedside  Amount and/or Complexity of Data Reviewed  Labs: ordered  Risk  Prescription drug management  Decision regarding hospitalization  Disposition  Final diagnoses:   Flash pulmonary edema (HCC)   Hypoxia   Dyspnea   SOB (shortness of breath)   Hypertensive CKD, ESRD on dialysis [facility])     Time reflects when diagnosis was documented in both MDM as applicable and the Disposition within this note     Time User Action Codes Description Comment    6/19/2023  9:26 AM Loanne Northern Add [J81 0] Flash pulmonary edema ([address] )     6/19/2023  9:26 AM Loanne Northern Add [R09 02] Hypoxia     6/19/2023  9:26 AM Loanne Northern Add [R06 00] Dyspnea     6/19/2023  9:26 AM Loanne Northern Add [R06 02] SOB (shortness of breath)     6/19/2023  9:27 AM Loanne Northern Add [I12 0,  N18 6,  Z99 2] Hypertensive CKD, ESRD on dialysis [facility])       ED Disposition     ED Disposition   Admit    Condition   Stable    Date/Time   Mon Jun 19, 2023  9:26 AM    Comment   Case was discussed with Dr Roxana Porras and the patient's admission status was agreed to be Admission Status: inpatient status to the service of Dr Diego Ortega              Follow-up Information    None         Current Discharge Medication List      CONTINUE these medications which have NOT CHANGED    Details   amLODIPine (NORVASC) 2 5 mg tablet Take 5 mg by mouth daily at bedtime      aspirin (ECOTRIN LOW STRENGTH) 81 mg EC tablet Take by mouth      atorvastatin (LIPITOR) 40 mg tablet Take 40 mg by mouth daily      calcium acetate (PHOSLO) capsule 3 (three) times a day Not on dialysis days carvedilol (COREG) 25 mg tablet Take 25 mg by mouth 2 (two) times a day with meals       gabapentin (NEURONTIN) 100 mg capsule Take 1 cap by mouth daily  Qty: 30 capsule, Refills: 3    Associated Diagnoses: Neuropathy      insulin aspart (NovoLOG FlexPen) 100 UNIT/ML injection pen Inject under the skin Three times a day       Insulin Pen Needle (UltiCare Short Pen Needles) 31G X 8 MM MISC INJECT 4 TIMES DAILY;E11 9      lanthanum (FOSRENOL) 500 mg chewable tablet CRUSH OR CHEW AND SWALLOW 1 TABLET THREE TIMES A DAY WITH MEALS      lidocaine-prilocaine (EMLA) cream APPLY SMALL AMOUNT TO ACCESS SITE (AVF) 1 TO 2 HOURS BEFORE DIALYSIS  COVER WITH OCCLUSIVE DRESSING (SARAN WRAP)      Lokelma 10 g PACK EMPTY CONTENTS OF 1 PACKET IN 3 TABLESPOONSFUL OF WATER OR AS DIRECTED BY CLINIC  STIR WELL AND DRINK IMMEDIATELY ON NON-DIALYSIS DAYS      losartan (COZAAR) 100 MG tablet Take 100 mg by mouth in the morning  sevelamer carbonate (RENVELA) 800 mg tablet Take 800 mg by mouth Three times a day              No discharge procedures on file      PDMP Review     None          ED Provider  Electronically Signed by           Ceci Moreira MD  06/19/23 1672

## 2023-06-19 NOTE — H&P
Samantha 46  H&P  Name: Jassi Mckeon 68 y o  male I MRN: [id number]  Unit/Bed#: ICU 05-01 I Date of Admission: 6/19/2023   Date of Service: 6/19/2023 I Hospital Day: 0      Assessment/Plan   Flash pulmonary edema (HCC)  Assessment & Plan  · Presented with hypoxia and increased work of breathing  · Likely in the setting of flash pulmonary edema related to hypertension  · Plan for emergent IHD for volume management   · Start cardene for goal -180  · Continue BIPAP for work of breathing     Acute respiratory failure with hypoxia ([facility] )  Assessment & Plan  · Likely in the setting of flash pulmonary edema and volume overload   · Continue BIPAP 14/8 80%, titrate for SpO2>90  · Plan for volume removal with urgent IHD   · Encourage cough, deep breathing, IS, ambulation    SIRS (systemic inflammatory response syndrome) ([facility] [address] )  Assessment & Plan  · Presented with tachycardia and leukocytosis   · Suspect this is likely reactive in the setting of pulmonary edema and hypertensive urgency  · Will monitor off of abx for now   · Monitor WBC and fever curve     Hypertensive urgency  Assessment & Plan  · Appears uncontrolled as an outpatient  · Continue cardene infusion to maintain -180   · Restart home losartan, coreg, clonidine patch, and norvasc     ESRD (end stage renal disease) [facility])  Assessment & Plan  Lab Results   Component Value Date    EGFR 4 06/19/2023    EGFR 3 01/15/2022    EGFR 4 01/14/2022    CREATININE 10 89 (H) 06/19/2023    CREATININE 11 79 (H) 01/15/2022    CREATININE 9 69 (H) 01/14/2022   · Hypertensive ESRD on MWF IHD   · Now with significant volume overload and pulmonary edema requiring urgent IHD  · Nephrology consulted, appreciate recommendations     Elevated troponin  Assessment & Plan  · Likely in the setting of flash pulmonary edema  · EKG without ST changes  · Obtain ECHO  · Continue to trend until peak     SIVA (obstructive sleep apnea)  Assessment & Plan  · New diagnosis, currently undergoing workup for CPAP  · Continue BIPAP PRN and HS     Mixed hyperlipidemia  Assessment & Plan  · Continue home statin     Type 2 diabetes mellitus without complication, with long-term current use of insulin [facility])  Assessment & Plan  Lab Results   Component Value Date    HGBA1C 7 6 (H) 08/04/2022       Recent Labs     06/19/23  1036 06/19/23  1236   POCGLU 349* 294*       Blood Sugar Average: Last 72 hrs:  (P) 321 5   · With significant hyperglycemia   · Start insulin infusion for goal glucose 140-180          History of Present Illness     HPI: Ene Robins is a 68 y o  who presents with acute respiratory failure with hypoxia in the setting of flash pulmonary edema  He has PMH HTN, ESRD on IHD, HTN, HLD, and DM2  He states that he last received IHD on Friday per his schedule  He was in his normal state of health this past weekend, and states that he went out to eat with his family yesterday  He woke abruptly this AM with severe dyspnea  In the ED he was placed on BIPAP and given IV lasix  Nephrology was consulted for urgent IHD  He will be admitted to [facility] for further monitoring and management  History obtained from chart review and the patient  Review of Systems   Constitutional: Negative  HENT: Negative  Eyes: Negative  Respiratory: Positive for shortness of breath  Cardiovascular: Negative  Gastrointestinal: Negative  Endocrine: Negative  Genitourinary: Negative  Musculoskeletal: Negative  Skin: Negative  Allergic/Immunologic: Negative  Neurological: Negative  Hematological: Negative  Psychiatric/Behavioral: Negative         Historical Information   Past Medical History:  No date: Coronary artery disease  No date: Diabetes mellitus  No date: End stage renal disease  No date: Hyperlipidemia  No date: Hypertension  2012: NSTEMI (non-ST elevated myocardial infarction)  No date: TIA (transient ischemic attack) Past Surgical History:  2017: CARDIAC CATHETERIZATION      Comment:  70% lesion of a small R PDA and a 99% stenosis of D1                (previously noted on the cath in 2012)  Neither lesion                was amenable to PCI  Medical management  No date: DIALYSIS FISTULA CREATION   Current Outpatient Medications   Medication Instructions   • amLODIPine (NORVASC) 5 mg, Oral, Daily at bedtime   • aspirin (ECOTRIN LOW STRENGTH) 81 mg EC tablet Oral   • atorvastatin (LIPITOR) 40 mg, Oral, Daily   • calcium acetate (PHOSLO) capsule 3 times daily, Not on dialysis days   • carvedilol (COREG) 25 mg, Oral, 2 times daily with meals   • gabapentin (NEURONTIN) 100 mg capsule Take 1 cap by mouth daily   • insulin aspart (NovoLOG FlexPen) 100 UNIT/ML injection pen Subcutaneous, 3 times daily   • Insulin Pen Needle (UltiCare Short Pen Needles) 31G X 8 MM MISC INJECT 4 TIMES DAILY;E11 9   • lanthanum (FOSRENOL) 500 mg chewable tablet CRUSH OR CHEW AND SWALLOW 1 TABLET THREE TIMES A DAY WITH MEALS   • lidocaine-prilocaine (EMLA) cream APPLY SMALL AMOUNT TO ACCESS SITE (AVF) 1 TO 2 HOURS BEFORE DIALYSIS  COVER WITH OCCLUSIVE DRESSING (SARAN WRAP)   • Lokelma 10 g PACK EMPTY CONTENTS OF 1 PACKET IN 3 TABLESPOONSFUL OF WATER OR AS DIRECTED BY CLINIC   STIR WELL AND DRINK IMMEDIATELY ON NON-DIALYSIS DAYS   • losartan (COZAAR) 100 mg, Oral, Daily   • sevelamer carbonate (RENVELA) 800 mg, 3 times daily    No Known Allergies   Social History     Tobacco Use   • Smoking status: Former     Packs/day: 1 00     Years: 20 00     Total pack years: 20 00     Types: Cigarettes     Start date:      Quit date:      Years since quittin 4   • Smokeless tobacco: Never   • Tobacco comments:     STATES QUIT 30 YEARS AGO/ ABOUT ONE CIGARETTE DAILY   Vaping Use   • Vaping Use: Never used   Substance Use Topics   • Alcohol use: Never   • Drug use: Never    Family History   Problem Relation Age of Onset   • Hypertension Mother    • Hypertension Father           Objective Vitals I/O      Most Recent Min/Max in 24hrs   Temp (!) 96 8 °F (36 °C) Temp  Min: 96 8 °F (36 °C)  Max: 96 8 °F (36 °C)   Pulse 81 Pulse  Min: 81  Max: 106   Resp 21 Resp  Min: 21  Max: 42   /68 BP  Min: 134/63  Max: 264/125   O2 Sat 99 % SpO2  Min: 90 %  Max: 99 %      Intake/Output Summary (Last 24 hours) at 6/19/2023 1505  Last data filed at 6/19/2023 1430  Gross per 24 hour   Intake 500 ml   Output --   Net 500 ml         Diet NPO     Invasive Monitoring Physical exam    Physical Exam  Constitutional:       General: He is in acute distress  Appearance: He is ill-appearing  HENT:      Head: Normocephalic and atraumatic  Mouth/Throat:      Mouth: Mucous membranes are moist    Eyes:      Pupils: Pupils are equal, round, and reactive to light  Cardiovascular:      Rate and Rhythm: Regular rhythm  Tachycardia present  Pulmonary:      [place]: Respiratory distress present  Breath sounds: Rales present  No wheezing or rhonchi  Abdominal:      General: Bowel sounds are normal  There is distension  Palpations: Abdomen is soft  Tenderness: There is no abdominal tenderness  Musculoskeletal:         General: Swelling present  Normal range of motion  Cervical back: Neck supple  Right lower leg: Edema present  Left lower leg: Edema present  Skin:     General: Skin is warm and dry  Capillary Refill: Capillary refill takes less than 2 seconds  Neurological:      General: No focal deficit present  Mental Status: He is alert and oriented to person, place, and time  Cranial Nerves: No cranial nerve deficit  Sensory: No sensory deficit  Motor: No weakness  Diagnostic Studies      EKG: St rate 100  Imaging:  I have personally reviewed pertinent reports     and I have personally reviewed pertinent films in PACS     Medications:  Scheduled PRN   amLODIPine, 5 mg, HS  aspirin, 81 mg, Once  atorvastatin, 40 mg, Daily  calcium acetate, 667 mg, TID With Meals  carvedilol, 25 mg, BID With Meals  chlorhexidine, 15 mL, Q12H [address]  cloNIDine, 0 2 mg, Weekly  gabapentin, 100 mg, Daily  heparin (porcine), 5,000 Units, Q8H [address]  lanthanum, 500 mg, TID With Meals  losartan, 100 mg, Daily          Continuous    insulin regular (HumuLIN R,NovoLIN R) 1 Units/mL in sodium chloride 0 9 % 100 mL infusion, 0 3-21 Units/hr, Last Rate: 7 Units/hr (06/19/23 1236)  niCARdipine, 10 mg/hr, Last Rate: 5 mg/hr (06/19/23 1330)         Labs:    CBC    Recent Labs     06/19/23  0836   WBC 15 63*   HGB 10 5*   HCT 34 1*        BMP    Recent Labs     06/19/23  0836   SODIUM 137   K 4 9   CL 95*   CO2 27   AGAP 15*   BUN 69*   CREATININE 10 89*   CALCIUM 8 9       Coags    Recent Labs     06/19/23  0836   INR 1 09        Additional Electrolytes  Recent Labs     06/19/23  0836   MG 2 5   PHOS 4 8*          Blood Gas    No recent results  No recent results LFTs  No recent results    Infectious  No recent results  Glucose  Recent Labs     06/19/23  0836   GLUC 258*             Critical Care Time Delivered: Upon my evaluation, this patient had a high probability of imminent or life-threatening deterioration due to respiratory failure, which required my direct attention, intervention, and personal management  I have personally provided 35 minutes of critical care time, exclusive of procedures, teaching, family meetings, and any prior time recorded by providers other than myself     Anticipated Length of Stay is > 2 midnights  LAN Zepeda

## 2023-06-19 NOTE — ASSESSMENT & PLAN NOTE
· Presented with tachycardia and leukocytosis   · Suspect this is likely reactive in the setting of pulmonary edema and hypertensive urgency  · Will monitor off of abx for now   · Monitor WBC and fever curve

## 2023-06-19 NOTE — ASSESSMENT & PLAN NOTE
Lab Results   Component Value Date    HGBA1C 7 6 (H) 08/04/2022       Recent Labs     06/19/23  1036 06/19/23  1236   POCGLU 349* 294*       Blood Sugar Average: Last 72 hrs:  (P) 321 5   · With significant hyperglycemia   · Start insulin infusion for goal glucose 140-180

## 2023-06-19 NOTE — ASSESSMENT & PLAN NOTE
Lab Results   Component Value Date    EGFR 4 06/19/2023    EGFR 3 01/15/2022    EGFR 4 01/14/2022    CREATININE 10 89 (H) 06/19/2023    CREATININE 11 79 (H) 01/15/2022    CREATININE 9 69 (H) 01/14/2022   · Hypertensive ESRD on MWF IHD   · Now with significant volume overload and pulmonary edema requiring urgent IHD  · Nephrology consulted, appreciate recommendations

## 2023-06-19 NOTE — ASSESSMENT & PLAN NOTE
· Appears uncontrolled as an outpatient  · Continue cardene infusion to maintain -180   · Restart home losartan, coreg, clonidine patch, and norvasc

## 2023-06-19 NOTE — ASSESSMENT & PLAN NOTE
· Presented with hypoxia and increased work of breathing  · Likely in the setting of flash pulmonary edema related to hypertension  · Plan for emergent IHD for volume management   · Start cardene for goal -180  · Continue BIPAP for work of breathing

## 2023-06-20 ENCOUNTER — APPOINTMENT (INPATIENT)
Dept: NON INVASIVE DIAGNOSTICS | Facility: HOSPITAL | Age: 73
DRG: 189 | End: 2023-06-20
Payer: COMMERCIAL

## 2023-06-20 ENCOUNTER — APPOINTMENT (INPATIENT)
Dept: DIALYSIS | Facility: HOSPITAL | Age: 73
DRG: 189 | End: 2023-06-20
Payer: COMMERCIAL

## 2023-06-20 LAB
ALBUMIN SERPL BCP-MCNC: 3.6 G/DL (ref 3.5–5)
ALP SERPL-CCNC: 59 U/L (ref 34–104)
ALT SERPL W P-5'-P-CCNC: 9 U/L (ref 7–52)
ANION GAP SERPL CALCULATED.3IONS-SCNC: 11 MMOL/L (ref 4–13)
AORTIC ROOT: 3.4 CM
AORTIC VALVE MEAN VELOCITY: 15.4 M/S
APICAL FOUR CHAMBER EJECTION FRACTION: 62 %
ASCENDING AORTA: 3.1 CM
AST SERPL W P-5'-P-CCNC: 14 U/L (ref 13–39)
ATRIAL RATE: 77 BPM
ATRIAL RATE: 86 BPM
AV AREA BY CONTINUOUS VTI: 1.7 CM2
AV AREA PEAK VELOCITY: 1.6 CM2
AV LVOT MEAN GRADIENT: 3 MMHG
AV LVOT PEAK GRADIENT: 5 MMHG
AV MEAN GRADIENT: 11 MMHG
AV PEAK GRADIENT: 20 MMHG
AV VALVE AREA: 1.66 CM2
AV VELOCITY RATIO: 0.5
BASOPHILS # BLD AUTO: 0.02 THOUSANDS/ÂΜL (ref 0–0.1)
BASOPHILS NFR BLD AUTO: 0 % (ref 0–1)
BILIRUB SERPL-MCNC: 0.46 MG/DL (ref 0.2–1)
BUN SERPL-MCNC: 45 MG/DL (ref 5–25)
CALCIUM SERPL-MCNC: 8.4 MG/DL (ref 8.4–10.2)
CARDIAC TROPONIN I PNL SERPL HS: 538 NG/L (ref 8–18)
CHLORIDE SERPL-SCNC: 94 MMOL/L (ref 96–108)
CO2 SERPL-SCNC: 30 MMOL/L (ref 21–32)
CREAT SERPL-MCNC: 8.02 MG/DL (ref 0.6–1.3)
DOP CALC AO PEAK VEL: 2.21 M/S
DOP CALC AO VTI: 52.64 CM
DOP CALC LVOT AREA: 3.14 CM2
DOP CALC LVOT DIAMETER: 2 CM
DOP CALC LVOT PEAK VEL VTI: 27.76 CM
DOP CALC LVOT PEAK VEL: 1.1 M/S
DOP CALC LVOT STROKE INDEX: 40.6 ML/M2
DOP CALC LVOT STROKE VOLUME: 87.17
E WAVE DECELERATION TIME: 222 MS
EOSINOPHIL # BLD AUTO: 0.07 THOUSAND/ÂΜL (ref 0–0.61)
EOSINOPHIL NFR BLD AUTO: 1 % (ref 0–6)
ERYTHROCYTE [DISTWIDTH] IN BLOOD BY AUTOMATED COUNT: 16.7 % (ref 11.6–15.1)
FRACTIONAL SHORTENING: 29 (ref 28–44)
GFR SERPL CREATININE-BSD FRML MDRD: 6 ML/MIN/1.73SQ M
GLUCOSE SERPL-MCNC: 111 MG/DL (ref 65–140)
GLUCOSE SERPL-MCNC: 127 MG/DL (ref 65–140)
GLUCOSE SERPL-MCNC: 186 MG/DL (ref 65–140)
GLUCOSE SERPL-MCNC: 190 MG/DL (ref 65–140)
GLUCOSE SERPL-MCNC: 82 MG/DL (ref 65–140)
GLUCOSE SERPL-MCNC: 92 MG/DL (ref 65–140)
GLUCOSE SERPL-MCNC: 96 MG/DL (ref 65–140)
HCT VFR BLD AUTO: 27.8 % (ref 36.5–49.3)
HGB BLD-MCNC: 8.6 G/DL (ref 12–17)
HGB BLD-MCNC: 9 G/DL (ref 12–17)
IMM GRANULOCYTES # BLD AUTO: 0.02 THOUSAND/UL (ref 0–0.2)
IMM GRANULOCYTES NFR BLD AUTO: 0 % (ref 0–2)
INTERVENTRICULAR SEPTUM IN DIASTOLE (PARASTERNAL SHORT AXIS VIEW): 2.1 CM
INTERVENTRICULAR SEPTUM: 2.1 CM (ref 0.6–1.1)
LAAS-AP2: 24.4 CM2
LAAS-AP4: 23.8 CM2
LEFT ATRIUM SIZE: 5.2 CM
LEFT INTERNAL DIMENSION IN SYSTOLE: 3.6 CM (ref 2.1–4)
LEFT VENTRICULAR INTERNAL DIMENSION IN DIASTOLE: 5.1 CM (ref 3.5–6)
LEFT VENTRICULAR POSTERIOR WALL IN END DIASTOLE: 1.3 CM
LEFT VENTRICULAR STROKE VOLUME: 70 ML
LVSV (TEICH): 70 ML
LYMPHOCYTES # BLD AUTO: 1.3 THOUSANDS/ÂΜL (ref 0.6–4.47)
LYMPHOCYTES NFR BLD AUTO: 17 % (ref 14–44)
MAGNESIUM SERPL-MCNC: 2.1 MG/DL (ref 1.9–2.7)
MCH RBC QN AUTO: 27 PG (ref 26.8–34.3)
MCHC RBC AUTO-ENTMCNC: 30.9 G/DL (ref 31.4–37.4)
MCV RBC AUTO: 87 FL (ref 82–98)
MONOCYTES # BLD AUTO: 0.88 THOUSAND/ÂΜL (ref 0.17–1.22)
MONOCYTES NFR BLD AUTO: 11 % (ref 4–12)
MV E'TISSUE VEL-SEP: 5 CM/S
MV PEAK A VEL: 1.31 M/S
MV PEAK E VEL: 140 CM/S
MV STENOSIS PRESSURE HALF TIME: 64 MS
MV VALVE AREA P 1/2 METHOD: 3.44
NEUTROPHILS # BLD AUTO: 5.43 THOUSANDS/ÂΜL (ref 1.85–7.62)
NEUTS SEG NFR BLD AUTO: 71 % (ref 43–75)
NRBC BLD AUTO-RTO: 0 /100 WBCS
P AXIS: 67 DEGREES
P AXIS: 72 DEGREES
PHOSPHATE SERPL-MCNC: 4.3 MG/DL (ref 2.3–4.1)
PLATELET # BLD AUTO: 105 THOUSANDS/UL (ref 149–390)
PMV BLD AUTO: 11.7 FL (ref 8.9–12.7)
POTASSIUM SERPL-SCNC: 4.6 MMOL/L (ref 3.5–5.3)
PR INTERVAL: 176 MS
PR INTERVAL: 182 MS
PROT SERPL-MCNC: 7.1 G/DL (ref 6.4–8.4)
QRS AXIS: 63 DEGREES
QRS AXIS: 63 DEGREES
QRSD INTERVAL: 104 MS
QRSD INTERVAL: 92 MS
QT INTERVAL: 392 MS
QT INTERVAL: 404 MS
QTC INTERVAL: 457 MS
QTC INTERVAL: 469 MS
RA PRESSURE ESTIMATED: 15 MMHG
RBC # BLD AUTO: 3.19 MILLION/UL (ref 3.88–5.62)
RIGHT VENTRICLE ID DIMENSION: 4.3 CM
RV PSP: 75 MMHG
SL CV LEFT ATRIUM LENGTH A2C: 5.5 CM
SL CV LV EF: 55
SL CV PED ECHO LEFT VENTRICLE DIASTOLIC VOLUME (MOD BIPLANE) 2D: 126 ML
SL CV PED ECHO LEFT VENTRICLE SYSTOLIC VOLUME (MOD BIPLANE) 2D: 55 ML
SODIUM SERPL-SCNC: 135 MMOL/L (ref 135–147)
T WAVE AXIS: 90 DEGREES
T WAVE AXIS: 99 DEGREES
TR MAX PG: 60 MMHG
TR PEAK VELOCITY: 3.9 M/S
TRICUSPID ANNULAR PLANE SYSTOLIC EXCURSION: 2.5 CM
TRICUSPID VALVE PEAK REGURGITATION VELOCITY: 3.88 M/S
VENTRICULAR RATE: 77 BPM
VENTRICULAR RATE: 86 BPM
WBC # BLD AUTO: 7.72 THOUSAND/UL (ref 4.31–10.16)

## 2023-06-20 PROCEDURE — 84100 ASSAY OF PHOSPHORUS: CPT | Performed by: NURSE PRACTITIONER

## 2023-06-20 PROCEDURE — 85018 HEMOGLOBIN: CPT | Performed by: NURSE PRACTITIONER

## 2023-06-20 PROCEDURE — 84484 ASSAY OF TROPONIN QUANT: CPT | Performed by: NURSE PRACTITIONER

## 2023-06-20 PROCEDURE — 83735 ASSAY OF MAGNESIUM: CPT | Performed by: NURSE PRACTITIONER

## 2023-06-20 PROCEDURE — 94660 CPAP INITIATION&MGMT: CPT

## 2023-06-20 PROCEDURE — 94760 N-INVAS EAR/PLS OXIMETRY 1: CPT

## 2023-06-20 PROCEDURE — 87081 CULTURE SCREEN ONLY: CPT | Performed by: ANESTHESIOLOGY

## 2023-06-20 PROCEDURE — 99291 CRITICAL CARE FIRST HOUR: CPT | Performed by: INTERNAL MEDICINE

## 2023-06-20 PROCEDURE — 82948 REAGENT STRIP/BLOOD GLUCOSE: CPT

## 2023-06-20 PROCEDURE — 85025 COMPLETE CBC W/AUTO DIFF WBC: CPT | Performed by: NURSE PRACTITIONER

## 2023-06-20 PROCEDURE — 99232 SBSQ HOSP IP/OBS MODERATE 35: CPT | Performed by: INTERNAL MEDICINE

## 2023-06-20 PROCEDURE — 80053 COMPREHEN METABOLIC PANEL: CPT | Performed by: NURSE PRACTITIONER

## 2023-06-20 PROCEDURE — 93010 ELECTROCARDIOGRAM REPORT: CPT | Performed by: INTERNAL MEDICINE

## 2023-06-20 PROCEDURE — 93306 TTE W/DOPPLER COMPLETE: CPT | Performed by: INTERNAL MEDICINE

## 2023-06-20 PROCEDURE — 93306 TTE W/DOPPLER COMPLETE: CPT

## 2023-06-20 RX ORDER — ONDANSETRON 2 MG/ML
4 INJECTION INTRAMUSCULAR; INTRAVENOUS EVERY 8 HOURS PRN
Status: DISCONTINUED | OUTPATIENT
Start: 2023-06-20 | End: 2023-06-22 | Stop reason: HOSPADM

## 2023-06-20 RX ORDER — LOSARTAN POTASSIUM 50 MG/1
50 TABLET ORAL DAILY
Status: DISCONTINUED | OUTPATIENT
Start: 2023-06-20 | End: 2023-06-22 | Stop reason: HOSPADM

## 2023-06-20 RX ORDER — HEPARIN SODIUM 1000 [USP'U]/ML
1000 INJECTION, SOLUTION INTRAVENOUS; SUBCUTANEOUS
Status: DISPENSED | OUTPATIENT
Start: 2023-06-20 | End: 2023-06-20

## 2023-06-20 RX ORDER — INSULIN LISPRO 100 [IU]/ML
1-6 INJECTION, SOLUTION INTRAVENOUS; SUBCUTANEOUS
Status: DISCONTINUED | OUTPATIENT
Start: 2023-06-20 | End: 2023-06-22 | Stop reason: HOSPADM

## 2023-06-20 RX ORDER — HEPARIN SODIUM 1000 [USP'U]/ML
1000 INJECTION, SOLUTION INTRAVENOUS; SUBCUTANEOUS
Status: DISCONTINUED | OUTPATIENT
Start: 2023-06-20 | End: 2023-06-20

## 2023-06-20 RX ORDER — AMLODIPINE BESYLATE 2.5 MG/1
2.5 TABLET ORAL
Status: DISCONTINUED | OUTPATIENT
Start: 2023-06-20 | End: 2023-06-22 | Stop reason: HOSPADM

## 2023-06-20 RX ORDER — CLONIDINE 0.2 MG/24H
0.2 PATCH, EXTENDED RELEASE TRANSDERMAL WEEKLY
COMMUNITY
Start: 2023-05-05

## 2023-06-20 RX ORDER — DOXAZOSIN 2 MG/1
2 TABLET ORAL DAILY
Status: DISCONTINUED | OUTPATIENT
Start: 2023-06-20 | End: 2023-06-22 | Stop reason: HOSPADM

## 2023-06-20 RX ORDER — CALCIUM ACETATE 667 MG/1
1334 CAPSULE ORAL
Status: DISCONTINUED | OUTPATIENT
Start: 2023-06-20 | End: 2023-06-22 | Stop reason: HOSPADM

## 2023-06-20 RX ORDER — DOXAZOSIN 2 MG/1
1 TABLET ORAL
COMMUNITY
Start: 2023-06-09

## 2023-06-20 RX ADMIN — LANTHANUM CARBONATE 500 MG: 500 TABLET, CHEWABLE ORAL at 10:08

## 2023-06-20 RX ADMIN — HEPARIN SODIUM 1000 UNITS: 1000 INJECTION INTRAVENOUS; SUBCUTANEOUS at 15:24

## 2023-06-20 RX ADMIN — ATORVASTATIN CALCIUM 40 MG: 40 TABLET, FILM COATED ORAL at 10:05

## 2023-06-20 RX ADMIN — CALCIUM ACETATE 1334 MG: 667 CAPSULE ORAL at 18:08

## 2023-06-20 RX ADMIN — HEPARIN SODIUM 5000 UNITS: 5000 INJECTION INTRAVENOUS; SUBCUTANEOUS at 05:22

## 2023-06-20 RX ADMIN — GABAPENTIN 100 MG: 100 CAPSULE ORAL at 10:05

## 2023-06-20 RX ADMIN — LOSARTAN POTASSIUM 50 MG: 50 TABLET, FILM COATED ORAL at 10:06

## 2023-06-20 RX ADMIN — HEPARIN SODIUM 1000 UNITS: 1000 INJECTION INTRAVENOUS; SUBCUTANEOUS at 16:28

## 2023-06-20 RX ADMIN — HEPARIN SODIUM 5000 UNITS: 5000 INJECTION INTRAVENOUS; SUBCUTANEOUS at 21:27

## 2023-06-20 RX ADMIN — CALCIUM ACETATE 667 MG: 667 CAPSULE ORAL at 10:06

## 2023-06-20 RX ADMIN — CALCIUM ACETATE 1334 MG: 667 CAPSULE ORAL at 11:43

## 2023-06-20 RX ADMIN — AMLODIPINE BESYLATE 2.5 MG: 2.5 TABLET ORAL at 21:27

## 2023-06-20 RX ADMIN — CHLORHEXIDINE GLUCONATE 0.12% ORAL RINSE 15 ML: 1.2 LIQUID ORAL at 21:27

## 2023-06-20 RX ADMIN — CHLORHEXIDINE GLUCONATE 0.12% ORAL RINSE 15 ML: 1.2 LIQUID ORAL at 10:05

## 2023-06-20 RX ADMIN — CARVEDILOL 25 MG: 25 TABLET, FILM COATED ORAL at 18:09

## 2023-06-20 RX ADMIN — INSULIN LISPRO 2 UNITS: 100 INJECTION, SOLUTION INTRAVENOUS; SUBCUTANEOUS at 21:27

## 2023-06-20 RX ADMIN — DOXAZOSIN 2 MG: 2 TABLET ORAL at 10:44

## 2023-06-20 RX ADMIN — LANTHANUM CARBONATE 500 MG: 500 TABLET, CHEWABLE ORAL at 18:08

## 2023-06-20 RX ADMIN — INSULIN LISPRO 1 UNITS: 100 INJECTION, SOLUTION INTRAVENOUS; SUBCUTANEOUS at 11:47

## 2023-06-20 RX ADMIN — CARVEDILOL 25 MG: 25 TABLET, FILM COATED ORAL at 10:06

## 2023-06-20 RX ADMIN — LANTHANUM CARBONATE 500 MG: 500 TABLET, CHEWABLE ORAL at 12:23

## 2023-06-20 NOTE — NUTRITION
06/20/23 1337   Biochemical Data,Medical Tests, and Procedures   Biochemical Data/Medical Tests/Procedures Lab values reviewed; Meds reviewed   Labs (Comment) 6/20/23 glucose , Cl 94, BUN 45, creat 8 02, P 4 3   Meds (Comment) atorvastatin, calcium acetate, heparin, insulin   Nutrition-Focused Physical Exam   Nutrition-Focused Physical Exam Findings RN skin assessment reviewed;Edema   Nutrition-Focused Physical Exam Findings Trace BL LE edema  HD AV fistula at left forearm  Medical-Related Concerns ESRD on HD, pulmonary edema, type 2 diabetes, HLD, SIVA   Current PO Intake   Current Diet Order CCD3, 2 g Na diet, 200 mL fluid restriction, thin liquids   Current Meal Intake Adequate   Estimated calorie intake compared to estimated need Nutrient needs are likely met  PES Statement   Energy Balance (1) Predicted suboptimal energy intake NI-1 4   Related to Increased PRO needs; Increased energy needs   As evidenced by: Other (Comment)  (ESRD on HD)   Recommendations/Interventions   Malnutrition/BMI Present No   Summary HCT Referral  Presents with SOB  Past medical history significant for ESRD on HD, pulmonary edema, type 2 diabetes, HLD, SIVA  Weight history reviewed  No significant changes  Trace BL LE edema  HD AV fistula at left forearm  Prescribed CCD3, 2 g Na diet, 200 mL fluid restriction, thin liquids  Pt reports having a very great appetite  He usually has 2-3 meals daily  Reports no difficulty chewing or swallowing  Pt's wife cooks, both pt and his wife grocery shop  He sees an RD at HD treatments  Pt reports reading nutrition labels often - sodium, potassium, phosphorus, calories  Pt adds minimal salt to foods  At restaurants pt requests no salt to be added  He does not take nutrition supplements  RD discussed low sodium diet guidelines  Encouraged pt to refrain from adding salt at all  Pt is agreeable  RD to revisit pt tomorrow to provide further diet education when pt’s spouse is present   RD to follow  Interventions/Recommendations Continue current diet order   Education Assessment   Education Education initiated/ completed   Patient Nutrition Goals   Goal Comprehend education; Adequate hydration; Adequate intake

## 2023-06-20 NOTE — ASSESSMENT & PLAN NOTE
Lab Results   Component Value Date    HGBA1C 7 6 (H) 08/04/2022       Recent Labs     06/19/23  1753 06/19/23 2013 06/19/23  2213 06/19/23  2351   POCGLU 93 182* 149* 115       Blood Sugar Average: Last 72 hrs:  (P) 164 375   · With significant hyperglycemia   · Start insulin infusion for goal glucose 140-180

## 2023-06-20 NOTE — CASE MANAGEMENT
Case Management Assessment & Discharge Planning Note    Patient name Farheen Harman  Location ICU 05/ICU 05- MRN [id number]  : 1950 Date 2023       Current Admission Date: 2023  Current Admission Diagnosis:Flash pulmonary edema [facility])   Patient Active Problem List    Diagnosis Date Noted   • Flash pulmonary edema ([facility] [address] ) 2023   • SIRS (systemic inflammatory response syndrome) ([facility] [address] ) 2023   • Acute respiratory failure with hypoxia ([facility] [address] ) 2023   • Dependence on [facility] ([facility] ) 2022   • Snoring 2022   • Numbness and tingling in both hands 2022   • SIVA (obstructive sleep apnea)    • TIA (transient ischemic attack) 2022   • Elevated troponin 2021   • ESRD (end stage renal disease) ([facility] ) 2021   • Hypertensive urgency 2021   • Type 2 diabetes mellitus without complication, with long-term current use of insulin ([facility] ) 2021   • Mixed hyperlipidemia 2021      LOS (days): 1  Geometric Mean LOS (GMLOS) (days): 3 50  Days to GMLOS:2 3     OBJECTIVE:    Risk of Unplanned Readmission Score: 15 1     Current admission status: Inpatient  Preferred Pharmacy:   [id number] [place], [place] - [address]  Phone: [phone] Fax: [phone]    Primary Care Provider: LAN Anderson    Primary Insurance: [facility]  Secondary Insurance:     ASSESSMENT:  G[address] Proxies    There are no active Health Care Proxies on file         Advance Directives  Does patient have a [address]?: No (Updating the papers at home)  Does patient have Advance Directives?: No  Was patient offered paperwork?: Yes (Updating his papers at home)  Primary Contact: Altagracia Saul wife    Readmission Root Cause  30 Day Readmission: No    Patient Information  Admitted from[de-identified] Home  Mental Status: Alert  During Assessment patient was accompanied by: Spouse  Assessment information provided by[de-identified] Patient  Primary Caregiver: Self  Support Systems: Spouse/significant other  [place] of Residence: [address] do you live in?: [address] [facility] entry access options  Select all that apply : Stairs  Number of steps to enter home  : 1  Do the steps have railings?: Yes  Type of Current Residence: 2 story home  Upon entering residence, is there a bedroom on the main floor (no further steps)?: No  A bedroom is located on the following floor levels of residence (select all that apply):: 2nd Floor  Upon entering residence, is there a bathroom on the main floor (no further steps)?: No  Indicate which floors of current residence have a bathroom (select all the apply):: 2nd Floor  Number of steps to 2nd floor from main floor: 2  In the last 12 months, was there a time when you were not able to pay the mortgage or rent on time?: No  In the last 12 months, how many places have you lived?: 1  In the last 12 months, was there a time when you did not have a steady place to sleep or slept in a shelter (including now)?: No  Homeless/housing insecurity resource given?: N/A  Living Arrangements: Lives w/ Spouse/significant other  Is patient a ?: No    Activities of Daily Living Prior to Admission  Completes ADLs independently?: Yes  Ambulates independently?: Yes  Does patient use assisted devices?: Yes  Assisted Devices (DME) used: Straight Cane  Does patient currently own DME?: Yes  What DME does the patient currently own?:  (BP Cuff Glucometer)  Does patient have a history of Outpatient Therapy (PT/OT)?: No  Does the patient have a history of Short-Term Rehab?: No  Does patient have a history of HHC?: No  Does patient currently have [address]?: No    Patient Information Continued  Income Source: Pension/[employer]  Does patient have prescription coverage?: Yes  Within the past 12 months, you worried that your food would run out before you got the money to buy more : Never true  Within the past 12 months, the food you bought just didn't last and you didn't have money to get more : Never true  Food insecurity resource given?: N/A  Does patient receive dialysis treatments?: Yes ([facility] M-W-F)  Does patient have a history of substance abuse?: No    PHQ 2/9 Screening   Reviewed PHQ 2/9 Depression Screening Score?: No    Means of Transportation  Means of Transport to Appts[de-identified] Family transport  In the past 12 months, has lack of transportation kept you from medical appointments or from getting medications?: No  In the past 12 months, has lack of transportation kept you from meetings, work, or from getting things needed for daily living?: No  Was application for public transport provided?: N/A        DISCHARGE DETAILS:    Discharge planning discussed with[de-identified] Pt and spouse  Freedom of Choice: Yes  Comments - Freedom of Choice: Pt and wife would like a blanket referral for [facility] services  Contacts  Patient Contacts: Lori Lund  Relationship to Patient[de-identified] Family  Contact Method:  In Person  Reason/Outcome: Discharge [address]         Is the patient interested in [facility] at discharge?: Yes  [address] requested[de-identified] [facility] Agency Name[de-identified] [address] [place] [address] Provider[de-identified] PCP  Home Health Services Needed[de-identified] Strengthening/Theraputic Exercises to Improve Function, Gait/ADL Training, Evaluate Functional Status and Safety  Homebound Criteria Met[de-identified] Uses an Assist Device (i e  cane, walker, etc), Requires the Assistance of Another Person for Safe Ambulation or to Leave the Home  Supporting Clincal Findings[de-identified] Fatigues Easliy in [employer], Limited Endurance    DME Referral Provided  Referral made for DME?: No  Would you like to participate in our [address] service program?  : No - Declined    Treatment Team Recommendation: Home with [facility] [address]  Discharge Destination Plan[de-identified] Home with Alesia at Discharge : Family

## 2023-06-20 NOTE — PLAN OF CARE
Problem: METABOLIC, FLUID AND ELECTROLYTES - ADULT  Goal: Electrolytes maintained within normal limits  Description: INTERVENTIONS:  - Monitor labs and assess patient for signs and symptoms of electrolyte imbalances  - Administer electrolyte replacement as ordered  - Monitor response to electrolyte replacements, including repeat lab results as appropriate  - Instruct patient on fluid and nutrition as appropriate  Outcome: Progressing     Problem: METABOLIC, FLUID AND ELECTROLYTES - ADULT  Goal: Fluid balance maintained  Description: INTERVENTIONS:  - Monitor labs   - Monitor I/O and WT  - Instruct patient on fluid and nutrition as appropriate  - Assess for signs & symptoms of volume excess or deficit  Outcome: Progressing     Problem: CARDIOVASCULAR - ADULT  Goal: Maintains optimal cardiac output and hemodynamic stability  Description: INTERVENTIONS:  - Monitor I/O, vital signs and rhythm  - Monitor for S/S and trends of decreased cardiac output  - Administer and titrate ordered vasoactive medications to optimize hemodynamic stability  - Assess quality of pulses, skin color and temperature  - Assess for signs of decreased coronary artery perfusion  - Instruct patient to report change in severity of symptoms  Outcome: Progressing     Problem: CARDIOVASCULAR - ADULT  Goal: Absence of cardiac dysrhythmias or at baseline rhythm  Description: INTERVENTIONS:  - Continuous cardiac monitoring, vital signs, obtain 12 lead EKG if ordered  - Administer antiarrhythmic and heart rate control medications as ordered  - Monitor electrolytes and administer replacement therapy as ordered  Outcome: Progressing     Problem: RESPIRATORY - ADULT  Goal: Achieves optimal ventilation and oxygenation  Description: INTERVENTIONS:  - Assess for changes in respiratory status  - Assess for changes in mentation and behavior  - Position to facilitate oxygenation and minimize respiratory effort  - Oxygen administered by appropriate delivery if ordered  - Initiate smoking cessation education as indicated  - Encourage broncho-pulmonary hygiene including cough, deep breathe, Incentive Spirometry  - Assess the need for suctioning and aspirate as needed  - Assess and instruct to report SOB or any respiratory difficulty  - Respiratory Therapy support as indicated  Outcome: Progressing

## 2023-06-20 NOTE — UTILIZATION REVIEW
Initial Clinical Review    Admission: Date/Time/Statement:   Admission Orders (From admission, onward)     Ordered        06/19/23 0927  INPATIENT ADMISSION  Once                      Orders Placed This Encounter   Procedures   • INPATIENT ADMISSION     Standing Status:   Standing     Number of Occurrences:   1     Order Specific Question:   Level of Care     Answer:   Critical Care [15]     Order Specific Question:   Estimated length of stay     Answer:   More than 2 Midnights     Order Specific Question:   Certification     Answer:   I certify that inpatient services are medically necessary for this patient for a duration of greater than two midnights  See H&P and MD Progress Notes for additional information about the patient's course of treatment  ED Arrival Information     Expected   -    Arrival   6/19/2023 08:22    Acuity   Immediate            Means of arrival   Ambulance    Escorted by   [facility]    Service   Anesthesiology    Admission type   Emergency            Arrival complaint   SOB           Chief Complaint   Patient presents with   • Shortness of Breath     Pt is MWF dialysis, woke up with sudden onset SOB  EMS, gave 4 mg zofran, nitro paste, and Duo-neb       Initial Presentation: 68 y o  male to the ED from home via EMS with complaints of shortness of breath  Admitted to CRITICAL CARE step down for flash pulmonary edema, acute respiratory failure with hypoxia, sirs  H/O HTN, ESRD on IHD, HTN, HLD, and DM2     ARrives with tachycardia, tachypnea, bp 264/125  Awoke abruptly at home with severe dypsnea  Unable to speak in full sentences  EMS Placed on CPAP  Given Zofran and nitropaste en route  Rales heard in lungs  Arrives diaphoretic, in acute distress with lower extremity edema, anxious  Given catapres, started on cardene drip, iv insulin drip, give IV lasix  Troponins elevated  Wbcs 15 63  Placed on BIpap  Nephrology consult  Nephrology: Markedly volume overloaded    Start Dialysis  Given IV lasix without urine output  Check bladder scan  Does urinate daily  Weaned to 80% oxygen mask  Date: 6/12   Day 2:   Removed 3 5 liters fluid of during HD  After further discussion, discovered he does not take his medications as prescribed at home and is non compliant with CPap  Crackles heard in  lung bases  Wean nasal cannula as able  Weaned off cardene drip  Nephrology consult:  ESRD  Tolerated treatment of HD  REpeat HD for 4 Liters if she tolerates  Remains off bipap       ED Triage Vitals   Temperature Pulse Respirations Blood Pressure SpO2   06/19/23 0828 06/19/23 0828 06/19/23 0828 06/19/23 0841 06/19/23 0828   (!) 96 8 °F (36 °C) (!) 106 (!) 28 (S) (!) 264/125 90 %      Temp Source Heart Rate Source Patient Position - Orthostatic VS BP Location FiO2 (%)   06/19/23 0828 06/19/23 0828 06/19/23 1900 06/19/23 0854 06/19/23 2240   Temporal Monitor Lying Right arm 35      Pain Score       06/19/23 1100       No Pain          Wt Readings from Last 1 Encounters:   06/20/23 102 kg (225 lb 15 5 oz)     Additional Vital Signs:   Time Temp Pulse Resp BP MAP (mmHg) SpO2 FiO2 (%) Calculated FIO2 (%) - Nasal Cannula Nasal Cannula O2 Flow Rate (L/min) O2 Device O2 Interface Device Mask Size Patient Position - Orthostatic VS   06/20/23 0900 -- 74 -- 150/72 -- -- -- -- -- -- -- -- --   06/20/23 0500 -- 74 16 150/72 104 97 % -- -- -- -- -- -- --   06/20/23 0419 -- 71 17 -- -- 99 % 28 -- -- BiPAP Full face mask Medium --   06/20/23 0401 97 3 °F (36 3 °C) Abnormal  -- -- -- -- -- -- -- -- -- -- -- --   06/19/23 2349 98 1 °F (36 7 °C) -- -- -- -- -- -- -- -- -- -- -- --   06/19/23 2300 -- 71 16 161/77 110 99 % 35 -- -- BiPAP  -- -- Lying   O2 Device: 10/5 rate 12 at 06/19/23 2300   06/19/23 2240 -- 70 13 -- -- 100 % 35 -- -- BiPAP Full face mask Medium --   06/19/23 2200 -- 80 21 183/84 Abnormal  120 98 % -- 40 5 L/min Nasal cannula -- -- Lying   06/19/23 2100 -- 81 26 Abnormal  181/84 Abnormal  125 97 % -- 40 5 L/min Nasal cannula -- -- Lying   06/19/23 2000 98 5 °F (36 9 °C) 82 33 Abnormal  189/86 Abnormal  124 97 % -- 40 5 L/min Nasal cannula -- -- Lying   06/19/23 1630 -- 75 20 147/71 102 -- -- -- -- -- -- -- --   06/19/23 1615 -- 78 39 Abnormal  153/70 101 91 % -- -- -- -- -- -- --   06/19/23 1600 -- 93 32 Abnormal  183/86 Abnormal  124 86 % Abnormal  -- -- -- -- -- -- --   06/19/23 1534 -- -- -- -- -- -- -- -- -- -- Full face mask -- --   06/19/23 1530 -- 72 18 139/61 88 -- -- -- -- -- -- -- --   06/19/23 1500 -- 79 21 158/70 101 100 % -- -- -- BiPAP -- -- --   06/19/23 1445 -- 81 21 144/68 98 -- -- -- -- -- -- -- --   06/19/23 1400 -- 86 27 Abnormal  147/72 103 -- -- -- -- -- -- -- --   06/19/23 1330 -- 85 21 134/63 91 -- -- -- -- -- -- -- --   06/19/23 0959 -- 95 24 Abnormal  160/86 -- 90 % -- -- -- BiPAP -- -- --   06/19/23 0939 -- 97 26 Abnormal  220/118 Abnormal  -- 92 % -- -- -- -- -- -- --   06/19/23 0905 -- -- -- 210/100 Abnormal  -- -- -- -- -- -- -- -- --   06/19/23 0854 -- 105 26 Abnormal  200/110 Abnormal  -- 93 % -- -- -- BiPAP -- -- --   06/19/23 0841 -- -- -- 264/125 Abnormal    -- -- -- -- -- -- -- -- --   BP: Dr Renetta Alfredo made aware at 06/19/23 0841   06/19/23 0828 96 8 °F (36 °C) Abnormal  106 Abnormal  28 Abnormal  -- -- 90 % -- 44 6 L/min Nasal cannula Full face mask --        Pertinent Labs/Diagnostic Test Results:   6/20 ECHO: Left Ventricle: Left ventricular cavity size is normal  Wall thickness is moderately increased  The left ventricular ejection fraction is 55%  Systolic function is normal  Although no diagnostic regional wall motion abnormality was identified, this possibility cannot be completely excluded on the basis of this study  Diastolic function is moderately abnormal, consistent with grade II (pseudonormal) relaxation  •  Left Atrium: The atrium is mild-moderately dilated  •  Right Atrium: The atrium is mildly dilated  •  Aortic Valve:  There is mild to moderate stenosis  The aortic valve peak velocity is 2 21 m/s  The aortic valve area is 1 66 cm2  •  Mitral Valve: There is annular calcification  There is moderate regurgitation  •  Tricuspid Valve: There is moderate regurgitation  XR chest 1 view portable   Final Result by Luis Eduardo Hunt MD (06/19 1044)   Prominent central congestion pattern of likely cardiogenic origin        Workstation performed: [id number]           Results from last 7 days   Lab Units 06/19/23  0854   SARS-COV-2  Negative     Results from last 7 days   Lab Units 06/20/23  0521 06/19/23  0836   WBC Thousand/uL 7 72 15 63*   HEMOGLOBIN g/dL 8 6* 10 5*   HEMATOCRIT % 27 8* 34 1*   PLATELETS Thousands/uL 105* 166   NEUTROS ABS Thousands/µL 5 43 11 03*         Results from last 7 days   Lab Units 06/20/23  0521 06/19/23  0836   SODIUM mmol/L 135 137   POTASSIUM mmol/L 4 6 4 9   CHLORIDE mmol/L 94* 95*   CO2 mmol/L 30 27   ANION GAP mmol/L 11 15*   BUN mg/dL 45* 69*   CREATININE mg/dL 8 02* 10 89*   EGFR ml/min/1 73sq m 6 4   CALCIUM mg/dL 8 4 8 9   MAGNESIUM mg/dL 2 1 2 5   PHOSPHORUS mg/dL 4 3* 4 8*     Results from last 7 days   Lab Units 06/20/23  0521   AST U/L 14   ALT U/L 9   ALK PHOS U/L 59   TOTAL PROTEIN g/dL 7 1   ALBUMIN g/dL 3 6   TOTAL BILIRUBIN mg/dL 0 46     Results from last 7 days   Lab Units 06/20/23  0401 06/20/23  0148 06/19/23  2351 06/19/23  2213 06/19/23  2013 06/19/23  1753 06/19/23  1612 06/19/23  1513 06/19/23  1236 06/19/23  1036   POC GLUCOSE mg/dl 92 82 115 149* 182* 93 85 48* 294* 349*     Results from last 7 days   Lab Units 06/20/23  0521 06/19/23  0836   GLUCOSE RANDOM mg/dL 96 258*         Results from last 7 days   Lab Units 06/19/23  1457 06/19/23  1205 06/19/23  0836   HS TNI 0HR ng/L  --   --  53*   HS TNI 2HR ng/L  --  179*  --    HSTNI D2 ng/L  --  126*  --    HS TNI 4HR ng/L 367*  --   --    HSTNI D4 ng/L 314*  --   --          Results from last 7 days   Lab Units 06/19/23  0836   PROTIME seconds 14 1   INR  1 09 Results from last 7 days   Lab Units 06/19/23  0854   INFLUENZA A PCR  Negative   INFLUENZA B PCR  Negative   RSV PCR  Negative       ED Treatment:   Medication Administration from 06/19/2023 0822 to 06/19/2023 1108       Date/Time Order Dose Route Action Comments     06/19/2023 0831 EDT furosemide (LASIX) injection 80 mg 80 mg Intravenous Given --     06/19/2023 0845 EDT nitroglycerin (NITRO-BID) 2 % TD ointment 1 inch 1 inch Topical Given --     06/19/2023 0857 EDT LORazepam (ATIVAN) injection 0 5 mg 0 5 mg Intravenous Given --     06/19/2023 0939 EDT niCARdipine (CARDENE-IV) 100 mcg/mL in sodium chloride infusion 10 mg/hr Intravenous New Bag --     06/19/2023 1100 EDT niCARdipine (CARDENE) 50 mg (DOUBLE CONCENTRATION) IV in sodium chloride 0 9% 250 mL 10 mg/hr Intravenous New Bag --     06/19/2023 1100 EDT insulin regular (HumuLIN R,NovoLIN R) 1 Units/mL in sodium chloride 0 9 % 100 mL infusion 9 Units/hr Intravenous New Bag --        Past Medical History:   Diagnosis Date   • Coronary artery disease    • Diabetes mellitus    • End stage renal disease    • Hyperlipidemia    • Hypertension    • NSTEMI (non-ST elevated myocardial infarction) 2012   • TIA (transient ischemic attack)      Admitting Diagnosis: Shortness of breath [R06 02]  Dyspnea [R06 00]  SOB (shortness of breath) [R06 02]  Flash pulmonary edema (HCC) [J81 0]  Hypoxia [R09 02]  Hypertensive CKD, [facility] [I12 0, N18 6, Z99 2]  Age/Sex: 68 y o  male  Admission Orders:  I/O  SCDS  2000 ml fluid restriction  Scheduled Medications:  amLODIPine, 5 mg, Oral, HS  aspirin, 81 mg, Oral, Once  atorvastatin, 40 mg, Oral, Daily  calcium acetate, 667 mg, Oral, TID With Meals  carvedilol, 25 mg, Oral, BID With Meals  chlorhexidine, 15 mL, Mouth/Throat, Q12H ETHAN  cloNIDine, 0 2 mg, Transdermal, Weekly  gabapentin, 100 mg, Oral, Daily  heparin (porcine), 5,000 Units, Subcutaneous, Q8H ETHAN  insulin lispro, 1-6 Units, Subcutaneous, TID AC  insulin lispro, 1-6 Units, Subcutaneous, HS  lanthanum, 500 mg, Oral, TID With Meals  losartan, 50 mg, Oral, Daily      Continuous IV Infusions:     PRN Meds:  ondansetron, 4 mg, Intravenous, Q8H PRN        IP CONSULT TO NEPHROLOGY  IP CONSULT TO CASE MANAGEMENT    Network Utilization Review Department  ATTENTION: Please call with any questions or concerns to [phone] and carefully listen to the prompts so that you are directed to the right person  All voicemails are confidential   [facility] all requests for admission clinical reviews, approved or denied determinations and any other requests to dedicated fax number below belonging to the campus where the patient is receiving treatment   List of dedicated fax numbers for the Facilities:  [address] DENIALS (Administrative/Medical Necessity) [phone]   [address] (Maternity/NICU/Pediatrics) [phone]   [address] Cecelia [address] [phone]   [facility] [address] [phone]   [address] [address] [address] [place] [address] [facility] [phone]   [address] [facility] 815 [facility] [phone]

## 2023-06-20 NOTE — RESPIRATORY THERAPY NOTE
06/19/23 2240   Respiratory Assessment   Assessment Type Assess only   General Appearance Awake; Alert   Respiratory Pattern Normal;Assisted;Spontaneous   Chest Assessment Chest expansion symmetrical   Bilateral Breath Sounds Diminished;Clear   Resp Comments pt reasdy for bipap, did not kriss 14/8 could kriss 10/5 35% will cont to monitor   O2 Device bipAP   Non-Invasive Information   O2 Interface Device Full face mask   Non-Invasive Ventilation Mode BiPAP  (V60)   $ Continous NIV Initial   SpO2 100 %   $ Pulse Oximetry Spot Check Charge Completed   Non-Invasive Settings   IPAP (cm) 10 cm   EPAP (cm) 5 cm   Rate (Set) 12   FiO2 (%) 35   Flow (lpm) 33   Pressure Support (cm H2O) 5   Rise Time 3   Inspiratory Time (Set) 1 4   Non-Invasive Readings   Skin Intervention Skin intact   Total Rate 13   MV (Mech) 10   Peak Pressure (Obs) 10   Spontaneous Vt (mL) 798   I/E Ratio (Obs) 1:3   Leak (lpm) 2   Non-Invasive Alarms   Insp Pressure High (cm H20) 25   Insp Pressure Low (cm H20) 10   Low Insp Pressure Time (sec) 20 sec   MV Low (L/min) 3   Vt High (mL) 1200   Vt Low (mL) 200   High Resp Rate (BPM) 40 BPM   Low Resp Rate (BPM) 10 BPM   Apnea Interval (sec) 20   Apnea Rate 12   Apnea Pressure 10   OTHER   Mask Size Medium

## 2023-06-20 NOTE — PLAN OF CARE
Target UF Goal  4 5  L as tolerated  Patient dialyzing for 4 hours on 2 K bath for serum K of  4 6  per protocol  Treatment plan reviewed with Nephrology  Post-Dialysis RN Treatment Note    Blood Pressure:  Pre 143/80 mm/Hg  Post 170/79 mmHg   EDW  100 kg (per outpatient records)    Weight:  Pre 99 6 kg   Post 95 2 kg   Mode of weight measurement: Bed Scale   Volume Removed  2923 ml    Treatment duration 183 minutes    NS given  No    Treatment shortened? Yes, describe: venous pressure alarms started around the 3 hr olman, became more frequent and would not settle; no signs of infiltration, probable system clot so rinsed back before total system clot and patient could recieve blood back       Medications given during Rx Heparin 1,000 units per hour given x2   Estimated Kt/V  1 15   Access type: AV fistula   Access Issues: No    Report called to primary nurse   Yes         Problem: METABOLIC, FLUID AND ELECTROLYTES - ADULT  Goal: Electrolytes maintained within normal limits  Description: INTERVENTIONS:  - Monitor labs and assess patient for signs and symptoms of electrolyte imbalances  - Administer electrolyte replacement as ordered  - Monitor response to electrolyte replacements, including repeat lab results as appropriate  - Instruct patient on fluid and nutrition as appropriate  Outcome: Progressing  Goal: Fluid balance maintained  Description: INTERVENTIONS:  - Monitor labs   - Monitor I/O and WT  - Instruct patient on fluid and nutrition as appropriate  - Assess for signs & symptoms of volume excess or deficit  Outcome: Progressing

## 2023-06-20 NOTE — RESPIRATORY THERAPY NOTE
06/20/23 0419   Respiratory Assessment   Assessment Type Assess only   General Appearance Sleeping   Respiratory Pattern Normal;Assisted;Spontaneous   Chest Assessment Chest expansion symmetrical   Bilateral Breath Sounds Diminished;Clear   Resp Comments pt asleep kriss bipap well will cont to monitor   O2 Device bipap   Non-Invasive Information   O2 Interface Device Full face mask   Non-Invasive Ventilation Mode BiPAP  (V60)   $ Intermittent NIV Yes   SpO2 99 %   $ Pulse Oximetry Spot Check Charge Completed   Non-Invasive Settings   IPAP (cm) 10 cm   EPAP (cm) 5 cm   Rate (Set) 12   FiO2 (%) 28   Flow (lpm) 33   Pressure Support (cm H2O) 5   Rise Time 3   Inspiratory Time (Set) 1 4   Non-Invasive Readings   Skin Intervention Skin intact   Total Rate 18   MV (Mech) 9 1   Peak Pressure (Obs) 10   Spontaneous Vt (mL) 515   I/E Ratio (Obs) 1:3   Leak (lpm) 7   Non-Invasive Alarms   Insp Pressure High (cm H20) 25   Insp Pressure Low (cm H20) 10   Low Insp Pressure Time (sec) 20 sec   MV Low (L/min) 3   Vt High (mL) 1200   Vt Low (mL) 200   High Resp Rate (BPM) 40 BPM   Low Resp Rate (BPM) 10 BPM   Apnea Interval (sec) 20   Apnea Rate 12   Apnea Pressure 10   OTHER   Mask Size Medium

## 2023-06-20 NOTE — QUICK NOTE
Patient seen and evaluated by Critical Care today and deemed to be appropriate for transfer to [facility]   Spoke to Dr Gus Paris from [facility] to accept patient transfer  Patient did not move from ICU on the day of transfer  See progress note from 06/20/2023 for the most up-to-date treatment therapy plans  Follow up needed prior to discharge:  · IHD 6/20 and 6/21([facility] IHD schedule as OP) for volume removal     Critical care can be contacted via [employer] with any questions or concerns

## 2023-06-20 NOTE — PROGRESS NOTES
[address]  Progress Note  Name: Janice Johnson  MRN: [id number]  Unit/Bed#: ICU 05-01 I Date of Admission: 6/19/2023   Date of Service: 6/20/2023 I Hospital Day: 1    Assessment/Plan   Flash pulmonary edema (HCC)  Assessment & Plan  · Presented with hypoxia and increased work of breathing  · Likely in the setting of flash pulmonary edema related to hypertension  · Plan for emergent IHD for volume management   · Start cardene for goal -180  · Continue BIPAP for work of breathing     Acute respiratory failure with hypoxia ([facility] )  Assessment & Plan  · Likely in the setting of flash pulmonary edema and volume overload   · Continue BIPAP 14/8 80%, titrate for SpO2>90  · Plan for volume removal with urgent IHD   · Encourage cough, deep breathing, IS, ambulation    SIRS (systemic inflammatory response syndrome) ([facility] )  Assessment & Plan  · Presented with tachycardia and leukocytosis   · Suspect this is likely reactive in the setting of pulmonary edema and hypertensive urgency  · Will monitor off of abx for now   · Monitor WBC and fever curve     Hypertensive urgency  Assessment & Plan  · Appears uncontrolled as an outpatient  · Continue cardene infusion to maintain -180   · Restart home losartan, coreg, clonidine patch, and norvasc     ESRD (end stage renal disease) [facility])  Assessment & Plan  Lab Results   Component Value Date    EGFR 4 06/19/2023    EGFR 3 01/15/2022    EGFR 4 01/14/2022    CREATININE 10 89 (H) 06/19/2023    CREATININE 11 79 (H) 01/15/2022    CREATININE 9 69 (H) 01/14/2022   · Hypertensive ESRD on MWF IHD   · Now with significant volume overload and pulmonary edema requiring urgent IHD  · Nephrology consulted, appreciate recommendations     Elevated troponin  Assessment & Plan  · Likely in the setting of flash pulmonary edema  · EKG without ST changes  · Obtain ECHO  · Continue to trend until peak     SIVA (obstructive sleep apnea)  Assessment & Plan  · New diagnosis, currently undergoing workup for CPAP  · Continue BIPAP PRN and HS     Mixed hyperlipidemia  Assessment & Plan  · Continue home statin     Type 2 diabetes mellitus without complication, with long-term current use of insulin [facility])  Assessment & Plan  Lab Results   Component Value Date    HGBA1C 7 6 (H) 08/04/2022       Recent Labs     06/19/23  1753 06/19/23 2013 06/19/23  2213 06/19/23  2351   POCGLU 93 182* 149* 115       Blood Sugar Average: Last 72 hrs:  (P) 164 375   · With significant hyperglycemia   · Start insulin infusion for goal glucose 140-180              ICU Core Measures     A: Assess, Prevent, and Manage Pain · Has pain been assessed? No  · Need for changes to pain regimen? NA   B: Both SAT/SAT  · N/A   C: Choice of Sedation · RASS Goal: 0 Alert and Calm  · Need for changes to sedation or analgesia regimen? NA   D: Delirium · CAM-ICU: Negative   E: Early Mobility  · Plan for early mobility? No   F: Family Engagement · Plan for family engagement today? Yes         Prophylaxis:  VTE VTE covered by:  heparin (porcine), Subcutaneous, 5,000 Units at 06/19/23 2108       Stress Ulcer  not ordered     Subjective   HPI/24hr events: Weaned off BIPAP and received emergent dialysis  Slept well and will recive IHD today  Weaned off Cardene gtt  Review of Systems   HENT: Negative for sore throat  Respiratory: Negative for wheezing  Cardiovascular: Negative for chest pain and leg swelling  Gastrointestinal: Negative for abdominal distention, nausea and vomiting  Endocrine: Negative for polyphagia and polyuria  Musculoskeletal: Negative for back pain  Neurological: Negative for dizziness, seizures and weakness            Objective                            Vitals I/O      Most Recent Min/Max in 24hrs   Temp 98 1 °F (36 7 °C) Temp  Min: 96 8 °F (36 °C)  Max: 98 5 °F (36 9 °C)   Pulse 72 Pulse  Min: 70  Max: 106   Resp (!) 23 Resp  Min: 13  Max: 42   /70 BP  Min: 134/63  Max: 264/125   O2 Sat 99 % SpO2  Min: 86 %  Max: 100 %      Intake/Output Summary (Last 24 hours) at 6/20/2023 0134  Last data filed at 6/20/2023 0014  Gross per 24 hour   Intake 838 62 ml   Output 3555 ml   Net -2716 38 ml         Diet Volodymyr/CHO Controlled; Consistent Carbohydrate Diet Level 3 (6 carb servings/90 grams CHO/meal); Fluid Restriction 2000 ML, Sodium 2 GM     Invasive Monitoring Physical exam    }Physical Exam  Constitutional:       Appearance: He is not ill-appearing  HENT:      Head: Normocephalic and atraumatic  Mouth/Throat:      Mouth: Mucous membranes are moist    Eyes:      Extraocular Movements: Extraocular movements intact  Conjunctiva/sclera: Conjunctivae normal       Pupils: Pupils are equal, round, and reactive to light  Cardiovascular:      Rate and Rhythm: Normal rate and regular rhythm  Pulses: Normal pulses  Pulmonary:      Effort: Pulmonary effort is normal       Comments: Coarse bilaterally  Abdominal:      General: Abdomen is flat  Bowel sounds are normal  There is no distension  Palpations: Abdomen is soft  Tenderness: There is no abdominal tenderness  Musculoskeletal:         General: Normal range of motion  Comments: Left AV fistula +bruit/+thrill   Skin:     General: Skin is warm and dry  Neurological:      General: No focal deficit present  Mental Status: Mental status is at baseline  Cranial Nerves: No cranial nerve deficit  Diagnostic Studies    EKG: NSR  Imaging:  I have personally reviewed pertinent reports     and I have personally reviewed pertinent films in PACS     Medications:  Scheduled PRN   amLODIPine, 5 mg, HS  aspirin, 81 mg, Once  atorvastatin, 40 mg, Daily  calcium acetate, 667 mg, TID With Meals  carvedilol, 25 mg, BID With Meals  chlorhexidine, 15 mL, Q12H ETHAN  cloNIDine, 0 2 mg, Weekly  gabapentin, 100 mg, Daily  heparin (porcine), 5,000 Units, Q8H ETHAN  lanthanum, 500 mg, TID With Meals  losartan, 100 mg, Daily Continuous    insulin regular (HumuLIN R,NovoLIN R) 1 Units/mL in sodium chloride 0 9 % 100 mL infusion, 0 3-21 Units/hr, Last Rate: 0 5 Units/hr (06/20/23 0014)  niCARdipine, 10 mg/hr, Last Rate: Stopped (06/19/23 1840)         Labs:    CBC    Recent Labs     06/19/23  0836   WBC 15 63*   HGB 10 5*   HCT 34 1*        BMP    Recent Labs     06/19/23  0836   SODIUM 137   K 4 9   CL 95*   CO2 27   AGAP 15*   BUN 69*   CREATININE 10 89*   CALCIUM 8 9       Coags    Recent Labs     06/19/23  0836   INR 1 09        Additional Electrolytes  Recent Labs     06/19/23  0836   MG 2 5   PHOS 4 8*          Blood Gas    No recent results  No recent results LFTs  No recent results    Infectious  No recent results  Glucose  Recent Labs     06/19/23  0836   GLUC 1000 [facility], CRNP

## 2023-06-20 NOTE — PROGRESS NOTES
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   - Has been diabetic for 23 years   - Sugars are being covered by protocol    4  Hypertension associated with end-stage renal disease   - Blood pressure better controlled in the 140-160 range over the past 12 hours    5 acute respiratory failure with hypoxia   - Markedly improved      Follow up reason for today's visit: Patient with end-stage renal disease admitted with flash pulmonary edema secondary to dietary overindulgence and volume    Flash pulmonary edema [facility])    Patient Active Problem List   Diagnosis   • ESRD (end stage renal disease) ([facility] )   • Hypertensive urgency   • Type 2 diabetes mellitus without complication, with long-term current use of insulin (HCC)   • Mixed hyperlipidemia   • Elevated troponin   • TIA (transient ischemic attack)   • SIVA (obstructive sleep apnea)   • Snoring   • Numbness and tingling in both hands   • Dependence on renal dialysis ([address] )   • Flash pulmonary edema (HCC)   • SIRS (systemic inflammatory response syndrome) ([facility])   • Acute respiratory failure with hypoxia ([facility] )     Patient's wife was at the bedside and I updated her as well as the patient    Subjective:   Feels much improved today  A 10 point review of systems is negative    Seen lying flat in bed    Objective:     Vitals: Blood pressure 150/72, pulse 74, temperature (!) 97 3 °F (36 3 °C), temperature source Temporal, resp  rate 16, height 6' (1 829 m), weight 102 kg (225 lb 15 5 oz), SpO2 97 %  ,Body mass index is 30 65 kg/m²  Weight (last 2 days)     Date/Time Weight    06/20/23 0541 102 (225 97)    06/20/23 0500 102 (225 97)    06/19/23 1100 108 (237 88)    06/19/23 0829 108 (238)            Intake/Output Summary (Last 24 hours) at 6/20/2023 1004  Last data filed at 6/20/2023 0014  Gross per 24 hour   Intake 838 62 ml   Output 3555 ml   Net -2716 38 ml     I/O last 3 completed shifts: In: 838 6 [I V :538 6; Other:300]  Out: 3555 [Other:3555]         Physical Exam: /72   Pulse 74   Temp (!) 97 3 °F (36 3 °C) (Temporal)   Resp 16   Ht 6' (1 829 m)   Wt 102 kg (225 lb 15 5 oz)   SpO2 97%   BMI 30 65 kg/m²     General Appearance:    Alert, cooperative, no distress, appears stated age   Head:    Normocephalic, without obvious abnormality, atraumatic   Eyes:    Conjunctiva/corneas clear   Ears:    Normal external ears   Nose:   Nares normal, septum midline, mucosa normal, no drainage    or sinus tenderness   Throat:   Lips, mucosa, and tongue normal; teeth and gums normal   Neck:   Supple, symmetrical, trachea midline, no adenopathy;        thyroid:  No enlargement/tenderness/nodules; no carotid    bruit or JVD   Back:     Symmetric, no curvature, ROM normal, no CVA tenderness   Lungs:    Diminished but clearer to auscultation bilaterally, respirations unlabored   Chest wall:    No tenderness or deformity   Heart:    Regular rate and rhythm, S1 and S2 normal, no murmur, rub   or gallop   Abdomen:     Soft, non-tender, bowel sounds active   Extremities:   Extremities normal, atraumatic, no cyanosis or edema   Skin:   Skin color, texture, turgor normal, no rashes or lesions   Lymph nodes:   Cervical normal   Neurologic:   CNII-XII intact            Lab, Imaging and other studies: I have personally reviewed pertinent labs    CBC:   Lab Results   Component "Value Date    WBC 7 72 06/20/2023    HGB 8 6 (L) 06/20/2023    HCT 27 8 (L) 06/20/2023    MCV 87 06/20/2023     (L) 06/20/2023    RBC 3 19 (L) 06/20/2023    MCH 27 0 06/20/2023    MCHC 30 9 (L) 06/20/2023    RDW 16 7 (H) 06/20/2023    MPV 11 7 06/20/2023    NRBC 0 06/20/2023     CMP:   Lab Results   Component Value Date    K 4 6 06/20/2023    CL 94 (L) 06/20/2023    CO2 30 06/20/2023    BUN 45 (H) 06/20/2023    CREATININE 8 02 (H) 06/20/2023    CALCIUM 8 4 06/20/2023    AST 14 06/20/2023    ALT 9 06/20/2023    ALKPHOS 59 06/20/2023    EGFR 6 06/20/2023         Results from last 7 days   Lab Units 06/20/23  0521 06/19/23  0836   POTASSIUM mmol/L 4 6 4 9   CHLORIDE mmol/L 94* 95*   CO2 mmol/L 30 27   BUN mg/dL 45* 69*   CREATININE mg/dL 8 02* 10 89*   CALCIUM mg/dL 8 4 8 9   ALK PHOS U/L 59  --    ALT U/L 9  --    AST U/L 14  --          Phosphorus:   Lab Results   Component Value Date    PHOS 4 3 (H) 06/20/2023     Magnesium:   Lab Results   Component Value Date    MG 2 1 06/20/2023     Urinalysis: No results found for: \"COLORU\", \"CLARITYU\", \"SPECGRAV\", \"PHUR\", \"LEUKOCYTESUR\", \"NITRITE\", \"PROTEINUA\", \"GLUCOSEU\", \"KETONESU\", \"BILIRUBINUR\", \"BLOODU\"  Ionized Calcium: No results found for: \"CAION\"  Coagulation: No results found for: \"PT\", \"INR\", \"APTT\"  Troponin: No results found for: \"TROPONINI\"  ABG: No results found for: \"PHART\", \"IEI1UWQ\", \"PO2ART\", \"FFW2SQB\", \"U6DTHTJC\", \"BEART\", \"SOURCE\"  Radiology review:     IMAGING  Procedure: XR chest 1 view portable    Result Date: 6/19/2023  Narrative: CHEST INDICATION:   short of breath  COMPARISON: 1/13/2022 EXAM PERFORMED/VIEWS:  XR CHEST PORTABLE FINDINGS: Cardiomediastinal silhouette appears unremarkable  Airspace opacities in a predominantly central distribution  Osseous structures appear within normal limits for patient age  Impression: Prominent central congestion pattern of likely cardiogenic origin   Workstation performed: [id number]       Current " Facility-Administered Medications   Medication Dose Route Frequency   • amLODIPine (NORVASC) tablet 5 mg  5 mg Oral HS   • aspirin (ECOTRIN LOW STRENGTH) EC tablet 81 mg  81 mg Oral Once   • atorvastatin (LIPITOR) tablet 40 mg  40 mg Oral Daily   • calcium acetate (PHOSLO) capsule 667 mg  667 mg Oral TID With Meals   • carvedilol (COREG) tablet 25 mg  25 mg Oral BID With Meals   • chlorhexidine (PERIDEX) 0 12 % oral rinse 15 mL  15 mL Mouth/Throat Q12H [address]   • cloNIDine (CATAPRES-TTS-2) 0 2 mg/24 hr TD weekly patch  0 2 mg Transdermal Weekly   • gabapentin (NEURONTIN) capsule 100 mg  100 mg Oral Daily   • heparin (porcine) subcutaneous injection 5,000 Units  5,000 Units Subcutaneous Q8H [address]   • insulin lispro (HumaLOG) 100 units/mL subcutaneous injection 1-6 Units  1-6 Units Subcutaneous TID AC   • insulin lispro (HumaLOG) 100 units/mL subcutaneous injection 1-6 Units  1-6 Units Subcutaneous HS   • lanthanum (FOSRENOL) chewable tablet 500 mg  500 mg Oral TID With Meals   • losartan (COZAAR) tablet 50 mg  50 mg Oral Daily   • ondansetron (ZOFRAN) injection 4 mg  4 mg Intravenous Q8H PRN     Medications Discontinued During This Encounter   Medication Reason   • nitroGLYcerin (TRIDIL) 50 mg in 250 mL infusion (premix)    • niCARdipine (CARDENE-IV) 100 mcg/mL in sodium chloride infusion    • niCARdipine (CARDENE) 50 mg (DOUBLE CONCENTRATION) IV in sodium chloride 0 9% 250 mL    • insulin regular (HumuLIN R,NovoLIN R) 1 Units/mL in sodium chloride 0 9 % 100 mL infusion    • losartan (COZAAR) tablet 100 mg        Carmelita Flynn MD      This progress note was produced in part using a dictation device which may document imprecise wording from author's original intent

## 2023-06-21 ENCOUNTER — HOME HEALTH ADMISSION (OUTPATIENT)
Dept: HOME HEALTH SERVICES | Facility: HOME HEALTHCARE | Age: 73
End: 2023-06-21
Payer: COMMERCIAL

## 2023-06-21 ENCOUNTER — APPOINTMENT (INPATIENT)
Dept: NON INVASIVE DIAGNOSTICS | Facility: HOSPITAL | Age: 73
DRG: 189 | End: 2023-06-21
Payer: COMMERCIAL

## 2023-06-21 ENCOUNTER — APPOINTMENT (INPATIENT)
Dept: DIALYSIS | Facility: HOSPITAL | Age: 73
DRG: 189 | End: 2023-06-21
Payer: COMMERCIAL

## 2023-06-21 LAB
ANION GAP SERPL CALCULATED.3IONS-SCNC: 11 MMOL/L
ATRIAL RATE: 77 BPM
BASOPHILS # BLD AUTO: 0.02 THOUSANDS/ÂΜL (ref 0–0.1)
BASOPHILS NFR BLD AUTO: 0 % (ref 0–1)
BUN SERPL-MCNC: 36 MG/DL (ref 5–25)
CALCIUM SERPL-MCNC: 9 MG/DL (ref 8.4–10.2)
CHLORIDE SERPL-SCNC: 92 MMOL/L (ref 96–108)
CO2 SERPL-SCNC: 30 MMOL/L (ref 21–32)
CREAT SERPL-MCNC: 6.65 MG/DL (ref 0.6–1.3)
EOSINOPHIL # BLD AUTO: 0.12 THOUSAND/ÂΜL (ref 0–0.61)
EOSINOPHIL NFR BLD AUTO: 2 % (ref 0–6)
ERYTHROCYTE [DISTWIDTH] IN BLOOD BY AUTOMATED COUNT: 16.8 % (ref 11.6–15.1)
GFR SERPL CREATININE-BSD FRML MDRD: 7 ML/MIN/1.73SQ M
GLUCOSE SERPL-MCNC: 109 MG/DL (ref 65–140)
GLUCOSE SERPL-MCNC: 127 MG/DL (ref 65–140)
GLUCOSE SERPL-MCNC: 201 MG/DL (ref 65–140)
GLUCOSE SERPL-MCNC: 230 MG/DL (ref 65–140)
GLUCOSE SERPL-MCNC: 96 MG/DL (ref 65–140)
HCT VFR BLD AUTO: 28.7 % (ref 36.5–49.3)
HGB BLD-MCNC: 8.8 G/DL (ref 12–17)
IMM GRANULOCYTES # BLD AUTO: 0.02 THOUSAND/UL (ref 0–0.2)
IMM GRANULOCYTES NFR BLD AUTO: 0 % (ref 0–2)
LYMPHOCYTES # BLD AUTO: 1.33 THOUSANDS/ÂΜL (ref 0.6–4.47)
LYMPHOCYTES NFR BLD AUTO: 20 % (ref 14–44)
MAGNESIUM SERPL-MCNC: 2.1 MG/DL (ref 1.9–2.7)
MCH RBC QN AUTO: 27.2 PG (ref 26.8–34.3)
MCHC RBC AUTO-ENTMCNC: 30.7 G/DL (ref 31.4–37.4)
MCV RBC AUTO: 89 FL (ref 82–98)
MONOCYTES # BLD AUTO: 0.9 THOUSAND/ÂΜL (ref 0.17–1.22)
MONOCYTES NFR BLD AUTO: 14 % (ref 4–12)
MRSA NOSE QL CULT: NORMAL
NEUTROPHILS # BLD AUTO: 4.2 THOUSANDS/ÂΜL (ref 1.85–7.62)
NEUTS SEG NFR BLD AUTO: 64 % (ref 43–75)
NRBC BLD AUTO-RTO: 0 /100 WBCS
P AXIS: 69 DEGREES
PHOSPHATE SERPL-MCNC: 4.3 MG/DL (ref 2.3–4.1)
PLATELET # BLD AUTO: 110 THOUSANDS/UL (ref 149–390)
PMV BLD AUTO: 11.9 FL (ref 8.9–12.7)
POTASSIUM SERPL-SCNC: 4.2 MMOL/L (ref 3.5–5.3)
PR INTERVAL: 180 MS
QRS AXIS: 65 DEGREES
QRSD INTERVAL: 102 MS
QT INTERVAL: 404 MS
QTC INTERVAL: 457 MS
RBC # BLD AUTO: 3.23 MILLION/UL (ref 3.88–5.62)
SODIUM SERPL-SCNC: 133 MMOL/L (ref 135–147)
T WAVE AXIS: 99 DEGREES
VENTRICULAR RATE: 77 BPM
WBC # BLD AUTO: 6.59 THOUSAND/UL (ref 4.31–10.16)

## 2023-06-21 PROCEDURE — 94660 CPAP INITIATION&MGMT: CPT

## 2023-06-21 PROCEDURE — 90935 HEMODIALYSIS ONE EVALUATION: CPT | Performed by: NURSE PRACTITIONER

## 2023-06-21 PROCEDURE — 93010 ELECTROCARDIOGRAM REPORT: CPT | Performed by: INTERNAL MEDICINE

## 2023-06-21 PROCEDURE — 85025 COMPLETE CBC W/AUTO DIFF WBC: CPT | Performed by: NURSE PRACTITIONER

## 2023-06-21 PROCEDURE — 94760 N-INVAS EAR/PLS OXIMETRY 1: CPT

## 2023-06-21 PROCEDURE — 80048 BASIC METABOLIC PNL TOTAL CA: CPT | Performed by: NURSE PRACTITIONER

## 2023-06-21 PROCEDURE — 84100 ASSAY OF PHOSPHORUS: CPT | Performed by: NURSE PRACTITIONER

## 2023-06-21 PROCEDURE — 83735 ASSAY OF MAGNESIUM: CPT | Performed by: NURSE PRACTITIONER

## 2023-06-21 PROCEDURE — 93923 UPR/LXTR ART STDY 3+ LVLS: CPT

## 2023-06-21 PROCEDURE — 82948 REAGENT STRIP/BLOOD GLUCOSE: CPT

## 2023-06-21 PROCEDURE — 99222 1ST HOSP IP/OBS MODERATE 55: CPT

## 2023-06-21 RX ORDER — CALCITRIOL 0.25 UG/1
0.75 CAPSULE, LIQUID FILLED ORAL 3 TIMES WEEKLY
Status: DISCONTINUED | OUTPATIENT
Start: 2023-06-21 | End: 2023-06-22 | Stop reason: HOSPADM

## 2023-06-21 RX ORDER — CINACALCET 30 MG/1
120 TABLET, FILM COATED ORAL 3 TIMES WEEKLY
Status: DISCONTINUED | OUTPATIENT
Start: 2023-06-21 | End: 2023-06-22 | Stop reason: HOSPADM

## 2023-06-21 RX ADMIN — CINACALCET 120 MG: 30 TABLET, FILM COATED ORAL at 20:01

## 2023-06-21 RX ADMIN — INSULIN LISPRO 2 UNITS: 100 INJECTION, SOLUTION INTRAVENOUS; SUBCUTANEOUS at 21:14

## 2023-06-21 RX ADMIN — INSULIN LISPRO 2 UNITS: 100 INJECTION, SOLUTION INTRAVENOUS; SUBCUTANEOUS at 11:48

## 2023-06-21 RX ADMIN — AMLODIPINE BESYLATE 2.5 MG: 2.5 TABLET ORAL at 21:16

## 2023-06-21 RX ADMIN — ATORVASTATIN CALCIUM 40 MG: 40 TABLET, FILM COATED ORAL at 08:07

## 2023-06-21 RX ADMIN — CALCIUM ACETATE 1334 MG: 667 CAPSULE ORAL at 08:07

## 2023-06-21 RX ADMIN — LANTHANUM CARBONATE 500 MG: 500 TABLET, CHEWABLE ORAL at 11:48

## 2023-06-21 RX ADMIN — LOSARTAN POTASSIUM 50 MG: 50 TABLET, FILM COATED ORAL at 08:07

## 2023-06-21 RX ADMIN — CHLORHEXIDINE GLUCONATE 0.12% ORAL RINSE 15 ML: 1.2 LIQUID ORAL at 08:07

## 2023-06-21 RX ADMIN — CARVEDILOL 25 MG: 25 TABLET, FILM COATED ORAL at 08:07

## 2023-06-21 RX ADMIN — CALCITRIOL CAPSULES 0.25 MCG 0.75 MCG: 0.25 CAPSULE ORAL at 20:01

## 2023-06-21 RX ADMIN — DOXAZOSIN 2 MG: 2 TABLET ORAL at 08:07

## 2023-06-21 RX ADMIN — GABAPENTIN 100 MG: 100 CAPSULE ORAL at 08:07

## 2023-06-21 RX ADMIN — HEPARIN SODIUM 5000 UNITS: 5000 INJECTION INTRAVENOUS; SUBCUTANEOUS at 06:19

## 2023-06-21 RX ADMIN — HEPARIN SODIUM 5000 UNITS: 5000 INJECTION INTRAVENOUS; SUBCUTANEOUS at 20:01

## 2023-06-21 RX ADMIN — CALCIUM ACETATE 1334 MG: 667 CAPSULE ORAL at 11:48

## 2023-06-21 NOTE — PROGRESS NOTES
NEPHROLOGY PROGRESS NOTE   Jesica Rojas 68 y o  male MRN: [id number]  Unit/Bed#: -01 Encounter: [id number]    Assessment/Plan:    69 yo man with ESRD on hemodialysis who presented 6/19 with acute hypoxic respiratory failure in the setting of flash pulmonary edema and hypertensive emergency  Received urgent dialysis now on room air  HEMODIALYSIS PROCEDURE NOTE  The patient was seen and examined on hemodialysis  Time: 3 5 hours  Sodium: 138 Blood flow: 375   Dialyzer: F160 Potassium: 3k Dialysate flow: 500   Access: fistula Bicarbonate: 35 Ultrafiltration goal: 2 5-3l   Medications on HD: none     1  ESRD on hemodialysis Monday, Wednesday, Friday at [address] [place] [address]  · Most recent hemodialysis session was yesterday for 3 L ultrafiltered complicated by clotting and system  Did not receive blood back  May need heparin  · Target weight documented is 100 kg however significantly under this per our scales here-unclear if accurate  Examines hypervolemic and plan to ultrafiltrate as above  Continue low-sodium, low potassium diet with fluid restriction  Access: Left AV fistula  Continue on Monday, Wednesday, Friday schedule while inpatient  2  Chronic hyperkalemia on chronic Lokelma  · Potassium appropriate this morning peak dialyzed against a 3K bath  Hold on restarting Lokelma  3  Type 2 diabetes mellitus with presumed diabetic nephropathy  · Blood sugar management per primary team  4  Secondary hyperparathyroidism  · Outpatient data: Phosphorus 5 7 mg/dL,  PG  · Continue lanthanum 500 mg 3 times daily with meals, Sensipar 120 mg 3 times weekly postdialysis as long as calcium appropriate, calcitriol 0 75 micrograms 3 times weekly  5  Anemia of ESRD  · Will avoid placing on short acting MARTHA as he receives long-acting agent in the outpatient setting unless prolonged hospitalization  6    Hypertensive emergency (resolved), hypertensive renal disease  · Required nicardipine infusion upon arrival "now discontinued  Home medications restarted  Blood pressure remains elevated and will challenge volume removal as mentioned above  7   Flash pulmonary edema  · Resolved on room air      ROS  Examined on dialysis \"I feel great  \" A complete 10 point review of systems have been performed and are otherwise negative  Historical Information   Past Medical History:   Diagnosis Date   • Coronary artery disease    • Diabetes mellitus    • End stage renal disease    • Hyperlipidemia    • Hypertension    • NSTEMI (non-ST elevated myocardial infarction)    • TIA (transient ischemic attack)      Past Surgical History:   Procedure Laterality Date   • CARDIAC CATHETERIZATION      70% lesion of a small R PDA and a 99% stenosis of D1 (previously noted on the cath in )  Neither lesion was amenable to PCI  Medical management     • DIALYSIS FISTULA CREATION       Social History   Social History     Substance and Sexual Activity   Alcohol Use Never     Social History     Substance and Sexual Activity   Drug Use Never     Social History     Tobacco Use   Smoking Status Former   • Packs/day:    • Years:    • Total pack years:    • Types: Cigarettes   • Start date:    • Quit date: 12   • Years since quittin 4   Smokeless Tobacco Never   Tobacco Comments    STATES QUIT 27 YEARS AGO/ ABOUT ONE CIGARETTE DAILY       Family History:   Family History   Problem Relation Age of Onset   • Hypertension Mother    • Hypertension Father        Medications:  Pertinent medications were reviewed  Current Facility-Administered Medications   Medication Dose Route Frequency Provider Last Rate   • amLODIPine  2 5 mg Oral HS LAN Escalona     • aspirin  81 mg Oral Once [employer], CRNP     • atorvastatin  40 mg Oral Daily LAN Escalona     • calcium acetate  1,334 mg Oral TID With Meals [employer], CRNP     • carvedilol  25 mg Oral BID With Meals [employer], CRNP     • " chlorhexidine  15 mL Mouth/Throat Q12H [address] LAN Escalona     • cloNIDine  0 2 mg Transdermal Weekly LAN Chandler     • doxazosin  2 mg Oral Daily LAN Escalona     • gabapentin  100 mg Oral Daily LAN Escalona     • heparin (porcine)  5,000 Units Subcutaneous Q8H [address] LAN Escalona     • insulin lispro  1-6 Units Subcutaneous TID AC LAN Escalona     • insulin lispro  1-6 Units Subcutaneous HS LAN Escalona     • lanthanum  500 mg Oral TID With Meals LAN Chandler     • losartan  50 mg Oral Daily LAN Escalona     • ondansetron  4 mg Intravenous Q8H PRN LAN Escalona           No Known Allergies      Vitals:   /88   Pulse 65   Temp 97 5 °F (36 4 °C) (Tympanic)   Resp 18   Ht 6' (1 829 m)   Wt 92 kg (202 lb 13 2 oz)   SpO2 99%   BMI 27 51 kg/m²   Body mass index is 27 51 kg/m²    SpO2: 99 %,   SpO2 Activity: At Rest,   O2 Device: None (Room air)      Intake/Output Summary (Last 24 hours) at 6/21/2023 1406  Last data filed at 6/21/2023 1309  Gross per 24 hour   Intake 980 ml   Output 3423 ml   Net -2443 ml     Invasive Devices     Peripheral Intravenous Line  Duration           Peripheral IV 06/19/23 Distal;Right;Ventral (anterior) Forearm 2 days    Peripheral IV 06/19/23 Right;Ventral (anterior) Hand 2 days          Line  Duration           Hemodialysis AV Fistula Left Forearm -- days                Physical Exam  General: conscious, cooperative, in no acute distress  Eyes: conjunctivae pink, anicteric sclerae  ENT: lips and mucous membranes moist  Neck: supple, no JVD, no masses  Chest: clear breath sounds bilaterally, no crackles, ronchus or wheezings  CVS: distinct S1 & S2, normal rate, regular rhythm  Abdomen: soft, non-tender, non-distended, normoactive bowel sounds obese, rounded  Extremities: trace edema of both legs  Skin: no rash  Neuro: awake, alert, oriented      Diagnostic Data:  Lab: I have personally "reviewed pertinent lab results  ,   CBC:  Results from last 7 days   Lab Units 06/21/23  0456   WBC Thousand/uL 6 59   HEMOGLOBIN g/dL 8 8*   HEMATOCRIT % 28 7*   PLATELETS Thousands/uL 110*      CMP:   Lab Results   Component Value Date    SODIUM 133 (L) 06/21/2023    K 4 2 06/21/2023    CL 92 (L) 06/21/2023    CO2 30 06/21/2023    BUN 36 (H) 06/21/2023    CREATININE 6 65 (H) 06/21/2023    CALCIUM 9 0 06/21/2023    EGFR 7 06/21/2023   ,   PT/INR: No results found for: \"PT\", \"INR\",   Magnesium: No components found for: \"MAG\",  Phosphorous:   Lab Results   Component Value Date    PHOS 4 3 (H) 06/21/2023       Microbiology:  @LABRCNTIP,(urinecx:7)@        Rip Block, CRNP    Portions of the record may have been created with voice recognition software  Occasional wrong word or \"sound a like\" substitutions may have occurred due to the inherent limitations of voice recognition software  Read the chart carefully and recognize, using context, where substitutions have occurred    "

## 2023-06-21 NOTE — PLAN OF CARE
Problem: METABOLIC, FLUID AND ELECTROLYTES - ADULT  Goal: Electrolytes maintained within normal limits  Description: INTERVENTIONS:  - Monitor labs and assess patient for signs and symptoms of electrolyte imbalances  - Administer electrolyte replacement as ordered  - Monitor response to electrolyte replacements, including repeat lab results as appropriate  - Instruct patient on fluid and nutrition as appropriate  Outcome: Progressing  Goal: Fluid balance maintained  Description: INTERVENTIONS:  - Monitor labs   - Monitor I/O and WT  - Instruct patient on fluid and nutrition as appropriate  - Assess for signs & symptoms of volume excess or deficit  Outcome: Progressing     Problem: DISCHARGE PLANNING - CARE MANAGEMENT  Goal: Discharge to post-acute care or home with appropriate resources  Description: INTERVENTIONS:  - Conduct assessment to determine patient/family and health care team treatment goals, and need for post-acute services based on payer coverage, community resources, and patient preferences, and barriers to discharge  - Address psychosocial, clinical, and financial barriers to discharge as identified in assessment in conjunction with the patient/family and health care team  - Arrange appropriate level of post-acute services according to patient’s   needs and preference and payer coverage in collaboration with the physician and health care team  - Communicate with and update the patient/family, physician, and health care team regarding progress on the discharge plan  - Arrange appropriate transportation to post-acute venues  Outcome: Progressing

## 2023-06-21 NOTE — NURSING NOTE
RECEIVED VIA BED FROM ICU  ALERT/ORIENTED X4  02 MAINTAINED 4 LITERS N/C AND PLACED BY RESP TECH ON HIS CHRONIC BIPAP  V/S ASSESSED   READY FOR SLEEP

## 2023-06-21 NOTE — PLAN OF CARE
As per Lara Bliss, UF up to 3 L as tolerated on a 3 K+ bath for a morning serum K+ of 4 2, 3 HR and 30 min TX  Lara Bliss is aware and agreeable of goal, bath and   2 HRS into [address] [place] [address] a SAD air alarm occurred that was unable to be resolved; venous chamber and line clotted  Arterial line was returned to the pt  System was flushed 1 HR into TX and no clotting was seen  At a NET UF of 2 L the pt c/o cramping in left leg, UF was turned off and pt given 100 cc of NSS to relieve cramping  Problem: METABOLIC, FLUID AND ELECTROLYTES - ADULT  Goal: Electrolytes maintained within normal limits  Description: INTERVENTIONS:  - Monitor labs and assess patient for signs and symptoms of electrolyte imbalances  - Administer electrolyte replacement as ordered  - Monitor response to electrolyte replacements, including repeat lab results as appropriate  - Instruct patient on fluid and nutrition as appropriate  Outcome: Progressing  Goal: Fluid balance maintained  Description: INTERVENTIONS:  - Monitor labs   - Monitor I/O and WT  - Instruct patient on fluid and nutrition as appropriate  - Assess for signs & symptoms of volume excess or deficit  Outcome: Progressing     Post-Dialysis RN Treatment Note    Blood Pressure:  Pre 169/98 mm/Hg  Post 168/90 mmHg   EDW  TBD kg 100 kg as per [address] [place]   Weight:  Pre 92 2 kg   Post 90 2 kg (Standing scale)   Mode of weight measurement: Bed Scale   Volume Removed  2000 ml net    Treatment duration 210 minutes    NS given  Yes, 200 cc for a flush and 100 cc for cramping    Treatment shortened?  No   Medications given during Rx Not Applicable   Estimated Kt/V  1 19   Access type: AV fistula   Access Issues: No    Report called to primary nurse   Yes, Ibeth Doty RN

## 2023-06-21 NOTE — CONSULTS
LEAP (Lower Extremity Amputation Prevention Program) Consultation      Assessment:  Patient qualifies to be in the [facility] program based on risk factor evaluation    Patient agrees to be part of the program: yes    Plan:  -Patient requires the following outpatient referrals on discharge:  Currently follows with Podiatry, Endocrinology    -Recommend starting the following medications:  Currently takes aspirin and statin    -Patient educated on proper foot care    ________________________________________________________________________    Risk Factors:    yes Insulin dependent DM  - 3 points  no History of wound/ulcer  - 3 points  no Active smoker   - 2 points  yes Former smoker   - 1 point  no A1C>8    - 1 point  yes Hypertension    - 1 point  yes Hyperlipidemia   - 1 point  yes Coronary artery disease  - 1 point  no Peripheral arterial disease  - 1 point  no Diabetic retinopathy   - 1 point  yes Peripheral neuropathy  - 1 point    Total LEAP risk score: 8    Medications:  ASA: yes  Other blood thinners:   Aspirin  Statin: yes  Oral diabetes medications: no  Insulin: yes    Vitals:  /71 ([place] Right arm)   Pulse 66   Temp 98 4 °F (36 9 °C) (Axillary)   Resp 16   Ht 6' (1 829 m)   Wt 92 kg (202 lb 13 2 oz)   SpO2 99%   BMI 27 51 kg/m²     Physical Exam:    Pulse Exam:   Right DP: 1+  Right PT: 1+  Left DP: 1+  Left PT: 1+  Neuro: Decreased foot sensation?: yes  Skin:    Stasis changes?: no   Lower extremity Edema: no  Wounds?: no    Social History:  Smoker: no  Details: Former smoker, smoked for 20 years 1 pack a day    Past Medical History:  Past Medical History:   Diagnosis Date   • Coronary artery disease    • Diabetes mellitus    • End stage renal disease    • Hyperlipidemia    • Hypertension    • NSTEMI (non-ST elevated myocardial infarction) 2012   • TIA (transient ischemic attack)        Past Surgical History:  Past Surgical History:   Procedure Laterality Date   • CARDIAC CATHETERIZATION  2017 70% lesion of a small R PDA and a 99% stenosis of D1 (previously noted on the cath in 2012)  Neither lesion was amenable to PCI  Medical management     • DIALYSIS FISTULA CREATION         Labs:   Lab Results   Component Value Date    WBC 6 59 06/21/2023    HGB 8 8 (L) 06/21/2023     (L) 06/21/2023    CREATININE 6 65 (H) 06/21/2023    EGFR 7 06/21/2023    INR 1 09 06/19/2023     Lab Results   Component Value Date    CHOL 86 03/20/2018       Suellen Gutierrez PA-C

## 2023-06-21 NOTE — CASE MANAGEMENT
Case Management Discharge Planning Note    Patient name Lata Victor  Location /-73 MRN [id number]  : 1950 Date 2023       Current Admission Date: 2023  Current Admission Diagnosis:Flash pulmonary edema [facility])   Patient Active Problem List    Diagnosis Date Noted   • Flash pulmonary edema ([facility] [address] ) 2023   • SIRS (systemic inflammatory response syndrome) ([facility] ) 2023   • Acute respiratory failure with hypoxia ([facility] ) 2023   • Dependence on renal dialysis ([facility] ) 2022   • Snoring 2022   • Numbness and tingling in both hands 2022   • SIVA (obstructive sleep apnea)    • TIA (transient ischemic attack) 2022   • Elevated troponin 2021   • ESRD (end stage renal disease) ([facility] ) 2021   • Hypertensive urgency 2021   • Type 2 diabetes mellitus without complication, with long-term current use of insulin ([facility]  ) 2021   • Mixed hyperlipidemia 2021      LOS (days): 2  Geometric Mean LOS (GMLOS) (days): 3 50  Days to GMLOS:1 4     OBJECTIVE:  Risk of Unplanned Readmission Score: 15 16     Current admission status: Inpatient   Preferred Pharmacy:   [id number] [place], [place] - [address]  Phone: [phone] Fax: [phone]    Primary Care Provider: LAN Sutherland    Primary Insurance: [facility]  Secondary Insurance:     DISCHARGE DETAILS:  Provided the choice list of [facility] providers and pt chose [facility]  Reserved same in [place]

## 2023-06-21 NOTE — UTILIZATION REVIEW
Date: 6/21    Day 3: Has surpassed a 2nd midnight with active treatments and services   Pt requiring continued stay for O2 4L NC

## 2023-06-22 VITALS
RESPIRATION RATE: 18 BRPM | BODY MASS INDEX: 27.08 KG/M2 | TEMPERATURE: 98.8 F | DIASTOLIC BLOOD PRESSURE: 77 MMHG | HEART RATE: 67 BPM | OXYGEN SATURATION: 97 % | SYSTOLIC BLOOD PRESSURE: 162 MMHG | WEIGHT: 199.96 LBS | HEIGHT: 72 IN

## 2023-06-22 LAB
GLUCOSE SERPL-MCNC: 174 MG/DL (ref 65–140)
GLUCOSE SERPL-MCNC: 183 MG/DL (ref 65–140)

## 2023-06-22 PROCEDURE — 94660 CPAP INITIATION&MGMT: CPT

## 2023-06-22 PROCEDURE — 82948 REAGENT STRIP/BLOOD GLUCOSE: CPT

## 2023-06-22 PROCEDURE — 99232 SBSQ HOSP IP/OBS MODERATE 35: CPT | Performed by: INTERNAL MEDICINE

## 2023-06-22 PROCEDURE — 94760 N-INVAS EAR/PLS OXIMETRY 1: CPT

## 2023-06-22 PROCEDURE — 99239 HOSP IP/OBS DSCHRG MGMT >30: CPT | Performed by: NURSE PRACTITIONER

## 2023-06-22 RX ORDER — CINACALCET 60 MG/1
120 TABLET, FILM COATED ORAL 3 TIMES WEEKLY
Qty: 24 TABLET | Refills: 0 | Status: SHIPPED | OUTPATIENT
Start: 2023-06-23 | End: 2023-07-23

## 2023-06-22 RX ORDER — LOSARTAN POTASSIUM 50 MG/1
50 TABLET ORAL DAILY
Qty: 30 TABLET | Refills: 0 | Status: SHIPPED | OUTPATIENT
Start: 2023-06-23 | End: 2023-07-23

## 2023-06-22 RX ORDER — AMLODIPINE BESYLATE 2.5 MG/1
2.5 TABLET ORAL
Qty: 30 TABLET | Refills: 0 | Status: SHIPPED | OUTPATIENT
Start: 2023-06-22 | End: 2023-07-22

## 2023-06-22 RX ORDER — DIPHENHYDRAMINE HYDROCHLORIDE 50 MG/ML
25 INJECTION INTRAMUSCULAR; INTRAVENOUS ONCE
Status: COMPLETED | OUTPATIENT
Start: 2023-06-22 | End: 2023-06-22

## 2023-06-22 RX ORDER — CALCITRIOL 0.25 UG/1
0.75 CAPSULE, LIQUID FILLED ORAL 3 TIMES WEEKLY
Qty: 36 CAPSULE | Refills: 0 | Status: SHIPPED | OUTPATIENT
Start: 2023-06-23 | End: 2023-07-23

## 2023-06-22 RX ADMIN — HEPARIN SODIUM 5000 UNITS: 5000 INJECTION INTRAVENOUS; SUBCUTANEOUS at 06:17

## 2023-06-22 RX ADMIN — DIPHENHYDRAMINE HYDROCHLORIDE 25 MG: 50 INJECTION, SOLUTION INTRAMUSCULAR; INTRAVENOUS at 01:36

## 2023-06-22 RX ADMIN — CALCIUM ACETATE 1334 MG: 667 CAPSULE ORAL at 12:00

## 2023-06-22 RX ADMIN — CARVEDILOL 25 MG: 25 TABLET, FILM COATED ORAL at 08:27

## 2023-06-22 RX ADMIN — DOXAZOSIN 2 MG: 2 TABLET ORAL at 08:27

## 2023-06-22 RX ADMIN — LOSARTAN POTASSIUM 50 MG: 50 TABLET, FILM COATED ORAL at 08:28

## 2023-06-22 RX ADMIN — GABAPENTIN 100 MG: 100 CAPSULE ORAL at 08:26

## 2023-06-22 RX ADMIN — INSULIN LISPRO 1 UNITS: 100 INJECTION, SOLUTION INTRAVENOUS; SUBCUTANEOUS at 08:05

## 2023-06-22 RX ADMIN — ATORVASTATIN CALCIUM 40 MG: 40 TABLET, FILM COATED ORAL at 08:27

## 2023-06-22 RX ADMIN — CHLORHEXIDINE GLUCONATE 0.12% ORAL RINSE 15 ML: 1.2 LIQUID ORAL at 08:28

## 2023-06-22 RX ADMIN — LANTHANUM CARBONATE 500 MG: 500 TABLET, CHEWABLE ORAL at 12:00

## 2023-06-22 RX ADMIN — INSULIN LISPRO 1 UNITS: 100 INJECTION, SOLUTION INTRAVENOUS; SUBCUTANEOUS at 12:00

## 2023-06-22 RX ADMIN — LANTHANUM CARBONATE 500 MG: 500 TABLET, CHEWABLE ORAL at 08:29

## 2023-06-22 RX ADMIN — CALCIUM ACETATE 1334 MG: 667 CAPSULE ORAL at 08:27

## 2023-06-22 NOTE — PLAN OF CARE
Problem: METABOLIC, FLUID AND ELECTROLYTES - ADULT  Goal: Electrolytes maintained within normal limits  Description: INTERVENTIONS:  - Monitor labs and assess patient for signs and symptoms of electrolyte imbalances  - Administer electrolyte replacement as ordered  - Monitor response to electrolyte replacements, including repeat lab results as appropriate  - Instruct patient on fluid and nutrition as appropriate  6/22/2023 1331 by Otilia Reynoso RN  Outcome: Adequate for Discharge  6/22/2023 1119 by Otilia Reynoso RN  Outcome: Progressing  6/22/2023 1119 by Otilia Reynoso RN  Outcome: Progressing  Goal: Fluid balance maintained  Description: INTERVENTIONS:  - Monitor labs   - Monitor I/O and WT  - Instruct patient on fluid and nutrition as appropriate  - Assess for signs & symptoms of volume excess or deficit  6/22/2023 1331 by Otilia Reynoso RN  Outcome: Adequate for Discharge  6/22/2023 1119 by Otilia Reynoso RN  Outcome: Progressing  6/22/2023 1119 by Otilia Reynoso RN  Outcome: Progressing     Problem: CARDIOVASCULAR - ADULT  Goal: Maintains optimal cardiac output and hemodynamic stability  Description: INTERVENTIONS:  - Monitor I/O, vital signs and rhythm  - Monitor for S/S and trends of decreased cardiac output  - Administer and titrate ordered vasoactive medications to optimize hemodynamic stability  - Assess quality of pulses, skin color and temperature  - Assess for signs of decreased coronary artery perfusion  - Instruct patient to report change in severity of symptoms  6/22/2023 1331 by Otilia Reynoso RN  Outcome: Adequate for Discharge  6/22/2023 1119 by Otilia Reynoso RN  Outcome: Progressing  6/22/2023 1119 by Otilia Reynoso RN  Outcome: Progressing  Goal: Absence of cardiac dysrhythmias or at baseline rhythm  Description: INTERVENTIONS:  - Continuous cardiac monitoring, vital signs, obtain 12 lead EKG if ordered  - Administer antiarrhythmic and heart rate control medications as ordered  - Monitor electrolytes and administer replacement therapy as ordered  6/22/2023 1331 by Jenny Marino RN  Outcome: Adequate for Discharge  6/22/2023 1119 by Jenny Marino RN  Outcome: Progressing  6/22/2023 1119 by Jenny Marino RN  Outcome: Progressing     Problem: RESPIRATORY - ADULT  Goal: Achieves optimal ventilation and oxygenation  Description: INTERVENTIONS:  - Assess for changes in respiratory status  - Assess for changes in mentation and behavior  - Position to facilitate oxygenation and minimize respiratory effort  - Oxygen administered by appropriate delivery if ordered  - Initiate smoking cessation education as indicated  - Encourage broncho-pulmonary hygiene including cough, deep breathe, Incentive Spirometry  - Assess the need for suctioning and aspirate as needed  - Assess and instruct to report SOB or any respiratory difficulty  - Respiratory Therapy support as indicated  6/22/2023 1331 by Jenny Marino RN  Outcome: Adequate for Discharge  6/22/2023 1119 by Jenny Marino RN  Outcome: Progressing  6/22/2023 1119 by Jenny Marino RN  Outcome: Progressing     Problem: MOBILITY - ADULT  Goal: Maintain or return to baseline ADL function  Description: INTERVENTIONS:  -  Assess patient's ability to carry out ADLs; assess patient's baseline for ADL function and identify physical deficits which impact ability to perform ADLs (bathing, care of mouth/teeth, toileting, grooming, dressing, etc )  - Assess/evaluate cause of self-care deficits   - Assess range of motion  - Assess patient's mobility; develop plan if impaired  - Assess patient's need for assistive devices and provide as appropriate  - Encourage maximum independence but intervene and supervise when necessary  - Involve family in performance of ADLs  - Assess for home care needs following discharge   - Consider OT consult to assist with ADL evaluation and planning for discharge  - Provide patient education as appropriate  6/22/2023 1331 by Thai eCrna RN  Outcome: Adequate for Discharge  6/22/2023 1119 by Thai Cerna RN  Outcome: Progressing  6/22/2023 1119 by Thai Cerna RN  Outcome: Progressing  Goal: Maintains/Returns to pre admission functional level  Description: INTERVENTIONS:  - Perform BMAT or MOVE assessment daily    - Set and communicate daily mobility goal to care team and patient/family/caregiver  - Collaborate with rehabilitation services on mobility goals if consulted  - Perform Range of Motion  times a day  - Reposition patient every  hours    - Dangle patient  times a day  - Stand patient  times a day  - Ambulate patient  times a day  - Out of bed to chair  times a day   - Out of bed for meals  times a day  - Out of bed for toileting  - Record patient progress and toleration of activity level   6/22/2023 1331 by Thai Cerna RN  Outcome: Adequate for Discharge  6/22/2023 1119 by Thai Cerna RN  Outcome: Progressing  6/22/2023 1119 by Thai Cerna RN  Outcome: Progressing     Problem: DISCHARGE PLANNING - CARE MANAGEMENT  Goal: Discharge to post-acute care or home with appropriate resources  Description: INTERVENTIONS:  - Conduct assessment to determine patient/family and health care team treatment goals, and need for post-acute services based on payer coverage, community resources, and patient preferences, and barriers to discharge  - Address psychosocial, clinical, and financial barriers to discharge as identified in assessment in conjunction with the patient/family and health care team  - Arrange appropriate level of post-acute services according to patient’s   needs and preference and payer coverage in collaboration with the physician and health care team  - Communicate with and update the patient/family, physician, and health care team regarding progress on the discharge plan  - Arrange appropriate transportation to post-acute venues  6/22/2023 1331 by Susu Sandhu RN  Outcome: Adequate for Discharge  6/22/2023 1119 by Susu Sandhu RN  Outcome: Progressing  6/22/2023 1119 by Susu Sandhu RN  Outcome: Progressing     Problem: PAIN - ADULT  Goal: Verbalizes/displays adequate comfort level or baseline comfort level  Description: Interventions:  - Encourage patient to monitor pain and request assistance  - Assess pain using appropriate pain scale  - Administer analgesics based on type and severity of pain and evaluate response  - Implement non-pharmacological measures as appropriate and evaluate response  - Consider cultural and social influences on pain and pain management  - Notify physician/advanced practitioner if interventions unsuccessful or patient reports new pain  6/22/2023 1331 by Susu Sandhu RN  Outcome: Adequate for Discharge  6/22/2023 1119 by Susu Sandhu RN  Outcome: Progressing  6/22/2023 1119 by Susu Sandhu RN  Outcome: Progressing     Problem: SAFETY ADULT  Goal: Maintain or return to baseline ADL function  Description: INTERVENTIONS:  -  Assess patient's ability to carry out ADLs; assess patient's baseline for ADL function and identify physical deficits which impact ability to perform ADLs (bathing, care of mouth/teeth, toileting, grooming, dressing, etc )  - Assess/evaluate cause of self-care deficits   - Assess range of motion  - Assess patient's mobility; develop plan if impaired  - Assess patient's need for assistive devices and provide as appropriate  - Encourage maximum independence but intervene and supervise when necessary  - Involve family in performance of ADLs  - Assess for home care needs following discharge   - Consider OT consult to assist with ADL evaluation and planning for discharge  - Provide patient education as appropriate  6/22/2023 1331 by Susu Sandhu RN  Outcome: Adequate for Discharge  6/22/2023 1119 by Susu Sandhu RN  Outcome: Progressing  6/22/2023 1119 by Susu Sandhu RN  Outcome: Progressing  Goal: Maintains/Returns to pre admission functional level  Description: INTERVENTIONS:  - Perform BMAT or MOVE assessment daily    - Set and communicate daily mobility goal to care team and patient/family/caregiver  - Collaborate with rehabilitation services on mobility goals if consulted  - Perform Range of Motion  times a day  - Reposition patient every  hours    - Dangle patient  times a day  - Stand patient  times a day  - Ambulate patient  times a day  - Out of bed to chair  times a day   - Out of bed for meals times a day  - Out of bed for toileting  - Record patient progress and toleration of activity level   6/22/2023 1331 by Rui Lopes RN  Outcome: Adequate for Discharge  6/22/2023 1119 by Rui Lopes RN  Outcome: Progressing  6/22/2023 1119 by Rui Lopes RN  Outcome: Progressing  Goal: Patient will remain free of falls  Description: INTERVENTIONS:  - Educate patient/family on patient safety including physical limitations  - Instruct patient to call for assistance with activity   - Consult OT/PT to assist with strengthening/mobility   - Keep Call bell within reach  - Keep bed low and locked with side rails adjusted as appropriate  - Keep care items and personal belongings within reach  - Initiate and maintain comfort rounds  - Make Fall Risk Sign visible to staff  - Offer Toileting every Hours, in advance of need  - Initiate/Maintain alarm  - Obtain necessary fall risk management equipment:   - Apply yellow socks and bracelet for high fall risk patients  - Consider moving patient to room near nurses station  6/22/2023 1331 by Rui Lopes RN  Outcome: Adequate for Discharge  6/22/2023 1119 by Rui Lopes RN  Outcome: Progressing  6/22/2023 1119 by Rui Lopes RN  Outcome: Progressing     Problem: Knowledge Deficit  Goal: Patient/family/caregiver demonstrates understanding of disease process, treatment plan, medications, and discharge instructions  Description: Complete learning assessment and assess knowledge base    Interventions:  - Provide teaching at level of understanding  - Provide teaching via preferred learning methods  6/22/2023 1331 by Jean Claude Benitez RN  Outcome: Adequate for Discharge  6/22/2023 1119 by Jean Claude Benitez RN  Outcome: Progressing  6/22/2023 1119 by Jean Claude Benitez RN  Outcome: Progressing

## 2023-06-22 NOTE — PROGRESS NOTES
Progress Note - Nephrology   Juliann Vega 68 y o  male MRN: [id number]  Unit/Bed#: -01 Encounter: [id number]    A/P:  1   End-stage renal disease hemodialysis dependent   - Treated for volume overload with acute hypoxic respiratory failure   - Dry weight has decreased to 96 4 kg with progressive ultrafiltration with improvement in symptoms and oxygen requirement   - He will need a dry weight adjustment in his outpatient clinic   - He is stable for discharge from a renal point of view    2  Chronic hyperkalemia on Lokelma   - Potassium was 4 2 today  Lanette Meigs has been held    3  Type 2 diabetes mellitus with probable diabetic nephropathy   - Sugars have been covered as per protocol    4  Secondary hyperparathyroidism of renal origin   - Receives Sensipar at 120 mg 3 times a week in his clinic and lanthanum 500 mg 3 times daily with meals    4  Anemia of end-stage renal disease   - He receives Mircera at his outpatient unit   - Last hemoglobin was 8 8    5  Hypertensive urgency   - Resolved with ultrafiltration    6  Flash pulmonary edema   - Resolved      Follow up reason for today's visit: Patient with end-stage renal disease admitted with flash pulmonary edema following sodium and fluid indulgence last Sunday  He was admitted and dialyzed with marked improvement  Had high oxygen requirement which resolved        Flash pulmonary edema [facility])    Patient Active Problem List   Diagnosis   • ESRD (end stage renal disease) ([facility] )   • Hypertensive urgency   • Type 2 diabetes mellitus without complication, with long-term current use of insulin ([facility])   • Mixed hyperlipidemia   • Elevated troponin   • TIA (transient ischemic attack)   • SIVA (obstructive sleep apnea)   • Snoring   • Numbness and tingling in both hands   • Dependence on renal dialysis ([facility] )   • Flash pulmonary edema ([facility])   • SIRS (systemic inflammatory response syndrome) ([facility])   • Acute respiratory failure with hypoxia ([facility])         Subjective: 10 point review of systems is unremarkable  The patient is ambulating in his room on room air  Objective:     Vitals: Blood pressure 162/77, pulse 67, temperature 98 8 °F (37 1 °C), temperature source Axillary, resp  rate 18, height 6' (1 829 m), weight 90 7 kg (199 lb 15 3 oz), SpO2 97 %  ,Body mass index is 27 12 kg/m²  Weight (last 2 days)     Date/Time Weight    06/22/23 0533 90 7 (199 96)    06/21/23 1800 90 2 (198 86)    06/21/23 1400 --    Comment rows:    OBSERV: Dialysis started at 06/21/23 1400    06/21/23 1355 92 (202 82)    Comment rows:    OBSERV: Pt assessed pre-dialysis at 06/21/23 1355    06/21/23 0718 92 (202 82)    06/21/23 0623 --     Weight: patient wearing bipap and requests to stand on scale after he removes his mask in the morning at 06/21/23 0623    06/21/23 0600 --     Weight: Weight X2 nn O2 tank and no SCD machine one pillow one sheet one blanket nurse made aware at 06/21/23 0600    06/20/23 0900 102 (225)    06/20/23 0541 102 (225 97)    06/20/23 0500 102 (225 97)            Intake/Output Summary (Last 24 hours) at 6/22/2023 1226  Last data filed at 6/22/2023 6925  Gross per 24 hour   Intake 1280 ml   Output 2755 ml   Net -1475 ml     I/O last 3 completed shifts: In: 1280 [P O :480; I V :500;  Other:300]  Out: 2755 [Other:2755]         Physical Exam: /77 ([place] Right arm)   Pulse 67   Temp 98 8 °F (37 1 °C) (Axillary)   Resp 18   Ht 6' (1 829 m)   Wt 90 7 kg (199 lb 15 3 oz)   SpO2 97%   BMI 27 12 kg/m²     General Appearance:    Alert, cooperative, no distress, appears stated age   Head:    Normocephalic, without obvious abnormality, atraumatic   Eyes:    Conjunctiva/corneas clear   Ears:    Normal external ears   Nose:   Nares normal, septum midline, mucosa normal, no drainage    or sinus tenderness   Throat:   Lips, mucosa, and tongue normal; teeth and gums normal   Neck:   Supple, symmetrical, trachea midline, no adenopathy;        thyroid:  No "enlargement/tenderness/nodules; no carotid    bruit or JVD   Back:     Symmetric, no curvature, ROM normal, no CVA tenderness   Lungs:     Clear to auscultation bilaterally, respirations unlabored   Chest wall:    No tenderness or deformity   Heart:    Regular rate and rhythm, S1 and S2 normal, no murmur, rub   or gallop   Abdomen:     Soft, non-tender, bowel sounds active   Extremities:   Extremities normal, atraumatic, no cyanosis or edema   Skin:   Skin color, texture, turgor normal, no rashes or lesions   Lymph nodes:   Cervical normal   Neurologic:   CNII-XII intact            Lab, Imaging and other studies: I have personally reviewed pertinent labs  CBC: No results found for: \"WBC\", \"HGB\", \"HCT\", \"MCV\", \"PLT\", \"ADJUSTEDWBC\", \"RBC\", \"MCH\", \"MCHC\", \"RDW\", \"MPV\", \"NRBC\"  CMP: No results found for: \"NA\", \"K\", \"CL\", \"CO2\", \"ANIONGAP\", \"BUN\", \"CREATININE\", \"GLUCOSE\", \"CALCIUM\", \"AST\", \"ALT\", \"ALKPHOS\", \"PROT\", \"BILITOT\", \"EGFR\"        Results from last 7 days   Lab Units 06/21/23  0456 06/20/23  0521 06/19/23  0836   POTASSIUM mmol/L 4 2 4 6 4 9   CHLORIDE mmol/L 92* 94* 95*   CO2 mmol/L 30 30 27   BUN mg/dL 36* 45* 69*   CREATININE mg/dL 6 65* 8 02* 10 89*   CALCIUM mg/dL 9 0 8 4 8 9   ALK PHOS U/L  --  59  --    ALT U/L  --  9  --    AST U/L  --  14  --          Phosphorus: No results found for: \"PHOS\"  Magnesium: No results found for: \"MG\"  Urinalysis: No results found for: \"COLORU\", \"CLARITYU\", \"SPECGRAV\", \"PHUR\", \"LEUKOCYTESUR\", \"NITRITE\", \"PROTEINUA\", \"GLUCOSEU\", \"KETONESU\", \"BILIRUBINUR\", \"BLOODU\"  Ionized Calcium: No results found for: \"CAION\"  Coagulation: No results found for: \"PT\", \"INR\", \"APTT\"  Troponin: No results found for: \"TROPONINI\"  ABG: No results found for: \"PHART\", \"SKQ4XJY\", \"PO2ART\", \"NXM9CHW\", \"E2XZIERT\", \"BEART\", \"SOURCE\"  Radiology review:     IMAGING  Procedure: Echo complete w/ contrast if indicated    Result Date: 6/20/2023  Narrative: •  Left Ventricle: Left ventricular cavity size is " normal  Wall thickness is moderately increased  The left ventricular ejection fraction is 55%  Systolic function is normal  Although no diagnostic regional wall motion abnormality was identified, this possibility cannot be completely excluded on the basis of this study  Diastolic function is moderately abnormal, consistent with grade II (pseudonormal) relaxation  •  Left Atrium: The atrium is mild-moderately dilated  •  Right Atrium: The atrium is mildly dilated  •  Aortic Valve: There is mild to moderate stenosis  The aortic valve peak velocity is 2 21 m/s  The aortic valve area is 1 66 cm2  •  Mitral Valve: There is annular calcification  There is moderate regurgitation  •  Tricuspid Valve: There is moderate regurgitation         Current Facility-Administered Medications   Medication Dose Route Frequency   • amLODIPine (NORVASC) tablet 2 5 mg  2 5 mg Oral HS   • aspirin (ECOTRIN LOW STRENGTH) EC tablet 81 mg  81 mg Oral Once   • atorvastatin (LIPITOR) tablet 40 mg  40 mg Oral Daily   • calcitriol (ROCALTROL) capsule 0 75 mcg  0 75 mcg Oral Once per day on Mon Wed Fri   • calcium acetate (PHOSLO) capsule 1,334 mg  1,334 mg Oral TID With Meals   • carvedilol (COREG) tablet 25 mg  25 mg Oral BID With Meals   • chlorhexidine (PERIDEX) 0 12 % oral rinse 15 mL  15 mL Mouth/Throat Q12H [facility]   • cinacalcet (SENSIPAR) tablet 120 mg  120 mg Oral Once per day on Mon Wed Fri   • cloNIDine (CATAPRES-TTS-2) 0 2 mg/24 hr TD weekly patch  0 2 mg Transdermal Weekly   • doxazosin (CARDURA) tablet 2 mg  2 mg Oral Daily   • gabapentin (NEURONTIN) capsule 100 mg  100 mg Oral Daily   • heparin (porcine) subcutaneous injection 5,000 Units  5,000 Units Subcutaneous Q8H [facility]   • insulin lispro (HumaLOG) 100 units/mL subcutaneous injection 1-6 Units  1-6 Units Subcutaneous TID AC   • insulin lispro (HumaLOG) 100 units/mL subcutaneous injection 1-6 Units  1-6 Units Subcutaneous HS   • lanthanum (FOSRENOL) chewable tablet 500 mg  500 mg Oral TID With Meals   • losartan (COZAAR) tablet 50 mg  50 mg Oral Daily   • ondansetron (ZOFRAN) injection 4 mg  4 mg Intravenous Q8H PRN     Medications Discontinued During This Encounter   Medication Reason   • nitroGLYcerin (TRIDIL) 50 mg in 250 mL infusion (premix)    • niCARdipine (CARDENE-IV) 100 mcg/mL in sodium chloride infusion    • niCARdipine (CARDENE) 50 mg (DOUBLE CONCENTRATION) IV in sodium chloride 0 9% 250 mL    • insulin regular (HumuLIN R,NovoLIN R) 1 Units/mL in sodium chloride 0 9 % 100 mL infusion    • losartan (COZAAR) tablet 100 mg    • amLODIPine (NORVASC) tablet 5 mg    • calcium acetate (PHOSLO) capsule 667 mg    • heparin (porcine) injection 1,000 Units        Yoseph Alaniz MD      This progress note was produced in part using a dictation device which may document imprecise wording from author's original intent

## 2023-06-22 NOTE — CASE MANAGEMENT
Case Management Discharge Planning Note    Patient name Orvis Sink  Location /-84 MRN [id number]  : 1950 Date 2023       Current Admission Date: 2023  Current Admission Diagnosis:Flash pulmonary edema [facility])   Patient Active Problem List    Diagnosis Date Noted   • [facility] pulmonary [facility] ([facility] ) 2023   • SIRS (systemic inflammatory response syndrome) ([facility] ) 2023   • Acute respiratory failure with hypoxia ([facility] ) 2023   • Dependence on renal dialysis ([facility] ) 2022   • Snoring 2022   • Numbness and tingling in both hands 2022   • SIVA (obstructive sleep apnea)    • TIA (transient ischemic attack) 2022   • Elevated troponin 2021   • ESRD (end stage renal disease) ([facility] ) 2021   • Hypertensive urgency 2021   • Type 2 diabetes mellitus without complication, with long-term current use of insulin ([facility] ) 2021   • Mixed hyperlipidemia 2021      LOS (days): 3  Geometric Mean LOS (GMLOS) (days): 3 50  Days to GMLOS:0 3     OBJECTIVE:  Risk of Unplanned Readmission Score: 15 72     Current admission status: Inpatient   Preferred Pharmacy:   [facility]/pharmacy #[id number]- [place], [place] - [address]  Phone: [phone] Fax: [phone]    Primary Care Provider: LAN Ferreira    Primary Insurance: [facility] REP  Secondary Insurance:     DISCHARGE DETAILS:  IMM Given (Date):: 23  IMM Given to[de-identified] Patient (Reviewed the  IMM with the pt and the wife who is in agreement with same )   Wife at the bedside, will transport home  Will start services with [facility]

## 2023-06-22 NOTE — NURSING NOTE
hospitalist was in to see and eval the pt and he is ok for dc to home, iv site removed with the tip intact,avs reviewed with the pt and his wife, all questions answered to their satisfaction,taken to family car via [facility] and the pt was dcd from the hospital

## 2023-06-22 NOTE — NURSING NOTE
4L/min NC(pt removes intermittently)  Denies respiratory distress  Sleeping when undisturbed  LA AV fistula with bruit/thrill; dressing intact

## 2023-06-22 NOTE — PLAN OF CARE
Problem: METABOLIC, FLUID AND ELECTROLYTES - ADULT  Goal: Electrolytes maintained within normal limits  Description: INTERVENTIONS:  - Monitor labs and assess patient for signs and symptoms of electrolyte imbalances  - Administer electrolyte replacement as ordered  - Monitor response to electrolyte replacements, including repeat lab results as appropriate  - Instruct patient on fluid and nutrition as appropriate  Outcome: Progressing  Goal: Fluid balance maintained  Description: INTERVENTIONS:  - Monitor labs   - Monitor I/O and WT  - Instruct patient on fluid and nutrition as appropriate  - Assess for signs & symptoms of volume excess or deficit  Outcome: Progressing   Hemo treatments as ordered    Maintain FR 2000cc

## 2023-06-22 NOTE — DISCHARGE SUMMARY
Samantha 46  Discharge- Jesus Cline 1950, 68 y o  male MRN: [id number]  Unit/Bed#: -01 Encounter: [id number]  Primary Care Provider: Litzy Dorman   Date and time admitted to hospital: 6/19/2023  8:22 AM    Acute respiratory failure with hypoxia [facility])  Assessment & Plan  · Presented with flash pulmonary edema and treated with BiPAP in the unit while waiting for urgent dialysis  · Now saturating well on room air    * Flash pulmonary edema (HCC)  Assessment & Plan  · Presented with hypoxia and increased work of breathing  · S/P BiPAP in ICU  · Received emergent hemodialysis for volume management  · Resolved    SIRS (systemic inflammatory response syndrome) ([facility] [address] )  Assessment & Plan  · Presented with tachycardia and leukocytosis, probably reactive  · No fever or leukocytosis    No cough, dysuria, or rash  · Outpatient follow-up with PCP    SIVA (obstructive sleep apnea)  Assessment & Plan  · Currently being evaluated for CPAP as an outpatient  · Continue outpatient follow-up    Type 2 diabetes mellitus without complication, with long-term current use of insulin [facility])  Assessment & Plan  Lab Results   Component Value Date    HGBA1C 7 6 (H) 08/04/2022       Recent Labs     06/21/23  1838 06/21/23  2050 06/22/23  0707 06/22/23  1058   POCGLU 127 230* 174* 183*       Blood Sugar Average: Last 72 hrs:  (P) 156 35     · Blood sugar elevated on admission, status post insulin infusion while in ICU  · Transitioned to SSI  · Resume home medications on discharge    Hypertensive urgency  Assessment & Plan  · Hypertensive on arrival, probably due to pulmonary edema, status post Cardene infusion  · Continue home medications including amlodipine which was reduced to 2 5 mg daily, losartan which was reduced to 50 mg daily, doxazosin and clonidine patch  · Monitor blood pressure as an outpatient    ESRD (end stage renal disease) [facility])  Assessment & Plan  Lab Results   Component Value Date EGFR 7 06/21/2023    EGFR 6 06/20/2023    EGFR 4 06/19/2023    CREATININE 6 65 (H) 06/21/2023    CREATININE 8 02 (H) 06/20/2023    CREATININE 10 89 (H) 06/19/2023     · ESRD on HD MWF  Presented in pulmonary edema requiring emergent dialysis  · Nephrology input appreciated   · Continue outpatient dialysis as scheduled      Medical Problems     Resolved Problems  Date Reviewed: 6/22/2023   None       Discharging Physician / Practitioner: LAN Velazquez  PCP: Christ Montenegro  Admission Date:   Admission Orders (From admission, onward)     Ordered        06/19/23 0927  INPATIENT ADMISSION  Once                      Discharge Date: 06/22/23    Consultations During Hospital Stay:  · Nephrology, vascular surgery    Procedures Performed:   · Dialysis 6/19, 6/20, 6/21/2023  · BiPAP 6/20/2023    Significant Findings / Test Results:   · Chest x-ray 6/19/2023: Prominent central congestion pattern of likely cardiogenic origin    Complications:  None apparent    Reason for Admission: Flash pulmonary edema    Hospital Course:   Angi Arcos is a 68 y o  male patient who originally presented to the hospital on 6/19/2023 due to shortness of breath  He said it was sudden onset  EMS brought him to the ER in severe respiratory distress  He was placed on NIPPV  He was also noted to be hypertensive and was treated with Nitropaste and Cardene infusion  Was seen by nephrology and for emergent dialysis  He had dialysis again for the next 2 days  He significantly improved and was downgraded to medicine  He was discharged home with home health to the care of his spouse in stable condition  This is a brief discharge summary  See full patient chart for additional details  Condition at Discharge: stable    Discharge Day Visit / Exam:   Subjective: Patient seen and examined  Denies shortness of breath or chest pain  Says he really wants to go home       Vitals: Blood Pressure: 162/77 (06/22/23 0709)  Pulse: 67 (06/22/23 0709)  Temperature: 98 8 °F (37 1 °C) (06/22/23 0709)  Temp Source: Axillary (06/22/23 0709)  Respirations: 18 (06/22/23 0709)  Height: 6' (182 9 cm) (06/20/23 0900)  Weight - Scale: 90 7 kg (199 lb 15 3 oz) (06/22/23 0533)  SpO2: 97 % (06/22/23 0800)    Exam:   Physical Exam  Vitals and nursing note reviewed  Constitutional:       General: He is not in acute distress  HENT:      Head: Normocephalic and atraumatic  Nose: Nose normal       Mouth/Throat:      Mouth: Mucous membranes are moist       Pharynx: Oropharynx is clear  Eyes:      Pupils: Pupils are equal, round, and reactive to light  Cardiovascular:      Rate and Rhythm: Normal rate and regular rhythm  Pulses: Normal pulses  Pulmonary:      Effort: Pulmonary effort is normal  No respiratory distress  Breath sounds: Normal breath sounds  No rhonchi or rales  Abdominal:      General: Bowel sounds are normal       Palpations: Abdomen is soft  Tenderness: There is no abdominal tenderness  Musculoskeletal:      Cervical back: Neck supple  Right lower leg: No edema  Left lower leg: No edema  Skin:     General: Skin is warm and dry  Capillary Refill: Capillary refill takes less than 2 seconds  Neurological:      General: No focal deficit present  Mental Status: He is alert and oriented to person, place, and time  Discussion with Family: Updated  (wife) at bedside  Discharge instructions/Information to patient and family:   See after visit summary for information provided to patient and family  Provisions for Follow-Up Care:  See after visit summary for information related to follow-up care and any pertinent home health orders  Disposition:   Home with VNA Services (Reminder: Complete face to face encounter)    Planned Readmission: No     Discharge Statement:  I spent 65 minutes discharging the patient  This time was spent on the day of discharge   I had direct contact with the patient on the day of discharge  Greater than 50% of the total time was spent examining patient, answering all patient questions, arranging and discussing plan of care with patient as well as directly providing post-discharge instructions  Additional time then spent on discharge activities  Discharge Medications:  See after visit summary for reconciled discharge medications provided to patient and/or family        **Please Note: This note may have been constructed using a voice recognition system**

## 2023-06-22 NOTE — ASSESSMENT & PLAN NOTE
Lab Results   Component Value Date    EGFR 7 06/21/2023    EGFR 6 06/20/2023    EGFR 4 06/19/2023    CREATININE 6 65 (H) 06/21/2023    CREATININE 8 02 (H) 06/20/2023    CREATININE 10 89 (H) 06/19/2023     · ESRD on HD MWF    Presented in pulmonary edema requiring emergent dialysis  · Nephrology input appreciated   · Continue outpatient dialysis as scheduled

## 2023-06-22 NOTE — PLAN OF CARE
Problem: METABOLIC, FLUID AND ELECTROLYTES - ADULT  Goal: Electrolytes maintained within normal limits  Description: INTERVENTIONS:  - Monitor labs and assess patient for signs and symptoms of electrolyte imbalances  - Administer electrolyte replacement as ordered  - Monitor response to electrolyte replacements, including repeat lab results as appropriate  - Instruct patient on fluid and nutrition as appropriate  6/22/2023 1119 by Franci Wilson RN  Outcome: Progressing  6/22/2023 1119 by Franci Wilson RN  Outcome: Progressing  Goal: Fluid balance maintained  Description: INTERVENTIONS:  - Monitor labs   - Monitor I/O and WT  - Instruct patient on fluid and nutrition as appropriate  - Assess for signs & symptoms of volume excess or deficit  6/22/2023 1119 by Franci Wilson RN  Outcome: Progressing  6/22/2023 1119 by Franci Wilson RN  Outcome: Progressing     Problem: CARDIOVASCULAR - ADULT  Goal: Maintains optimal cardiac output and hemodynamic stability  Description: INTERVENTIONS:  - Monitor I/O, vital signs and rhythm  - Monitor for S/S and trends of decreased cardiac output  - Administer and titrate ordered vasoactive medications to optimize hemodynamic stability  - Assess quality of pulses, skin color and temperature  - Assess for signs of decreased coronary artery perfusion  - Instruct patient to report change in severity of symptoms  6/22/2023 1119 by Franci Wilson RN  Outcome: Progressing  6/22/2023 1119 by Franci Wilson RN  Outcome: Progressing  Goal: Absence of cardiac dysrhythmias or at baseline rhythm  Description: INTERVENTIONS:  - Continuous cardiac monitoring, vital signs, obtain 12 lead EKG if ordered  - Administer antiarrhythmic and heart rate control medications as ordered  - Monitor electrolytes and administer replacement therapy as ordered  6/22/2023 1119 by Franci Wilson RN  Outcome: Progressing  6/22/2023 1119 by Franci Wilson RN  Outcome: Progressing Problem: RESPIRATORY - ADULT  Goal: Achieves optimal ventilation and oxygenation  Description: INTERVENTIONS:  - Assess for changes in respiratory status  - Assess for changes in mentation and behavior  - Position to facilitate oxygenation and minimize respiratory effort  - Oxygen administered by appropriate delivery if ordered  - Initiate smoking cessation education as indicated  - Encourage broncho-pulmonary hygiene including cough, deep breathe, Incentive Spirometry  - Assess the need for suctioning and aspirate as needed  - Assess and instruct to report SOB or any respiratory difficulty  - Respiratory Therapy support as indicated  6/22/2023 1119 by Alma Ruiz RN  Outcome: Progressing  6/22/2023 1119 by Alma Ruiz RN  Outcome: Progressing     Problem: MOBILITY - ADULT  Goal: Maintain or return to baseline ADL function  Description: INTERVENTIONS:  -  Assess patient's ability to carry out ADLs; assess patient's baseline for ADL function and identify physical deficits which impact ability to perform ADLs (bathing, care of mouth/teeth, toileting, grooming, dressing, etc )  - Assess/evaluate cause of self-care deficits   - Assess range of motion  - Assess patient's mobility; develop plan if impaired  - Assess patient's need for assistive devices and provide as appropriate  - Encourage maximum independence but intervene and supervise when necessary  - Involve family in performance of ADLs  - Assess for home care needs following discharge   - Consider OT consult to assist with ADL evaluation and planning for discharge  - Provide patient education as appropriate  6/22/2023 1119 by Alma Ruiz RN  Outcome: Progressing  6/22/2023 1119 by Alma Ruiz RN  Outcome: Progressing  Goal: Maintains/Returns to pre admission functional level  Description: INTERVENTIONS:  - Perform BMAT or MOVE assessment daily    - Set and communicate daily mobility goal to care team and patient/family/caregiver     - Collaborate with rehabilitation services on mobility goals if consulted  - Perform Range of Motion 3 times a day  - Reposition patient every 2 hours    - Dangle patient 3 times a day  - Stand patient 3 times a day  - Ambulate patient 3 times a day  - Out of bed to chair 3 times a day   - Out of bed for meals 3 times a day  - Out of bed for toileting  - Record patient progress and toleration of activity level   6/22/2023 1119 by Alan Lundberg RN  Outcome: Progressing  6/22/2023 1119 by Alan Lundberg RN  Outcome: Progressing     Problem: DISCHARGE PLANNING - CARE MANAGEMENT  Goal: Discharge to post-acute care or home with appropriate resources  Description: INTERVENTIONS:  - Conduct assessment to determine patient/family and health care team treatment goals, and need for post-acute services based on payer coverage, community resources, and patient preferences, and barriers to discharge  - Address psychosocial, clinical, and financial barriers to discharge as identified in assessment in conjunction with the patient/family and health care team  - Arrange appropriate level of post-acute services according to patient’s   needs and preference and payer coverage in collaboration with the physician and health care team  - Communicate with and update the patient/family, physician, and health care team regarding progress on the discharge plan  - Arrange appropriate transportation to post-acute venues  6/22/2023 1119 by Alan Lundberg RN  Outcome: Progressing  6/22/2023 1119 by Alan Lundberg RN  Outcome: Progressing

## 2023-06-22 NOTE — RESPIRATORY THERAPY NOTE
06/22/23 0800   Respiratory Assessment   Resp Comments BiPAP on SB   Oxygen Therapy/Pulse Ox   O2 Device None (Room air)   SpO2 97 %   SpO2 Activity During Exercise  (Patient performing ADLS in bathroom)   $ Pulse Oximetry Spot Check Charge Completed

## 2023-06-22 NOTE — ASSESSMENT & PLAN NOTE
· Hypertensive on arrival, probably due to pulmonary edema, status post Cardene infusion  · Continue home medications including amlodipine which was reduced to 2 5 mg daily, losartan which was reduced to 50 mg daily, doxazosin and clonidine patch  · Monitor blood pressure as an outpatient

## 2023-06-22 NOTE — ASSESSMENT & PLAN NOTE
· Presented with hypoxia and increased work of breathing  · S/P BiPAP in ICU  · Received emergent hemodialysis for volume management  · Resolved

## 2023-06-22 NOTE — ASSESSMENT & PLAN NOTE
· Presented with tachycardia and leukocytosis, probably reactive  · No fever or leukocytosis    No cough, dysuria, or rash  · Outpatient follow-up with PCP

## 2023-06-22 NOTE — ASSESSMENT & PLAN NOTE
[facility]: Sitting, Cuff Size: Regular)   Pulse 68   Resp 16   Ht 5' 9.25\" (1.759 m)   Wt 67.6 kg   BMI 21.84 kg/m²       Physical Exam   Constitutional: No distress.   HENT:   Head: Normocephalic and atraumatic.   Right Ear: Tympanic membrane and ear canal normal.   Left Ear: Tympanic membrane and ear canal normal.   Nose: No rhinorrhea.   Mouth/Throat: Oropharynx is clear and moist and mucous membranes are normal.   Eyes: Conjunctivae are normal.   Neck: No thyromegaly present.   Cardiovascular: Normal rate and regular rhythm.    No murmur heard.  Pulmonary/Chest: Effort normal. She has no wheezes. She has no rhonchi. She has no rales.   Abdominal: Soft. Bowel sounds are  Lab Results   Component Value Date    HGBA1C 7 6 (H) 08/04/2022       Recent Labs     06/21/23  1838 06/21/23 2050 06/22/23  0707 06/22/23  1058   POCGLU 127 230* 174* 183*       Blood Sugar Average: Last 72 hrs:  (P) 156 35     · Blood sugar elevated on admission, status post insulin infusion while in ICU  · Transitioned to SSI  · Resume home medications on discharge 

## 2023-06-22 NOTE — ASSESSMENT & PLAN NOTE
· Presented with flash pulmonary edema and treated with BiPAP in the unit while waiting for urgent dialysis  · Now saturating well on room air

## 2023-06-23 ENCOUNTER — HOME CARE VISIT (OUTPATIENT)
Dept: HOME HEALTH SERVICES | Facility: HOME HEALTHCARE | Age: 73
End: 2023-06-23

## 2023-06-23 PROCEDURE — 93923 UPR/LXTR ART STDY 3+ LVLS: CPT | Performed by: SURGERY

## 2023-06-23 NOTE — UTILIZATION REVIEW
NOTIFICATION OF ADMISSION DISCHARGE   This is a Notification of Discharge from [address]  Please be advised that this patient has been discharge from our facility  Below you will find the admission and discharge date and time including the patient’s disposition  UTILIZATION REVIEW CONTACT:  Lara Mayo  Utilization   Network Utilization Review Department  Phone: [phone] carefully listen to the prompts  All voicemails are confidential   Email: [email or web address]     ADMISSION INFORMATION  PRESENTATION DATE: 6/19/2023  8:22 AM  OBERVATION ADMISSION DATE:   INPATIENT ADMISSION DATE: 6/19/23  9:27 AM   DISCHARGE DATE: 6/22/2023  2:10 PM   DISPOSITION:Home with Home Health Care    IMPORTANT INFORMATION:  Send all requests for admission clinical reviews, approved or denied determinations and any other requests to dedicated fax number below belonging to the campus where the patient is receiving treatment   List of dedicated fax numbers:  [address] DENIALS (Administrative/Medical Necessity) [phone]   [address] (Maternity/NICU/Pediatrics) [phone]   [facility] [phone]   [place] [address] [phone]   [address] [phone]   [address] [facility] [phone]   90 [facility] [phone]   [address] [place] [address] 119 [phone]   [facility]  [phone]   [facility] [facility] [phone]   [address] [facility] [address] [facility] [phone]

## 2023-06-23 NOTE — CASE COMMUNICATION
SN spoke with pt on 6/23 to set up [facility] visit for 6/24  Pt stated he will not be at home this weekend, he is traveling to [place]  On Monday pt has dialysis at 11:15am chair time till about 4pm  He prefers [facility] visit to be done on Tuesday, 6/27

## 2023-06-24 ENCOUNTER — HOME CARE VISIT (OUTPATIENT)
Dept: HOME HEALTH SERVICES | Facility: HOME HEALTHCARE | Age: 73
End: 2023-06-24

## 2023-06-24 NOTE — CASE COMMUNICATION
Called pt to see how she was feeling. She stated that she is doing much better.   as per Nilesh khan RN    SN spoke with pt on 6/23 to set up [facility] visit for 6/24  Pt stated he will not be at home this weekend, he is traveling to [place]  On Monday pt has dialysis at 11:15am chair time till about 4pm  He prefers [facility] visit to be done on Tuesday, 6/27

## 2023-06-27 ENCOUNTER — HOME CARE VISIT (OUTPATIENT)
Dept: HOME HEALTH SERVICES | Facility: HOME HEALTHCARE | Age: 73
End: 2023-06-27
Payer: COMMERCIAL

## 2023-06-27 PROCEDURE — G0299 HHS/HOSPICE OF RN EA 15 MIN: HCPCS

## 2023-06-27 PROCEDURE — 400013 VN SOC

## 2023-06-28 ENCOUNTER — HOME CARE VISIT (OUTPATIENT)
Dept: HOME HEALTH SERVICES | Facility: HOME HEALTHCARE | Age: 73
End: 2023-06-28
Payer: COMMERCIAL

## 2023-06-28 NOTE — CASE COMMUNICATION
TC response from patient after VM message left for PT eval 6/28  He reports he cannot do therapy on his dialysis days M, W, F  so declines todays eval  Therapist will call to reschedule for Thursday 6/29

## 2023-06-28 NOTE — HOME HEALTH
Patient is a 68 yr old male recently hospitalized with shortness of breath  Pt was treated with cardiac medications and received emergency dialysis x2 days with significant improvemend  Pt follows with Dialysis every Monday, Wednesday and Friday  Pt currently lives in a single family home with his wife  Wife questioning if patient can take medication for sleep due to patient not sleeping well  SN to send message to MD  POC:  SN 1 week 2, then 2 week 2, then 1 week s  PT evaluation ordered

## 2023-06-29 ENCOUNTER — HOME CARE VISIT (OUTPATIENT)
Dept: HOME HEALTH SERVICES | Facility: HOME HEALTHCARE | Age: 73
End: 2023-06-29
Payer: COMMERCIAL

## 2023-06-29 VITALS — OXYGEN SATURATION: 99 % | HEART RATE: 79 BPM | DIASTOLIC BLOOD PRESSURE: 52 MMHG | SYSTOLIC BLOOD PRESSURE: 122 MMHG

## 2023-06-29 PROCEDURE — G0151 HHCP-SERV OF PT,EA 15 MIN: HCPCS

## 2023-06-29 NOTE — CASE COMMUNICATION
"Home Health Care Physical Therapy evaluation completed  Patient reports feeling he is \"feeling much better now  I feel happy  \" Demonstrates indep home mobility with no device  Tinettis balance test indicates low risk of falls at 27/28 with no device  Describes he does perform a regular walking program and does have an exercise routine from past PT  Therapist suggests he continue with same  No skilled PT warranted at this time  "

## 2023-06-30 ENCOUNTER — HOME CARE VISIT (OUTPATIENT)
Dept: HOME HEALTH SERVICES | Facility: HOME HEALTHCARE | Age: 73
End: 2023-06-30
Payer: COMMERCIAL

## 2023-06-30 VITALS
OXYGEN SATURATION: 98 % | RESPIRATION RATE: 20 BRPM | TEMPERATURE: 97.6 F | SYSTOLIC BLOOD PRESSURE: 140 MMHG | DIASTOLIC BLOOD PRESSURE: 82 MMHG | HEART RATE: 79 BPM

## 2023-06-30 NOTE — CASE COMMUNICATION
[facility] has admitted your patient to [facility] service with the following disciplines:      SN and PT  This report is informational only, no responses is needed  Primary focus of home health care  due to ESRD  pt on dialysis  Patient stated goals of care To get stronger  Anticipated visit pattern and next visit date  1 week 2, 2 week 1, then 1 week 2  See medication list - meds in home differ from AVS,   Patient not taking Se nsipar or Renvella  Patient taking Clonidine 1 mg       2 mg ordered  Significant clinical findings Pt weak and tires easily  Potential barriers to goal achievement   Tiring easily   Other pertinent information    Thank you for allowing us to participate in the care of your patient        Mich Tubbs RN

## 2023-06-30 NOTE — CASE COMMUNICATION
[facility] has admitted your patient to [facility] service with the following disciplines:  SN and PT  This report is informational only, no responses is needed  Primary focus of home health care  due to ESRD  pt on dialysis  Patient stated goals of care To get stronger  Anticipated visit pattern and next visit date 2 week 3, then 1 week 2  See medication list - meds in home differ from AVS, Patient not taking Sensipar or Renve lla  Patient taking Clonidine 1 mg      2 mg ordered  Significant clinical findings Pt weak and tires easily  Potential barriers to goal achievement Tiring easily  Other pertinent information  Thank you for allowing us to participate in the care of your patient    Eddie Zhu RN

## 2023-07-04 ENCOUNTER — HOME CARE VISIT (OUTPATIENT)
Dept: HOME HEALTH SERVICES | Facility: HOME HEALTHCARE | Age: 73
End: 2023-07-04
Payer: COMMERCIAL

## 2023-07-07 ENCOUNTER — HOME CARE VISIT (OUTPATIENT)
Dept: HOME HEALTH SERVICES | Facility: HOME HEALTHCARE | Age: 73
End: 2023-07-07
Payer: COMMERCIAL

## 2023-07-11 ENCOUNTER — HOME CARE VISIT (OUTPATIENT)
Dept: HOME HEALTH SERVICES | Facility: HOME HEALTHCARE | Age: 73
End: 2023-07-11
Payer: COMMERCIAL

## 2023-07-11 VITALS
TEMPERATURE: 98 F | RESPIRATION RATE: 20 BRPM | SYSTOLIC BLOOD PRESSURE: 138 MMHG | HEART RATE: 67 BPM | OXYGEN SATURATION: 99 % | DIASTOLIC BLOOD PRESSURE: 68 MMHG

## 2023-07-11 PROCEDURE — G0299 HHS/HOSPICE OF RN EA 15 MIN: HCPCS

## 2023-07-12 ENCOUNTER — APPOINTMENT (EMERGENCY)
Dept: RADIOLOGY | Facility: HOSPITAL | Age: 73
DRG: 304 | End: 2023-07-12
Payer: COMMERCIAL

## 2023-07-12 ENCOUNTER — HOSPITAL ENCOUNTER (INPATIENT)
Facility: HOSPITAL | Age: 73
LOS: 3 days | Discharge: HOME HEALTH | DRG: 304 | End: 2023-07-15
Attending: EMERGENCY MEDICINE | Admitting: INTERNAL MEDICINE
Payer: COMMERCIAL

## 2023-07-12 DIAGNOSIS — R77.8 ELEVATED TROPONIN: Primary | ICD-10-CM

## 2023-07-12 DIAGNOSIS — N18.6 ESRD (END STAGE RENAL DISEASE) (HCC): ICD-10-CM

## 2023-07-12 DIAGNOSIS — I16.0 HYPERTENSIVE URGENCY: ICD-10-CM

## 2023-07-12 DIAGNOSIS — I16.1 HYPERTENSIVE EMERGENCY: ICD-10-CM

## 2023-07-12 DIAGNOSIS — M79.602 LEFT ARM PAIN: ICD-10-CM

## 2023-07-12 LAB
2HR DELTA HS TROPONIN: -12 NG/L
ALBUMIN SERPL BCP-MCNC: 4.4 G/DL (ref 3.5–5)
ALP SERPL-CCNC: 75 U/L (ref 34–104)
ALT SERPL W P-5'-P-CCNC: 9 U/L (ref 7–52)
ANION GAP SERPL CALCULATED.3IONS-SCNC: 10 MMOL/L
APTT PPP: 30 SECONDS (ref 23–37)
AST SERPL W P-5'-P-CCNC: 13 U/L (ref 13–39)
BASOPHILS # BLD AUTO: 0.02 THOUSANDS/ÂΜL (ref 0–0.1)
BASOPHILS NFR BLD AUTO: 0 % (ref 0–1)
BILIRUB SERPL-MCNC: 0.51 MG/DL (ref 0.2–1)
BNP SERPL-MCNC: 1603 PG/ML (ref 0–100)
BUN SERPL-MCNC: 17 MG/DL (ref 5–25)
CALCIUM SERPL-MCNC: 10.1 MG/DL (ref 8.4–10.2)
CARDIAC TROPONIN I PNL SERPL HS: 110 NG/L
CARDIAC TROPONIN I PNL SERPL HS: 98 NG/L
CHLORIDE SERPL-SCNC: 93 MMOL/L (ref 96–108)
CO2 SERPL-SCNC: 33 MMOL/L (ref 21–32)
CREAT SERPL-MCNC: 5.32 MG/DL (ref 0.6–1.3)
EOSINOPHIL # BLD AUTO: 0.19 THOUSAND/ÂΜL (ref 0–0.61)
EOSINOPHIL NFR BLD AUTO: 3 % (ref 0–6)
ERYTHROCYTE [DISTWIDTH] IN BLOOD BY AUTOMATED COUNT: 18.3 % (ref 11.6–15.1)
GFR SERPL CREATININE-BSD FRML MDRD: 9 ML/MIN/1.73SQ M
GLUCOSE SERPL-MCNC: 120 MG/DL (ref 65–140)
HCT VFR BLD AUTO: 34 % (ref 36.5–49.3)
HGB BLD-MCNC: 10.4 G/DL (ref 12–17)
IMM GRANULOCYTES # BLD AUTO: 0.01 THOUSAND/UL (ref 0–0.2)
IMM GRANULOCYTES NFR BLD AUTO: 0 % (ref 0–2)
INR PPP: 1.04 (ref 0.84–1.19)
LYMPHOCYTES # BLD AUTO: 1.27 THOUSANDS/ÂΜL (ref 0.6–4.47)
LYMPHOCYTES NFR BLD AUTO: 22 % (ref 14–44)
MAGNESIUM SERPL-MCNC: 2.2 MG/DL (ref 1.9–2.7)
MCH RBC QN AUTO: 27.4 PG (ref 26.8–34.3)
MCHC RBC AUTO-ENTMCNC: 30.6 G/DL (ref 31.4–37.4)
MCV RBC AUTO: 90 FL (ref 82–98)
MONOCYTES # BLD AUTO: 0.69 THOUSAND/ÂΜL (ref 0.17–1.22)
MONOCYTES NFR BLD AUTO: 12 % (ref 4–12)
NEUTROPHILS # BLD AUTO: 3.57 THOUSANDS/ÂΜL (ref 1.85–7.62)
NEUTS SEG NFR BLD AUTO: 63 % (ref 43–75)
NRBC BLD AUTO-RTO: 0 /100 WBCS
PHOSPHATE SERPL-MCNC: 2.9 MG/DL (ref 2.3–4.1)
PLATELET # BLD AUTO: 97 THOUSANDS/UL (ref 149–390)
PMV BLD AUTO: 9.6 FL (ref 8.9–12.7)
POTASSIUM SERPL-SCNC: 4.2 MMOL/L (ref 3.5–5.3)
PROT SERPL-MCNC: 8.6 G/DL (ref 6.4–8.4)
PROTHROMBIN TIME: 13.6 SECONDS (ref 11.6–14.5)
RBC # BLD AUTO: 3.8 MILLION/UL (ref 3.88–5.62)
SODIUM SERPL-SCNC: 136 MMOL/L (ref 135–147)
WBC # BLD AUTO: 5.75 THOUSAND/UL (ref 4.31–10.16)

## 2023-07-12 PROCEDURE — 93005 ELECTROCARDIOGRAM TRACING: CPT

## 2023-07-12 PROCEDURE — 99285 EMERGENCY DEPT VISIT HI MDM: CPT

## 2023-07-12 PROCEDURE — 84100 ASSAY OF PHOSPHORUS: CPT | Performed by: NURSE PRACTITIONER

## 2023-07-12 PROCEDURE — 85025 COMPLETE CBC W/AUTO DIFF WBC: CPT | Performed by: EMERGENCY MEDICINE

## 2023-07-12 PROCEDURE — 73030 X-RAY EXAM OF SHOULDER: CPT

## 2023-07-12 PROCEDURE — 84484 ASSAY OF TROPONIN QUANT: CPT | Performed by: EMERGENCY MEDICINE

## 2023-07-12 PROCEDURE — 96374 THER/PROPH/DIAG INJ IV PUSH: CPT

## 2023-07-12 PROCEDURE — 83036 HEMOGLOBIN GLYCOSYLATED A1C: CPT | Performed by: NURSE PRACTITIONER

## 2023-07-12 PROCEDURE — 5A1D70Z PERFORMANCE OF URINARY FILTRATION, INTERMITTENT, LESS THAN 6 HOURS PER DAY: ICD-10-PCS | Performed by: HOSPITALIST

## 2023-07-12 PROCEDURE — 85610 PROTHROMBIN TIME: CPT | Performed by: EMERGENCY MEDICINE

## 2023-07-12 PROCEDURE — 83735 ASSAY OF MAGNESIUM: CPT | Performed by: NURSE PRACTITIONER

## 2023-07-12 PROCEDURE — 71045 X-RAY EXAM CHEST 1 VIEW: CPT

## 2023-07-12 PROCEDURE — 80053 COMPREHEN METABOLIC PANEL: CPT | Performed by: EMERGENCY MEDICINE

## 2023-07-12 PROCEDURE — 99291 CRITICAL CARE FIRST HOUR: CPT | Performed by: EMERGENCY MEDICINE

## 2023-07-12 PROCEDURE — 87081 CULTURE SCREEN ONLY: CPT | Performed by: NURSE PRACTITIONER

## 2023-07-12 PROCEDURE — 85730 THROMBOPLASTIN TIME PARTIAL: CPT | Performed by: EMERGENCY MEDICINE

## 2023-07-12 PROCEDURE — 84443 ASSAY THYROID STIM HORMONE: CPT | Performed by: NURSE PRACTITIONER

## 2023-07-12 PROCEDURE — 96375 TX/PRO/DX INJ NEW DRUG ADDON: CPT

## 2023-07-12 PROCEDURE — 99291 CRITICAL CARE FIRST HOUR: CPT | Performed by: NURSE PRACTITIONER

## 2023-07-12 PROCEDURE — 36415 COLL VENOUS BLD VENIPUNCTURE: CPT | Performed by: EMERGENCY MEDICINE

## 2023-07-12 PROCEDURE — 73060 X-RAY EXAM OF HUMERUS: CPT

## 2023-07-12 PROCEDURE — 83880 ASSAY OF NATRIURETIC PEPTIDE: CPT | Performed by: NURSE PRACTITIONER

## 2023-07-12 RX ORDER — ASPIRIN 81 MG/1
324 TABLET, CHEWABLE ORAL ONCE
Status: COMPLETED | OUTPATIENT
Start: 2023-07-12 | End: 2023-07-12

## 2023-07-12 RX ORDER — LABETALOL HYDROCHLORIDE 5 MG/ML
10 INJECTION, SOLUTION INTRAVENOUS ONCE
Status: DISCONTINUED | OUTPATIENT
Start: 2023-07-12 | End: 2023-07-12

## 2023-07-12 RX ORDER — SODIUM CHLORIDE, SODIUM GLUCONATE, SODIUM ACETATE, POTASSIUM CHLORIDE, MAGNESIUM CHLORIDE, SODIUM PHOSPHATE, DIBASIC, AND POTASSIUM PHOSPHATE .53; .5; .37; .037; .03; .012; .00082 G/100ML; G/100ML; G/100ML; G/100ML; G/100ML; G/100ML; G/100ML
100 INJECTION, SOLUTION INTRAVENOUS CONTINUOUS
Status: DISCONTINUED | OUTPATIENT
Start: 2023-07-12 | End: 2023-07-12

## 2023-07-12 RX ORDER — LABETALOL HYDROCHLORIDE 5 MG/ML
20 INJECTION, SOLUTION INTRAVENOUS ONCE
Status: COMPLETED | OUTPATIENT
Start: 2023-07-12 | End: 2023-07-12

## 2023-07-12 RX ORDER — NICARDIPINE HYDROCHLORIDE 0.1 MG/ML
1-15 INJECTION INTRAVENOUS
Status: DISCONTINUED | OUTPATIENT
Start: 2023-07-12 | End: 2023-07-13

## 2023-07-12 RX ADMIN — LABETALOL HYDROCHLORIDE 20 MG: 5 INJECTION, SOLUTION INTRAVENOUS at 19:54

## 2023-07-12 RX ADMIN — NICARDIPINE HYDROCHLORIDE 7.5 MG/HR: 0.1 INJECTION INTRAVENOUS at 22:53

## 2023-07-12 RX ADMIN — MORPHINE SULFATE 2 MG: 2 INJECTION, SOLUTION INTRAMUSCULAR; INTRAVENOUS at 20:33

## 2023-07-12 RX ADMIN — NICARDIPINE HYDROCHLORIDE 5 MG/HR: 0.1 INJECTION INTRAVENOUS at 22:14

## 2023-07-12 RX ADMIN — ASPIRIN 81 MG 324 MG: 81 TABLET ORAL at 22:15

## 2023-07-12 NOTE — ED PROVIDER NOTES
History  Chief Complaint   Patient presents with   • Arm Pain     Left arm pain from elbow to shoulder. Patient reports pain started after dialysis treatment     71-year-old male presents emergency department complaining of pain in his left shoulder and left humerus that started while receiving dialysis through a shunt that is in his left forearm. Patient denies any fever chills swelling or trauma. The patient notes that the pain is worse with palpation and much worse with any attempt to move the left upper extremity. The patient denies any weakness, and notes that the limiting factor to his visit is pain. The patient was offered a Tylenol while in dialysis but refused. Family and patient are concerned due to the fact that the left arm may be a sign of cardiac disease, so they presented to the ER. Patient denies chest pain or shortness of breath. Prior to Admission Medications   Prescriptions Last Dose Informant Patient Reported? Taking? Insulin Pen Needle (UltiCare Short Pen Needles) 31G X 8 MM MISC 7/12/2023 Self Yes Yes   Sig: INJECT 4 TIMES DAILY;E11.9   Lokelma 10 g PACK 7/11/2023 Self Yes Yes   Sig: EMPTY CONTENTS OF 1 PACKET IN 3 TABLESPOONSFUL OF WATER OR AS DIRECTED BY CLINIC.  STIR WELL AND DRINK IMMEDIATELY ON NON-DIALYSIS DAYS   amLODIPine (NORVASC) 2.5 mg tablet 7/11/2023  No Yes   Sig: Take 1 tablet (2.5 mg total) by mouth daily at bedtime   aspirin (ECOTRIN LOW STRENGTH) 81 mg EC tablet 7/12/2023 Self Yes Yes   Sig: Take by mouth   atorvastatin (LIPITOR) 40 mg tablet 7/11/2023 Self Yes Yes   Sig: Take 40 mg by mouth daily   calcitriol (ROCALTROL) 0.25 mcg capsule Past Week  No Yes   Sig: Take 3 capsules (0.75 mcg total) by mouth 3 (three) times a week   calcium acetate (PHOSLO) capsule 7/12/2023 Self Yes Yes   Sig: 3 (three) times a day Not on dialysis days  Pt taking 2 tabs 3x daily   carvedilol (COREG) 25 mg tablet 7/12/2023 Self Yes Yes   Sig: Take 25 mg by mouth 2 (two) times a day with meals    cinacalcet (SENSIPAR) 60 MG tablet Not Taking  No No   Sig: Take 2 tablets (120 mg total) by mouth 3 (three) times a week   Patient not taking: Reported on 6/27/2023   cloNIDine (CATAPRES-TTS-2) 0.2 mg/24 hr 7/12/2023  Yes Yes   Sig: Place 0.2 patches on the skin once a week Pt stated that he is taking . 1 mg   doxazosin (CARDURA) 2 mg tablet 7/11/2023  Yes Yes   Sig: Take 1 tablet by mouth   gabapentin (NEURONTIN) 100 mg capsule 7/11/2023  No Yes   Sig: Take 1 cap by mouth daily   insulin aspart (NovoLOG FlexPen) 100 UNIT/ML injection pen 7/12/2023 Self Yes Yes   Sig: Inject under the skin Three times a day    lanthanum (FOSRENOL) 500 mg chewable tablet 7/12/2023  Yes Yes   Sig: CRUSH OR CHEW AND SWALLOW 1 TABLET THREE TIMES A DAY WITH MEALS   lidocaine-prilocaine (EMLA) cream 7/12/2023 Self Yes Yes   Sig: APPLY SMALL AMOUNT TO ACCESS SITE (AVF) 1 TO 2 HOURS BEFORE DIALYSIS. COVER WITH OCCLUSIVE DRESSING (SARAN WRAP)   losartan (COZAAR) 50 mg tablet 7/12/2023  No Yes   Sig: Take 1 tablet (50 mg total) by mouth daily Do not start before June 23, 2023. sevelamer carbonate (RENVELA) 800 mg tablet Not Taking  Yes No   Sig: Take 800 mg by mouth Three times a day Patient is not taking   Patient not taking: Reported on 7/12/2023      Facility-Administered Medications: None       Past Medical History:   Diagnosis Date   • Coronary artery disease    • Diabetes mellitus    • End stage renal disease    • Hyperlipidemia    • Hypertension    • NSTEMI (non-ST elevated myocardial infarction) 2012   • TIA (transient ischemic attack)        Past Surgical History:   Procedure Laterality Date   • CARDIAC CATHETERIZATION  2017    70% lesion of a small R PDA and a 99% stenosis of D1 (previously noted on the cath in 2012). Neither lesion was amenable to PCI. Medical management.    • DIALYSIS FISTULA CREATION         Family History   Problem Relation Age of Onset   • Hypertension Mother    • Hypertension Father      I have reviewed and agree with the history as documented. E-Cigarette/Vaping   • E-Cigarette Use Never User      E-Cigarette/Vaping Substances   • Nicotine No    • THC No    • CBD No    • Flavoring No    • Other No    • Unknown No      Social History     Tobacco Use   • Smoking status: Former     Packs/day: 1.00     Years: 20.00     Total pack years: 20.00     Types: Cigarettes     Start date:      Quit date:      Years since quittin.5   • Smokeless tobacco: Never   • Tobacco comments:     STATES QUIT 30 YEARS AGO/ ABOUT ONE CIGARETTE DAILY   Vaping Use   • Vaping Use: Never used   Substance Use Topics   • Alcohol use: Never   • Drug use: Never       Review of Systems   Constitutional: Positive for activity change. Negative for chills and fever. HENT: Negative for ear pain and sore throat. Eyes: Negative for pain and visual disturbance. Respiratory: Negative for cough and shortness of breath. Cardiovascular: Negative for chest pain and palpitations. Gastrointestinal: Negative for abdominal pain and vomiting. Genitourinary: Negative for dysuria and hematuria. Musculoskeletal: Positive for arthralgias and myalgias. Negative for back pain. Skin: Negative for color change and rash. Neurological: Negative for syncope. All other systems reviewed and are negative. Physical Exam  Physical Exam  Constitutional:       General: He is not in acute distress. Appearance: Normal appearance. He is normal weight. He is ill-appearing. HENT:      Head: Normocephalic and atraumatic. Right Ear: External ear normal.      Left Ear: External ear normal.      Nose: Nose normal.      Mouth/Throat:      Mouth: Mucous membranes are moist.   Eyes:      Conjunctiva/sclera: Conjunctivae normal.   Cardiovascular:      Rate and Rhythm: Normal rate and regular rhythm. Pulses: Normal pulses. Heart sounds: Normal heart sounds.    Pulmonary:      Effort: Pulmonary effort is normal. Breath sounds: Normal breath sounds. Abdominal:      General: Abdomen is flat. There is no distension. Palpations: Abdomen is soft. There is no mass. Musculoskeletal:         General: Tenderness present. No swelling or deformity. Normal range of motion. Cervical back: Normal range of motion. Comments: Patient with pain over bicep with palpation of the left upper extremity as well as to the anterior glenohumeral joint. Patient has increased pain with trying to range at the shoulder. Patient's pulses are 2/4 in the radial and ulnar arteries and the patient's shunt in the left forearm has a good thrill and the patient has no pain over the area of dialysis. There is no reproducible pain over the chest.  There is no paradoxical chest wall motion or subcutaneous emphysema noted. Skin:     General: Skin is warm and dry. Capillary Refill: Capillary refill takes 2 to 3 seconds. Coloration: Skin is not jaundiced or pale. Findings: No bruising or erythema. Neurological:      General: No focal deficit present. Mental Status: He is alert and oriented to person, place, and time. Mental status is at baseline.    Psychiatric:         Mood and Affect: Mood normal.         Vital Signs  ED Triage Vitals   Temperature Pulse Respirations Blood Pressure SpO2   07/12/23 1842 07/12/23 1840 07/12/23 1840 07/12/23 1842 07/12/23 1840   (!) 97.1 °F (36.2 °C) 73 16 (!) 238/96 97 %      Temp Source Heart Rate Source Patient Position - Orthostatic VS BP Location FiO2 (%)   07/12/23 2300 07/12/23 1840 07/12/23 1919 07/12/23 1919 --   Tympanic Monitor Sitting Right arm       Pain Score       07/12/23 1840       9           Vitals:    07/12/23 2230 07/12/23 2245 07/12/23 2300 07/12/23 2330   BP: (!) 201/85 (!) 193/88 (!) 175/82    Pulse: 75 74 77 81   Patient Position - Orthostatic VS: Lying Sitting Lying          Visual Acuity      ED Medications  Medications   niCARdipine (CARDENE-IV) 100 mcg/mL in sodium chloride infusion (7.5 mg/hr Intravenous New Bag 7/12/23 2253)   labetalol (NORMODYNE) injection 20 mg (20 mg Intravenous Given 7/12/23 1954)   morphine injection 2 mg (2 mg Intravenous Given 7/12/23 2033)   aspirin chewable tablet 324 mg (324 mg Oral Given 7/12/23 2215)       Diagnostic Studies  Results Reviewed     Procedure Component Value Units Date/Time    Magnesium [[id number]]  (Normal) Collected: 07/1950    Lab Status: Final result Specimen: Blood from Arm, Right Updated: 07/12/23 2340     Magnesium 2.2 mg/dL     Phosphorus [[id number]]  (Normal) Collected: 07/1950    Lab Status: Final result Specimen: Blood from Arm, Right Updated: 07/12/23 2340     Phosphorus 2.9 mg/dL     HS Troponin I 4hr [[id number]] Collected: 07/12/23 2335    Lab Status: In process Specimen: Blood from Arm, Right Updated: 07/12/23 2339    B-Type Natriuretic Peptide(BNP) [[id number]] Collected: 07/1950    Lab Status:  In process Specimen: Blood from Arm, Right Updated: 07/12/23 2326    HS Troponin I 2hr [[id number]]  (Abnormal) Collected: 07/12/23 2213    Lab Status: Final result Specimen: Blood from Arm, Right Updated: 07/12/23 2242     hs TnI 2hr 98 ng/L      Delta 2hr hsTnI -12 ng/L     HS Troponin 0hr (reflex protocol) [[id number]]  (Abnormal) Collected: 07/1950    Lab Status: Final result Specimen: Blood from Arm, Right Updated: 07/12/23 2021     hs TnI 0hr 110 ng/L     Comprehensive metabolic panel [[id number]]  (Abnormal) Collected: 07/1950    Lab Status: Final result Specimen: Blood from Arm, Right Updated: 07/12/23 2017     Sodium 136 mmol/L      Potassium 4.2 mmol/L      Chloride 93 mmol/L      CO2 33 mmol/L      ANION GAP 10 mmol/L      BUN 17 mg/dL      Creatinine 5.32 mg/dL      Glucose 120 mg/dL      Calcium 10.1 mg/dL      AST 13 U/L      ALT 9 U/L      Alkaline Phosphatase 75 U/L      Total Protein 8.6 g/dL      Albumin 4.4 g/dL      Total Bilirubin 0.51 mg/dL      eGFR 9 ml/min/1.73sq m Narrative:      National Kid[employer] guidelines for Chronic Kidney Disease (CKD):   •  Stage 1 with normal or high GFR (GFR > 90 mL/min/1.73 square meters)  •  Stage 2 Mild CKD (GFR = 60-89 mL/min/1.73 square meters)  •  Stage 3A Moderate CKD (GFR = 45-59 mL/min/1.73 square meters)  •  Stage 3B Moderate CKD (GFR = 30-44 mL/min/1.73 square meters)  •  Stage 4 Severe CKD (GFR = 15-29 mL/min/1.73 square meters)  •  Stage 5 End Stage CKD (GFR <15 mL/min/1.73 square meters)  Note: GFR calculation is accurate only with a steady state creatinine    CBC and differential [[id number]]  (Abnormal) Collected: 07/1950    Lab Status: Final result Specimen: Blood from Arm, Right Updated: 07/12/23 2016     WBC 5.75 Thousand/uL      RBC 3.80 Million/uL      Hemoglobin 10.4 g/dL      Hematocrit 34.0 %      MCV 90 fL      MCH 27.4 pg      MCHC 30.6 g/dL      RDW 18.3 %      MPV 9.6 fL      Platelets 97 Thousands/uL      nRBC 0 /100 WBCs      Neutrophils Relative 63 %      Immat GRANS % 0 %      Lymphocytes Relative 22 %      Monocytes Relative 12 %      Eosinophils Relative 3 %      Basophils Relative 0 %      Neutrophils Absolute 3.57 Thousands/µL      Immature Grans Absolute 0.01 Thousand/uL      Lymphocytes Absolute 1.27 Thousands/µL      Monocytes Absolute 0.69 Thousand/µL      Eosinophils Absolute 0.19 Thousand/µL      Basophils Absolute 0.02 Thousands/µL     Protime-INR [[id number]]  (Normal) Collected: 07/1950    Lab Status: Final result Specimen: Blood from Arm, Right Updated: 07/12/23 2010     Protime 13.6 seconds      INR 1.04    APTT [[id number]]  (Normal) Collected: 07/1950    Lab Status: Final result Specimen: Blood from Arm, Right Updated: 07/12/23 2010     PTT 30 seconds                  XR shoulder 2+ views RIGHT   ED Interpretation by Diana Mcleod DO (07/12 2018)   There may be some joint laxity to the left shoulder and there is some mild degenerative changes noted within the joint space. XR humerus LEFT    (Results Pending)   XR chest 1 view portable    (Results Pending)              Procedures  ECG 12 Lead Documentation Only    Date/Time: 7/12/2023 7:34 PM    Performed by: Marcelo Martinez DO  Authorized by: Marcelo Martinez DO    ECG reviewed by me, the ED Provider: yes    Patient location:  ED  Comments:      EKG is a sinus rhythm at 73 beats a minute with normal axis. There is nonspecific lateral ST segment changes. No other definitive acute ST or T wave changes present. CriticalCare Time    Date/Time: 7/12/2023 11:47 PM    Performed by: Marcelo Martinez DO  Authorized by: Marcelo Martinez DO    Critical care provider statement:     Critical care time (minutes):  42    Critical care was necessary to treat or prevent imminent or life-threatening deterioration of the following conditions:  Renal failure, cardiac failure and metabolic crisis    Critical care was time spent personally by me on the following activities:  Blood draw for specimens, evaluation of patient's response to treatment, examination of patient, interpretation of cardiac output measurements, ordering and performing treatments and interventions, ordering and review of laboratory studies, ordering and review of radiographic studies, re-evaluation of patient's condition and review of old charts             ED Course  ED Course as of 07/12/23 2350   Wed Jul 12, 2023   1933 Hypertension found to be significant in this patient with a manual blood pressure at 764 systolic. The patient be given 20 of labetalol IV.   2024 hs TnI 0hr(!): 110                                             Medical Decision Making  Patient 81-year-old male presents emergency department complaining of left shoulder and arm pain after dialysis which occurred today.   Patient denies any fever chills trauma or swelling but notes that ever since dialysis started he started getting pain in the shoulder and the bicep area of the arm. This pain is worsened by movement or palpation. Patient denies any prior history of this. The patient was examined by myself and his differential diagnosis was arthritis tendinitis or possibly muscle strain. Patient had no reproducible pectoralis pain or evidence of swelling or crepitus. The patient however has had a blood pressure that has been in the mid 624I systolic, so lab work and medication was instituted to treat his blood pressure. Patient was given morphine for his pain but that did not affect his blood pressure and neither did labetalol IV. Patient's troponin is elevated at 100 with normal being around 50 with his history of renal failure. Patient's EKG is nonacute for STEMI. The patient also has no chest pain. An elevated troponin with this blood pressure is diagnostic of hypertensive emergency. Patient will be placed on a Cardene drip and put in the ICU for further evaluation. X-rays of the shoulder were taken which showed some degenerative changes and may be some laxity but no significant abnormalities. Elevated troponin: acute illness or injury     Details: Elevated from the norm  Hypertensive emergency: acute illness or injury with systemic symptoms     Details: Patient placed on Cardene drip after labetalol IV. Amount and/or Complexity of Data Reviewed  Labs: ordered. Decision-making details documented in ED Course. Radiology: ordered and independent interpretation performed. Risk  OTC drugs. Prescription drug management. Decision regarding hospitalization.           Disposition  Final diagnoses:   Elevated troponin   Hypertensive emergency     Time reflects when diagnosis was documented in both MDM as applicable and the Disposition within this note     Time User Action Codes Description Comment    7/12/2023  9:55 PM Rosibel Florian Add [I16.0] Hypertensive urgency     7/12/2023  9:56 PM Rosibel Florian Add [R77.8] Elevated troponin     7/12/2023 10:01 PM Chandra Opitz [R77.8] Elevated troponin     7/12/2023 10:01 PM Nadeen Florian [I16.0] Hypertensive urgency     7/12/2023 10:02 PM Chiqui Mattie Nancy Add [I16.1] Hypertensive emergency       ED Disposition     ED Disposition   Admit    Condition   Stable    Date/Time   Wed Jul 12, 2023  9:56 PM    Comment   Case was discussed with Ngozi Zheng and the patient's admission status was agreed to be Admission Status: inpatient status to the service of Dr. Dorian Paz . Follow-up Information    None         Current Discharge Medication List      CONTINUE these medications which have NOT CHANGED    Details   amLODIPine (NORVASC) 2.5 mg tablet Take 1 tablet (2.5 mg total) by mouth daily at bedtime  Qty: 30 tablet, Refills: 0    Associated Diagnoses: Hypertensive CKD, ESRD on dialysis (HCC)      aspirin (ECOTRIN LOW STRENGTH) 81 mg EC tablet Take by mouth      atorvastatin (LIPITOR) 40 mg tablet Take 40 mg by mouth daily      calcitriol (ROCALTROL) 0.25 mcg capsule Take 3 capsules (0.75 mcg total) by mouth 3 (three) times a week  Qty: 36 capsule, Refills: 0    Associated Diagnoses: Hypertensive CKD, ESRD on dialysis ([facility])      calcium acetate (PHOSLO) capsule 3 (three) times a day Not on dialysis days  Pt taking 2 tabs 3x daily      carvedilol (COREG) 25 mg tablet Take 25 mg by mouth 2 (two) times a day with meals       cloNIDine (CATAPRES-TTS-2) 0.2 mg/24 hr Place 0.2 patches on the skin once a week Pt stated that he is taking . 1 mg      doxazosin (CARDURA) 2 mg tablet Take 1 tablet by mouth      gabapentin (NEURONTIN) 100 mg capsule Take 1 cap by mouth daily  Qty: 30 capsule, Refills: 3    Associated Diagnoses: Neuropathy      insulin aspart (NovoLOG FlexPen) 100 UNIT/ML injection pen Inject under the skin Three times a day       Insulin Pen Needle (UltiCare Short Pen Needles) 31G X 8 MM MISC INJECT 4 TIMES DAILY;E11.9      lanthanum (FOSRENOL) 500 mg chewable tablet CRUSH OR CHEW AND SWALLOW 1 TABLET THREE TIMES A DAY WITH MEALS      lidocaine-prilocaine (EMLA) cream APPLY SMALL AMOUNT TO ACCESS SITE (AVF) 1 TO 2 HOURS BEFORE DIALYSIS. COVER WITH OCCLUSIVE DRESSING (SARAN WRAP)      Lokelma 10 g PACK EMPTY CONTENTS OF 1 PACKET IN 3 TABLESPOONSFUL OF WATER OR AS DIRECTED BY CLINIC. STIR WELL AND DRINK IMMEDIATELY ON NON-DIALYSIS DAYS      losartan (COZAAR) 50 mg tablet Take 1 tablet (50 mg total) by mouth daily Do not start before June 23, 2023. Qty: 30 tablet, Refills: 0    Associated Diagnoses: Hypertensive CKD, ESRD on dialysis ([address])      cinacalcet (SENSIPAR) 60 MG tablet Take 2 tablets (120 mg total) by mouth 3 (three) times a week  Qty: 24 tablet, Refills: 0    Associated Diagnoses: Hypertensive CKD, ESRD on dialysis ([facility])      sevelamer carbonate (RENVELA) 800 mg tablet Take 800 mg by mouth Three times a day Patient is not taking             No discharge procedures on file.     PDMP Review     None          ED Provider  Electronically Signed by           Princess Guillory DO  07/12/23 0036

## 2023-07-13 ENCOUNTER — APPOINTMENT (INPATIENT)
Dept: CT IMAGING | Facility: HOSPITAL | Age: 73
DRG: 304 | End: 2023-07-13
Payer: COMMERCIAL

## 2023-07-13 ENCOUNTER — APPOINTMENT (INPATIENT)
Dept: NON INVASIVE DIAGNOSTICS | Facility: HOSPITAL | Age: 73
DRG: 304 | End: 2023-07-13
Payer: COMMERCIAL

## 2023-07-13 PROBLEM — I50.32 CHRONIC DIASTOLIC (CONGESTIVE) HEART FAILURE (HCC): Status: ACTIVE | Noted: 2023-07-13

## 2023-07-13 PROBLEM — I25.10 CAD (CORONARY ARTERY DISEASE): Status: ACTIVE | Noted: 2023-07-13

## 2023-07-13 PROBLEM — D64.9 ANEMIA: Status: ACTIVE | Noted: 2023-07-13

## 2023-07-13 PROBLEM — M79.602 LEFT ARM PAIN: Status: ACTIVE | Noted: 2023-07-13

## 2023-07-13 PROBLEM — I16.1 HYPERTENSIVE EMERGENCY: Status: ACTIVE | Noted: 2023-07-13

## 2023-07-13 LAB
4HR DELTA HS TROPONIN: -18 NG/L
ANION GAP SERPL CALCULATED.3IONS-SCNC: 9 MMOL/L
ATRIAL RATE: 73 BPM
ATRIAL RATE: 79 BPM
ATRIAL RATE: 84 BPM
BUN SERPL-MCNC: 22 MG/DL (ref 5–25)
CA-I BLD-SCNC: 1.02 MMOL/L (ref 1.12–1.32)
CALCIUM SERPL-MCNC: 8.8 MG/DL (ref 8.4–10.2)
CARDIAC TROPONIN I PNL SERPL HS: 92 NG/L
CHLORIDE SERPL-SCNC: 95 MMOL/L (ref 96–108)
CO2 SERPL-SCNC: 31 MMOL/L (ref 21–32)
CREAT SERPL-MCNC: 6.38 MG/DL (ref 0.6–1.3)
ERYTHROCYTE [DISTWIDTH] IN BLOOD BY AUTOMATED COUNT: 18.1 % (ref 11.6–15.1)
EST. AVERAGE GLUCOSE BLD GHB EST-MCNC: 137 MG/DL
GFR SERPL CREATININE-BSD FRML MDRD: 7 ML/MIN/1.73SQ M
GLUCOSE SERPL-MCNC: 113 MG/DL (ref 65–140)
GLUCOSE SERPL-MCNC: 118 MG/DL (ref 65–140)
GLUCOSE SERPL-MCNC: 152 MG/DL (ref 65–140)
GLUCOSE SERPL-MCNC: 175 MG/DL (ref 65–140)
GLUCOSE SERPL-MCNC: 194 MG/DL (ref 65–140)
GLUCOSE SERPL-MCNC: 98 MG/DL (ref 65–140)
HBA1C MFR BLD: 6.4 %
HCT VFR BLD AUTO: 30.6 % (ref 36.5–49.3)
HGB BLD-MCNC: 9.4 G/DL (ref 12–17)
MAGNESIUM SERPL-MCNC: 2.1 MG/DL (ref 1.9–2.7)
MCH RBC QN AUTO: 27.2 PG (ref 26.8–34.3)
MCHC RBC AUTO-ENTMCNC: 30.7 G/DL (ref 31.4–37.4)
MCV RBC AUTO: 89 FL (ref 82–98)
P AXIS: 62 DEGREES
P AXIS: 63 DEGREES
P AXIS: 71 DEGREES
PHOSPHATE SERPL-MCNC: 3.4 MG/DL (ref 2.3–4.1)
PLATELET # BLD AUTO: 92 THOUSANDS/UL (ref 149–390)
PMV BLD AUTO: 10.8 FL (ref 8.9–12.7)
POTASSIUM SERPL-SCNC: 4.1 MMOL/L (ref 3.5–5.3)
PR INTERVAL: 168 MS
PR INTERVAL: 172 MS
PR INTERVAL: 180 MS
QRS AXIS: 32 DEGREES
QRS AXIS: 37 DEGREES
QRS AXIS: 49 DEGREES
QRSD INTERVAL: 96 MS
QRSD INTERVAL: 98 MS
QRSD INTERVAL: 98 MS
QT INTERVAL: 398 MS
QT INTERVAL: 398 MS
QT INTERVAL: 404 MS
QTC INTERVAL: 445 MS
QTC INTERVAL: 456 MS
QTC INTERVAL: 470 MS
RBC # BLD AUTO: 3.45 MILLION/UL (ref 3.88–5.62)
SODIUM SERPL-SCNC: 135 MMOL/L (ref 135–147)
T WAVE AXIS: 83 DEGREES
T WAVE AXIS: 89 DEGREES
T WAVE AXIS: 93 DEGREES
TSH SERPL DL<=0.05 MIU/L-ACNC: 2.3 UIU/ML (ref 0.45–4.5)
VENTRICULAR RATE: 73 BPM
VENTRICULAR RATE: 79 BPM
VENTRICULAR RATE: 84 BPM
WBC # BLD AUTO: 4.93 THOUSAND/UL (ref 4.31–10.16)

## 2023-07-13 PROCEDURE — 80048 BASIC METABOLIC PNL TOTAL CA: CPT | Performed by: NURSE PRACTITIONER

## 2023-07-13 PROCEDURE — 93010 ELECTROCARDIOGRAM REPORT: CPT | Performed by: INTERNAL MEDICINE

## 2023-07-13 PROCEDURE — G1004 CDSM NDSC: HCPCS

## 2023-07-13 PROCEDURE — 83735 ASSAY OF MAGNESIUM: CPT | Performed by: NURSE PRACTITIONER

## 2023-07-13 PROCEDURE — 84100 ASSAY OF PHOSPHORUS: CPT | Performed by: NURSE PRACTITIONER

## 2023-07-13 PROCEDURE — 85027 COMPLETE CBC AUTOMATED: CPT | Performed by: NURSE PRACTITIONER

## 2023-07-13 PROCEDURE — 82330 ASSAY OF CALCIUM: CPT | Performed by: NURSE PRACTITIONER

## 2023-07-13 PROCEDURE — 94760 N-INVAS EAR/PLS OXIMETRY 1: CPT

## 2023-07-13 PROCEDURE — 82948 REAGENT STRIP/BLOOD GLUCOSE: CPT

## 2023-07-13 PROCEDURE — 93880 EXTRACRANIAL BILAT STUDY: CPT | Performed by: SURGERY

## 2023-07-13 PROCEDURE — 70450 CT HEAD/BRAIN W/O DYE: CPT

## 2023-07-13 PROCEDURE — 99233 SBSQ HOSP IP/OBS HIGH 50: CPT | Performed by: INTERNAL MEDICINE

## 2023-07-13 PROCEDURE — 93880 EXTRACRANIAL BILAT STUDY: CPT

## 2023-07-13 PROCEDURE — 99223 1ST HOSP IP/OBS HIGH 75: CPT | Performed by: INTERNAL MEDICINE

## 2023-07-13 RX ORDER — CARVEDILOL 25 MG/1
25 TABLET ORAL 2 TIMES DAILY WITH MEALS
Status: DISCONTINUED | OUTPATIENT
Start: 2023-07-13 | End: 2023-07-15 | Stop reason: HOSPADM

## 2023-07-13 RX ORDER — LOSARTAN POTASSIUM 50 MG/1
100 TABLET ORAL DAILY
Status: DISCONTINUED | OUTPATIENT
Start: 2023-07-13 | End: 2023-07-13

## 2023-07-13 RX ORDER — DOXAZOSIN 2 MG/1
2 TABLET ORAL DAILY
Status: DISCONTINUED | OUTPATIENT
Start: 2023-07-13 | End: 2023-07-13

## 2023-07-13 RX ORDER — DOXAZOSIN 2 MG/1
2 TABLET ORAL 2 TIMES DAILY
Status: DISCONTINUED | OUTPATIENT
Start: 2023-07-13 | End: 2023-07-15 | Stop reason: HOSPADM

## 2023-07-13 RX ORDER — CLONIDINE 0.3 MG/24H
0.3 PATCH, EXTENDED RELEASE TRANSDERMAL WEEKLY
Status: DISCONTINUED | OUTPATIENT
Start: 2023-07-13 | End: 2023-07-13

## 2023-07-13 RX ORDER — IRBESARTAN 300 MG/1
300 TABLET ORAL DAILY
Status: DISCONTINUED | OUTPATIENT
Start: 2023-07-14 | End: 2023-07-13

## 2023-07-13 RX ORDER — AMLODIPINE BESYLATE 5 MG/1
5 TABLET ORAL
Status: DISCONTINUED | OUTPATIENT
Start: 2023-07-13 | End: 2023-07-13

## 2023-07-13 RX ORDER — CALCITRIOL 0.25 UG/1
0.75 CAPSULE, LIQUID FILLED ORAL 3 TIMES WEEKLY
Status: DISCONTINUED | OUTPATIENT
Start: 2023-07-13 | End: 2023-07-15 | Stop reason: HOSPADM

## 2023-07-13 RX ORDER — CALCIUM ACETATE 667 MG/1
1334 CAPSULE ORAL 3 TIMES DAILY
Status: DISCONTINUED | OUTPATIENT
Start: 2023-07-13 | End: 2023-07-15 | Stop reason: HOSPADM

## 2023-07-13 RX ORDER — HYDRALAZINE HYDROCHLORIDE 20 MG/ML
20 INJECTION INTRAMUSCULAR; INTRAVENOUS EVERY 6 HOURS PRN
Status: DISCONTINUED | OUTPATIENT
Start: 2023-07-13 | End: 2023-07-15 | Stop reason: HOSPADM

## 2023-07-13 RX ORDER — CARVEDILOL 25 MG/1
25 TABLET ORAL 2 TIMES DAILY WITH MEALS
Status: DISCONTINUED | OUTPATIENT
Start: 2023-07-13 | End: 2023-07-13

## 2023-07-13 RX ORDER — HEPARIN SODIUM 5000 [USP'U]/ML
5000 INJECTION, SOLUTION INTRAVENOUS; SUBCUTANEOUS EVERY 8 HOURS SCHEDULED
Status: DISCONTINUED | OUTPATIENT
Start: 2023-07-14 | End: 2023-07-15 | Stop reason: HOSPADM

## 2023-07-13 RX ORDER — LANTHANUM CARBONATE 500 MG/1
500 TABLET, CHEWABLE ORAL
Status: DISCONTINUED | OUTPATIENT
Start: 2023-07-13 | End: 2023-07-15 | Stop reason: HOSPADM

## 2023-07-13 RX ORDER — CLONIDINE 0.2 MG/24H
1 PATCH, EXTENDED RELEASE TRANSDERMAL WEEKLY
Status: DISCONTINUED | OUTPATIENT
Start: 2023-07-17 | End: 2023-07-13

## 2023-07-13 RX ORDER — AMLODIPINE BESYLATE 2.5 MG/1
2.5 TABLET ORAL
Status: DISCONTINUED | OUTPATIENT
Start: 2023-07-13 | End: 2023-07-13

## 2023-07-13 RX ORDER — GABAPENTIN 100 MG/1
100 CAPSULE ORAL DAILY
Status: DISCONTINUED | OUTPATIENT
Start: 2023-07-13 | End: 2023-07-15 | Stop reason: HOSPADM

## 2023-07-13 RX ORDER — ATORVASTATIN CALCIUM 40 MG/1
40 TABLET, FILM COATED ORAL
Status: DISCONTINUED | OUTPATIENT
Start: 2023-07-13 | End: 2023-07-15 | Stop reason: HOSPADM

## 2023-07-13 RX ORDER — CHLORHEXIDINE GLUCONATE 0.12 MG/ML
15 RINSE ORAL EVERY 12 HOURS SCHEDULED
Status: DISCONTINUED | OUTPATIENT
Start: 2023-07-13 | End: 2023-07-15 | Stop reason: HOSPADM

## 2023-07-13 RX ORDER — INSULIN LISPRO 100 [IU]/ML
1-6 INJECTION, SOLUTION INTRAVENOUS; SUBCUTANEOUS
Status: DISCONTINUED | OUTPATIENT
Start: 2023-07-13 | End: 2023-07-15 | Stop reason: HOSPADM

## 2023-07-13 RX ORDER — LIDOCAINE 40 MG/G
CREAM TOPICAL 3 TIMES WEEKLY
Status: DISCONTINUED | OUTPATIENT
Start: 2023-07-14 | End: 2023-07-15 | Stop reason: HOSPADM

## 2023-07-13 RX ORDER — HYDRALAZINE HYDROCHLORIDE 20 MG/ML
10 INJECTION INTRAMUSCULAR; INTRAVENOUS EVERY 6 HOURS PRN
Status: DISCONTINUED | OUTPATIENT
Start: 2023-07-13 | End: 2023-07-13

## 2023-07-13 RX ORDER — LOSARTAN POTASSIUM 50 MG/1
50 TABLET ORAL DAILY
Status: DISCONTINUED | OUTPATIENT
Start: 2023-07-13 | End: 2023-07-13

## 2023-07-13 RX ORDER — AMLODIPINE BESYLATE 10 MG/1
10 TABLET ORAL
Status: DISCONTINUED | OUTPATIENT
Start: 2023-07-13 | End: 2023-07-15 | Stop reason: HOSPADM

## 2023-07-13 RX ORDER — CLONIDINE 0.2 MG/24H
0.2 PATCH, EXTENDED RELEASE TRANSDERMAL WEEKLY
Status: DISCONTINUED | OUTPATIENT
Start: 2023-07-13 | End: 2023-07-15 | Stop reason: HOSPADM

## 2023-07-13 RX ORDER — LIDOCAINE 50 MG/G
1 PATCH TOPICAL DAILY
Status: DISCONTINUED | OUTPATIENT
Start: 2023-07-13 | End: 2023-07-15 | Stop reason: HOSPADM

## 2023-07-13 RX ORDER — ACETAMINOPHEN 325 MG/1
650 TABLET ORAL EVERY 6 HOURS PRN
Status: DISCONTINUED | OUTPATIENT
Start: 2023-07-13 | End: 2023-07-15 | Stop reason: HOSPADM

## 2023-07-13 RX ORDER — CLONIDINE 0.1 MG/24H
0.1 PATCH, EXTENDED RELEASE TRANSDERMAL WEEKLY
Status: DISCONTINUED | OUTPATIENT
Start: 2023-07-13 | End: 2023-07-15 | Stop reason: HOSPADM

## 2023-07-13 RX ORDER — LANOLIN ALCOHOL/MO/W.PET/CERES
6 CREAM (GRAM) TOPICAL
Status: DISCONTINUED | OUTPATIENT
Start: 2023-07-13 | End: 2023-07-15 | Stop reason: HOSPADM

## 2023-07-13 RX ORDER — HYDRALAZINE HYDROCHLORIDE 20 MG/ML
10 INJECTION INTRAMUSCULAR; INTRAVENOUS ONCE
Status: COMPLETED | OUTPATIENT
Start: 2023-07-13 | End: 2023-07-13

## 2023-07-13 RX ADMIN — INSULIN LISPRO 1 UNITS: 100 INJECTION, SOLUTION INTRAVENOUS; SUBCUTANEOUS at 16:09

## 2023-07-13 RX ADMIN — CARVEDILOL 25 MG: 25 TABLET, FILM COATED ORAL at 16:09

## 2023-07-13 RX ADMIN — CARVEDILOL 25 MG: 25 TABLET, FILM COATED ORAL at 08:41

## 2023-07-13 RX ADMIN — CALCIUM ACETATE 1334 MG: 667 CAPSULE ORAL at 21:35

## 2023-07-13 RX ADMIN — HYDRALAZINE HYDROCHLORIDE 10 MG: 20 INJECTION INTRAMUSCULAR; INTRAVENOUS at 10:44

## 2023-07-13 RX ADMIN — LANTHANUM CARBONATE 500 MG: 500 TABLET, CHEWABLE ORAL at 16:09

## 2023-07-13 RX ADMIN — ATORVASTATIN CALCIUM 40 MG: 40 TABLET, FILM COATED ORAL at 17:15

## 2023-07-13 RX ADMIN — CHLORHEXIDINE GLUCONATE 0.12% ORAL RINSE 15 ML: 1.2 LIQUID ORAL at 21:36

## 2023-07-13 RX ADMIN — CALCITRIOL CAPSULES 0.25 MCG 0.75 MCG: 0.25 CAPSULE ORAL at 01:15

## 2023-07-13 RX ADMIN — LANTHANUM CARBONATE 500 MG: 500 TABLET, CHEWABLE ORAL at 11:59

## 2023-07-13 RX ADMIN — AMLODIPINE BESYLATE 10 MG: 10 TABLET ORAL at 21:35

## 2023-07-13 RX ADMIN — LIDOCAINE 5% 1 PATCH: 700 PATCH TOPICAL at 08:42

## 2023-07-13 RX ADMIN — CHLORHEXIDINE GLUCONATE 0.12% ORAL RINSE 15 ML: 1.2 LIQUID ORAL at 00:33

## 2023-07-13 RX ADMIN — Medication 6 MG: at 21:36

## 2023-07-13 RX ADMIN — DOXAZOSIN 2 MG: 2 TABLET ORAL at 17:15

## 2023-07-13 RX ADMIN — CALCIUM ACETATE 1334 MG: 667 CAPSULE ORAL at 08:41

## 2023-07-13 RX ADMIN — HYDRALAZINE HYDROCHLORIDE 10 MG: 20 INJECTION INTRAMUSCULAR; INTRAVENOUS at 17:38

## 2023-07-13 RX ADMIN — LANTHANUM CARBONATE 500 MG: 500 TABLET, CHEWABLE ORAL at 08:41

## 2023-07-13 RX ADMIN — Medication 6 MG: at 00:34

## 2023-07-13 RX ADMIN — ACETAMINOPHEN 650 MG: 325 TABLET ORAL at 05:09

## 2023-07-13 RX ADMIN — INSULIN LISPRO 2 UNITS: 100 INJECTION, SOLUTION INTRAVENOUS; SUBCUTANEOUS at 11:47

## 2023-07-13 RX ADMIN — CLONIDINE 0.1 MG: 0.1 PATCH TRANSDERMAL at 11:47

## 2023-07-13 RX ADMIN — ACETAMINOPHEN 650 MG: 325 TABLET ORAL at 11:47

## 2023-07-13 RX ADMIN — NICARDIPINE HYDROCHLORIDE 5 MG/HR: 0.1 INJECTION INTRAVENOUS at 02:43

## 2023-07-13 RX ADMIN — CLONIDINE 0.2 MG: 0.2 PATCH TRANSDERMAL at 11:46

## 2023-07-13 RX ADMIN — ASPIRIN 81 MG: 81 TABLET, COATED ORAL at 08:41

## 2023-07-13 RX ADMIN — HYDRALAZINE HYDROCHLORIDE 10 MG: 20 INJECTION INTRAMUSCULAR; INTRAVENOUS at 16:09

## 2023-07-13 RX ADMIN — HYDRALAZINE HYDROCHLORIDE 20 MG: 20 INJECTION INTRAMUSCULAR; INTRAVENOUS at 19:29

## 2023-07-13 RX ADMIN — GABAPENTIN 100 MG: 100 CAPSULE ORAL at 08:41

## 2023-07-13 RX ADMIN — CARVEDILOL 25 MG: 25 TABLET, FILM COATED ORAL at 00:33

## 2023-07-13 RX ADMIN — DOXAZOSIN 2 MG: 2 TABLET ORAL at 08:41

## 2023-07-13 RX ADMIN — INSULIN LISPRO 1 UNITS: 100 INJECTION, SOLUTION INTRAVENOUS; SUBCUTANEOUS at 21:36

## 2023-07-13 RX ADMIN — CALCIUM ACETATE 1334 MG: 667 CAPSULE ORAL at 16:09

## 2023-07-13 RX ADMIN — LOSARTAN POTASSIUM 100 MG: 50 TABLET, FILM COATED ORAL at 08:41

## 2023-07-13 NOTE — PLAN OF CARE
Problem: PAIN - ADULT  Goal: Verbalizes/displays adequate comfort level or baseline comfort level  Description: Interventions:  - Encourage patient to monitor pain and request assistance  - Assess pain using appropriate pain scale  - Administer analgesics based on type and severity of pain and evaluate response  - Implement non-pharmacological measures as appropriate and evaluate response  - Consider cultural and social influences on pain and pain management  - Notify physician/advanced practitioner if interventions unsuccessful or patient reports new pain  Outcome: Progressing     Problem: SAFETY ADULT  Goal: Patient will remain free of falls  Description: INTERVENTIONS:  - Educate patient/family on patient safety including physical limitations  - Instruct patient to call for assistance with activity   - Consult OT/PT to assist with strengthening/mobility   - Keep Call bell within reach  - Keep bed low and locked with side rails adjusted as appropriate  - Keep care items and personal belongings within reach  - Initiate and maintain comfort rounds  - Make Fall Risk Sign visible to staff  - Offer Toileting every 2 Hours, in advance of need  - Initiate/Maintain bed alarm  - Obtain necessary fall risk management equipment  - Apply yellow socks and bracelet for high fall risk patients  - Consider moving patient to room near nurses station  Outcome: Progressing     Problem: Knowledge Deficit  Goal: Patient/family/caregiver demonstrates understanding of disease process, treatment plan, medications, and discharge instructions  Description: Complete learning assessment and assess knowledge base.   Interventions:  - Provide teaching at level of understanding  - Provide teaching via preferred learning methods  Outcome: Progressing     Problem: Nutrition/Hydration-ADULT  Goal: Nutrient/Hydration intake appropriate for improving, restoring or maintaining nutritional needs  Description: Monitor and assess patient's nutrition/hydration status for malnutrition. Collaborate with interdisciplinary team and initiate plan and interventions as ordered. Monitor patient's weight and dietary intake as ordered or per policy. Utilize nutrition screening tool and intervene as necessary. Determine patient's food preferences and provide high-protein, high-caloric foods as appropriate.      INTERVENTIONS:  - Monitor oral intake, urinary output, labs, and treatment plans  - Assess nutrition and hydration status and recommend course of action  - Evaluate amount of meals eaten  - Assist patient with eating if necessary   - Allow adequate time for meals  - Recommend/ encourage appropriate diets, oral nutritional supplements, and vitamin/mineral supplements  - Order, calculate, and assess calorie counts as needed  - Recommend, monitor, and adjust tube feedings and TPN/PPN based on assessed needs  - Assess need for intravenous fluids  - Provide specific nutrition/hydration education as appropriate  - Include patient/family/caregiver in decisions related to nutrition  Outcome: Progressing     Problem: CARDIOVASCULAR - ADULT  Goal: Maintains optimal cardiac output and hemodynamic stability  Description: INTERVENTIONS:  - Monitor I/O, vital signs and rhythm  - Monitor for S/S and trends of decreased cardiac output  - Administer and titrate ordered vasoactive medications to optimize hemodynamic stability  - Assess quality of pulses, skin color and temperature  - Assess for signs of decreased coronary artery perfusion  - Instruct patient to report change in severity of symptoms  Outcome: Progressing     Problem: RESPIRATORY - ADULT  Goal: Achieves optimal ventilation and oxygenation  Description: INTERVENTIONS:  - Assess for changes in respiratory status  - Assess for changes in mentation and behavior  - Position to facilitate oxygenation and minimize respiratory effort  - Oxygen administered by appropriate delivery if ordered  - Initiate smoking cessation education as indicated  - Encourage broncho-pulmonary hygiene including cough, deep breathe, Incentive Spirometry  - Assess the need for suctioning and aspirate as needed  - Assess and instruct to report SOB or any respiratory difficulty  - Respiratory Therapy support as indicated  Outcome: Progressing

## 2023-07-13 NOTE — ASSESSMENT & PLAN NOTE
· Troponin 110 - 98 - 92  · Suspect this is 2/2 hypertensive emergency  · ECG w/out acute ST changes - largely unchanged from prior  · Denies chest/shoulder/arm pain at present  · Can stop trending

## 2023-07-13 NOTE — H&P
[id number] [employer]  H&P  Name: Salome Melendez 68 y.o. male I MRN: [id number]  Unit/Bed#: ICU 11-01 I Date of Admission: 7/12/2023   Date of Service: 7/13/2023 I Hospital Day: 1      Assessment/Plan   * Hypertensive emergency  Assessment & Plan  · /96 on admission to ED w/severe L shoulder/arm pain - shoulder/arm pain developed during IHD and continued through evening   · Pain noted to be reproducible in ED - now resolved  · Unclear etiology: likely IHD-induced vs lowering of BP medications during admission in 06/2023 - patient reports compliance w/home meds  · CXR w/out evidence of flash pulmonary edema - does show mild vascular congestion, however patient w/known G2DD/ESRD  · Obtain stat CTH now  · Will order carotid US for AM  · Check TSH  · ECG reveals NSR - troponin w/mild elevation, however this is likely 2/2 hypertensive emergency  · Cardene gtt started in ED - titrate for goal -180 for now  · Continue home Amlodipine, Coreg, Cardura, Clonidine patch - will increase home Losartan to 100 mg daily   · Trend BPs closely    Left arm pain  Assessment & Plan  · Reports severe L shoulder/arm pain that started during IHD and persisted through evening - noted to be reproducible in ED  · Does report bilateral chronic shoulder/arm pain related to arthritis - has since resolved  · L shoulder/humerus XR pending  · PRN APAP/Lidocaine patch for pain control    Elevated troponin  Assessment & Plan  · Troponin 110 - 98  · Suspect this is 2/2 hypertensive emergency  · ECG w/out acute ST changes - largely unchanged from prior  · Denies chest/shoulder/arm pain at present  · Await 4H troponin - if trending down, can stop trending    ESRD (end stage renal disease) [facility])  Assessment & Plan  Lab Results   Component Value Date    EGFR 9 07/12/2023    EGFR 7 06/21/2023    EGFR 6 06/20/2023    CREATININE 5.32 (H) 07/12/2023    CREATININE 6.65 (H) 06/21/2023    CREATININE 8.02 (H) 06/20/2023     · Receives IHD MWF - last session earlier today  · Access: L FA AVF  · Appears to have been admitted to hospital in 06/2023 for flash pulmonary edema requiring extra IHD treatments - will consult Nephrology for recommendations  · Continue home Phoslo, Fosrenol, Manuel Fonder on non-IHD days, Calcitriol 3x a week  · Trend renal indices  · Monitor I/O  · Daily weights    Chronic diastolic (congestive) heart failure (HCC)  Assessment & Plan  Wt Readings from Last 3 Encounters:   07/13/23 97.3 kg (214 lb 8.1 oz)   06/22/23 90.7 kg (199 lb 15.3 oz)   09/01/22 101 kg (223 lb)     · TTE from EF 55% w/moderately increased wall thickness, G2DD; mild to moderate AS, moderate MR/TR  · Continue volume removal w/IHD per Nephrology  · Continue home Coreg  · Monitor I/O  · Daily weights    Anemia  Assessment & Plan  · Baseline Hgb appears to be around 8-10  · Will defer to Nephrology as it appears that patient takes MARTHA and IV Venofer as outpatient   · Trend Hgb and transfuse for Hgb < 7    CAD (coronary artery disease)  Assessment & Plan  · Continue home ASA and statin      SIVA (obstructive sleep apnea)  Assessment & Plan  · Continue home CPAP qHS    Mixed hyperlipidemia  Assessment & Plan  · Continue home statin    Type 2 diabetes mellitus without complication, with long-term current use of insulin ([facility])  Assessment & Plan  Lab Results   Component Value Date    HGBA1C 7.6 (H) 08/04/2022       Recent Labs     07/13/23  0036   POCGLU 118       Blood Sugar Average: Last 72 hrs:  (P) 118     · Check HA1C  · Unclear regimen as outpatient - documented as insulin aspart U-100 8 units qAM and 6 units qPM   · Will order SSI w/ACHS fingersticks while here  · Diabetic diet  · Goal  - 180           History of Present Illness     HPI: Stephanie Washburn is a 68 y.o. male w/PMHx of HTN, HLD, CAD, G2DD, ESRD-IHD MWF, SIVA, IDDM2, anemia who presented earlier this evening after developing severe L shoulder/arm pain during IHD session today.  He states that pain persisted and so he presented to ED. In ED, he was noted to have BPs in 230s/90s w/mild troponin elevation. Shoulder/arm pain was noted to be reproducible in ED and was thought to be related to chronic shoulder/arm pain 2/2 arthritis. Patient remained hypertensive despite IV Labetalol and so Cardene gtt was initiated. Of note, patient was recently admitted to hospital in 06/2023 for flash pulmonary edema/hypertensive urgency to which he required additional IHD treatments and BP medications were changed. On discharge, it appears that BP medications were actually decreased due to lower BPs. Patient denies any current alcohol/tobacco/ilicit drug use. Patient will be admitted as SD1 for further work-up and management of Cardene gtt. History obtained from chart review and the patient. Review of Systems   Constitutional: Negative for chills and fever. HENT: Negative for congestion. Eyes: Negative for visual disturbance. Respiratory: Negative for cough, chest tightness and shortness of breath. Cardiovascular: Negative for chest pain and palpitations. Gastrointestinal: Negative for abdominal distention, abdominal pain, nausea and vomiting. Genitourinary:        Oliguric on IHD   Musculoskeletal: Negative for back pain and neck pain. Initially w/L shoulder/arm pain - now resolved   Neurological: Negative for dizziness, speech difficulty, light-headedness and headaches. Psychiatric/Behavioral: Negative for confusion. Historical Information   Past Medical History:  No date: Coronary artery disease  No date: Diabetes mellitus  No date: End stage renal disease  No date: Hyperlipidemia  No date: Hypertension  2012: NSTEMI (non-ST elevated myocardial infarction)  No date: TIA (transient ischemic attack) Past Surgical History:  2017: CARDIAC CATHETERIZATION      Comment:  70% lesion of a small R PDA and a 99% stenosis of D1                (previously noted on the cath in 2012).   Neither lesion was amenable to PCI. Medical management. No date: DIALYSIS FISTULA CREATION   Current Outpatient Medications   Medication Instructions   • amLODIPine (NORVASC) 2.5 mg, Oral, Daily at bedtime   • aspirin (ECOTRIN LOW STRENGTH) 81 mg EC tablet Oral   • atorvastatin (LIPITOR) 40 mg, Oral, Daily   • calcitriol (ROCALTROL) 0.75 mcg, Oral, 3 times weekly   • calcium acetate (PHOSLO) capsule 3 times daily, Not on dialysis days  Pt taking 2 tabs 3x daily   • carvedilol (COREG) 25 mg, Oral, 2 times daily with meals   • cinacalcet (SENSIPAR) 120 mg, Oral, 3 times weekly   • cloNIDine (CATAPRES-TTS-2) 0.2 mg/24 hr 0.2 patches, Transdermal, Weekly, Pt stated that he is taking . 1 mg   • doxazosin (CARDURA) 2 mg tablet 1 tablet, Oral   • gabapentin (NEURONTIN) 100 mg capsule Take 1 cap by mouth daily   • insulin aspart (NovoLOG FlexPen) 100 UNIT/ML injection pen Subcutaneous, 3 times daily   • Insulin Pen Needle (UltiCare Short Pen Needles) 31G X 8 MM MISC INJECT 4 TIMES DAILY;E11.9   • lanthanum (FOSRENOL) 500 mg chewable tablet CRUSH OR CHEW AND SWALLOW 1 TABLET THREE TIMES A DAY WITH MEALS   • lidocaine-prilocaine (EMLA) cream APPLY SMALL AMOUNT TO ACCESS SITE (AVF) 1 TO 2 HOURS BEFORE DIALYSIS. COVER WITH OCCLUSIVE DRESSING (SARAN WRAP)   • Lokelma 10 g PACK EMPTY CONTENTS OF 1 PACKET IN 3 TABLESPOONSFUL OF WATER OR AS DIRECTED BY CLINIC.  STIR WELL AND DRINK IMMEDIATELY ON NON-DIALYSIS DAYS   • losartan (COZAAR) 50 mg, Oral, Daily   • sevelamer carbonate (RENVELA) 800 mg, 3 times daily    No Known Allergies   Social History     Tobacco Use   • Smoking status: Former     Packs/day: 1.00     Years: 20.00     Total pack years: 20.00     Types: Cigarettes     Start date:      Quit date:      Years since quittin.5   • Smokeless tobacco: Never   • Tobacco comments:     STATES QUIT 30 YEARS AGO/ ABOUT ONE CIGARETTE DAILY   Vaping Use   • Vaping Use: Never used   Substance Use Topics   • Alcohol use: Never • Drug use: Never    Family History   Problem Relation Age of Onset   • Hypertension Mother    • Hypertension Father           Objective                            Vitals I/O      Most Recent Min/Max in 24hrs   Temp 97.6 °F (36.4 °C) Temp  Min: 97.1 °F (36.2 °C)  Max: 97.6 °F (36.4 °C)   Pulse 76 Pulse  Min: 64  Max: 82   Resp 13 Resp  Min: 12  Max: 24   /72 BP  Min: 152/72  Max: 246/90   O2 Sat 94 % SpO2  Min: 94 %  Max: 98 %    No intake or output data in the 24 hours ending 07/13/23 0123      Diet Cardiovascular; Cardiac; Fluid Restriction 1200 ML, Consistent Carbohydrate Diet Level 2 (5 carb servings/75 grams CHO/meal)     Invasive Monitoring Physical exam     Physical Exam  Vitals and nursing note reviewed. Constitutional:       General: He is awake. He is not in acute distress. HENT:      Head: Normocephalic. Eyes:      Extraocular Movements: Extraocular movements intact. Pupils: Pupils are equal, round, and reactive to light. Cardiovascular:      Rate and Rhythm: Normal rate and regular rhythm. Pulses: Normal pulses. Heart sounds: Murmur heard. Pulmonary:      Effort: Pulmonary effort is normal.      Breath sounds: Examination of the right-lower field reveals rales. Examination of the left-lower field reveals rales. Decreased breath sounds and rales present. Abdominal:      General: Bowel sounds are normal. There is no distension. Palpations: Abdomen is soft. Tenderness: There is no abdominal tenderness. Musculoskeletal:      Cervical back: Normal range of motion and neck supple. Right lower leg: Edema present. Left lower leg: Edema present. Comments: Trace bilateral LE edema   Skin:     Capillary Refill: Capillary refill takes less than 2 seconds. Neurological:      General: No focal deficit present. Mental Status: He is alert and oriented to person, place, and time. Psychiatric:         Behavior: Behavior is cooperative. Diagnostic Studies      EKG: sinus rhythm  Imaging:  I have personally reviewed pertinent reports. and I have personally reviewed pertinent films in PACS     Medications:  Scheduled PRN   amLODIPine, 10 mg, HS  aspirin, 81 mg, Daily  atorvastatin, 40 mg, After Dinner  calcitriol, 0.75 mcg, Once per day on Mon Wed Fri  calcium acetate, 1,334 mg, TID  carvedilol, 25 mg, BID With Meals  chlorhexidine, 15 mL, Q12H [address]  [START ON 7/17/2023] cloNIDine, 1 patch, Weekly  doxazosin, 2 mg, Daily  gabapentin, 100 mg, Daily  [START ON 7/14/2023] heparin (porcine), 5,000 Units, Q8H [address]  insulin lispro, 1-6 Units, TID AC  insulin lispro, 1-6 Units, HS  lanthanum, 500 mg, TID With Meals  losartan, 100 mg, Daily  melatonin, 6 mg, HS  Sodium Zirconium Cyclosilicate, 10 g, Once per day on Sun Tue Thu Sat          Continuous    niCARdipine, 1-15 mg/hr, Last Rate: Stopped (07/13/23 0045)         Labs:    CBC    Recent Labs     07/1950   WBC 5.75   HGB 10.4*   HCT 34.0*   PLT 97*     BMP    Recent Labs     07/1950   SODIUM 136   K 4.2   CL 93*   CO2 33*   AGAP 10   BUN 17   CREATININE 5.32*   CALCIUM 10.1       Coags    Recent Labs     07/1950   INR 1.04   PTT 30        Additional Electrolytes  Recent Labs     07/1950   MG 2.2   PHOS 2.9          Blood Gas    No recent results  No recent results LFTs  Recent Labs     07/1950   ALT 9   AST 13   ALKPHOS 75   ALB 4.4   TBILI 0.51       Infectious  No recent results  Glucose  Recent Labs     07/1950   GLUC 120             Critical Care Time Delivered: Upon my evaluation, this patient had a high probability of imminent or life-threatening deterioration due to hypertensive emergency, elevated troponin, which required my direct attention, intervention, and personal management.   I have personally provided 42 minutes of critical care time, exclusive of procedures, teaching, family meetings, and any prior time recorded by providers other than myself.    Anticipated Length of Stay is > 2 midnights  [address]

## 2023-07-13 NOTE — ASSESSMENT & PLAN NOTE
Lab Results   Component Value Date    EGFR 9 07/12/2023    EGFR 7 06/21/2023    EGFR 6 06/20/2023    CREATININE 5.32 (H) 07/12/2023    CREATININE 6.65 (H) 06/21/2023    CREATININE 8.02 (H) 06/20/2023     · Receives IHD MWF - last session earlier today  · Access: L FA AVF  · Appears to have been admitted to hospital in 06/2023 for flash pulmonary edema requiring extra IHD treatments - will consult Nephrology for recommendations  · Continue home Phoslo, Fosrenol, Manuel Fonder on non-IHD days, Calcitriol 3x a week  · Trend renal indices  · Monitor I/O  · Daily weights

## 2023-07-13 NOTE — QUICK NOTE
Patient continues to experience elevated blood pressures with systolic now remaining over 180 mmHg. After discussion with our critical care colleagues, we will transition the patient from losartan to irbesartan 300 mg daily, which will need to be initiated tomorrow as the patient has already received losartan 100 mg earlier today. We will increase clonidine patch from 0.2 up to 0.3 mg. We will consider increasing doxazosin depending progresses clinically. Of note our concern is that during his previous admission for hypertensive urgency, medications needed to be de-escalated in the outpatient setting due to hypotension. We will continue to closely monitor at this time.     Domingo Espinoza

## 2023-07-13 NOTE — RESPIRATORY THERAPY NOTE
07/13/23 0144   Respiratory Assessment   Resp Comments pt ordered cpap for HS. pt declined cpap at this time. NP notified.  pt currently on room air. will continue to monitor pt   O2 Device room  air   Oxygen Therapy/Pulse Ox   O2 Device None (Room air)   SpO2 Activity At Rest   $ Pulse Oximetry Spot Check Charge Completed

## 2023-07-13 NOTE — ASSESSMENT & PLAN NOTE
· Troponin 110 - 98  · Suspect this is 2/2 hypertensive emergency  · ECG w/out acute ST changes - largely unchanged from prior  · Denies chest/shoulder/arm pain at present  · Await 4H troponin - if trending down, can stop trending

## 2023-07-13 NOTE — ASSESSMENT & PLAN NOTE
Wt Readings from Last 3 Encounters:   07/13/23 97.3 kg (214 lb 8.1 oz)   06/22/23 90.7 kg (199 lb 15.3 oz)   09/01/22 101 kg (223 lb)     · TTE from EF 55% w/moderately increased wall thickness, G2DD; mild to moderate AS, moderate MR/TR  · Continue volume removal w/IHD per Nephrology  · Continue home Coreg  · Monitor I/O  · Daily weights

## 2023-07-13 NOTE — UTILIZATION REVIEW
Initial Clinical Review    Admission: Date/Time/Statement:   Admission Orders (From admission, onward)     Ordered        07/12/23 2231  INPATIENT ADMISSION  Once                      Orders Placed This Encounter   Procedures   • INPATIENT ADMISSION     Standing Status:   Standing     Number of Occurrences:   1     Order Specific Question:   Level of Care     Answer:   Level 1 Stepdown [13]     Order Specific Question:   Estimated length of stay     Answer:   More than 2 Midnights     Order Specific Question:   Certification     Answer:   I certify that inpatient services are medically necessary for this patient for a duration of greater than two midnights. See H&P and MD Progress Notes for additional information about the patient's course of treatment. ED Arrival Information     Expected   -    Arrival   7/12/2023 18:08    Acuity   Urgent            Means of arrival   Walk-In    Escorted by   Spouse    Service   Critical Care/ICU    Admission type   Emergency            Arrival complaint   pain in left arm and shoulder pain           Chief Complaint   Patient presents with   • Arm Pain     Left arm pain from elbow to shoulder. Patient reports pain started after dialysis treatment       Initial Presentation: 68 y.o. male to the ED from home with complaints of left shoulder and humerus pain while receiving HD via shunt in left forearm. Worse with palpation. Admitted to CRITICAL CARE step down for hypertensive emergency, left arm pain, elevated troponin. H/O HTN, HLD, CAD, G2DD, ESRD-IHD MWF, SIVA, IDDM2, anemia. On arrival to the ED, bp 238/96. In the ED, given IV labetalol, IV morphine, started on IV cardene drip. Continue phoslo, fosrenol, lokelma. Monitor I/Os. Check Carotid US, CT head. EKG shows NSR. Troponin w/mild elevation, however this is likely 2/2 hypertensive emergency. Continue home Amlodipine, Coreg, Cardura, Clonidine patch - will increase home Losartan to 100 mg daily . Trend bps.      Date: 7/13   Day 2: CXR w/out evidence of flash pulmonary edema - does show mild vascular congestion, however patient w/known G2DD/ESRD. Has been weaned off cardene. Bilateral lower extremity edema. Rales heard in lungs. Nephrology consult:  ESRD. Had full treatment July 12. Continue with OP treatment schedule. Continues to have elevated bp. Transition from losartan to irbesartan and increase clonidine patch. Date: 7/14    Day 3: Has surpassed a 2nd midnight with active treatments and services, which include. Continues to require intermittent dosing of iv hydralazine, close bp monitoring.     ED Triage Vitals   Temperature Pulse Respirations Blood Pressure SpO2   07/12/23 1842 07/12/23 1840 07/12/23 1840 07/12/23 1842 07/12/23 1840   (!) 97.1 °F (36.2 °C) 73 16 (!) 238/96 97 %      Temp Source Heart Rate Source Patient Position - Orthostatic VS BP Location FiO2 (%)   07/12/23 2300 07/12/23 1840 07/12/23 1919 07/12/23 1919 --   Tympanic Monitor Sitting Right arm       Pain Score       07/12/23 1840       9          Wt Readings from Last 1 Encounters:   07/13/23 97.6 kg (215 lb 2.7 oz)     Additional Vital Signs:   Date/Time Temp Pulse Resp BP MAP (mmHg) SpO2 O2 Device O2 Interface Device Patient Position - Orthostatic VS   07/14/23 0700 98.5 °F (36.9 °C) 74 14 169/77 110 94 % -- -- Lying   07/14/23 0400 -- 76 24 Abnormal  153/72 103 95 % -- -- --   07/14/23 0258 -- -- -- -- -- -- -- Full face mask --   07/14/23 0000 -- 71 21 196/85 Abnormal  122 94 % -- -- --   07/13/23 2310 -- -- -- -- -- -- None (Room air) -- --   07/13/23 2000 97.4 °F (36.3 °C) Abnormal  81 20 166/71 102 95 % None (Room air) -- Lying   07/13/23 1900 -- 76 18 185/80 Abnormal  115 94 % None (Room air) -- Lying   07/13/23 1738 -- -- -- 174/77 Abnormal  -- -- -- -- --   07/13/23 1609 -- -- -- 182/82 Abnormal  -- -- --         Date/Time Temp Pulse Resp BP MAP (mmHg) SpO2 O2 Device Patient Position - Orthostatic VS   07/13/23 0900 -- 66 20 -- -- 95 % -- --   07/13/23 0800 -- 64 12 196/88 Abnormal  126 95 % -- --   07/13/23 0700 98.2 °F (36.8 °C) 62 19 184/82 Abnormal  118 93 % -- Lying   07/13/23 0400 97.8 °F (36.6 °C) 65 17 142/67 96 93 % None (Room air) Lying   07/13/23 0330 -- 65 23 Abnormal  155/73 105 94 % None (Room air) Lying   07/13/23 0300 -- 67 22 170/79 114 92 % None (Room air) Lying   07/13/23 0245 -- 70 16 155/67 105 94 % None (Room air) Lying   07/13/23 0230 -- 71 11 Abnormal  175/80 Abnormal  115 94 % None (Room air) Lying   07/12/23 2330 97.6 °F (36.4 °C) 81 18 -- -- 95 % -- --   07/12/23 2315 -- -- -- -- -- -- None (Room air) --   07/12/23 2300 97.6 °F (36.4 °C) 77 12 175/82 Abnormal  118 94 % None (Room air) Lying   07/12/23 2245 -- 74 16 193/88 Abnormal  127 94 % None (Room air) Sitting   07/12/23 2230 -- 75 16 201/85 Abnormal  134 94 % None (Room air) Lying   07/12/23 2130 -- 68 20 234/105 Abnormal  151 97 % None (Room air) Lying   07/12/23 2100 -- 67 16 231/105 Abnormal  151 95 % None (Room air) Lying   07/12/23 2030 -- 67 16 227/102 Abnormal  147 95 % None (Room air) Lying   07/12/23 2000 -- 64 16 234/96 Abnormal  153 97 % None (Room air) Lying   07/12/23 1950 -- 69 17 238/110 Abnormal  158 98 % None (Room air) Sitting   07/12/23 1919 -- -- -- 246/90 Abnormal  -- -- -- Sitting   07/12/23 1842 97.1 °F (36.2 °C) Abnormal  -- -- 238/96 Abnormal  -- -- -- --   07/12/23 1840 -- 73 16 -- -- 97 % None (Room         Pertinent Labs/Diagnostic Test Results:   7/13 EKG:Normal sinus rhythm  Nonspecific T wave abnormality  Abnormal ECG  When compared with ECG of 19-JUN-2023 19:41,  No significant change was found  VAS carotid complete study   Final Result by Rohini Cedeño MD (07/13 4060)      CT head wo contrast   Final Result by Magali Diallo MD (07/13 0119)      No acute intracranial abnormality.                   Workstation performed: [id number]         XR chest 1 view portable   Final Result by Christophe Rosado MD (07/13 1023) No acute cardiopulmonary disease. Workstation performed: [id number]         XR shoulder 2+ views RIGHT   ED Interpretation by Eleazar Babin.DO (07/12 2018)   There may be some joint laxity to the left shoulder and there is some mild degenerative changes noted within the joint space. Final Result by Mak Langley MD (07/13 1025)   Addendum (preliminary) 1 of 1 by Mak Langley MD (07/13 1025)   ADDENDUM:      Please note that images of the left shoulder and not the right shoulder    were obtained. The left side is the symptomatic side. Final      No acute osseous abnormality. Degenerative changes as described. Workstation performed: [id number]         XR humerus LEFT   Final Result by Mak Langley MD (07/13 1022)      No acute osseous abnormality.             Workstation performed: [id number]               Results from last 7 days   Lab Units 07/13/23  0504 07/1950   WBC Thousand/uL 4.93 5.75   HEMOGLOBIN g/dL 9.4* 10.4*   HEMATOCRIT % 30.6* 34.0*   PLATELETS Thousands/uL 92* 97*   NEUTROS ABS Thousands/µL  --  3.57         Results from last 7 days   Lab Units 07/13/23  0504 07/1950   SODIUM mmol/L 135 136   POTASSIUM mmol/L 4.1 4.2   CHLORIDE mmol/L 95* 93*   CO2 mmol/L 31 33*   ANION GAP mmol/L 9 10   BUN mg/dL 22 17   CREATININE mg/dL 6.38* 5.32*   EGFR ml/min/1.73sq m 7 9   CALCIUM mg/dL 8.8 10.1   CALCIUM, IONIZED mmol/L 1.02*  --    MAGNESIUM mg/dL 2.1 2.2   PHOSPHORUS mg/dL 3.4 2.9     Results from last 7 days   Lab Units 07/1950   AST U/L 13   ALT U/L 9   ALK PHOS U/L 75   TOTAL PROTEIN g/dL 8.6*   ALBUMIN g/dL 4.4   TOTAL BILIRUBIN mg/dL 0.51     Results from last 7 days   Lab Units 07/13/23  1106 07/13/23  0718 07/13/23  0036   POC GLUCOSE mg/dl 194* 98 118     Results from last 7 days   Lab Units 07/13/23  0504 07/1950   GLUCOSE RANDOM mg/dL 113 120         Results from last 7 days   Lab Units 07/12/23 2335 07/12/23 2213 07/1950   HS TNI 0HR ng/L  --   --  110*   HS TNI 2HR ng/L  --  98*  --    HSTNI D2 ng/L  --  -12  --    HS TNI 4HR ng/L 92*  --   --    HSTNI D4 ng/L -18  --   --          Results from last 7 days   Lab Units 07/1950   PROTIME seconds 13.6   INR  1.04   PTT seconds 30     Results from last 7 days   Lab Units 07/1950   TSH 3RD GENERATON uIU/mL 2.297         Results from last 7 days   Lab Units 07/1950   BNP pg/mL 1,603*     ED Treatment:   Medication Administration from 07/12/2023 1808 to 07/12/2023 2312       Date/Time Order Dose Route Action     07/12/2023 1954 EDT labetalol (NORMODYNE) injection 20 mg 20 mg Intravenous Given     07/12/2023 2033 EDT morphine injection 2 mg 2 mg Intravenous Given     07/12/2023 2253 EDT niCARdipine (CARDENE-IV) 100 mcg/mL in sodium chloride infusion 7.5 mg/hr Intravenous New Bag     07/12/2023 2214 EDT niCARdipine (CARDENE-IV) 100 mcg/mL in sodium chloride infusion 5 mg/hr Intravenous New Bag     07/12/2023 2215 EDT aspirin chewable tablet 324 mg 324 mg Oral Given        Past Medical History:   Diagnosis Date   • Coronary artery disease    • Diabetes mellitus    • End stage renal disease    • Hyperlipidemia    • Hypertension    • NSTEMI (non-ST elevated myocardial infarction) 2012   • TIA (transient ischemic attack)        Admitting Diagnosis: Arm injury [S49.90XA]  Elevated troponin [R77.8]  Hypertensive emergency [I16.1]  Age/Sex: 68 y.o. male  Admission Orders:    Scheduled Medications:  amLODIPine, 10 mg, Oral, HS  aspirin, 81 mg, Oral, Daily  atorvastatin, 40 mg, Oral, After Dinner  calcitriol, 0.75 mcg, Oral, Once per day on Mon Wed Fri  calcium acetate, 1,334 mg, Oral, TID  carvedilol, 25 mg, Oral, BID With Meals  chlorhexidine, 15 mL, Mouth/Throat, Q12H [address]  [START ON 7/17/2023] cloNIDine, 1 patch, Transdermal, Weekly  doxazosin, 2 mg, Oral, Daily  gabapentin, 100 mg, Oral, Daily  [START ON 7/14/2023] heparin (porcine), 5,000 Units, Subcutaneous, Q8H [facility]  insulin lispro, 1-6 Units, Subcutaneous, TID AC  insulin lispro, 1-6 Units, Subcutaneous, HS  lanthanum, 500 mg, Oral, TID With Meals  lidocaine, 1 patch, Topical, Daily  [START ON 7/14/2023] lidocaine, , Topical, Once per day on Mon Wed Fri  losartan, 100 mg, Oral, Daily  melatonin, 6 mg, Oral, HS  Sodium Zirconium Cyclosilicate, 10 g, Oral, Once per day on Sun Tue Thu Sat      Continuous IV Infusions:     PRN Meds:  acetaminophen, 650 mg, Oral, Q6H PRN  hydrALAZINE, 10 mg, Intravenous, Q6H PRN x 3 7/13, x 1 7/14        IP CONSULT TO CASE MANAGEMENT  IP CONSULT TO NEPHROLOGY  IP CONSULT TO ORTHOPEDIC SURGERY    Network Utilization Review Department  ATTENTION: Please call with any questions or concerns to [phone] and carefully listen to the prompts so that you are directed to the right person. All voicemails are confidential.  Saritha No all requests for admission clinical reviews, approved or denied determinations and any other requests to dedicated fax number below belonging to the campus where the patient is receiving treatment.  List of dedicated fax numbers for the Facilities:  [place] DENIALS (Administrative/Medical Necessity) [phone] [facility] [facility] [phone]   [address] [place] [address] Drive [phone]   [facility] [address] [facility] [phone]   [address] [facility] [address] [facility] [id number] [place] [address] [phone]   [address] [facility] [address] [facility] [address] [phone]

## 2023-07-13 NOTE — ASSESSMENT & PLAN NOTE
· Reports severe L shoulder/arm pain that started during IHD and persisted through evening - noted to be reproducible in ED  · Does report bilateral chronic shoulder/arm pain related to arthritis - has since resolved  · L shoulder/humerus XR pending  · PRN APAP/Lidocaine patch for pain control

## 2023-07-13 NOTE — ASSESSMENT & PLAN NOTE
· /96 on admission to ED w/severe L shoulder/arm pain - shoulder/arm pain developed during IHD and continued through evening   · Pain noted to be reproducible in ED - now resolved  · Unclear etiology: likely IHD-induced vs lowering of BP medications during admission in 06/2023 - patient reports compliance w/home meds  · CXR w/out evidence of flash pulmonary edema - does show mild vascular congestion, however patient w/known G2DD/ESRD  · Obtain stat CTH now  · Will order carotid US for AM  · Check TSH  · ECG reveals NSR - troponin w/mild elevation, however this is likely 2/2 hypertensive emergency  · Cardene gtt started in ED - titrate for goal -180 for now  · Continue home Amlodipine, Coreg, Cardura, Clonidine patch - will increase home Losartan to 100 mg daily   · Trend BPs closely

## 2023-07-13 NOTE — ASSESSMENT & PLAN NOTE
Lab Results   Component Value Date    HGBA1C 7.6 (H) 08/04/2022       Recent Labs     07/13/23  0036   POCGLU 118       Blood Sugar Average: Last 72 hrs:  (P) 118     · Check HA1C  · Unclear regimen as outpatient - documented as insulin aspart U-100 8 units qAM and 6 units qPM   · Will order SSI w/ACHS fingersticks while here  · Diabetic diet  · Goal  - 180  ·   · Will order

## 2023-07-13 NOTE — ASSESSMENT & PLAN NOTE
· /96 on admission to ED w/severe L shoulder/arm pain - shoulder/arm pain developed during IHD and continued through evening   · Pain noted to be reproducible in ED - now resolved  · Unclear etiology: likely IHD-induced vs lowering of BP medications during admission in 06/2023 - patient reports compliance w/home meds  · CXR w/out evidence of flash pulmonary edema - does show mild vascular congestion, however patient w/known G2DD/ESRD  · CTH w/out acute intracranial abnormality  · Obtain carotid US this AM  · TSH 2.297  · ECG reveals NSR - troponin w/mild elevation, however this is likely 2/2 hypertensive emergency  · Cardene gtt started in ED - now off  · Continue home Amlodipine, Coreg, Cardura, Clonidine patch - Home Losartan increased to 100 mg daily   · Trend BPs closely

## 2023-07-13 NOTE — CONSULTS
Consultation - Nephrology   Karena Clemons 68 y.o. male MRN: [id number]  Unit/Bed#: ICU 11-01 Encounter: [id number]      A/P:  1.  End-stage renal disease   Will continue with Monday Wednesday Friday hemodialysis sessions, next session will be tomorrow, July 14. Patient typically uses lidocaine cream on his fistula which will be ordered. Continue with 3 and half hour treatments, patient's dry weight is around 96.5 kg, however he was apparently down to 91 kg discharge from the hospital in late June 2023. Previously his dry weight was closer to 100 kg. We will continue to monitor and adjust estimated dry weight as indicated clinically. 2.  Secondary hyperparathyroidism, renal   Patient was on Sensipar 120 mg 3 times a week, will place this order into the chart, also on calcitriol 0.75 mcg 3 times weekly. He is taking lanthanum 1 tablet with meals for phosphorus control and management, however home medication list shows sevelamer 1 tablet with meals. Unclear which phosphorus binder the patient is actively taking, will continue with lanthanum for now. 3.  Anemia due to end-stage renal disease   Avoid short acting MARTHA, patient on Mircera in the outpatient setting. Provide packed red blood cells as indicated  4. Hypertensive urgency   No longer requires nicardipine drip, patient's pain is controlled which is the likely cause of the increased blood pressure. Continue to monitor. 5.  Diabetic nephropathy likely  6. Chronic hyperkalemia   Patient is on Raymondo Merino in the outpatient setting, may continue in the inpatient setting, continue with low potassium diet. 7. Left arm pain   Most likely musculoskeletal, continue care according to critical care/hospitalist recommendations. 8.  Chronic diastolic congestive heart failure   Patient appears to be compensated at this time, continue low-sodium diet, will add diuretic on nondialysis days to assist with volume control management.   9.  Diet management   Will transition the patient to a liberal renal diet with low sodium component and fluid restriction of 2 L. Thank you for allowing us to participate in the care of your patient. Please feel free to contact us regarding the care of this patient, or any other questions/concerns that may be applicable. Patient Active Problem List   Diagnosis   • ESRD (end stage renal disease) ([address])   • Hypertensive urgency   • Type 2 diabetes mellitus without complication, with long-term current use of insulin (HCC)   • Mixed hyperlipidemia   • Elevated troponin   • TIA (transient ischemic attack)   • SIVA (obstructive sleep apnea)   • Snoring   • Numbness and tingling in both hands   • Dependence on renal dialysis ([address])   • Flash pulmonary edema (HCC)   • SIRS (systemic inflammatory response syndrome) ([address])   • Acute respiratory failure with hypoxia (HCC)   • Hypertensive emergency   • Left arm pain   • CAD (coronary artery disease)   • Anemia   • Chronic diastolic (congestive) heart failure ([address])       History of Present Illness   Physician Requesting Consult: Sly Gomes*  Reason for Consult / Principal Problem: End-stage renal disease  Hx and PE limited by:   HPI: Haresh Eagle is a 68y.o. year old male who presented to the emergency department yesterday, July 12, with complaints of severe left shoulder pain. Patient noted that the pain in his left shoulder area and proximal upper extremity worsened during and after dialysis treatment. Pain remained in the left shoulder/upper extremity region with no radiation, pain felt deep, not associated with nausea or vomiting, diaphoresis, or any other signs or symptoms. It is on the ipsilateral side of his left radiocephalic AV fistula. Patient denies erythema or other signs of potential infection of the fistula. He claims that the pain is positional has difficult time lifting his arm due to pain.   The pain is now better controlled but still persist.  At the time presentation, the patient was also experiencing hypertensive urgency, required nicardipine drip to help control blood pressure. Patient had been taking his antihypertensive medications all along including a clonidine patch, amlodipine, carvedilol, Cardura, and losartan which was recently increased up to 100 mg daily. From the renal standpoint, the patient has end-stage renal disease, dialyzes Monday Wednesday Friday at the [facility] in [facility]. He had a full treatment yesterday, July 12. History obtained from chart review and the patient    Constitutional ROS- Denies fatigue, fever, chills, night sweats, weight changes. HEENT ROS- Denies history of eye surgeries, glaucoma, headaches or history of trauma, blurred vision. .  Endocrine ROS- No history diabetes mellitus or thyroid disease. Cardiovascular ROS- Denies chest pain, palpitation, dyspnea exertion, orthopnea, claudication. Pulmonary ROS- Denies history of COPD, asthma. Denies cough, hemoptysis, shortness of breath. GI ROS- Denies abdominal pain, diarrhea, nausea, swallowing problems, vomiting, constipation, blood in stools, fecal incontinence. Hematological ROS- Denies history of easy bruising, blood clots, bleeding or blood transfusions. Genitourinary ROS- Denies recent hematuria, pyuria, flank pain, change in urinary stream, decreased urinary output, increased urinary frequency, nocturia, foamy urine, or urinary incontinence. Lymphatic ROS- Denies lymphadenopathy. Musculoskeletal ROS-shoulder pain as noted above. Dermatological ROS- Denies rash, wounds, ulcers, itching, jaundice. Psychiatric ROS- Denies anxiety, depression, hallucinations, disorientation. Neurological ROS- No stroke or TIA symptoms.  .    Historical Information   Past Medical History:   Diagnosis Date   • Coronary artery disease    • Diabetes mellitus    • End stage renal disease    • Hyperlipidemia    • Hypertension    • NSTEMI (non-ST elevated myocardial infarction) 2012   • TIA (transient ischemic attack)      Past Surgical History:   Procedure Laterality Date   • CARDIAC CATHETERIZATION  2017    70% lesion of a small R PDA and a 99% stenosis of D1 (previously noted on the cath in ). Neither lesion was amenable to PCI. Medical management.    • DIALYSIS FISTULA CREATION       Social History   Social History     Substance and Sexual Activity   Alcohol Use Never     Social History     Substance and Sexual Activity   Drug Use Never     Social History     Tobacco Use   Smoking Status Former   • Packs/day: 1.00   • Years: 20.00   • Total pack years: 20.00   • Types: Cigarettes   • Start date:    • Quit date: 12   • Years since quittin.5   Smokeless Tobacco Never   Tobacco Comments    STATES QUIT 27 YEARS AGO/ ABOUT ONE CIGARETTE DAILY     Family History   Problem Relation Age of Onset   • Hypertension Mother    • Hypertension Father        Meds/Allergies   all current active meds have been reviewed, current meds:   Current Facility-Administered Medications   Medication Dose Route Frequency   • acetaminophen (TYLENOL) tablet 650 mg  650 mg Oral Q6H PRN   • amLODIPine (NORVASC) tablet 10 mg  10 mg Oral HS   • aspirin (ECOTRIN LOW STRENGTH) EC tablet 81 mg  81 mg Oral Daily   • atorvastatin (LIPITOR) tablet 40 mg  40 mg Oral After Dinner   • calcitriol (ROCALTROL) capsule 0.75 mcg  0.75 mcg Oral Once per day on    • calcium acetate (PHOSLO) capsule 1,334 mg  1,334 mg Oral TID   • carvedilol (COREG) tablet 25 mg  25 mg Oral BID With Meals   • chlorhexidine (PERIDEX) 0.12 % oral rinse 15 mL  15 mL Mouth/Throat Q12H [address]   • [START ON 2023] cloNIDine (CATAPRES-TTS-2) 0.2 mg/24 hr TD weekly patch  1 patch Transdermal Weekly   • doxazosin (CARDURA) tablet 2 mg  2 mg Oral Daily   • gabapentin (NEURONTIN) capsule 100 mg  100 mg Oral Daily   • [START ON 2023] heparin (porcine) subcutaneous injection 5,000 Units  5,000 Units Subcutaneous Q8H [address]   • hydrALAZINE (APRESOLINE) injection 10 mg  10 mg Intravenous Q6H PRN   • insulin lispro (HumaLOG) 100 units/mL subcutaneous injection 1-6 Units  1-6 Units Subcutaneous TID AC   • insulin lispro (HumaLOG) 100 units/mL subcutaneous injection 1-6 Units  1-6 Units Subcutaneous HS   • lanthanum (FOSRENOL) chewable tablet 500 mg  500 mg Oral TID With Meals   • lidocaine (LIDODERM) 5 % patch 1 patch  1 patch Topical Daily   • losartan (COZAAR) tablet 100 mg  100 mg Oral Daily   • melatonin tablet 6 mg  6 mg Oral HS   • Sodium Zirconium Cyclosilicate (Lokelma) 10 g  10 g Oral Once per day on Sun Tue Thu Sat    and PTA meds:    Medications Prior to Admission   Medication   • amLODIPine (NORVASC) 2.5 mg tablet   • aspirin (ECOTRIN LOW STRENGTH) 81 mg EC tablet   • atorvastatin (LIPITOR) 40 mg tablet   • calcitriol (ROCALTROL) 0.25 mcg capsule   • calcium acetate (PHOSLO) capsule   • carvedilol (COREG) 25 mg tablet   • cloNIDine (CATAPRES-TTS-2) 0.2 mg/24 hr   • doxazosin (CARDURA) 2 mg tablet   • gabapentin (NEURONTIN) 100 mg capsule   • insulin aspart (NovoLOG FlexPen) 100 UNIT/ML injection pen   • Insulin Pen Needle (UltiCare Short Pen Needles) 31G X 8 MM MISC   • lanthanum (FOSRENOL) 500 mg chewable tablet   • lidocaine-prilocaine (EMLA) cream   • Lokelma 10 g PACK   • losartan (COZAAR) 50 mg tablet   • cinacalcet (SENSIPAR) 60 MG tablet   • sevelamer carbonate (RENVELA) 800 mg tablet         No Known Allergies    Objective     Intake/Output Summary (Last 24 hours) at 7/13/2023 0915  Last data filed at 7/13/2023 0406  Gross per 24 hour   Intake 260 ml   Output 0 ml   Net 260 ml       Invasive Devices:        Physical Exam      I/O last 3 completed shifts: In: 260 [P.O.:50; I.V.:210]  Out: 0     Vitals:    07/13/23 0600   BP: 152/70   Pulse: 58   Resp: 12   Temp:    SpO2: 94%       General Appearance:    No acute distress. Cooperative. Appears stated age. Head:    Normocephalic. Atraumatic. Normal jaw occlusion.    Eyes:    Lids, conjunctiva normal. No scleral icterus. Ears:    Normal external ears. Nose:   Nares normal. No drainage. Mouth:   Lips, tongue normal. Mucosa normal. Phonation normal.   Neck:   Supple. Symmetrical.   Back:     Symmetric. No CVA tenderness. Lungs:     Normal respiratory effort. Clear to auscultation bilaterally. Chest wall:    No tenderness or deformity. Heart:    Regular rate and rhythm. Normal S1 and S2. No murmur. No JVD. No edema. Abdomen:     Soft. Non-tender. Bowel sounds active. Genitourinary:   No Meehan catheter present. Extremities:   Extremities normal. Atraumatic. No cyanosis. Skin:   Warm and dry. No pallor, jaundice, rash, ecchymoses. Neurologic:   Alert and oriented to person, place, time. No focal deficit. Current Weight: Weight - Scale: 97.6 kg (215 lb 2.7 oz)  First Weight: Weight - Scale: 90.3 kg (199 lb)    Lab Results:  I have personally reviewed pertinent labs.     CBC:   Lab Results   Component Value Date    WBC 4.93 07/13/2023    HGB 9.4 (L) 07/13/2023    HCT 30.6 (L) 07/13/2023    MCV 89 07/13/2023    PLT 92 (L) 07/13/2023    RBC 3.45 (L) 07/13/2023    MCH 27.2 07/13/2023    MCHC 30.7 (L) 07/13/2023    RDW 18.1 (H) 07/13/2023    MPV 10.8 07/13/2023    NRBC 0 07/12/2023     CMP:   Lab Results   Component Value Date    K 4.1 07/13/2023    CL 95 (L) 07/13/2023    CO2 31 07/13/2023    BUN 22 07/13/2023    CREATININE 6.38 (H) 07/13/2023    CALCIUM 8.8 07/13/2023    AST 13 07/12/2023    ALT 9 07/12/2023    ALKPHOS 75 07/12/2023    EGFR 7 07/13/2023     Phosphorus:   Lab Results   Component Value Date    PHOS 3.4 07/13/2023     Magnesium:   Lab Results   Component Value Date    MG 2.1 07/13/2023     Urinalysis: No results found for: "COLORU", "CLARITYU", "SPECGRAV", "PHUR", "LEUKOCYTESUR", "NITRITE", "PROTEINUA", "GLUCOSEU", "Judeen Moras", "BILIRUBINUR", "BLOODU"  Ionized Calcium: No results found for: "CAION"  Coagulation:   Lab Results   Component Value Date    INR 1.04 07/12/2023     Troponin: No results found for: "TROPONINI"  ABG: No results found for: "PHART", "YHE8JIN", "PO2ART", "NDV3KLL", "T4XWJMYC", "BEART", "SOURCE"    Results from last 7 days   Lab Units 07/13/23  0504 07/1950   POTASSIUM mmol/L 4.1 4.2   CHLORIDE mmol/L 95* 93*   CO2 mmol/L 31 33*   BUN mg/dL 22 17   CREATININE mg/dL 6.38* 5.32*   CALCIUM mg/dL 8.8 10.1   ALK PHOS U/L  --  75   ALT U/L  --  9   AST U/L  --  13       Radiology review:  Procedure: CT head wo contrast    Result Date: 7/13/2023  Narrative: CT BRAIN - WITHOUT CONTRAST INDICATION:   Hypertensive emergency hypertensive emergency. COMPARISON: CT head on 1/13/2022. TECHNIQUE:  CT examination of the brain was performed. Multiplanar 2D reformatted images were created from the source data. Radiation dose length product (DLP) for this visit:  851.17 mGy-cm . This examination, like all CT scans performed in the [facility], was performed utilizing techniques to minimize radiation dose exposure, including the use of iterative  reconstruction and automated exposure control. IMAGE QUALITY:  Diagnostic. FINDINGS: PARENCHYMA: Decreased attenuation is noted in periventricular and subcortical white matter demonstrating an appearance that is statistically most likely to represent mild microangiopathic change; this appearance is similar when compared to most recent prior examination. No CT signs of acute infarction. No intracranial mass, mass effect or midline shift. No acute parenchymal hemorrhage. VENTRICLES AND EXTRA-AXIAL SPACES:  Normal for the patient's age. VISUALIZED ORBITS: Normal visualized orbits. PARANASAL SINUSES: Normal visualized paranasal sinuses. CALVARIUM AND EXTRACRANIAL SOFT TISSUES:  Normal.     Impression: No acute intracranial abnormality.  Workstation performed: [id number]     Kasandra Dos Santos DO      This consultation note was produced in part using a dictation device which may document imprecise wording from author's original intent.

## 2023-07-13 NOTE — ASSESSMENT & PLAN NOTE
Lab Results   Component Value Date    EGFR 9 07/12/2023    EGFR 7 06/21/2023    EGFR 6 06/20/2023    CREATININE 5.32 (H) 07/12/2023    CREATININE 6.65 (H) 06/21/2023    CREATININE 8.02 (H) 06/20/2023     · Receives IHD MWF - last session earlier today  · Access: L FA AVF  · Appears to have been admitted to hospital in 06/2023 for flash pulmonary edema requiring extra IHD treatments - will consult Nephrology for recommendations  · Continue home Phoslo, Fosrenol, Ashley Petty on non-IHD days, Calcitriol 3x a week  · Trend renal indices  · Monitor I/O  · Daily weights

## 2023-07-13 NOTE — ASSESSMENT & PLAN NOTE
· Baseline Hgb appears to be around 8-10  · Will defer to Nephrology as it appears that patient takes MARTHA and IV Venofer as outpatient   · Trend Hgb and transfuse for Hgb < 7

## 2023-07-13 NOTE — PROGRESS NOTES
[facility]  Progress Note  Name: Renetta Sheridan  MRN: [id number]  Unit/Bed#: ICU 11-01 I Date of Admission: 7/12/2023   Date of Service: 7/13/2023 I Hospital Day: 1    Assessment/Plan   * Hypertensive emergency  Assessment & Plan  · /96 on admission to ED w/severe L shoulder/arm pain - shoulder/arm pain developed during IHD and continued through evening   · Pain noted to be reproducible in ED - now resolved  · Unclear etiology: likely IHD-induced vs lowering of BP medications during admission in 06/2023 - patient reports compliance w/home meds  · CXR w/out evidence of flash pulmonary edema - does show mild vascular congestion, however patient w/known G2DD/ESRD  · CTH w/out acute intracranial abnormality  · Obtain carotid US this AM  · TSH 2.297  · ECG reveals NSR - troponin w/mild elevation, however this is likely 2/2 hypertensive emergency  · Cardene gtt started in ED - now off  · Continue home Amlodipine, Coreg, Cardura, Clonidine patch - Home Losartan increased to 100 mg daily   · Trend BPs closely    Left arm pain  Assessment & Plan  · Reports severe L shoulder/arm pain that started during IHD and persisted through evening - noted to be reproducible in ED  · Does report bilateral chronic shoulder/arm pain related to arthritis - has since resolved  · L shoulder/humerus XR pending  · PRN APAP/Lidocaine patch for pain control    Elevated troponin  Assessment & Plan  · Troponin 110 - 98 - 92  · Suspect this is 2/2 hypertensive emergency  · ECG w/out acute ST changes - largely unchanged from prior  · Denies chest/shoulder/arm pain at present  · Can stop trending    ESRD (end stage renal disease) [facility])  Assessment & Plan  Lab Results   Component Value Date    EGFR 9 07/12/2023    EGFR 7 06/21/2023    EGFR 6 06/20/2023    CREATININE 5.32 (H) 07/12/2023    CREATININE 6.65 (H) 06/21/2023    CREATININE 8.02 (H) 06/20/2023     · Receives IHD MWF - last session earlier today  · Access: L FA AVF  · Appears to have been admitted to hospital in 06/2023 for flash pulmonary edema requiring extra IHD treatments - will consult Nephrology for recommendations  · Continue home Phoslo, Fosrenol, Sanchez Mitts on non-IHD days, Calcitriol 3x a week  · Trend renal indices  · Monitor I/O  · Daily weights    Chronic diastolic (congestive) heart failure (HCC)  Assessment & Plan  Wt Readings from Last 3 Encounters:   07/13/23 97.3 kg (214 lb 8.1 oz)   06/22/23 90.7 kg (199 lb 15.3 oz)   09/01/22 101 kg (223 lb)     · TTE from EF 55% w/moderately increased wall thickness, G2DD; mild to moderate AS, moderate MR/TR  · Continue volume removal w/IHD per Nephrology  · Continue home Coreg  · Monitor I/O  · Daily weights    Anemia  Assessment & Plan  · Baseline Hgb appears to be around 8-10  · Will defer to Nephrology as it appears that patient takes MARTHA and IV Venofer as outpatient   · Trend Hgb and transfuse for Hgb < 7    CAD (coronary artery disease)  Assessment & Plan  · Continue home ASA and statin      SIVA (obstructive sleep apnea)  Assessment & Plan  · Continue home CPAP qHS    Mixed hyperlipidemia  Assessment & Plan  · Continue home statin    Type 2 diabetes mellitus without complication, with long-term current use of insulin ([facility])  Assessment & Plan  Lab Results   Component Value Date    HGBA1C 7.6 (H) 08/04/2022       Recent Labs     07/13/23  0036   POCGLU 118       Blood Sugar Average: Last 72 hrs:  (P) 118     · HA1C pending  · Unclear regimen as outpatient - documented as insulin aspart U-100 8 units qAM and 6 units qPM   · Continue SSI w/ACHS fingersticks while here  · Diabetic diet  · Goal  - 180         ICU Core Measures     A: Assess, Prevent, and Manage Pain · Has pain been assessed? Yes  · Need for changes to pain regimen? No   B: Both SAT/SAT  · N/A   C: Choice of Sedation · RASS Goal: 0 Alert and Calm  · Need for changes to sedation or analgesia regimen?  No   D: Delirium · CAM-ICU: Negative   E: Early Mobility  · Plan for early mobility? Yes   F: Family Engagement · Plan for family engagement today? Yes         Prophylaxis:  VTE VTE covered by:  Elena Mueller ON 7/14/2023] heparin (porcine), Subcutaneous       Stress Ulcer  not ordered       Subjective   HPI/24hr events:     · Cardene gtt weaned off  · No acute events overnight       Review of Systems   Constitutional: Negative for chills and fever. HENT: Negative for congestion. Eyes: Negative for visual disturbance. Respiratory: Negative for cough and shortness of breath. Cardiovascular: Negative for chest pain. Gastrointestinal: Negative for abdominal distention, abdominal pain, nausea and vomiting. Genitourinary: Negative for difficulty urinating. Oliguric   Musculoskeletal: Negative for back pain and neck pain. Neurological: Negative for dizziness, light-headedness and headaches. Psychiatric/Behavioral: Negative for confusion. Objective                            Vitals I/O      Most Recent Min/Max in 24hrs   Temp 97.8 °F (36.6 °C) Temp  Min: 97.1 °F (36.2 °C)  Max: 97.8 °F (36.6 °C)   Pulse 61 Pulse  Min: 61  Max: 82   Resp (!) 11 Resp  Min: 10  Max: 28   /78 BP  Min: 135/65  Max: 246/90   O2 Sat 94 % SpO2  Min: 92 %  Max: 98 %      Intake/Output Summary (Last 24 hours) at 7/13/2023 0543  Last data filed at 7/13/2023 0406  Gross per 24 hour   Intake 260 ml   Output 0 ml   Net 260 ml         Diet Cardiovascular; Cardiac; Fluid Restriction 1200 ML, Consistent Carbohydrate Diet Level 2 (5 carb servings/75 grams CHO/meal)     Invasive Monitoring Physical exam    Physical Exam  Vitals and nursing note reviewed. Constitutional:       General: He is awake. He is not in acute distress. HENT:      Head: Normocephalic. Eyes:      Extraocular Movements: Extraocular movements intact. Pupils: Pupils are equal, round, and reactive to light. Cardiovascular:      Rate and Rhythm: Normal rate and regular rhythm.       Pulses: Normal pulses. Heart sounds: Murmur heard. Pulmonary:      Effort: Pulmonary effort is normal.      Breath sounds: Examination of the right-lower field reveals rales. Examination of the left-lower field reveals rales. Decreased breath sounds and rales present. Abdominal:      General: Bowel sounds are normal. There is no distension. Palpations: Abdomen is soft. Tenderness: There is no abdominal tenderness. Musculoskeletal:      Cervical back: Normal range of motion and neck supple. Right lower leg: Edema present. Left lower leg: Edema present. Comments: Trace bilateral LE edema   Skin:     Capillary Refill: Capillary refill takes less than 2 seconds. Neurological:      General: No focal deficit present. Mental Status: He is alert and oriented to person, place, and time. Psychiatric:         Behavior: Behavior is cooperative. Diagnostic Studies      EKG: sinus rhythm  Imaging:  I have personally reviewed pertinent reports.    and I have personally reviewed pertinent films in PACS     Medications:  Scheduled PRN   amLODIPine, 10 mg, HS  aspirin, 81 mg, Daily  atorvastatin, 40 mg, After Dinner  calcitriol, 0.75 mcg, Once per day on Mon Wed Fri  calcium acetate, 1,334 mg, TID  carvedilol, 25 mg, BID With Meals  chlorhexidine, 15 mL, Q12H [facility]  [START ON 7/17/2023] cloNIDine, 1 patch, Weekly  doxazosin, 2 mg, Daily  gabapentin, 100 mg, Daily  [START ON 7/14/2023] heparin (porcine), 5,000 Units, Q8H [facility] & longterm  insulin lispro, 1-6 Units, TID AC  insulin lispro, 1-6 Units, HS  lanthanum, 500 mg, TID With Meals  lidocaine, 1 patch, Daily  losartan, 100 mg, Daily  melatonin, 6 mg, HS  Sodium Zirconium Cyclosilicate, 10 g, Once per day on Sun Tue Thu Sat      acetaminophen, 650 mg, Q6H PRN       Continuous    niCARdipine, 1-15 mg/hr, Last Rate: Stopped (07/13/23 0406)         Labs:    CBC    Recent Labs     07/1950 07/13/23  0504   WBC 5.75 4.93   HGB 10.4* 9.4*   HCT 34.0* 30.6*   PLT 97* 92*     BMP    Recent Labs     07/1950 07/13/23  0504   SODIUM 136 135   K 4.2 4.1   CL 93* 95*   CO2 33* 31   AGAP 10 9   BUN 17 22   CREATININE 5.32* 6.38*   CALCIUM 10.1 8.8       Coags    Recent Labs     07/1950   INR 1.04   PTT 30        Additional Electrolytes  Recent Labs     07/1950 07/13/23  0504   MG 2.2 2.1   PHOS 2.9 3.4   CAIONIZED  --  1.02*          Blood Gas    No recent results  No recent results LFTs  Recent Labs     07/1950   ALT 9   AST 13   ALKPHOS 75   ALB 4.4   TBILI 0.51       Infectious  No recent results  Glucose  Recent Labs     07/1950 07/13/23  0504   GLUC 120 [address],, CRNP

## 2023-07-13 NOTE — CASE MANAGEMENT
Case Management Assessment & Discharge Planning Note    Patient name Mathew Mijares  Location ICU 11/ICU  MRN [id number]  : 1950 Date 2023       Current Admission Date: 2023  Current Admission Diagnosis:Hypertensive emergency   Patient Active Problem List    Diagnosis Date Noted   • Hypertensive emergency 2023   • Left arm pain 2023   • CAD (coronary artery disease) 2023   • Anemia 2023   • Chronic diastolic (congestive) heart failure ([address]) 2023   • Flash pulmonary edema ([address]) 2023   • SIRS (systemic inflammatory response syndrome) ([address]) 2023   • Acute respiratory failure with hypoxia ([address]) 2023   • Dependence on renal dialysis ([address]) 2022   • Snoring 2022   • Numbness and tingling in both hands 2022   • SIVA (obstructive sleep apnea)    • TIA (transient ischemic attack) 2022   • Elevated troponin 2021   • ESRD (end stage renal disease) ([address]) 2021   • Hypertensive urgency 2021   • Type 2 diabetes mellitus without complication, with long-term current use of insulin ([address]) 2021   • Mixed hyperlipidemia 2021      LOS (days): 1  Geometric Mean LOS (GMLOS) (days): 3.90  Days to GMLOS:3.2     OBJECTIVE:  PATIENT READMITTED TO HOSPITAL  Risk of Unplanned Readmission Score: 19.13         Current admission status: Inpatient       Preferred Pharmacy:   [id number] [place], [place] - [address] [place] [address]  Phone: [phone] Fax: [phone]    Primary Care Provider: LAN Gonzales    Primary Insurance: [facility]  Secondary Insurance:     ASSESSMENT:  Susan Proxies    There are no active Health Care Proxies on file.        Advance Directives  Does patient have a [address] [place], [place] [address]?: No (Updating papers)  Does patient currently have a Health Care decision maker?: Yes, please see Health Care Proxy section  Does patient have Advance Directives?: No  Was patient offered paperwork?: Yes (Updating paperwork)  Primary Contact: Inessa Mcqueen wife  Readmission Root Cause  30 Day Readmission: No, Yes  Who directed you to return to the hospital?: Self  Did you understand whom to contact if you had questions or problems?: Yes  Did you get your prescriptions before you left the hospital?: No  Reason[de-identified] Declined service  Were you able to get your prescriptions filled when you left the hospital?: Yes  Did you take your medications as prescribed?: Yes  Were you able to get to your follow-up appointments?: Yes  During previous admission, was a post-acute recommendation made?: Yes  What post-acute resources were offered?: [address] [facility]  Patient was readmitted due to: Arm ijnjury    Patient Information  Admitted from[de-identified] Home  Mental Status: Alert  During Assessment patient was accompanied by: Not accompanied during assessment  Assessment information provided by[de-identified] Patient  Primary Caregiver: Self  Support Systems: Spouse/significant other  [place] of Residence: [facility] do you live in?: [address] [place] [address] entry access options.  Select all that apply.: Stairs  Number of steps to enter home.: 1  Type of Current Residence: 2 [place] home  Upon entering residence, is there a bedroom on the main floor (no further steps)?: No  A bedroom is located on the following floor levels of residence (select all that apply):: 2nd Floor  Upon entering residence, is there a bathroom on the main floor (no further steps)?: No  Indicate which floors of current residence have a bathroom (select all the apply):: 2nd Floor  Number of steps to 2nd floor from main floor: One Flight  In the last 12 months, was there a time when you were not able to pay the mortgage or rent on time?: No  In the last 12 months, how many places have you lived?: 1  In the last 12 months, was there a time when you did not have a steady place to sleep or slept in a shelter (including now)?: No  Homeless/housing insecurity resource given?: N/A  Living Arrangements: Lives w/ Spouse/significant other  Is patient a ?: No    Activities of Daily Living Prior to Admission  Functional Status: Independent  Completes ADLs independently?: Yes  Ambulates independently?: Yes  Does patient use assisted devices?: No  Does patient currently own DME?: Yes  What DME does the patient currently own?:  (Glucometer, BP cuff)  Does patient have a history of Outpatient Therapy (PT/OT)?: No  Does the patient have a history of Short-Term Rehab?: No  Does patient have a history of HHC?: Yes  Does patient currently have [address] [facility]?: Yes ([facility])    Current [address] [facility] [facility]  Type of Current Home Care Services: Nurse visit, Home PT  [address][de-identified] [facility]    Patient Information Continued  Income Source: Pension/residential  Does patient have prescription coverage?: Yes  Within the past 12 months, you worried that your food would run out before you got the money to buy more.: Never true  Within the past 12 months, the food you bought just didn't last and you didn't have money to get more.: Never true  Food insecurity resource given?: N/A  Does patient receive dialysis treatments?: No  Does patient have a history of substance abuse?: No    PHQ 2/9 Screening   Reviewed PHQ 2/9 Depression Screening Score?: No    Means of Transportation  Means of Transport to Appts[de-identified] Family transport  In the past 12 months, has lack of transportation kept you from medical appointments or from getting medications?: No  In the past 12 months, has lack of transportation kept you from meetings, work, or from getting things needed for daily living?: No  Was application for public transport provided?: N/A        DISCHARGE DETAILS:    Discharge planning discussed with[de-identified] Pt  Freedom of Choice: Yes  Comments - Freedom of Choice: Pt would like to resume services with [facility], referral made in [facility]     Contacts  Patient Contacts: Aurora Diamond  Relationship to Patient[de-identified] Family  Contact Method: Phone  Phone Number: [phone]  Reason/Outcome: Emergency [address]         Is the patient interested in [facility] at discharge?: Yes  [facility] requested[de-identified] Nursing, Physical [facility] Name[de-identified] Lisa Provider[de-identified] PCP  Home Health Services Needed[de-identified] Strengthening/Theraputic Exercises to Improve Function, Gait/ADL Training, Evaluate Functional Status and Safety  Homebound Criteria Met[de-identified] Requires the Assistance of Another Person for Safe Ambulation or to Leave the Home  Supporting Clincal Findings[de-identified] Fatigues Easliy in [employer], Limited Endurance    DME Referral Provided  Referral made for DME?: No         Would you like to participate in our [facility] ustyme service program?  : No - Declined    Treatment Team Recommendation: Home with [address]  Discharge Destination Plan[de-identified] Home with [facility] at Discharge : Family

## 2023-07-13 NOTE — ASSESSMENT & PLAN NOTE
Lab Results   Component Value Date    HGBA1C 7.6 (H) 08/04/2022       Recent Labs     07/13/23  0036   POCGLU 118       Blood Sugar Average: Last 72 hrs:  (P) 118     · HA1C pending  · Unclear regimen as outpatient - documented as insulin aspart U-100 8 units qAM and 6 units qPM   · Continue SSI w/ACHS fingersticks while here  · Diabetic diet  · Goal  - 180

## 2023-07-14 ENCOUNTER — APPOINTMENT (INPATIENT)
Dept: DIALYSIS | Facility: HOSPITAL | Age: 73
DRG: 304 | End: 2023-07-14
Payer: COMMERCIAL

## 2023-07-14 LAB
ANION GAP SERPL CALCULATED.3IONS-SCNC: 9 MMOL/L
ATRIAL RATE: 85 BPM
BASOPHILS # BLD AUTO: 0.01 THOUSANDS/ÂΜL (ref 0–0.1)
BASOPHILS NFR BLD AUTO: 0 % (ref 0–1)
BUN SERPL-MCNC: 40 MG/DL (ref 5–25)
CA-I BLD-SCNC: 1.05 MMOL/L (ref 1.12–1.32)
CALCIUM SERPL-MCNC: 9.3 MG/DL (ref 8.4–10.2)
CHLORIDE SERPL-SCNC: 94 MMOL/L (ref 96–108)
CO2 SERPL-SCNC: 30 MMOL/L (ref 21–32)
CREAT SERPL-MCNC: 8.62 MG/DL (ref 0.6–1.3)
EOSINOPHIL # BLD AUTO: 0.16 THOUSAND/ÂΜL (ref 0–0.61)
EOSINOPHIL NFR BLD AUTO: 3 % (ref 0–6)
ERYTHROCYTE [DISTWIDTH] IN BLOOD BY AUTOMATED COUNT: 17.7 % (ref 11.6–15.1)
GFR SERPL CREATININE-BSD FRML MDRD: 5 ML/MIN/1.73SQ M
GLUCOSE SERPL-MCNC: 139 MG/DL (ref 65–140)
GLUCOSE SERPL-MCNC: 145 MG/DL (ref 65–140)
GLUCOSE SERPL-MCNC: 146 MG/DL (ref 65–140)
GLUCOSE SERPL-MCNC: 151 MG/DL (ref 65–140)
GLUCOSE SERPL-MCNC: 210 MG/DL (ref 65–140)
GLUCOSE SERPL-MCNC: 230 MG/DL (ref 65–140)
GLUCOSE SERPL-MCNC: 232 MG/DL (ref 65–140)
HCT VFR BLD AUTO: 31.5 % (ref 36.5–49.3)
HGB BLD-MCNC: 9.9 G/DL (ref 12–17)
IMM GRANULOCYTES # BLD AUTO: 0.01 THOUSAND/UL (ref 0–0.2)
IMM GRANULOCYTES NFR BLD AUTO: 0 % (ref 0–2)
LYMPHOCYTES # BLD AUTO: 1.39 THOUSANDS/ÂΜL (ref 0.6–4.47)
LYMPHOCYTES NFR BLD AUTO: 22 % (ref 14–44)
MAGNESIUM SERPL-MCNC: 2.2 MG/DL (ref 1.9–2.7)
MCH RBC QN AUTO: 27.6 PG (ref 26.8–34.3)
MCHC RBC AUTO-ENTMCNC: 31.4 G/DL (ref 31.4–37.4)
MCV RBC AUTO: 88 FL (ref 82–98)
MONOCYTES # BLD AUTO: 0.85 THOUSAND/ÂΜL (ref 0.17–1.22)
MONOCYTES NFR BLD AUTO: 13 % (ref 4–12)
MRSA NOSE QL CULT: NORMAL
NEUTROPHILS # BLD AUTO: 3.97 THOUSANDS/ÂΜL (ref 1.85–7.62)
NEUTS SEG NFR BLD AUTO: 62 % (ref 43–75)
NRBC BLD AUTO-RTO: 0 /100 WBCS
P AXIS: 67 DEGREES
PHOSPHATE SERPL-MCNC: 3.4 MG/DL (ref 2.3–4.1)
PLATELET # BLD AUTO: 104 THOUSANDS/UL (ref 149–390)
PMV BLD AUTO: 11.3 FL (ref 8.9–12.7)
POTASSIUM SERPL-SCNC: 5 MMOL/L (ref 3.5–5.3)
PR INTERVAL: 180 MS
QRS AXIS: 51 DEGREES
QRSD INTERVAL: 92 MS
QT INTERVAL: 372 MS
QTC INTERVAL: 442 MS
RBC # BLD AUTO: 3.59 MILLION/UL (ref 3.88–5.62)
SODIUM SERPL-SCNC: 133 MMOL/L (ref 135–147)
T WAVE AXIS: 96 DEGREES
VENTRICULAR RATE: 85 BPM
WBC # BLD AUTO: 6.39 THOUSAND/UL (ref 4.31–10.16)

## 2023-07-14 PROCEDURE — 82330 ASSAY OF CALCIUM: CPT

## 2023-07-14 PROCEDURE — 99232 SBSQ HOSP IP/OBS MODERATE 35: CPT | Performed by: INTERNAL MEDICINE

## 2023-07-14 PROCEDURE — 82948 REAGENT STRIP/BLOOD GLUCOSE: CPT

## 2023-07-14 PROCEDURE — 94660 CPAP INITIATION&MGMT: CPT

## 2023-07-14 PROCEDURE — 93005 ELECTROCARDIOGRAM TRACING: CPT

## 2023-07-14 PROCEDURE — 94002 VENT MGMT INPAT INIT DAY: CPT

## 2023-07-14 PROCEDURE — 85025 COMPLETE CBC W/AUTO DIFF WBC: CPT

## 2023-07-14 PROCEDURE — 93010 ELECTROCARDIOGRAM REPORT: CPT | Performed by: INTERNAL MEDICINE

## 2023-07-14 PROCEDURE — 80048 BASIC METABOLIC PNL TOTAL CA: CPT

## 2023-07-14 PROCEDURE — 94760 N-INVAS EAR/PLS OXIMETRY 1: CPT

## 2023-07-14 PROCEDURE — 99221 1ST HOSP IP/OBS SF/LOW 40: CPT | Performed by: ORTHOPAEDIC SURGERY

## 2023-07-14 PROCEDURE — 99232 SBSQ HOSP IP/OBS MODERATE 35: CPT | Performed by: HOSPITALIST

## 2023-07-14 PROCEDURE — 83735 ASSAY OF MAGNESIUM: CPT

## 2023-07-14 PROCEDURE — 84100 ASSAY OF PHOSPHORUS: CPT

## 2023-07-14 RX ORDER — QUETIAPINE FUMARATE 25 MG/1
12.5 TABLET, FILM COATED ORAL
Status: DISCONTINUED | OUTPATIENT
Start: 2023-07-14 | End: 2023-07-14

## 2023-07-14 RX ORDER — QUETIAPINE FUMARATE 25 MG/1
12.5 TABLET, FILM COATED ORAL
Status: DISCONTINUED | OUTPATIENT
Start: 2023-07-14 | End: 2023-07-15 | Stop reason: HOSPADM

## 2023-07-14 RX ORDER — LOSARTAN POTASSIUM 50 MG/1
100 TABLET ORAL DAILY
Status: DISCONTINUED | OUTPATIENT
Start: 2023-07-14 | End: 2023-07-15 | Stop reason: HOSPADM

## 2023-07-14 RX ADMIN — DOXAZOSIN 2 MG: 2 TABLET ORAL at 17:00

## 2023-07-14 RX ADMIN — ATORVASTATIN CALCIUM 40 MG: 40 TABLET, FILM COATED ORAL at 17:00

## 2023-07-14 RX ADMIN — GABAPENTIN 100 MG: 100 CAPSULE ORAL at 08:25

## 2023-07-14 RX ADMIN — INSULIN LISPRO 2 UNITS: 100 INJECTION, SOLUTION INTRAVENOUS; SUBCUTANEOUS at 11:39

## 2023-07-14 RX ADMIN — DOXAZOSIN 2 MG: 2 TABLET ORAL at 08:26

## 2023-07-14 RX ADMIN — HEPARIN SODIUM 5000 UNITS: 5000 INJECTION INTRAVENOUS; SUBCUTANEOUS at 06:17

## 2023-07-14 RX ADMIN — HYDRALAZINE HYDROCHLORIDE 20 MG: 20 INJECTION INTRAMUSCULAR; INTRAVENOUS at 01:29

## 2023-07-14 RX ADMIN — CALCIUM ACETATE 1334 MG: 667 CAPSULE ORAL at 21:19

## 2023-07-14 RX ADMIN — QUETIAPINE FUMARATE 12.5 MG: 25 TABLET ORAL at 02:53

## 2023-07-14 RX ADMIN — CARVEDILOL 25 MG: 25 TABLET, FILM COATED ORAL at 16:14

## 2023-07-14 RX ADMIN — ASPIRIN 81 MG: 81 TABLET, COATED ORAL at 08:26

## 2023-07-14 RX ADMIN — LOSARTAN POTASSIUM 100 MG: 50 TABLET, FILM COATED ORAL at 18:27

## 2023-07-14 RX ADMIN — CALCIUM ACETATE 1334 MG: 667 CAPSULE ORAL at 16:14

## 2023-07-14 RX ADMIN — CARVEDILOL 25 MG: 25 TABLET, FILM COATED ORAL at 08:26

## 2023-07-14 RX ADMIN — AMLODIPINE BESYLATE 10 MG: 10 TABLET ORAL at 21:19

## 2023-07-14 RX ADMIN — HYDRALAZINE HYDROCHLORIDE 20 MG: 20 INJECTION INTRAMUSCULAR; INTRAVENOUS at 17:35

## 2023-07-14 RX ADMIN — CHLORHEXIDINE GLUCONATE 0.12% ORAL RINSE 15 ML: 1.2 LIQUID ORAL at 08:25

## 2023-07-14 RX ADMIN — QUETIAPINE FUMARATE 12.5 MG: 25 TABLET ORAL at 21:19

## 2023-07-14 RX ADMIN — Medication 6 MG: at 21:20

## 2023-07-14 RX ADMIN — LANTHANUM CARBONATE 500 MG: 500 TABLET, CHEWABLE ORAL at 16:14

## 2023-07-14 RX ADMIN — CALCITRIOL CAPSULES 0.25 MCG 0.75 MCG: 0.25 CAPSULE ORAL at 08:26

## 2023-07-14 RX ADMIN — LANTHANUM CARBONATE 500 MG: 500 TABLET, CHEWABLE ORAL at 11:39

## 2023-07-14 RX ADMIN — HEPARIN SODIUM 5000 UNITS: 5000 INJECTION INTRAVENOUS; SUBCUTANEOUS at 21:20

## 2023-07-14 RX ADMIN — CALCIUM ACETATE 1334 MG: 667 CAPSULE ORAL at 08:25

## 2023-07-14 RX ADMIN — INSULIN LISPRO 3 UNITS: 100 INJECTION, SOLUTION INTRAVENOUS; SUBCUTANEOUS at 21:20

## 2023-07-14 RX ADMIN — HEPARIN SODIUM 5000 UNITS: 5000 INJECTION INTRAVENOUS; SUBCUTANEOUS at 13:51

## 2023-07-14 RX ADMIN — LIDOCAINE: 40 CREAM TOPICAL at 08:27

## 2023-07-14 RX ADMIN — LANTHANUM CARBONATE 500 MG: 500 TABLET, CHEWABLE ORAL at 08:27

## 2023-07-14 NOTE — PLAN OF CARE
Chronic hemodialysis treatment planned for 210 minutes using a 2 K+ bath for potassium 5 this morning. Fluid goal 2500 ml/2000 ml net to end at EDW 96.5 kg. Post-Dialysis RN Treatment Note    Blood Pressure:  Pre 158/72 mm/Hg  Post 118/62 mmHg   EDW  96.5 kg    Weight:  Pre 98.5 kg   Post 97.5 kg   Mode of weight measurement:    Bed scale   Volume Removed:  2220 ml/1700 ml net   Treatment duration:   195 minutes    NS given:   Reinfused x 1 for clotting venous chamber   Treatment shortened:   Yes.    Early termination for blood pressure <100   Medications given during Rx:   none   Estimated Kt/V:   1.52   Access type:    LFA fistula   Access Issues:    Maintains 400 bfr   Report called to primary nurse:   Verbal            Problem: METABOLIC, FLUID AND ELECTROLYTES - ADULT  Goal: Electrolytes maintained within normal limits  Description: INTERVENTIONS:  - Monitor labs and assess patient for signs and symptoms of electrolyte imbalances  - Administer electrolyte replacement as ordered  - Monitor response to electrolyte replacements, including repeat lab results as appropriate  - Instruct patient on fluid and nutrition as appropriate  Outcome: Progressing  Goal: Fluid balance maintained  Description: INTERVENTIONS:  - Monitor labs   - Monitor I/O and WT  - Instruct patient on fluid and nutrition as appropriate  - Assess for signs & symptoms of volume excess or deficit  Outcome: Progressing

## 2023-07-14 NOTE — CASE COMMUNICATION
Patient discharged from homecare services per his request. Pt does not feel that homecare services are needed any further. Pt will cotinue to follow up with Dialysis 3x weekly and his md appointments.

## 2023-07-14 NOTE — PROGRESS NOTES
[address] [facility]  Progress Note  Name: Xi Porter  MRN: [id number]  Unit/Bed#: ICU 11-01 I Date of Admission: 7/12/2023   Date of Service: 7/14/2023 I Hospital Day: 2    Assessment/Plan   * Hypertensive emergency  Assessment & Plan  · Resolved  · Status post treatment with an IV Cardene drip in the ICU  · Patient was downgraded from the ICU on 7/13/2023  · Blood pressure is relatively controlled today  · Continue amlodipine 10 mg daily p.o., Coreg 25 mg p.o. twice daily, Cardura 0.2 mg twice daily, and clonidine patch 0.3 mg  · Hopeful discharge planning within 24 hours    ESRD (end stage renal disease) [facility])  Assessment & Plan  Lab Results   Component Value Date    EGFR 5 07/14/2023    EGFR 7 07/13/2023    EGFR 9 07/12/2023    CREATININE 8.62 (H) 07/14/2023    CREATININE 6.38 (H) 07/13/2023    CREATININE 5.32 (H) 07/12/2023   · Appreciate nephrology input  · Continue dialysis Monday, Wednesday, and Fridays  · Secondary hyperparathyroidism- continue calcitriol, PhosLo and lanthanum    Type 2 diabetes mellitus without complication, with long-term current use of insulin [facility])  Assessment & Plan  Lab Results   Component Value Date    HGBA1C 6.4 (H) 07/12/2023       Recent Labs     07/13/23  1553 07/13/23  2107 07/14/23  0635 07/14/23  0733   POCGLU 152* 175* 151* 139       Blood Sugar Average: Last 72 hrs:  (P) [id number]   · Target blood sugar for the hospital 140-180  · Blood sugars are controlled  · Continue Accu-Cheks before meals and at bedtime with sliding scale coverage  · Diabetic neuropathy- continue Neurontin    Elevated troponin  Assessment & Plan  · None myocardial injury related elevated troponins in the setting of having end-stage renal disease on hemodialysis and a hypertensive emergency at time of arrival  · Patient denies chest pain  · No further work-up needed  · Troponin is downtrending    Left arm pain  Assessment & Plan  · Shoulder x-ray is within normal limits  · Supportive therapy    Mixed hyperlipidemia  Assessment & Plan  · Stable  · Continue atorvastatin    SIVA (obstructive sleep apnea)  Assessment & Plan  · Continue CPAP    Chronic diastolic (congestive) heart failure (HCC)  Assessment & Plan  Wt Readings from Last 3 Encounters:   23 98.3 kg (216 lb 11.4 oz)   23 90.7 kg (199 lb 15.3 oz)   22 101 kg (223 lb)     · Stable  · Volume management with dialysis  · Continue current cardiac based medications which include aspirin, carvedilol, and atorvastatin        CAD (coronary artery disease)  Assessment & Plan  · Continue current cardiac based medications as outlined above    Anemia  Assessment & Plan  · Continue to monitor H&H             VTE Prophylaxis:  Heparin    Patient Centered Rounds: I have performed bedside rounds with nursing staff today. Discussions with Specialists or Other Care Team Provider: Case management, nursing, pharmacy, nephrology  Education and Discussions with Family / Patient: Patient was brought up to par    Current Length of Stay: 2 day(s)    Current Patient Status: Inpatient   Certification Statement: The patient will continue to require additional inpatient hospital stay due to Need for dialysis, and continued blood pressure management    Discharge Plan: Hopeful discharge planning tomorrow    Code Status: Level 1 - Full Code    Subjective:   Patient seen, resting in bed, no new complaints, feels well    Objective:     Vitals:   Temp (24hrs), Av.7 °F (36.5 °C), Min:97.2 °F (36.2 °C), Max:98.5 °F (36.9 °C)    Temp:  [97.2 °F (36.2 °C)-98.5 °F (36.9 °C)] 98.5 °F (36.9 °C)  HR:  [67-81] 73  Resp:  [10-24] 10  BP: (151-196)/(68-85) 151/68  SpO2:  [94 %-96 %] 94 %  Body mass index is 29.39 kg/m². Input and Output Summary (last 24 hours):        Intake/Output Summary (Last 24 hours) at 2023 1053  Last data filed at 2023 0600  Gross per 24 hour   Intake --   Output 0 ml   Net 0 ml       Physical Exam: Physical Exam  Vitals and nursing note reviewed. Constitutional:       General: He is not in acute distress. Appearance: Normal appearance. He is not ill-appearing. HENT:      Head: Normocephalic and atraumatic. Nose: Nose normal.   Eyes:      Extraocular Movements: Extraocular movements intact. Pupils: Pupils are equal, round, and reactive to light. Cardiovascular:      Rate and Rhythm: Normal rate and regular rhythm. Pulses: Normal pulses. Heart sounds: Normal heart sounds. No murmur heard. No friction rub. No gallop. Pulmonary:      Effort: Pulmonary effort is normal.      Breath sounds: Normal breath sounds. Abdominal:      General: There is no distension. Palpations: Abdomen is soft. There is no mass. Tenderness: There is no abdominal tenderness. There is no guarding or rebound. Musculoskeletal:         General: No swelling or tenderness. Normal range of motion. Cervical back: Normal range of motion and neck supple. No rigidity. No muscular tenderness. Right lower leg: No edema. Left lower leg: No edema. Skin:     General: Skin is warm. Capillary Refill: Capillary refill takes less than 2 seconds. Findings: No erythema or rash. Neurological:      General: No focal deficit present. Mental Status: He is alert and oriented to person, place, and time. Mental status is at baseline.    Psychiatric:         Mood and Affect: Mood normal.         Behavior: Behavior normal.         Additional Data:     Labs:    Results from last 7 days   Lab Units 07/14/23  0635   WBC Thousand/uL 6.39   HEMOGLOBIN g/dL 9.9*   HEMATOCRIT % 31.5*   PLATELETS Thousands/uL 104*   NEUTROS PCT % 62   LYMPHS PCT % 22   MONOS PCT % 13*   EOS PCT % 3     Results from last 7 days   Lab Units 07/14/23  0635 07/13/23  0504 07/12/23  1950   SODIUM mmol/L 133*   < > 136   POTASSIUM mmol/L 5.0   < > 4.2   CHLORIDE mmol/L 94*   < > 93*   CO2 mmol/L 30   < > 33*   BUN mg/dL 40*   < > 17   CREATININE mg/dL 8.62*   < > 5.32*   CALCIUM mg/dL 9.3   < > 10.1   ALK PHOS U/L  --   --  75   ALT U/L  --   --  9   AST U/L  --   --  13    < > = values in this interval not displayed. Results from last 7 days   Lab Units 07/12/23  1950   INR  1.04     Results from last 7 days   Lab Units 07/14/23  0733 07/14/23  0635 07/13/23  2107 07/13/23  1553 07/13/23  1106 07/13/23  0718 07/13/23  0036   POC GLUCOSE mg/dl 139 151* 175* 152* 194* 98 118     Results from last 7 days   Lab Units 07/12/23  2335   HEMOGLOBIN A1C % 6.4*       * I Have Reviewed All Lab Data Listed Above. * Additional Pertinent Lab Tests Reviewed:  [address] Admission  Reviewed    Imaging:  Imaging Reports Reviewed Today Include: None    Recent Cultures (last 7 days):           Last 24 Hours Medication List:   Current Facility-Administered Medications   Medication Dose Route Frequency Provider Last Rate   • acetaminophen  650 mg Oral Q6H PRN LAN Tamayo     • amLODIPine  10 mg Oral HS LAN Tamayo     • aspirin  81 mg Oral Daily LAN Tamayo     • atorvastatin  40 mg Oral After [employer], LAN     • calcitriol  0.75 mcg Oral Once per day on Mon Wed Fri LAN Tamayo     • calcium acetate  1,334 mg Oral TID LAN Tamayo     • carvedilol  25 mg Oral BID With Meals LAN Tamayo     • chlorhexidine  15 mL Mouth/Throat Q12H [address] LAN Tamayo     • cloNIDine  0.1 mg Transdermal Weekly LAN Tamayo      And   • cloNIDine  0.2 mg Transdermal Weekly LAN Tamayo     • doxazosin  2 mg Oral BID LAN Tamayo     • gabapentin  100 mg Oral Daily LAN Tamayo     • heparin (porcine)  5,000 Units Subcutaneous Q8H [address] Ariadna Gallegos, LAN     • hydrALAZINE  20 mg Intravenous Q6H PRN LAN Tamayo     • insulin lispro  1-6 Units Subcutaneous TID AC LAN Tamayo     • insulin lispro  1-6 Units Subcutaneous HS LAN Ye     • lanthanum  500 mg Oral TID With Meals LAN Ye     • lidocaine  1 patch Topical Daily LAN Ye     • lidocaine   Topical Once per day on Mon Wed Fri LAN Ye     • melatonin  6 mg Oral HS LAN Ye     • QUEtiapine  12.5 mg Oral HS LAN Storey     • [START ON 7/15/2023] Sodium Zirconium Cyclosilicate  10 g Oral Once per day on Sun Tue Thu Sat Baldo Louise DO          Today, Patient Was Seen By: Fabio Mendes MD    ** Please Note: Dictation voice to text software may have been used in the creation of this document.  **

## 2023-07-14 NOTE — ASSESSMENT & PLAN NOTE
Wt Readings from Last 3 Encounters:   07/14/23 98.3 kg (216 lb 11.4 oz)   06/22/23 90.7 kg (199 lb 15.3 oz)   09/01/22 101 kg (223 lb)     · Stable  · Volume management with dialysis  · Continue current cardiac based medications which include aspirin, carvedilol, and atorvastatin

## 2023-07-14 NOTE — ASSESSMENT & PLAN NOTE
· None myocardial injury related elevated troponins in the setting of having end-stage renal disease on hemodialysis and a hypertensive emergency at time of arrival  · Patient denies chest pain  · No further work-up needed  · Troponin is downtrending

## 2023-07-14 NOTE — ASSESSMENT & PLAN NOTE
· Resolved  · Status post treatment with an IV Cardene drip in the ICU  · Patient was downgraded from the ICU on 7/13/2023  · Blood pressure is relatively controlled today  · Continue amlodipine 10 mg daily p.o., Coreg 25 mg p.o. twice daily, Cardura 0.2 mg twice daily, and clonidine patch 0.3 mg  · Hopeful discharge planning within 24 hours

## 2023-07-14 NOTE — PLAN OF CARE
Problem: PAIN - ADULT  Goal: Verbalizes/displays adequate comfort level or baseline comfort level  Description: Interventions:  - Encourage patient to monitor pain and request assistance  - Assess pain using appropriate pain scale  - Administer analgesics based on type and severity of pain and evaluate response  - Implement non-pharmacological measures as appropriate and evaluate response  - Consider cultural and social influences on pain and pain management  - Notify physician/advanced practitioner if interventions unsuccessful or patient reports new pain  Outcome: Progressing     Problem: INFECTION - ADULT  Goal: Absence or prevention of progression during hospitalization  Description: INTERVENTIONS:  - Assess and monitor for signs and symptoms of infection  - Monitor lab/diagnostic results  - Monitor all insertion sites, i.e. indwelling lines, tubes, and drains  - Monitor endotracheal if appropriate and nasal secretions for changes in amount and color  - [place] appropriate cooling/warming therapies per order  - Administer medications as ordered  - Instruct and encourage patient and family to use good hand hygiene technique  - Identify and instruct in appropriate isolation precautions for identified infection/condition  Outcome: Progressing  Goal: Absence of fever/infection during neutropenic period  Description: INTERVENTIONS:  - Monitor WBC    Outcome: Progressing     Problem: SAFETY ADULT  Goal: Patient will remain free of falls  Description: INTERVENTIONS:  - Educate patient/family on patient safety including physical limitations  - Instruct patient to call for assistance with activity   - Consult OT/PT to assist with strengthening/mobility   - Keep Call bell within reach  - Keep bed low and locked with side rails adjusted as appropriate  - Keep care items and personal belongings within reach  - Initiate and maintain comfort rounds  - Make Fall Risk Sign visible to staff  - Offer Toileting every 2 Hours, in advance of need  - Initiate/Maintain fall alarm  - Obtain necessary fall risk management equipment: fall alarm  - Apply yellow socks and bracelet for high fall risk patients  - Consider moving patient to room near nurses station  Outcome: Progressing  Goal: Maintain or return to baseline ADL function  Description: INTERVENTIONS:  -  Assess patient's ability to carry out ADLs; assess patient's baseline for ADL function and identify physical deficits which impact ability to perform ADLs (bathing, care of mouth/teeth, toileting, grooming, dressing, etc.)  - Assess/evaluate cause of self-care deficits   - Assess range of motion  - Assess patient's mobility; develop plan if impaired  - Assess patient's need for assistive devices and provide as appropriate  - Encourage maximum independence but intervene and supervise when necessary  - Involve family in performance of ADLs  - Assess for home care needs following discharge   - Consider OT consult to assist with ADL evaluation and planning for discharge  - Provide patient education as appropriate  Outcome: Progressing  Goal: Maintains/Returns to pre admission functional level  Description: INTERVENTIONS:  - Perform BMAT or MOVE assessment daily.   - Set and communicate daily mobility goal to care team and patient/family/caregiver. - Collaborate with rehabilitation services on mobility goals if consulted  - Perform Range of Motion 3 times a day. - Reposition patient every 2 hours.   - Dangle patient 3 times a day  - Stand patient 3 times a day  - Ambulate patient 3 times a day  - Out of bed to chair 3 times a day   - Out of bed for meals 3 times a day  - Out of bed for toileting  - Record patient progress and toleration of activity level   Outcome: Progressing     Problem: DISCHARGE PLANNING  Goal: Discharge to home or other facility with appropriate resources  Description: INTERVENTIONS:  - Identify barriers to discharge w/patient and caregiver  - Arrange for needed discharge resources and transportation as appropriate  - Identify discharge learning needs (meds, wound care, etc.)  - Arrange for interpretive services to assist at discharge as needed  - Refer to Case Management Department for coordinating discharge planning if the patient needs post-hospital services based on physician/advanced practitioner order or complex needs related to functional status, cognitive ability, or social support system  Outcome: Progressing     Problem: Knowledge Deficit  Goal: Patient/family/caregiver demonstrates understanding of disease process, treatment plan, medications, and discharge instructions  Description: Complete learning assessment and assess knowledge base. Interventions:  - Provide teaching at level of understanding  - Provide teaching via preferred learning methods  Outcome: Progressing     Problem: Nutrition/Hydration-ADULT  Goal: Nutrient/Hydration intake appropriate for improving, restoring or maintaining nutritional needs  Description: Monitor and assess patient's nutrition/hydration status for malnutrition. Collaborate with interdisciplinary team and initiate plan and interventions as ordered. Monitor patient's weight and dietary intake as ordered or per policy. Utilize nutrition screening tool and intervene as necessary. Determine patient's food preferences and provide high-protein, high-caloric foods as appropriate.      INTERVENTIONS:  - Monitor oral intake, urinary output, labs, and treatment plans  - Assess nutrition and hydration status and recommend course of action  - Evaluate amount of meals eaten  - Assist patient with eating if necessary   - Allow adequate time for meals  - Recommend/ encourage appropriate diets, oral nutritional supplements, and vitamin/mineral supplements  - Order, calculate, and assess calorie counts as needed  - Recommend, monitor, and adjust tube feedings and TPN/PPN based on assessed needs  - Assess need for intravenous fluids  - Provide specific nutrition/hydration education as appropriate  - Include patient/family/caregiver in decisions related to nutrition  Outcome: Progressing     Problem: NEUROSENSORY - ADULT  Goal: Achieves stable or improved neurological status  Description: INTERVENTIONS  - Monitor and report changes in neurological status  - Monitor vital signs such as temperature, blood pressure, glucose, and any other labs ordered   - Initiate measures to prevent increased intracranial pressure  - Monitor for seizure activity and implement precautions if appropriate      Outcome: Progressing  Goal: Remains free of injury related to seizures activity  Description: INTERVENTIONS  - Maintain airway, patient safety  and administer oxygen as ordered  - Monitor patient for seizure activity, document and report duration and description of seizure to physician/advanced practitioner  - If seizure occurs,  ensure patient safety during seizure  - Reorient patient post seizure  - Seizure pads on all 4 side rails  - Instruct patient/family to notify RN of any seizure activity including if an aura is experienced  - Instruct patient/family to call for assistance with activity based on nursing assessment  - Administer anti-seizure medications if ordered    Outcome: Progressing  Goal: Achieves maximal functionality and self care  Description: INTERVENTIONS  - Monitor swallowing and airway patency with patient fatigue and changes in neurological status  - Encourage and assist patient to increase activity and self care.    - Encourage visually impaired, hearing impaired and aphasic patients to use assistive/communication devices  Outcome: Progressing     Problem: CARDIOVASCULAR - ADULT  Goal: Maintains optimal cardiac output and hemodynamic stability  Description: INTERVENTIONS:  - Monitor I/O, vital signs and rhythm  - Monitor for S/S and trends of decreased cardiac output  - Administer and titrate ordered vasoactive medications to optimize hemodynamic stability  - Assess quality of pulses, skin color and temperature  - Assess for signs of decreased coronary artery perfusion  - Instruct patient to report change in severity of symptoms  Outcome: Progressing  Goal: Absence of cardiac dysrhythmias or at baseline rhythm  Description: INTERVENTIONS:  - Continuous cardiac monitoring, vital signs, obtain 12 lead EKG if ordered  - Administer antiarrhythmic and heart rate control medications as ordered  - Monitor electrolytes and administer replacement therapy as ordered  Outcome: Progressing     Problem: RESPIRATORY - ADULT  Goal: Achieves optimal ventilation and oxygenation  Description: INTERVENTIONS:  - Assess for changes in respiratory status  - Assess for changes in mentation and behavior  - Position to facilitate oxygenation and minimize respiratory effort  - Oxygen administered by appropriate delivery if ordered  - Initiate smoking cessation education as indicated  - Encourage broncho-pulmonary hygiene including cough, deep breathe, Incentive Spirometry  - Assess the need for suctioning and aspirate as needed  - Assess and instruct to report SOB or any respiratory difficulty  - Respiratory Therapy support as indicated  Outcome: Progressing     Problem: GASTROINTESTINAL - ADULT  Goal: Minimal or absence of nausea and/or vomiting  Description: INTERVENTIONS:  - Administer IV fluids if ordered to ensure adequate hydration  - Maintain NPO status until nausea and vomiting are resolved  - Nasogastric tube if ordered  - Administer ordered antiemetic medications as needed  - Provide nonpharmacologic comfort measures as appropriate  - Advance diet as tolerated, if ordered  - Consider nutrition services referral to assist patient with adequate nutrition and appropriate food choices  Outcome: Progressing  Goal: Maintains or returns to baseline bowel function  Description: INTERVENTIONS:  - Assess bowel function  - Encourage oral fluids to ensure adequate hydration  - Administer IV fluids if ordered to ensure adequate hydration  - Administer ordered medications as needed  - Encourage mobilization and activity  - Consider nutritional services referral to assist patient with adequate nutrition and appropriate food choices  Outcome: Progressing  Goal: Maintains adequate nutritional intake  Description: INTERVENTIONS:  - Monitor percentage of each meal consumed  - Identify factors contributing to decreased intake, treat as appropriate  - Assist with meals as needed  - Monitor I&O, weight, and lab values if indicated  - Obtain nutrition services referral as needed  Outcome: Progressing  Goal: Establish and maintain optimal ostomy function  Description: INTERVENTIONS:  - Assess bowel function  - Encourage oral fluids to ensure adequate hydration  - Administer IV fluids if ordered to ensure adequate hydration   - Administer ordered medications as needed  - Encourage mobilization and activity  - Nutrition services referral to assist patient with appropriate food choices  - Assess stoma site  - Consider wound care consult   Outcome: Progressing  Goal: Oral mucous membranes remain intact  Description: INTERVENTIONS  - Assess oral mucosa and hygiene practices  - Implement preventative oral hygiene regimen  - Implement oral medicated treatments as ordered  - Initiate Nutrition services referral as needed  Outcome: Progressing     Problem: GENITOURINARY - ADULT  Goal: Maintains or returns to baseline urinary function  Description: INTERVENTIONS:  - Assess urinary function  - Encourage oral fluids to ensure adequate hydration if ordered  - Administer IV fluids as ordered to ensure adequate hydration  - Administer ordered medications as needed  - Offer frequent toileting  - Follow urinary retention protocol if ordered  Outcome: Progressing  Goal: Absence of urinary retention  Description: INTERVENTIONS:  - Assess patient’s ability to void and empty bladder  - Monitor I/O  - Bladder scan as needed  - Discuss with physician/AP medications to alleviate retention as needed  - Discuss catheterization for long term situations as appropriate  Outcome: Progressing  Goal: Urinary catheter remains patent  Description: INTERVENTIONS:  - Assess patency of urinary catheter  - If patient has a chronic ledezma, consider changing catheter if non-functioning  - Follow guidelines for intermittent irrigation of non-functioning urinary catheter  Outcome: Progressing     Problem: METABOLIC, FLUID AND ELECTROLYTES - ADULT  Goal: Electrolytes maintained within normal limits  Description: INTERVENTIONS:  - Monitor labs and assess patient for signs and symptoms of electrolyte imbalances  - Administer electrolyte replacement as ordered  - Monitor response to electrolyte replacements, including repeat lab results as appropriate  - Instruct patient on fluid and nutrition as appropriate  Outcome: Progressing  Goal: Fluid balance maintained  Description: INTERVENTIONS:  - Monitor labs   - Monitor I/O and WT  - Instruct patient on fluid and nutrition as appropriate  - Assess for signs & symptoms of volume excess or deficit  Outcome: Progressing  Goal: Glucose maintained within target range  Description: INTERVENTIONS:  - Monitor Blood Glucose as ordered  - Assess for signs and symptoms of hyperglycemia and hypoglycemia  - Administer ordered medications to maintain glucose within target range  - Assess nutritional intake and initiate nutrition service referral as needed  Outcome: Progressing      Problem: HEMATOLOGIC - ADULT  Goal: Maintains hematologic stability  Description: INTERVENTIONS  - Assess for signs and symptoms of bleeding or hemorrhage  - Monitor labs  - Administer supportive blood products/factors as ordered and appropriate  Outcome: Progressing     Problem: MUSCULOSKELETAL - ADULT  Goal: Maintain or return mobility to safest level of function  Description: INTERVENTIONS:  - Assess patient's ability to carry out ADLs; assess patient's baseline for ADL function and identify physical deficits which impact ability to perform ADLs (bathing, care of mouth/teeth, toileting, grooming, dressing, etc.)  - Assess/evaluate cause of self-care deficits   - Assess range of motion  - Assess patient's mobility  - Assess patient's need for assistive devices and provide as appropriate  - Encourage maximum independence but intervene and supervise when necessary  - Involve family in performance of ADLs  - Assess for home care needs following discharge   - Consider OT consult to assist with ADL evaluation and planning for discharge  - Provide patient education as appropriate  Outcome: Progressing  Goal: Maintain proper alignment of affected body part  Description: INTERVENTIONS:  - Support, maintain and protect limb and body alignment  - Provide patient/ family with appropriate education  Outcome: Progressing

## 2023-07-14 NOTE — PLAN OF CARE
Problem: PAIN - ADULT  Goal: Verbalizes/displays adequate comfort level or baseline comfort level  Description: Interventions:  - Encourage patient to monitor pain and request assistance  - Assess pain using appropriate pain scale  - Administer analgesics based on type and severity of pain and evaluate response  - Implement non-pharmacological measures as appropriate and evaluate response  - Consider cultural and social influences on pain and pain management  - Notify physician/advanced practitioner if interventions unsuccessful or patient reports new pain  Outcome: Progressing     Problem: INFECTION - ADULT  Goal: Absence or prevention of progression during hospitalization  Description: INTERVENTIONS:  - Assess and monitor for signs and symptoms of infection  - Monitor lab/diagnostic results  - Monitor all insertion sites, i.e. indwelling lines, tubes, and drains  - Monitor endotracheal if appropriate and nasal secretions for changes in amount and color  - [place] appropriate cooling/warming therapies per order  - Administer medications as ordered  - Instruct and encourage patient and family to use good hand hygiene technique  - Identify and instruct in appropriate isolation precautions for identified infection/condition  Outcome: Progressing

## 2023-07-14 NOTE — CONSULTS
Consultation - Orthopedics   Merary Amezquita 68 y.o. male MRN: [id number]  Unit/Bed#: ICU 11-01 Encounter: [id number]      Assessment/Plan     Assessment:  Resolved left arm/shoulder pain    Plan:  Out of bed  May use shoulder as tolerated  Please reconsult if necessary. Orthopedically stable    History of Present Illness   Physician Requesting Consult: Roscoe Thakkar MD  Reason for Consult / Principal Problem: Left shoulder pain  HPI: Merary Amezquita is a 68y.o. year old male who presents with the above symptoms for "years". He was admitted for hypertensive emergency. He states his shoulder pain has dramatically decreased. He denies any numbness or tingling. He does use his left arm for dialysis. Inpatient consult to Orthopedic Surgery  Consult performed by: Althea Edmonds DO  Consult ordered by: LAN Munoz          Review of Systems   Constitutional: Negative for chills, fever and unexpected weight change. HENT: Negative for hearing loss, nosebleeds and sore throat. Eyes: Negative for pain, redness and visual disturbance. Respiratory: Negative for cough, shortness of breath and wheezing. Cardiovascular: Negative for chest pain, palpitations and leg swelling. Gastrointestinal: Negative for abdominal pain, nausea and vomiting. Endocrine: Negative for polydipsia and polyuria. Genitourinary: Negative for dysuria and hematuria. Musculoskeletal: Positive for arthralgias and myalgias. Negative for back pain, gait problem, joint swelling, neck pain and neck stiffness. As noted in HPI   Skin: Negative for rash and wound. Neurological: Negative for dizziness, numbness and headaches. Psychiatric/Behavioral: Negative for decreased concentration and suicidal ideas. The patient is not nervous/anxious.         Historical Information   Past Medical History:   Diagnosis Date   • Coronary artery disease    • Diabetes mellitus    • End stage renal disease    • Hyperlipidemia • Hypertension    • NSTEMI (non-ST elevated myocardial infarction)    • TIA (transient ischemic attack)      Past Surgical History:   Procedure Laterality Date   • CARDIAC CATHETERIZATION      70% lesion of a small R PDA and a 99% stenosis of D1 (previously noted on the cath in ). Neither lesion was amenable to PCI. Medical management. • DIALYSIS FISTULA CREATION       Social History   Social History     Substance and Sexual Activity   Alcohol Use Never     Social History     Substance and Sexual Activity   Drug Use Never     E-Cigarette/Vaping   • E-Cigarette Use Never User      E-Cigarette/Vaping Substances   • Nicotine No    • THC No    • CBD No    • Flavoring No    • Other No    • Unknown No      Social History     Tobacco Use   Smoking Status Former   • Packs/day: 1.00   • Years: 20.00   • Total pack years: 20.00   • Types: Cigarettes   • Start date:    • Quit date: 12   • Years since quittin.5   Smokeless Tobacco Never   Tobacco Comments    STATES QUIT 27 YEARS AGO/ ABOUT ONE CIGARETTE DAILY     Family History:   Family History   Problem Relation Age of Onset   • Hypertension Mother    • Hypertension Father        Meds/Allergies   all current active meds have been reviewed  No Known Allergies    Objective   Vitals: Blood pressure 153/72, pulse 76, temperature (!) 97.4 °F (36.3 °C), temperature source Tympanic, resp. rate (!) 24, height 6' (1.829 m), weight 98.3 kg (216 lb 11.4 oz), SpO2 95 %. ,Body mass index is 29.39 kg/m². Intake/Output Summary (Last 24 hours) at 2023 0731  Last data filed at 2023 0600  Gross per 24 hour   Intake --   Output 0 ml   Net 0 ml     No intake/output data recorded. Invasive Devices     Peripheral Intravenous Line  Duration           Peripheral IV 23 Dorsal (posterior); Right Forearm 1 day          Line  Duration           Hemodialysis AV Fistula Left Forearm -- days                Physical Exam  Ortho Exam     Physical Exam Constitutional: Appears well-developed and well-nourished. No distress. HENT:   Head: Normocephalic. Eyes: Conjunctivae are normal. Right eye exhibits no discharge. Left eye exhibits no discharge. No scleral icterus. Cardiovascular: Normal rate. Pulmonary/Chest: Effort normal.   Neurological: Alert and oriented to person, place, and time. Skin: Skin is warm and dry. No rash noted. The patient is not diaphoretic. No erythema. No pallor. Psychiatric: Normal mood and affect. Behavior is normal. Judgment and thought content normal.     Neck was soft and supple. There is a negative axial compression test in his neck. Left upper extremity was neuro vas intact. Fingers are pink and mobile. Compartments are soft. There is a painless arc of motion throughout his left shoulder. Flexion past 165 degrees. Abduction past 1 or 65 degrees. Rotator cuff strength testing intact. No crepitation. No pain along the entire shoulder region or arm. A dialysis graft is present distally. Pulses intact. No major swelling    Lab Results:   CBC:   Lab Results   Component Value Date    WBC 6.39 07/14/2023    HGB 9.9 (L) 07/14/2023    HCT 31.5 (L) 07/14/2023    MCV 88 07/14/2023     (L) 07/14/2023    RBC 3.59 (L) 07/14/2023    MCH 27.6 07/14/2023    MCHC 31.4 07/14/2023    RDW 17.7 (H) 07/14/2023    MPV 11.3 07/14/2023    NRBC 0 07/14/2023     CMP:   Lab Results   Component Value Date    SODIUM 133 (L) 07/14/2023    CL 94 (L) 07/14/2023    CO2 30 07/14/2023    BUN 40 (H) 07/14/2023    CREATININE 8.62 (H) 07/14/2023    CALCIUM 9.3 07/14/2023    EGFR 5 07/14/2023     PT/INR: No results found for: "PT", "INR"  Imaging Studies: I have personally reviewed pertinent films in PACS     X-rays of the left shoulder and arm were negative for any fractures dislocations or major arthropathies    EKG, Pathology, and Other Studies: I have personally reviewed pertinent reports.     VTE Prophylaxis: Sequential compression device Elizabeth Osman     Code Status: Level 1 - Full Code  Advance Directive and Living Will:      Power of : Yes  POLST:      Counseling / Coordination of Care  Total floor / unit time spent today 45 minutes. Greater than 50% of total time was spent with the patient and / or family counseling and / or coordination of care.  A description of the counseling / coordination of care:

## 2023-07-14 NOTE — ASSESSMENT & PLAN NOTE
Lab Results   Component Value Date    EGFR 5 07/14/2023    EGFR 7 07/13/2023    EGFR 9 07/12/2023    CREATININE 8.62 (H) 07/14/2023    CREATININE 6.38 (H) 07/13/2023    CREATININE 5.32 (H) 07/12/2023   · Appreciate nephrology input  · Continue dialysis Monday, Wednesday, and Fridays  · Secondary hyperparathyroidism- continue calcitriol, PhosLo and lanthanum

## 2023-07-14 NOTE — RESPIRATORY THERAPY NOTE
07/13/23 2310   Respiratory Assessment   Resp Comments pt declined cpap. cpap machine remain on stby in pts room. pt currently on room air.    O2 Device room air   Non-Invasive Information   $ Pulse Oximetry Spot Check Charge Completed

## 2023-07-14 NOTE — RESPIRATORY THERAPY NOTE
07/14/23 0258   Respiratory Assessment   Resp Comments pt requested to go on cpap at this time   O2 Device cpap   Non-Invasive Information   O2 Interface Device Full face mask   Non-Invasive Ventilation Mode CPAP   $ Continous NIV Initial   $ Pulse Oximetry Spot Check Charge Completed   Non-Invasive Settings   FiO2 (%) 21   PEEP/CPAP (cm H2O) 10   Rise Time 2   Non-Invasive Readings   Skin Intervention Skin intact   Total Rate 10   Spontaneous Vt (mL) 682   Spontaneous MV (mL) 6.4   Leak (lpm) 3   Non-Invasive Alarms   Insp Pressure High (cm H20) 16   Insp Pressure Low (cm H20) 7   Low Insp Pressure Time (sec) 20 sec   MV Low (L/min) 3   Vt High (mL) 1300   Vt Low (mL) 200   High Resp Rate (BPM) 40 BPM   Low Resp Rate (BPM) 8 BPM   Apnea Interval (sec) 20

## 2023-07-14 NOTE — PROGRESS NOTES
Progress Note - Nephrology   Aubrey Ma 68 y.o. male MRN: [id number]  Unit/Bed#: ICU 11-01 Encounter: [id number]    A/P:  1.  End-stage renal disease on hemodialysis Monday, Wednesday, Friday under care of Dr. Luisa Boykin at [facility]. HD session today, July 14th. Lidocaine cream to fistula. Estimated dry weight 96.5 kg. His discharge weight was 91 kg in late June. Previously dry weight 100 kg. Appears euvolemic at present. 2.  Hypertensive urgency and hypertensive renal disease  Blood pressure trends improving. His blood pressure requirements have been quite labile. He had accelerated hypertension his previous hospitalization and then hypotension which limited his antihypertensives. Required Cardene drip initially. Outpatient regimen: Clonidine patch, amlodipine, carvedilol, Cardura, losartan (recently increased to 100mg/day). Pain was contributing factor to his hypertensive disorder. Continue amlodipine 10 mg/day, Coreg 25 mg twice daily, Cardura(increased to 2 mg twice daily), clonidine patch(increased to 0.3mg) . Dr. Mariaelena Valente attempted to switch him from losartan to irbesartan however, this is not available at this campus. Would continue with losartan 100 mg/day. His Cardura and clonidine patch were attenuated. Avoid aggressive titration on dialysis days. 3.  Secondary hyperparathyroidism  Follow PTH in the outpatient setting. Patient was on Sensipar 120 mg 3 times weekly and calcitriol 0.75 mcg 3 times weekly. Takes lanthanum 1 tablet with meals for phosphorus control. His outpatient medication list was showing sevelamer however. Lanthanum is being continued. Can follow phosphorus trends periodically. 4.  Anemia renal disease  Follow hemoglobin trends while here. Avoid short acting MARTHA. Patient is on Mircera in the outpatient setting. 5. Chronic hyperkalemia, maintained on Lokelma in outpatient setting. This medication is available here so we will continue.   2 g K diet.    6.  Left arm pain, orthopedics evaluated and situation has resolved. 7.  Chronic diastolic heart failure, appears compensated this time  Consider torsemide on nondialysis days. Follow up reason for today's visit:     Hypertensive emergency      Subjective:   Patient seen and examined today. Patient denies chest pain, shortness of breath, nausea, vomiting, diarrhea. Blood pressure trends were improving. Blood pressure 153/72 on my examination. Patient on BiPAP this AM.    A complete 10 point review of systems was performed and is otherwise negative. Objective:     Vitals: Blood pressure 169/77, pulse 74, temperature 98.5 °F (36.9 °C), temperature source Temporal, resp. rate 14, height 6' (1.829 m), weight 98.3 kg (216 lb 11.4 oz), SpO2 94 %. ,Body mass index is 29.39 kg/m². Weight (last 2 days)     Date/Time Weight    07/14/23 0600 98.3 (216.71)    07/13/23 0600 97.6 (215.17)    07/13/23 0015 97.3 (214.51)    07/12/23 2330 97.3 (214.51)    07/12/23 1840 90.3 (199)            Intake/Output Summary (Last 24 hours) at 7/14/2023 0820  Last data filed at 7/14/2023 0600  Gross per 24 hour   Intake --   Output 0 ml   Net 0 ml     I/O last 3 completed shifts:   In: 260 [P.O.:50; I.V.:210]  Out: 0          Physical Exam: /77 ([place] Right arm)   Pulse 74   Temp 98.5 °F (36.9 °C) (Temporal)   Resp 14   Ht 6' (1.829 m)   Wt 98.3 kg (216 lb 11.4 oz)   SpO2 94%   BMI 29.39 kg/m²     General Appearance:    Alert, cooperative, no distress, appears stated age   Head:    Normocephalic, without obvious abnormality, atraumatic   Eyes:    Conjunctiva/corneas clear   Ears:    Normal external ears   Nose:   Nares normal, septum midline, mucosa normal, no drainage    or sinus tenderness   Throat:   Lips, mucosa, and tongue normal; teeth and gums normal   Neck:    Back:      Lungs:     Clear to auscultation bilaterally, respirations unlabored   Chest wall:    No tenderness or deformity   Heart: Regular rate and rhythm, S1 and S2 normal, no murmur, rub   or gallop  Left forearm AV fistula   Abdomen:     Soft, non-tender, bowel sounds active   Extremities:   Extremities normal, atraumatic, no cyanosis or edema   Skin:   Skin color, texture, turgor normal, no rashes or lesions   Lymph nodes:   Cervical normal   Neurologic:   CNII-XII intact            Lab, Imaging and other studies: I have personally reviewed pertinent labs. CBC:   Lab Results   Component Value Date    WBC 6.39 07/14/2023    HGB 9.9 (L) 07/14/2023    HCT 31.5 (L) 07/14/2023    MCV 88 07/14/2023     (L) 07/14/2023    RBC 3.59 (L) 07/14/2023    MCH 27.6 07/14/2023    MCHC 31.4 07/14/2023    RDW 17.7 (H) 07/14/2023    MPV 11.3 07/14/2023    NRBC 0 07/14/2023     CMP:   Lab Results   Component Value Date    K 5.0 07/14/2023    CL 94 (L) 07/14/2023    CO2 30 07/14/2023    BUN 40 (H) 07/14/2023    CREATININE 8.62 (H) 07/14/2023    CALCIUM 9.3 07/14/2023    EGFR 5 07/14/2023       .   Results from last 7 days   Lab Units 07/14/23  0635 07/13/23  0504 07/12/23  1951   POTASSIUM mmol/L 5.0 4.1 4.2   CHLORIDE mmol/L 94* 95* 93*   CO2 mmol/L 30 31 33*   BUN mg/dL 40* 22 17   CREATININE mg/dL 8.62* 6.38* 5.32*   CALCIUM mg/dL 9.3 8.8 10.1   ALK PHOS U/L  --   --  75   ALT U/L  --   --  9   AST U/L  --   --  13         Phosphorus:   Lab Results   Component Value Date    PHOS 3.4 07/14/2023     Magnesium:   Lab Results   Component Value Date    MG 2.2 07/14/2023     Urinalysis: No results found for: "COLORU", "CLARITYU", "SPECGRAV", "PHUR", "LEUKOCYTESUR", "NITRITE", "PROTEINUA", "GLUCOSEU", "Marzella Double", "BILIRUBINUR", "BLOODU"  Ionized Calcium: No results found for: "CAION"  Coagulation: No results found for: "PT", "INR", "APTT"  Troponin: No results found for: "TROPONINI"  ABG: No results found for: "PHART", "VPD8SPP", "PO2ART", "ZZE3RSS", "T0CMBYWN", "BEART", "SOURCE"  Radiology review:     IMAGING  Procedure: VAS carotid complete study    Result Date: 7/13/2023  Narrative:  [facility] REPORT CLINICAL: Indications: Patient presents to evaluate for carotid artery stenosis s/p recent TIA. The patient admits symptoms of left shoulder pain and abnormal blood pressure. Operative History: 2017-01-01 Cardiac catheterization Left dialysis fistula creation (Date unknown) Risk Factors The patient has history of HTN, Diabetes (NIDDM (oral meds)), Hyperlipidemia, CKD, CAD and previous smoking (quit >10yrs ago). Clinical Right Pressure:  184/72 mm Hg, Left Pressure:  Fistula  FINDINGS:  Right        Impression  PSV  EDV (cm/s)  Direction of Flow  Ratio  Dist. ICA                 70           9                      0.93  Mid. ICA                  58          11                      0.78  Prox. ICA    1 - 49%      72           8                      0.96  Dist CCA                  58           5                            Mid CCA                   75           0                      1.24  Prox CCA                  60           9                            Ext Carotid               88           0                      1.17  Prox Vert                 45           0  Antegrade                 Subclavian               106          11                             Left         Impression  PSV  EDV (cm/s)  Direction of Flow  Ratio  Dist. ICA                 64          20                      0.96  Mid. ICA                  55          14                      0.82  Prox.  ICA    1 - 49%      67          15                      1.00  Dist CCA                  65           8                            Mid CCA                   67           0                      0.85  Prox CCA                  79          14                            Ext Carotid              102           0                      1.52  Prox Vert                 44           0  Antegrade                 Subclavian               189          41                               CONCLUSION:  Impression RIGHT: There is <50% stenosis noted in the internal carotid artery. Plaque is heterogenous and irregular. Vertebral artery flow is antegrade. There is no significant subclavian artery disease. LEFT: There is <50% stenosis noted in the internal carotid artery. Plaque is heterogenous and irregular. Vertebral artery flow is antegrade. There is no significant subclavian artery disease. SIGNATURE: Electronically Signed by: Madhu Gorman MD on 2023-07-13 01:38:42 PM    Procedure: XR shoulder 2+ views RIGHT    Addendum Date: 7/13/2023 Addendum:   ADDENDUM: Please note that images of the left shoulder and not the right shoulder were obtained. The left side is the symptomatic side. Result Date: 7/13/2023  Narrative: RIGHT SHOULDER INDICATION:   L shoulder pain. COMPARISON:  None VIEWS:  XR SHOULDER 2+ VW RIGHT FINDINGS: There is no acute fracture or dislocation. Mild osteoarthritis of the glenohumeral and acromioclavicular joints. No lytic or blastic osseous lesion. Soft tissues are unremarkable. Impression: No acute osseous abnormality. Degenerative changes as described. Workstation performed: [id number]     Procedure: XR chest 1 view portable    Result Date: 7/13/2023  Narrative: CHEST INDICATION:   Left arm pain, started dialysis. COMPARISON: Chest x-ray June 19, 2023 EXAM PERFORMED/VIEWS:  XR CHEST PORTABLE FINDINGS: Cardiomediastinal silhouette appears unremarkable. The lungs are clear. No pneumothorax or pleural effusion. Osseous structures appear within normal limits for patient age. Impression: No acute cardiopulmonary disease. Workstation performed: [id number]     Procedure: XR humerus LEFT    Result Date: 7/13/2023  Narrative: LEFT HUMERUS INDICATION:   Pain. COMPARISON:  None VIEWS:  XR HUMERUS LEFT FINDINGS: There is no acute fracture or dislocation. No significant degenerative changes. No lytic or blastic osseous lesion. Soft tissues are unremarkable. Impression: No acute osseous abnormality.  Workstation performed: [id number]     Procedure: CT head wo contrast    Result Date: 7/13/2023  Narrative: CT BRAIN - WITHOUT CONTRAST INDICATION:   Hypertensive emergency hypertensive emergency. COMPARISON: CT head on 1/13/2022. TECHNIQUE:  CT examination of the brain was performed. Multiplanar 2D reformatted images were created from the source data. Radiation dose length product (DLP) for this visit:  851.17 mGy-cm . This examination, like all CT scans performed in the [facility], was performed utilizing techniques to minimize radiation dose exposure, including the use of iterative  reconstruction and automated exposure control. IMAGE QUALITY:  Diagnostic. FINDINGS: PARENCHYMA: Decreased attenuation is noted in periventricular and subcortical white matter demonstrating an appearance that is statistically most likely to represent mild microangiopathic change; this appearance is similar when compared to most recent prior examination. No CT signs of acute infarction. No intracranial mass, mass effect or midline shift. No acute parenchymal hemorrhage. VENTRICLES AND EXTRA-AXIAL SPACES:  Normal for the patient's age. VISUALIZED ORBITS: Normal visualized orbits. PARANASAL SINUSES: Normal visualized paranasal sinuses. CALVARIUM AND EXTRACRANIAL SOFT TISSUES:  Normal.     Impression: No acute intracranial abnormality.  Workstation performed: [id number]       Current Facility-Administered Medications   Medication Dose Route Frequency   • acetaminophen (TYLENOL) tablet 650 mg  650 mg Oral Q6H PRN   • amLODIPine (NORVASC) tablet 10 mg  10 mg Oral HS   • aspirin (ECOTRIN LOW STRENGTH) EC tablet 81 mg  81 mg Oral Daily   • atorvastatin (LIPITOR) tablet 40 mg  40 mg Oral After Dinner   • calcitriol (ROCALTROL) capsule 0.75 mcg  0.75 mcg Oral Once per day on Mon Wed Fri   • calcium acetate (PHOSLO) capsule 1,334 mg  1,334 mg Oral TID   • carvedilol (COREG) tablet 25 mg  25 mg Oral BID With Meals   • chlorhexidine (PERIDEX) 0.12 % oral rinse 15 mL  15 mL Mouth/Throat Q12H [address]   • cloNIDine (CATAPRES-TTS-1) 0.1 mg/24 hr TD weekly patch  0.1 mg Transdermal Weekly    And   • cloNIDine (CATAPRES-TTS-2) 0.2 mg/24 hr TD weekly patch  0.2 mg Transdermal Weekly   • doxazosin (CARDURA) tablet 2 mg  2 mg Oral BID   • gabapentin (NEURONTIN) capsule 100 mg  100 mg Oral Daily   • heparin (porcine) subcutaneous injection 5,000 Units  5,000 Units Subcutaneous Q8H [address]   • hydrALAZINE (APRESOLINE) injection 20 mg  20 mg Intravenous Q6H PRN   • insulin lispro (HumaLOG) 100 units/mL subcutaneous injection 1-6 Units  1-6 Units Subcutaneous TID AC   • insulin lispro (HumaLOG) 100 units/mL subcutaneous injection 1-6 Units  1-6 Units Subcutaneous HS   • lanthanum (FOSRENOL) chewable tablet 500 mg  500 mg Oral TID With Meals   • lidocaine (LIDODERM) 5 % patch 1 patch  1 patch Topical Daily   • lidocaine (LMX) 4 % cream   Topical Once per day on Mon Wed Fri   • melatonin tablet 6 mg  6 mg Oral HS   • QUEtiapine (SEROquel) tablet 12.5 mg  12.5 mg Oral HS   • [START ON 7/15/2023] Sodium Zirconium Cyclosilicate (Lokelma) 10 g  10 g Oral Once per day on Sun Tue Thu Sat     Medications Discontinued During This Encounter   Medication Reason   • labetalol (NORMODYNE) injection 10 mg    • multi-electrolyte (PLASMALYTE-A/ISOLYTE-S PH 7.4) IV solution    • amLODIPine (NORVASC) tablet 2.5 mg    • amLODIPine (NORVASC) tablet 5 mg    • carvedilol (COREG) tablet 25 mg    • losartan (COZAAR) tablet 50 mg    • niCARdipine (CARDENE-IV) 100 mcg/mL in sodium chloride infusion    • cloNIDine (CATAPRES-TTS-2) 0.2 mg/24 hr TD weekly patch    • losartan (COZAAR) tablet 100 mg    • cloNIDine (CATAPRES-TTS-3) 0.3 mg/24 hr TD weekly patch    • irbesartan (AVAPRO) tablet 300 mg    • doxazosin (CARDURA) tablet 2 mg    • hydrALAZINE (APRESOLINE) injection 10 mg    • Sodium Zirconium Cyclosilicate (Lokelma) 10 g    • QUEtiapine (SEROquel) tablet 12.5 mg        Selena [place], DO      This progress note was produced in part using a dictation device which may document imprecise wording from author's original intent.

## 2023-07-14 NOTE — ASSESSMENT & PLAN NOTE
Lab Results   Component Value Date    HGBA1C 6.4 (H) 07/12/2023       Recent Labs     07/13/23  1553 07/13/23  2107 07/14/23  0635 07/14/23  0733   POCGLU 152* 175* 151* 139       Blood Sugar Average: Last 72 hrs:  (P) [id number]   · Target blood sugar for the hospital 140-180  · Blood sugars are controlled  · Continue Accu-Cheks before meals and at bedtime with sliding scale coverage  · Diabetic neuropathy- continue Neurontin

## 2023-07-15 ENCOUNTER — HOME HEALTH ADMISSION (OUTPATIENT)
Dept: HOME HEALTH SERVICES | Facility: HOME HEALTHCARE | Age: 73
End: 2023-07-15
Payer: COMMERCIAL

## 2023-07-15 VITALS
TEMPERATURE: 98.7 F | DIASTOLIC BLOOD PRESSURE: 65 MMHG | BODY MASS INDEX: 28.76 KG/M2 | RESPIRATION RATE: 18 BRPM | HEIGHT: 72 IN | OXYGEN SATURATION: 95 % | SYSTOLIC BLOOD PRESSURE: 156 MMHG | HEART RATE: 70 BPM | WEIGHT: 212.3 LBS

## 2023-07-15 LAB
ALBUMIN SERPL BCP-MCNC: 3.6 G/DL (ref 3.5–5)
ALP SERPL-CCNC: 57 U/L (ref 34–104)
ALT SERPL W P-5'-P-CCNC: 6 U/L (ref 7–52)
ANION GAP SERPL CALCULATED.3IONS-SCNC: 8 MMOL/L
AST SERPL W P-5'-P-CCNC: 9 U/L (ref 13–39)
BASOPHILS # BLD AUTO: 0.01 THOUSANDS/ÂΜL (ref 0–0.1)
BASOPHILS NFR BLD AUTO: 0 % (ref 0–1)
BILIRUB SERPL-MCNC: 0.4 MG/DL (ref 0.2–1)
BUN SERPL-MCNC: 31 MG/DL (ref 5–25)
CALCIUM SERPL-MCNC: 9.6 MG/DL (ref 8.4–10.2)
CHLORIDE SERPL-SCNC: 94 MMOL/L (ref 96–108)
CO2 SERPL-SCNC: 28 MMOL/L (ref 21–32)
CREAT SERPL-MCNC: 6.93 MG/DL (ref 0.6–1.3)
EOSINOPHIL # BLD AUTO: 0.16 THOUSAND/ÂΜL (ref 0–0.61)
EOSINOPHIL NFR BLD AUTO: 3 % (ref 0–6)
ERYTHROCYTE [DISTWIDTH] IN BLOOD BY AUTOMATED COUNT: 17.8 % (ref 11.6–15.1)
GFR SERPL CREATININE-BSD FRML MDRD: 7 ML/MIN/1.73SQ M
GLUCOSE SERPL-MCNC: 105 MG/DL (ref 65–140)
GLUCOSE SERPL-MCNC: 114 MG/DL (ref 65–140)
HCT VFR BLD AUTO: 31.6 % (ref 36.5–49.3)
HGB BLD-MCNC: 9.7 G/DL (ref 12–17)
IMM GRANULOCYTES # BLD AUTO: 0.01 THOUSAND/UL (ref 0–0.2)
IMM GRANULOCYTES NFR BLD AUTO: 0 % (ref 0–2)
LYMPHOCYTES # BLD AUTO: 1.44 THOUSANDS/ÂΜL (ref 0.6–4.47)
LYMPHOCYTES NFR BLD AUTO: 28 % (ref 14–44)
MCH RBC QN AUTO: 27.4 PG (ref 26.8–34.3)
MCHC RBC AUTO-ENTMCNC: 30.7 G/DL (ref 31.4–37.4)
MCV RBC AUTO: 89 FL (ref 82–98)
MONOCYTES # BLD AUTO: 0.9 THOUSAND/ÂΜL (ref 0.17–1.22)
MONOCYTES NFR BLD AUTO: 17 % (ref 4–12)
NEUTROPHILS # BLD AUTO: 2.71 THOUSANDS/ÂΜL (ref 1.85–7.62)
NEUTS SEG NFR BLD AUTO: 52 % (ref 43–75)
NRBC BLD AUTO-RTO: 0 /100 WBCS
PLATELET # BLD AUTO: 77 THOUSANDS/UL (ref 149–390)
PMV BLD AUTO: 12.2 FL (ref 8.9–12.7)
POTASSIUM SERPL-SCNC: 4.4 MMOL/L (ref 3.5–5.3)
PROT SERPL-MCNC: 7.3 G/DL (ref 6.4–8.4)
RBC # BLD AUTO: 3.54 MILLION/UL (ref 3.88–5.62)
SODIUM SERPL-SCNC: 130 MMOL/L (ref 135–147)
WBC # BLD AUTO: 5.23 THOUSAND/UL (ref 4.31–10.16)

## 2023-07-15 PROCEDURE — 82948 REAGENT STRIP/BLOOD GLUCOSE: CPT

## 2023-07-15 PROCEDURE — 94660 CPAP INITIATION&MGMT: CPT

## 2023-07-15 PROCEDURE — 85025 COMPLETE CBC W/AUTO DIFF WBC: CPT | Performed by: HOSPITALIST

## 2023-07-15 PROCEDURE — 99239 HOSP IP/OBS DSCHRG MGMT >30: CPT | Performed by: HOSPITALIST

## 2023-07-15 PROCEDURE — 80053 COMPREHEN METABOLIC PANEL: CPT | Performed by: HOSPITALIST

## 2023-07-15 PROCEDURE — 94760 N-INVAS EAR/PLS OXIMETRY 1: CPT

## 2023-07-15 RX ORDER — CLONIDINE 0.2 MG/24H
1 PATCH, EXTENDED RELEASE TRANSDERMAL WEEKLY
Qty: 4 PATCH | Refills: 0 | Status: SHIPPED | OUTPATIENT
Start: 2023-07-20 | End: 2023-08-19

## 2023-07-15 RX ORDER — AMLODIPINE BESYLATE 10 MG/1
10 TABLET ORAL
Qty: 30 TABLET | Refills: 0 | Status: SHIPPED | OUTPATIENT
Start: 2023-07-15 | End: 2023-08-14

## 2023-07-15 RX ORDER — CLONIDINE 0.1 MG/24H
1 PATCH, EXTENDED RELEASE TRANSDERMAL WEEKLY
Qty: 4 PATCH | Refills: 0 | Status: SHIPPED | OUTPATIENT
Start: 2023-07-20 | End: 2023-08-19

## 2023-07-15 RX ORDER — LOSARTAN POTASSIUM 100 MG/1
100 TABLET ORAL DAILY
Qty: 30 TABLET | Refills: 0 | Status: SHIPPED | OUTPATIENT
Start: 2023-07-15 | End: 2023-08-14

## 2023-07-15 RX ADMIN — CALCIUM ACETATE 1334 MG: 667 CAPSULE ORAL at 08:00

## 2023-07-15 RX ADMIN — GABAPENTIN 100 MG: 100 CAPSULE ORAL at 08:00

## 2023-07-15 RX ADMIN — LOSARTAN POTASSIUM 100 MG: 50 TABLET, FILM COATED ORAL at 08:00

## 2023-07-15 RX ADMIN — LANTHANUM CARBONATE 500 MG: 500 TABLET, CHEWABLE ORAL at 07:57

## 2023-07-15 RX ADMIN — ASPIRIN 81 MG: 81 TABLET, COATED ORAL at 08:00

## 2023-07-15 RX ADMIN — CHLORHEXIDINE GLUCONATE 0.12% ORAL RINSE 15 ML: 1.2 LIQUID ORAL at 08:00

## 2023-07-15 RX ADMIN — DOXAZOSIN 2 MG: 2 TABLET ORAL at 08:00

## 2023-07-15 RX ADMIN — HEPARIN SODIUM 5000 UNITS: 5000 INJECTION INTRAVENOUS; SUBCUTANEOUS at 05:07

## 2023-07-15 RX ADMIN — SODIUM ZIRCONIUM CYCLOSILICATE 10 G: 10 POWDER, FOR SUSPENSION ORAL at 08:01

## 2023-07-15 RX ADMIN — CARVEDILOL 25 MG: 25 TABLET, FILM COATED ORAL at 07:57

## 2023-07-15 NOTE — CASE MANAGEMENT
Case Management Discharge Planning Note    Patient name Jackie Arceo  Location ICU 11/ICU  MRN [id number]  : 1950 Date 7/15/2023       Current Admission Date: 2023  Current Admission Diagnosis:Hypertensive emergency   Patient Active Problem List    Diagnosis Date Noted   • Hypertensive emergency 2023   • Left arm pain 2023   • CAD (coronary artery disease) 2023   • Anemia 2023   • Chronic diastolic (congestive) heart failure ([address]) 2023   • Flash pulmonary edema ([address]) 2023   • SIRS (systemic inflammatory response syndrome) ([address]) 2023   • Acute respiratory failure with hypoxia ([address]) 2023   • Dependence on renal dialysis ([address]) 2022   • Snoring 2022   • Numbness and tingling in both hands 2022   • SIVA (obstructive sleep apnea)    • TIA (transient ischemic attack) 2022   • Elevated troponin 2021   • ESRD (end stage renal disease) ([address]) 2021   • Hypertensive urgency 2021   • Type 2 diabetes mellitus without complication, with long-term current use of insulin ([address]) 2021   • Mixed hyperlipidemia 2021      LOS (days): 3  Geometric Mean LOS (GMLOS) (days): 3.90  Days to GMLOS:1.4     OBJECTIVE:  Risk of Unplanned Readmission Score: 25.77         Current admission status: Inpatient   Preferred Pharmacy:   [id number]- [place], [place] - [address] [place] [address]  Phone: [phone] Fax: [phone]    Primary Care Provider: LAN Zuluaga    Primary Insurance: [facility]  Secondary Insurance:     DISCHARGE DETAILS:    Discharge planning discussed with[de-identified] patient & wife was called at 9:10 am  Freedom of Choice: Yes  Comments - Freedom of Choice: pt is active with slvna   pt denies any d/c needs- wie asked about getting a cpap and I made her aware it is done in an outpt setting and he needs a sleep study and his doctor will order-   pt and wife are in agreement with the d/c and d/c plan  CM contacted family/caregiver?: Yes  Were Treatment Team discharge recommendations reviewed with patient/caregiver?: Yes  Did patient/caregiver verbalize understanding of patient care needs?: Yes  Were patient/caregiver advised of the risks associated with not following Treatment Team discharge recommendations?: Yes    Contacts  Patient Contacts: Renetta Herman  Relationship to Patient[de-identified] Family  Contact Method: Phone  Phone Number: [phone]  Reason/Outcome: Discharge [address]         Is the patient interested in [address] [facility] at discharge?: Yes    DME Referral Provided  Referral made for DME?: No    Other Referral/Resources/Interventions Provided:  Interventions: HHC, Dialysis  Referral Comments: home with spouse & [facility]-  pt and wife state the pt goes to [id number] 11 am chair time  cm called [employer] [phone] and I had to LM    Would you like to participate in our [address] [employer] service program?  : No - Declined    Treatment Team Recommendation: Home with [address] (home with spouse & slvna & outpt dialysis& outpt follow up- wife.)  Discharge Destination Plan[de-identified] Home with [address] (home with spouse & slvna & outpt dialysis & outpt follow up)  Transport at Discharge : Family, Automobile                       Accompanied by: Family member

## 2023-07-15 NOTE — ASSESSMENT & PLAN NOTE
· Resolved  · Status post treatment with an IV Cardene drip in the ICU  · Patient was downgraded from the ICU on 7/13/2023  · Blood pressure is relatively controlled today  · Patient feels well, and is eager on wanting to go home  · Okay for discharge on the current antihypertensive regimen which is as follows- amlodipine 10 mg daily p.o., Coreg 25 mg p.o. twice daily, Cozaar 100 mg p.o. daily and Cardura 0.2 mg twice daily, and clonidine patch 0.3 mg -appropriate prescriptions have been called in  · DC home today  · Outpatient follow-up with PCP and nephrology

## 2023-07-15 NOTE — ASSESSMENT & PLAN NOTE
· Will need continued periodic outpatient CBC monitoring via his PCP in the nephrology  · This is anemia of chronic kidney disease and the patient who is on dialysis secondary to end-stage renal disease

## 2023-07-15 NOTE — DISCHARGE SUMMARY
[address] [facility]  Discharge- Mark Davenport 1950, 68 y.o. male MRN: [id number]  Unit/Bed#: ICU 11-01 Encounter: [id number]  Primary Care Provider: LAN Upton   Date and time admitted to hospital: 7/12/2023  6:43 PM    * Hypertensive emergency  Assessment & Plan  · Resolved  · Status post treatment with an IV Cardene drip in the ICU  · Patient was downgraded from the ICU on 7/13/2023  · Blood pressure is relatively controlled today  · Patient feels well, and is eager on wanting to go home  · Okay for discharge on the current antihypertensive regimen which is as follows- amlodipine 10 mg daily p.o., Coreg 25 mg p.o. twice daily, Cozaar 100 mg p.o. daily and Cardura 0.2 mg twice daily, and clonidine patch 0.3 mg -appropriate prescriptions have been called in  · DC home today  · Outpatient follow-up with PCP and nephrology    ESRD (end stage renal disease) [facility])  Assessment & Plan  Lab Results   Component Value Date    EGFR 7 07/15/2023    EGFR 5 07/14/2023    EGFR 7 07/13/2023    CREATININE 6.93 (H) 07/15/2023    CREATININE 8.62 (H) 07/14/2023    CREATININE 6.38 (H) 07/13/2023   · Appreciate nephrology input  · Continue dialysis Monday, Wednesday, and Fridays postdischarge  · Secondary hyperparathyroidism- continue calcitriol, PhosLo and lanthanum post discharge  · Outpatient follow-up with nephrology    Type 2 diabetes mellitus without complication, with long-term current use of insulin [facility])  Assessment & Plan  Lab Results   Component Value Date    HGBA1C 6.4 (H) 07/12/2023       Recent Labs     07/14/23  1630 07/14/23  2119 07/14/23  2200 07/15/23  0735   POCGLU 145* 230* 232* 114       Blood Sugar Average: Last 72 hrs:  (P) [id number]   · Blood sugar has remained relatively stable-DC home on preadmit meds at preadmit doses  · Diabetic neuropathy- continue Neurontin-post discharge    Elevated troponin  Assessment & Plan  · None myocardial injury related elevated troponins in the setting of having end-stage renal disease on hemodialysis and a hypertensive emergency at time of arrival  · Patient denies chest pain  · No further work-up needed  · Troponin is downtrending    Left arm pain  Assessment & Plan  · Shoulder x-ray is within normal limits  · Supportive therapy  · Outpatient VNA services have been set up    Mixed hyperlipidemia  Assessment & Plan  · Stable  · Continue atorvastatin post discharge    SIVA (obstructive sleep apnea)  Assessment & Plan  · Continue CPAP postdischarge at     Chronic diastolic (congestive) heart failure (HCC)  Assessment & Plan  Wt Readings from Last 3 Encounters:   07/15/23 96.3 kg (212 lb 4.9 oz)   06/22/23 90.7 kg (199 lb 15.3 oz)   09/01/22 101 kg (223 lb)     · Stable  · Volume management with dialysis  · Continue current cardiac based medications which include aspirin, carvedilol, losartan and atorvastatin post discharge        CAD (coronary artery disease)  Assessment & Plan  · Continue current cardiac based medications as outlined above post discharge    Anemia  Assessment & Plan  · Will need continued periodic outpatient CBC monitoring via his PCP in the nephrology  · This is anemia of chronic kidney disease and the patient who is on dialysis secondary to end-stage renal disease        Discharging Physician / Practitioner: Archer Cushing, MD  PCP: LAN Granados  Admission Date:   Admission Orders (From admission, onward)     Ordered        07/12/23 2231  INPATIENT ADMISSION  Once                      Discharge Date: 07/15/23    Medical Problems     Resolved Problems  Date Reviewed: 7/13/2023   None         Consultations During Hospital Stay:  · Nephrology    Procedures Performed:   · None    Significant Findings / Test Results:   · CT brain-no acute intracranial abnormality  · X-ray chest-no acute cardiopulmonary disease  · X-ray right shoulder-no acute osseous abnormality, degenerative changes.   · X-ray left humerus- no acute osseous abnormality  · Bilateral carotid Dopplers- no significant stenosis    Incidental Findings:   · None    Test Results Pending at Discharge (will require follow up): · None     Outpatient Tests Requested:  · None    Complications:    • None    Reason for Admission: Hypertensive emergency    Hospital Course:     Maria Isabel Raymond is a 68 y.o. male patient who originally presented to the hospital on 7/12/2023 due to bilateral upper extremity pain, and high blood pressure during dialysis. Please refer to the initial history and physical examination completed by LAN Gonsalez for the initial presenting features and complaints. In brief, the patient is a 71-year-old male, with a complex medical history, who also unfortunately has end-stage renal disease and is currently on hemodialysis, and is a patient that was admitted to the intensive care unit back on the day of admission after he was diagnosed with a hypertensive emergency. He was started on an IV Cardene drip. Imaging studies are listed above. He was titrated off the Cardene drip. Antihypertensive medications were optimized. Changes included an increase of amlodipine from 2.5 mg prior to coming into the hospital to 10 mg. Continuation of carvedilol 25 mg p.o. twice daily. Increasing the clonidine patch to 0.3 mg. Additional change include increasing losartan from 50 to 100 mg daily by mouth. He was seen in conjunction with nephrology who facilitated his dialysis sessions. He was deemed medically stable for discharge on Saturday, 7/15/2023, he will follow-up in the near future in the outpatient setting with his primary care provider. Please refer to the assessment/plan portion of this discharge summary as outlined above for additional details regarding his stay. Please see above list of diagnoses and related plan for additional information.      Condition at Discharge: good     Discharge Day Visit / Exam:     Subjective: Patient seen, looks and feels well, no new complaints, is ready to go home. Vitals: Blood Pressure: 156/65 (07/15/23 0700)  Pulse: 70 (07/15/23 0700)  Temperature: 98.7 °F (37.1 °C) (07/15/23 0700)  Temp Source: Temporal (07/15/23 0700)  Respirations: 18 (07/15/23 0700)  Height: 6' (182.9 cm) (07/12/23 2330)  Weight - Scale: 96.3 kg (212 lb 4.9 oz) (07/15/23 0600)  SpO2: 95 % (07/15/23 0700)  Exam:   Physical Exam  Vitals and nursing note reviewed. Constitutional:       General: He is not in acute distress. Appearance: Normal appearance. He is not ill-appearing. HENT:      Head: Normocephalic and atraumatic. Nose: Nose normal.   Eyes:      Extraocular Movements: Extraocular movements intact. Pupils: Pupils are equal, round, and reactive to light. Cardiovascular:      Rate and Rhythm: Normal rate and regular rhythm. Pulses: Normal pulses. Heart sounds: Normal heart sounds. No murmur heard. No friction rub. No gallop. Pulmonary:      Effort: Pulmonary effort is normal.      Breath sounds: Normal breath sounds. Abdominal:      General: There is no distension. Palpations: Abdomen is soft. There is no mass. Tenderness: There is no abdominal tenderness. There is no guarding or rebound. Musculoskeletal:         General: No swelling or tenderness. Normal range of motion. Cervical back: Normal range of motion and neck supple. No rigidity. No muscular tenderness. Right lower leg: No edema. Left lower leg: No edema. Skin:     General: Skin is warm. Capillary Refill: Capillary refill takes less than 2 seconds. Findings: No erythema or rash. Neurological:      General: No focal deficit present. Mental Status: He is alert and oriented to person, place, and time. Mental status is at baseline.    Psychiatric:         Mood and Affect: Mood normal.         Behavior: Behavior normal.         Discussion with Family: Attempts to call the patient's wife Bubba Alegre at 8:20 AM at the phone number listed in epic-no answer    Discharge instructions/Information to patient and family:   See after visit summary for information provided to patient and family. Provisions for Follow-Up Care:  See after visit summary for information related to follow-up care and any pertinent home health orders. Disposition:     Home with VNA Services (Reminder: Complete face to face encounter)      Planned Readmission:   • None     Discharge Statement:  I spent 45 minutes discharging the patient. This time was spent on the day of discharge. I had direct contact with the patient on the day of discharge. Greater than 50% of the total time was spent examining patient, answering all patient questions, arranging and discussing plan of care with patient as well as directly providing post-discharge instructions. Additional time then spent on discharge activities. Discharge Medications:  See after visit summary for reconciled discharge medications provided to patient and family.       ** Please Note: This note has been constructed using a voice recognition system **

## 2023-07-15 NOTE — PLAN OF CARE
Problem: PAIN - ADULT  Goal: Verbalizes/displays adequate comfort level or baseline comfort level  Description: Interventions:  - Encourage patient to monitor pain and request assistance  - Assess pain using appropriate pain scale  - Administer analgesics based on type and severity of pain and evaluate response  - Implement non-pharmacological measures as appropriate and evaluate response  - Consider cultural and social influences on pain and pain management  - Notify physician/advanced practitioner if interventions unsuccessful or patient reports new pain  Outcome: Progressing     Problem: INFECTION - ADULT  Goal: Absence or prevention of progression during hospitalization  Description: INTERVENTIONS:  - Assess and monitor for signs and symptoms of infection  - Monitor lab/diagnostic results  - Monitor all insertion sites, i.e. indwelling lines, tubes, and drains  - Monitor endotracheal if appropriate and nasal secretions for changes in amount and color  - [place] appropriate cooling/warming therapies per order  - Administer medications as ordered  - Instruct and encourage patient and family to use good hand hygiene technique  - Identify and instruct in appropriate isolation precautions for identified infection/condition  Outcome: Progressing  Goal: Absence of fever/infection during neutropenic period  Description: INTERVENTIONS:  - Monitor WBC    Outcome: Progressing

## 2023-07-15 NOTE — ASSESSMENT & PLAN NOTE
Wt Readings from Last 3 Encounters:   07/15/23 96.3 kg (212 lb 4.9 oz)   06/22/23 90.7 kg (199 lb 15.3 oz)   09/01/22 101 kg (223 lb)     · Stable  · Volume management with dialysis  · Continue current cardiac based medications which include aspirin, carvedilol, losartan and atorvastatin post discharge

## 2023-07-15 NOTE — NURSING NOTE
Back to bed. Awake and alert, oriented. Denies pain. AV fistula dressings C,D and intact. AV bruit and thrill present.  Off monitor now(M/S status)

## 2023-07-15 NOTE — RESPIRATORY THERAPY NOTE
07/14/23 2228   Respiratory Assessment   Assessment Type Assess only   General Appearance Awake; Alert   Respiratory Pattern Normal   Chest Assessment Chest expansion symmetrical   Bilateral Breath Sounds Clear   Resp Comments pt placed on cpap 10 cmh2o kriss well will cont to monitor   O2 Device cpap   Non-Invasive Information   O2 Interface Device Full face mask   Non-Invasive Ventilation Mode CPAP  (v60)   $ Intermittent NIV Yes   SpO2 95 %   $ Pulse Oximetry Spot Check Charge Completed   Non-Invasive Settings   FiO2 (%) 21   PEEP/CPAP (cm H2O) 10   Rise Time 2   Non-Invasive Readings   Skin Intervention Skin intact   Total Rate 15   Spontaneous Vt (mL) 504   Spontaneous MV (mL) 8   Leak (lpm) 1   Non-Invasive Alarms   Insp Pressure High (cm H20) 16   Insp Pressure Low (cm H20) 7   Low Insp Pressure Time (sec) 20 sec   MV Low (L/min) 3   Vt High (mL) 1300   Vt Low (mL) 200   High Resp Rate (BPM) 40 BPM   Low Resp Rate (BPM) 8 BPM   Apnea Interval (sec) 20   OTHER   Mask Size Medium

## 2023-07-15 NOTE — ASSESSMENT & PLAN NOTE
Lab Results   Component Value Date    EGFR 7 07/15/2023    EGFR 5 07/14/2023    EGFR 7 07/13/2023    CREATININE 6.93 (H) 07/15/2023    CREATININE 8.62 (H) 07/14/2023    CREATININE 6.38 (H) 07/13/2023   · Appreciate nephrology input  · Continue dialysis Monday, Wednesday, and Fridays postdischarge  · Secondary hyperparathyroidism- continue calcitriol, PhosLo and lanthanum post discharge  · Outpatient follow-up with nephrology

## 2023-07-15 NOTE — NURSING NOTE
hospitalist in to see patient this AM ok for discharge home with dialysis schedule. Belongings taken with patient. IV site removed. AVS given and reviewed with patient.  Transported to main entrance via wheelchair with PCA

## 2023-07-15 NOTE — DISCHARGE INSTR - AVS FIRST PAGE
Dear Denise Manjarrez,     It was our pleasure to care for you here at [facility]. It is our hope that we were always able to exceed the expected standards for your care during your stay. You were hospitalized due to a hypertensive urgency. You were cared for on the medical/surgical floor by Sueann Boxer, MD with the [facility] who covers for your primary care physician (PCP), John Rodriguez, while you were hospitalized. You were additionally seen by the [facility]-Dr. Bess Graham. If you have any questions or concerns related to this hospitalization, you may contact us at [phone]. For follow up as well as any medication refills, we recommend that you follow up with your primary care physician. A registered nurse will reach out to you by phone within a few days after your discharge to answer any additional questions that you may have after going home. However, at this time we provide for you here, the most important instructions / recommendations at discharge:     Notable Medication Adjustments -   Increase amlodipine from 2.5 mg by mouth daily to 10 mg by mouth daily. Prescription has been called in. Increase losartan from 50 mg daily by mouth to 100 mg daily by mouth. Prescription has been called in. Increase clonidine patch 2.3 mg-prescription has been called in. Okay to resume all other preadmission medications at the preadmission doses. Testing Required after Discharge -   To be further determined in the outpatient setting by your primary care provider, and/or nephrologist.  Important follow up information -   Please follow-up with the providers as outlined in this discharge package. Other Instructions -   Please maintain a blood pressure diary.   Please maintain a healthy diabetic, renal diet  Please review this entire after visit summary as additional general instructions including medication list, appointments, activity, diet, any pertinent wound care, and other additional recommendations from your care team that may be provided for you.       Sincerely,     Echo Pino MD

## 2023-07-15 NOTE — ASSESSMENT & PLAN NOTE
Lab Results   Component Value Date    HGBA1C 6.4 (H) 07/12/2023       Recent Labs     07/14/23  1630 07/14/23  2119 07/14/23  2200 07/15/23  0735   POCGLU 145* 230* 232* 114       Blood Sugar Average: Last 72 hrs:  (P) 478.2666215855151309   · Blood sugar has remained relatively stable-DC home on preadmit meds at preadmit doses  · Diabetic neuropathy- continue Neurontin-post discharge

## 2023-07-15 NOTE — PROGRESS NOTES
-- Patient:  -- MRN: [id number]  -- Aidin Request ID: [id number]  -- Level of care reserved: Vin Little  -- [facility],  [address] [phone]  -- Clinical needs requested:  -- Geography searched: [phone]  -- Start of Service:  -- Request sent: 3:24pm EDT on 7/13/2023 by Errol Bobo  -- Partner reserved: 8:17am EDT on 7/15/2023 by Delano Baeza  -- Choice list shared: 8:17am EDT on 7/15/2023 by Delano Baeza

## 2023-07-15 NOTE — ASSESSMENT & PLAN NOTE
· Shoulder x-ray is within normal limits  · Supportive therapy  · Outpatient VNA services have been set up

## 2023-07-15 NOTE — PLAN OF CARE
Problem: METABOLIC, FLUID AND ELECTROLYTES - ADULT  Goal: Electrolytes maintained within normal limits  Description: INTERVENTIONS:  - Monitor labs and assess patient for signs and symptoms of electrolyte imbalances  - Administer electrolyte replacement as ordered  - Monitor response to electrolyte replacements, including repeat lab results as appropriate  - Instruct patient on fluid and nutrition as appropriate  Outcome: Progressing  Goal: Fluid balance maintained  Description: INTERVENTIONS:  - Monitor labs   - Monitor I/O and WT  - Instruct patient on fluid and nutrition as appropriate  - Assess for signs & symptoms of volume excess or deficit  Outcome: Progressing  Goal: Glucose maintained within target range  Description: INTERVENTIONS:  - Monitor Blood Glucose as ordered  - Assess for signs and symptoms of hyperglycemia and hypoglycemia  - Administer ordered medications to maintain glucose within target range  - Assess nutritional intake and initiate nutrition service referral as needed  Outcome: Progressing   Hemo tx's M-W-F  Labs as ordered

## 2023-07-17 ENCOUNTER — HOME CARE VISIT (OUTPATIENT)
Dept: HOME HEALTH SERVICES | Facility: HOME HEALTHCARE | Age: 73
End: 2023-07-17

## 2023-07-17 NOTE — UTILIZATION REVIEW
NOTIFICATION OF ADMISSION DISCHARGE   This is a Notification of Discharge from [facility]. Please be advised that this patient has been discharge from our facility. Below you will find the admission and discharge date and time including the patient’s disposition. UTILIZATION REVIEW CONTACT:  Abel Garay  Utilization   Network Utilization Review Department  Phone: [phone] carefully listen to the prompts. All voicemails are confidential.  Email: [email or web address]     ADMISSION INFORMATION  PRESENTATION DATE: 7/12/2023  6:43 PM  OBERVATION ADMISSION DATE:   INPATIENT ADMISSION DATE: 7/12/23 10:02 PM   DISCHARGE DATE: 7/15/2023 11:28 AM   DISPOSITION:Home with Home Health Care    IMPORTANT INFORMATION:  Send all requests for admission clinical reviews, approved or denied determinations and any other requests to dedicated fax number below belonging to the campus where the patient is receiving treatment.  List of dedicated fax numbers:  [place] DENIALS (Administrative/Medical Necessity) [phone] [address] [facility] (Maternity/NICU/Pediatrics) [phone]   [facility] [phone]   [facility] [phone] [address] [facility] [phone]   [address] [place] [address] [phone]   [facility] [phone]   [address] [place] [address] [address] [facility] [phone]   [address] [place] [address] [phone] [address] [facility] [phone] [address] [facility] [address] [phone]

## 2023-07-18 ENCOUNTER — HOME CARE VISIT (OUTPATIENT)
Dept: HOME HEALTH SERVICES | Facility: HOME HEALTHCARE | Age: 73
End: 2023-07-18

## 2023-07-20 ENCOUNTER — HOME CARE VISIT (OUTPATIENT)
Dept: HOME HEALTH SERVICES | Facility: HOME HEALTHCARE | Age: 73
End: 2023-07-20
Payer: COMMERCIAL

## 2023-07-20 PROCEDURE — 10330081 VN NO-PAY CLAIM PROCEDURE

## 2023-07-20 PROCEDURE — 400013 VN SOC

## 2023-07-20 PROCEDURE — G0299 HHS/HOSPICE OF RN EA 15 MIN: HCPCS

## 2023-07-24 ENCOUNTER — HOME CARE VISIT (OUTPATIENT)
Dept: HOME HEALTH SERVICES | Facility: HOME HEALTHCARE | Age: 73
End: 2023-07-24
Payer: COMMERCIAL

## 2023-07-24 NOTE — CASE COMMUNICATION
Attempted to schedule [facility] PT eval today but patient declines due to dialysis M, W, F.  PT eval delayed until next available therapist and agreable time.

## 2023-07-25 ENCOUNTER — HOME CARE VISIT (OUTPATIENT)
Dept: HOME HEALTH SERVICES | Facility: HOME HEALTHCARE | Age: 73
End: 2023-07-25
Payer: COMMERCIAL

## 2023-07-25 VITALS
OXYGEN SATURATION: 98 % | RESPIRATION RATE: 20 BRPM | HEART RATE: 68 BPM | DIASTOLIC BLOOD PRESSURE: 56 MMHG | SYSTOLIC BLOOD PRESSURE: 150 MMHG | TEMPERATURE: 97.7 F

## 2023-07-25 VITALS
HEART RATE: 74 BPM | SYSTOLIC BLOOD PRESSURE: 136 MMHG | DIASTOLIC BLOOD PRESSURE: 70 MMHG | TEMPERATURE: 97.8 F | RESPIRATION RATE: 18 BRPM | OXYGEN SATURATION: 98 %

## 2023-07-25 PROCEDURE — G0151 HHCP-SERV OF PT,EA 15 MIN: HCPCS

## 2023-07-25 PROCEDURE — G0300 HHS/HOSPICE OF LPN EA 15 MIN: HCPCS

## 2023-07-26 ENCOUNTER — HOME CARE VISIT (OUTPATIENT)
Dept: HOME HEALTH SERVICES | Facility: HOME HEALTHCARE | Age: 73
End: 2023-07-26
Payer: COMMERCIAL

## 2023-07-26 VITALS — OXYGEN SATURATION: 98 % | DIASTOLIC BLOOD PRESSURE: 70 MMHG | HEART RATE: 59 BPM | SYSTOLIC BLOOD PRESSURE: 150 MMHG

## 2023-07-26 NOTE — CASE COMMUNICATION
[facility] PT Evaluation completed. Patient demonstrates 5/5 LE strength, 28/28 balance score via Tinettis testing with no device, indep mobility in home with no device. He describes intermitted left shoulder pain, ctivity dependant that resolves quickly with rest.  No skilled [facility] PT warranted. Recommend consideration of outpatient PT for left shoulder if discomfort persists or worsens.

## 2023-07-27 ENCOUNTER — HOME CARE VISIT (OUTPATIENT)
Dept: HOME HEALTH SERVICES | Facility: HOME HEALTHCARE | Age: 73
End: 2023-07-27
Payer: COMMERCIAL

## 2023-07-27 VITALS
OXYGEN SATURATION: 97 % | RESPIRATION RATE: 16 BRPM | DIASTOLIC BLOOD PRESSURE: 72 MMHG | SYSTOLIC BLOOD PRESSURE: 132 MMHG | TEMPERATURE: 97 F | HEART RATE: 78 BPM

## 2023-07-27 PROCEDURE — G0300 HHS/HOSPICE OF LPN EA 15 MIN: HCPCS

## 2023-08-01 ENCOUNTER — HOME CARE VISIT (OUTPATIENT)
Dept: HOME HEALTH SERVICES | Facility: HOME HEALTHCARE | Age: 73
End: 2023-08-01
Payer: COMMERCIAL

## 2023-08-01 VITALS
BODY MASS INDEX: 28.75 KG/M2 | WEIGHT: 212 LBS | TEMPERATURE: 95.3 F | HEART RATE: 66 BPM | DIASTOLIC BLOOD PRESSURE: 80 MMHG | SYSTOLIC BLOOD PRESSURE: 130 MMHG | OXYGEN SATURATION: 97 % | RESPIRATION RATE: 20 BRPM

## 2023-08-01 PROCEDURE — G0299 HHS/HOSPICE OF RN EA 15 MIN: HCPCS

## 2023-08-03 ENCOUNTER — HOME CARE VISIT (OUTPATIENT)
Dept: HOME HEALTH SERVICES | Facility: HOME HEALTHCARE | Age: 73
End: 2023-08-03
Payer: COMMERCIAL

## 2023-08-03 NOTE — CASE COMMUNICATION
Please forward this to [address]    Patient discharged from homecare services per pt request. Pt refusing further sn services after learning that he cannot have homecaare services and out patient services at the same time.                                                                                                                                                                                                                                                                                                                                                                                                                                                                                                                                       Pt has appt with [facility] Patient physical therapy on 8/3/23

## 2023-08-03 NOTE — CASE COMMUNICATION
Please forward this to [facility]    Patient discharged from homecare services due to  refusing further sercies after learning that he canot have homecare services and out patient physical therapy services monica the same time.  Pt has appt with [facility] out patient physical therapy tonight at 5pm.

## 2023-10-06 ENCOUNTER — TRANSCRIBE ORDERS (OUTPATIENT)
Dept: RADIOLOGY | Facility: HOSPITAL | Age: 73
End: 2023-10-06

## 2023-10-06 ENCOUNTER — PREP FOR PROCEDURE (OUTPATIENT)
Dept: INTERVENTIONAL RADIOLOGY/VASCULAR | Facility: CLINIC | Age: 73
End: 2023-10-06

## 2023-10-06 DIAGNOSIS — N18.6 ESRD (END STAGE RENAL DISEASE) (HCC): Primary | ICD-10-CM

## 2023-10-06 DIAGNOSIS — N18.9 CHRONIC KIDNEY DISEASE, UNSPECIFIED CKD STAGE: Primary | ICD-10-CM

## 2023-10-31 ENCOUNTER — CONSULT (OUTPATIENT)
Dept: VASCULAR SURGERY | Facility: CLINIC | Age: 73
End: 2023-10-31
Payer: COMMERCIAL

## 2023-10-31 VITALS
OXYGEN SATURATION: 98 % | SYSTOLIC BLOOD PRESSURE: 146 MMHG | DIASTOLIC BLOOD PRESSURE: 74 MMHG | HEART RATE: 69 BPM | TEMPERATURE: 98.1 F | WEIGHT: 212.2 LBS | HEIGHT: 72 IN | BODY MASS INDEX: 28.74 KG/M2

## 2023-10-31 DIAGNOSIS — Z99.2 DEPENDENCE ON RENAL DIALYSIS (HCC): ICD-10-CM

## 2023-10-31 DIAGNOSIS — I65.23 ASYMPTOMATIC BILATERAL CAROTID ARTERY STENOSIS: Primary | ICD-10-CM

## 2023-10-31 DIAGNOSIS — I73.9 PAD (PERIPHERAL ARTERY DISEASE) (HCC): ICD-10-CM

## 2023-10-31 DIAGNOSIS — Z79.4 TYPE 2 DIABETES MELLITUS WITHOUT COMPLICATION, WITH LONG-TERM CURRENT USE OF INSULIN (HCC): ICD-10-CM

## 2023-10-31 DIAGNOSIS — R60.0 BILATERAL LOWER EXTREMITY EDEMA: ICD-10-CM

## 2023-10-31 DIAGNOSIS — E11.9 TYPE 2 DIABETES MELLITUS WITHOUT COMPLICATION, WITH LONG-TERM CURRENT USE OF INSULIN (HCC): ICD-10-CM

## 2023-10-31 DIAGNOSIS — E78.2 MIXED HYPERLIPIDEMIA: Chronic | ICD-10-CM

## 2023-10-31 PROCEDURE — 99204 OFFICE O/P NEW MOD 45 MIN: CPT | Performed by: SURGERY

## 2023-10-31 NOTE — PROGRESS NOTES
Assessment/Plan:    Pt is a 67 yo M w/ DM, SIVA, HTN, CAD, dCHF, PAD, ESRD on HD, B ICA stenosis, HLD, BLE edema    Asymptomatic bilateral carotid artery stenosis  -     VAS carotid complete study; Future  -reviewed car DU which shows B ICA <50% stenosis  -reviewed CTA neck form '22 which shows calcific disease at the B carotid bifurcations wtihout significant stenosis  -no hx of CVA on MRI or CT  -will continue surveillance on a 2 year basis    Type 2 diabetes mellitus without complication, with long-term current use of insulin ([address])  -     Ambulatory Referral to Vascular Surgery  -A1C: 6.4    ESRD  Dependence on renal dialysis [facility])  -     Ambulatory Referral to Vascular Surgery  -L R/C fistula '[facility]  -currently without issue per patient; excellent thrill/bruit; no hand symptoms    PAD (peripheral artery disease) (HCC)  -     VAS lower limb arterial duplex, complete bilateral; Future  -reviewed ABIs as part of LEAP program eval in June; R: NC/196/83 and L: NC/150/37; biphasic on R and monophasic on L  -difficult to tell if there is any claudication as he has limited mobility and significant neuropathy as well; no wounds; no indication for intervention at this time  -will get full LEADs next year and instructed to RTC immediately if there are any wounds; also instructed to do daily foot checks and wear protective footware    Bilateral lower extremity edema    -discussed fluid shifts with HD and conservative management including elevation, compression, activity    Mixed hyperlipidemia  Medications  -continue ASA/statin    Subjective:      Patient ID: Jose Cruz Riggs is a 68 y.o. male. New patient referred by Roberto MONTENEGRO for carotid stenosis had CV done 7/13/23. Pt denies TIA/Stroke like symptoms. Pt is taking ASA 81 mg and Atorvastatin. Pt is a former smoker. HPI:    Somewhat unclear on why patient is here today. His referral states DM, ESRD. He also had an MIR test and a carotid DU.     Patient complains of numbness in the L shoulder and upper arm. He complains of intermittent numbness in the fingers. Denies pain in the hand currently but does have this intermittently. Does not seem related to dialysis. ESRD on HD. He has a radiocephalic fistula, '[facility]. He reports no issues with fistula currently. There is a fgram order in the computer but as far as he knows, he is not planned to have this. Denies issues with ambulation. He has some SOB and leg tiredness with walking. He can walk 1 block but not 2. He has to rest when going to the [employer] or uses a scooter. Denies hx of stroke. Denies amaurosis, facial droop, altered speech, unilateral weakness/numbness other than stated above. Quit smoker >30yrs ago. Takes ASA, statin        The following portions of the patient's history were reviewed and updated as appropriate: allergies, current medications, past family history, past medical history, past social history, past surgical history, and problem list.    Review of Systems   Constitutional: Negative. HENT: Negative. Eyes: Negative. Respiratory: Negative. Cardiovascular: Negative. Gastrointestinal: Negative. Endocrine: Negative. Genitourinary: Negative. Musculoskeletal: Negative. Skin: Negative. Allergic/Immunologic: Negative. Neurological: Negative. Hematological:  Bruises/bleeds easily. Psychiatric/Behavioral: Negative. Objective:      /74 ([place] Right arm, Patient Position: Sitting, Cuff Size: Standard)   Pulse 69   Temp 98.1 °F (36.7 °C) (Temporal)   Ht 6' (1.829 m)   Wt 96.3 kg (212 lb 3.2 oz)   SpO2 98%   BMI 28.78 kg/m²          Physical Exam  Cardiovascular:      Rate and Rhythm: Normal rate and regular rhythm. Pulses:           Radial pulses are 2+ on the right side and 2+ on the left side. Dorsalis pedis pulses are 0 on the right side and 0 on the left side.         Posterior tibial pulses are 0 on the right side and 0 on the left side. Heart sounds: Murmur heard. Comments: + thrill/bruit L R/C fistula  Pulmonary:      Effort: No respiratory distress. Breath sounds: No wheezing. Musculoskeletal:      Right lower le+ Edema present. Left lower le+ Edema present. Skin:     Comments: Mild stasis changes of the B ankles/lower legs w/ darkening; no wounds           I have reviewed and made appropriate changes to the review of systems input by the medical assistant. Vitals:    10/31/23 1021   BP: 146/74   [place] Right arm   Patient Position: Sitting   Cuff Size: Standard   Pulse: 69   Temp: 98.1 °F (36.7 °C)   TempSrc: Temporal   SpO2: 98%   Weight: 96.3 kg (212 lb 3.2 oz)   Height: 6' (1.829 m)       Patient Active Problem List   Diagnosis   • ESRD (end stage renal disease) ([address])   • Hypertensive urgency   • Type 2 diabetes mellitus without complication, with long-term current use of insulin ([facility])   • Mixed hyperlipidemia   • Elevated troponin   • TIA (transient ischemic attack)   • SIVA (obstructive sleep apnea)   • Snoring   • Numbness and tingling in both hands   • Dependence on renal dialysis ([facility])   • Flash pulmonary edema ([facility])   • SIRS (systemic inflammatory response syndrome) ([facility])   • Acute respiratory failure with hypoxia ([facility])   • Hypertensive emergency   • Left arm pain   • CAD (coronary artery disease)   • Anemia   • Chronic diastolic (congestive) heart failure ([facility])   • Asymptomatic bilateral carotid artery stenosis   • PAD (peripheral artery disease) ([facility])   • Bilateral lower extremity edema       Past Surgical History:   Procedure Laterality Date   • CARDIAC CATHETERIZATION      70% lesion of a small R PDA and a 99% stenosis of D1 (previously noted on the cath in ). Neither lesion was amenable to PCI. Medical management.    • DIALYSIS FISTULA CREATION         Family History   Problem Relation Age of Onset   • Hypertension Mother    • Hypertension Father        Social History     Socioeconomic History   • Marital status: /Civil Union     Spouse name: Not on file   • Number of children: Not on file   • Years of education: Not on file   • Highest education level: Not on file   Occupational History   • Not on file   Tobacco Use   • Smoking status: Former     Packs/day: 1.00     Years: 20.00     Total pack years: 20.00     Types: Cigarettes     Start date:      Quit date: 12     Years since quittin.8   • Smokeless tobacco: Never   • Tobacco comments:     STATES QUIT 30 YEARS AGO/ ABOUT ONE CIGARETTE DAILY   Vaping Use   • Vaping Use: Never used   Substance and Sexual Activity   • Alcohol use: Never   • Drug use: Never   • Sexual activity: Not Currently   Other Topics Concern   • Not on file   Social History Narrative   • Not on file     Social Determinants of Health     Financial Resource Strain: Not on file   Food Insecurity: No Food Insecurity (2023)    Hunger Vital Sign    • Worried About Running Out of Food in the Last Year: Never true    • Ran Out of Food in the Last Year: Never true   Transportation Needs: No Transportation Needs (2023)    PRAPARE - Transportation    • Lack of Transportation (Medical): No    • Lack of Transportation (Non-Medical):  No   Physical Activity: Not on file   Stress: Not on file   Social Connections: Not on file   Intimate Partner Violence: Not on file   Housing Stability: Low Risk  (2023)    Housing Stability Vital Sign    • Unable to Pay for Housing in the Last Year: No    • Number of Places Lived in the Last Year: 1    • Unstable Housing in the Last Year: No       No Known Allergies      Current Outpatient Medications:   •  amLODIPine (NORVASC) 10 mg tablet, Take 1 tablet (10 mg total) by mouth daily at bedtime, Disp: 30 tablet, Rfl: 0  •  aspirin (ECOTRIN LOW STRENGTH) 81 mg EC tablet, Take 81 mg by mouth daily, Disp: , Rfl:   •  atorvastatin (LIPITOR) 40 mg tablet, Take 40 mg by mouth daily, Disp: , Rfl:   •  calcitriol (ROCALTROL) 0.25 mcg capsule, Take 3 capsules (0.75 mcg total) by mouth 3 (three) times a week, Disp: 36 capsule, Rfl: 0  •  calcium acetate (PHOSLO) capsule, 3 (three) times a day Not on dialysis days  Pt taking 2 tabs 3x daily, Disp: , Rfl:   •  carvedilol (COREG) 25 mg tablet, Take 25 mg by mouth 2 (two) times a day with meals , Disp: , Rfl:   •  cloNIDine (CATAPRES-TTS-1) 0.1 mg/24 hr, Place 1 patch (0.1 mg total) on the skin over 7 days once a week Do not start before July 20, 2023., Disp: 4 patch, Rfl: 0  •  cloNIDine (CATAPRES-TTS-2) 0.2 mg/24 hr, Place 1 patch (0.2 mg total) on the skin over 7 days once a week Do not start before July 20, 2023., Disp: 4 patch, Rfl: 0  •  doxazosin (CARDURA) 2 mg tablet, Take 1 tablet by mouth daily at bedtime, Disp: , Rfl:   •  gabapentin (NEURONTIN) 100 mg capsule, Take 1 cap by mouth daily, Disp: 30 capsule, Rfl: 3  •  insulin aspart (NovoLOG FlexPen) 100 UNIT/ML injection pen, Inject 1 Units under the skin Three times a day. Pt reports dose as follows if BS over 150 breakfast 8 units lunch 4 units supper 5 units bedtime 6 units if BS over 200  Indications: Type 2 Diabetes, Disp: , Rfl:   •  Insulin Pen Needle (UltiCare Short Pen Needles) 31G X 8 MM MISC, INJECT 4 TIMES DAILY;E11.9, Disp: , Rfl:   •  lanthanum (FOSRENOL) 500 mg chewable tablet, CRUSH OR CHEW AND SWALLOW 1 TABLET THREE TIMES A DAY WITH MEALS, Disp: , Rfl:   •  lidocaine-prilocaine (EMLA) cream, APPLY SMALL AMOUNT TO ACCESS SITE (AVF) 1 TO 2 HOURS BEFORE DIALYSIS. COVER WITH OCCLUSIVE DRESSING (SARAN WRAP), Disp: , Rfl:   •  Lokelma 10 g PACK, EMPTY CONTENTS OF 1 PACKET IN 3 TABLESPOONSFUL OF WATER OR AS DIRECTED BY CLINIC.  STIR WELL AND DRINK IMMEDIATELY ON NON-DIALYSIS DAYS, Disp: , Rfl:   •  losartan (COZAAR) 100 MG tablet, Take 1 tablet (100 mg total) by mouth daily, Disp: 30 tablet, Rfl: 0  •  QUEtiapine (SEROquel) 25 mg tablet, Take 12.5 mg by mouth daily at bedtime.  Indications: sleep, Disp: , Rfl:

## 2024-07-10 ENCOUNTER — HOSPITAL ENCOUNTER (OUTPATIENT)
Facility: HOSPITAL | Age: 74
Setting detail: OBSERVATION
Discharge: HOME | End: 2024-07-11
Attending: EMERGENCY MEDICINE | Admitting: INTERNAL MEDICINE
Payer: COMMERCIAL

## 2024-07-10 ENCOUNTER — APPOINTMENT (OUTPATIENT)
Dept: DIALYSIS | Facility: HOSPITAL | Age: 74
End: 2024-07-10
Payer: COMMERCIAL

## 2024-07-10 ENCOUNTER — APPOINTMENT (EMERGENCY)
Dept: CT IMAGING | Facility: HOSPITAL | Age: 74
End: 2024-07-10
Payer: COMMERCIAL

## 2024-07-10 DIAGNOSIS — E11.9 DM (DIABETES MELLITUS) (HCC): ICD-10-CM

## 2024-07-10 DIAGNOSIS — R79.89 ELEVATED TROPONIN: ICD-10-CM

## 2024-07-10 DIAGNOSIS — N18.6 ESRD (END STAGE RENAL DISEASE) (HCC): ICD-10-CM

## 2024-07-10 DIAGNOSIS — I16.1 HYPERTENSIVE EMERGENCY: ICD-10-CM

## 2024-07-10 DIAGNOSIS — I16.0 HYPERTENSIVE URGENCY: ICD-10-CM

## 2024-07-10 DIAGNOSIS — R00.1 SINUS BRADYCARDIA: Primary | ICD-10-CM

## 2024-07-10 DIAGNOSIS — I10 HTN (HYPERTENSION): ICD-10-CM

## 2024-07-10 PROBLEM — R42 DIZZINESS: Status: ACTIVE | Noted: 2024-07-10

## 2024-07-10 PROBLEM — R53.1 WEAKNESS GENERALIZED: Status: ACTIVE | Noted: 2024-07-10

## 2024-07-10 LAB
ALBUMIN SERPL BCG-MCNC: 4.1 G/DL (ref 3.5–5)
ALP SERPL-CCNC: 67 U/L (ref 34–104)
ALT SERPL W P-5'-P-CCNC: 8 U/L (ref 7–52)
ANION GAP SERPL CALCULATED.3IONS-SCNC: 13 MMOL/L (ref 4–13)
APTT PPP: 27 SECONDS (ref 23–37)
AST SERPL W P-5'-P-CCNC: 10 U/L (ref 13–39)
ATRIAL RATE: 53 BPM
BASOPHILS # BLD AUTO: 0.02 THOUSANDS/ÂΜL (ref 0–0.1)
BASOPHILS NFR BLD AUTO: 0 % (ref 0–1)
BILIRUB SERPL-MCNC: 0.41 MG/DL (ref 0.2–1)
BUN SERPL-MCNC: 68 MG/DL (ref 5–25)
CALCIUM SERPL-MCNC: 9.3 MG/DL (ref 8.4–10.2)
CHLORIDE SERPL-SCNC: 95 MMOL/L (ref 96–108)
CO2 SERPL-SCNC: 28 MMOL/L (ref 21–32)
CREAT SERPL-MCNC: 10.63 MG/DL (ref 0.6–1.3)
EOSINOPHIL # BLD AUTO: 0.18 THOUSAND/ÂΜL (ref 0–0.61)
EOSINOPHIL NFR BLD AUTO: 3 % (ref 0–6)
ERYTHROCYTE [DISTWIDTH] IN BLOOD BY AUTOMATED COUNT: 17 % (ref 11.6–15.1)
EST. AVERAGE GLUCOSE BLD GHB EST-MCNC: 197 MG/DL
GFR SERPL CREATININE-BSD FRML MDRD: 4 ML/MIN/1.73SQ M
GLUCOSE SERPL-MCNC: 164 MG/DL (ref 65–140)
GLUCOSE SERPL-MCNC: 185 MG/DL (ref 65–140)
GLUCOSE SERPL-MCNC: 236 MG/DL (ref 65–140)
GLUCOSE SERPL-MCNC: 360 MG/DL (ref 65–140)
HBA1C MFR BLD: 8.5 %
HCT VFR BLD AUTO: 36.8 % (ref 36.5–49.3)
HGB BLD-MCNC: 11.5 G/DL (ref 12–17)
IMM GRANULOCYTES # BLD AUTO: 0 THOUSAND/UL (ref 0–0.2)
IMM GRANULOCYTES NFR BLD AUTO: 0 % (ref 0–2)
INR PPP: 1.07 (ref 0.84–1.19)
LYMPHOCYTES # BLD AUTO: 1.79 THOUSANDS/ÂΜL (ref 0.6–4.47)
LYMPHOCYTES NFR BLD AUTO: 32 % (ref 14–44)
MAGNESIUM SERPL-MCNC: 2.7 MG/DL (ref 1.9–2.7)
MCH RBC QN AUTO: 29.7 PG (ref 26.8–34.3)
MCHC RBC AUTO-ENTMCNC: 31.3 G/DL (ref 31.4–37.4)
MCV RBC AUTO: 95 FL (ref 82–98)
MONOCYTES # BLD AUTO: 0.9 THOUSAND/ÂΜL (ref 0.17–1.22)
MONOCYTES NFR BLD AUTO: 16 % (ref 4–12)
NEUTROPHILS # BLD AUTO: 2.7 THOUSANDS/ÂΜL (ref 1.85–7.62)
NEUTS SEG NFR BLD AUTO: 49 % (ref 43–75)
NRBC BLD AUTO-RTO: 0 /100 WBCS
P AXIS: 6 DEGREES
PLATELET # BLD AUTO: 117 THOUSANDS/UL (ref 149–390)
PMV BLD AUTO: 10.6 FL (ref 8.9–12.7)
POTASSIUM SERPL-SCNC: 5.6 MMOL/L (ref 3.5–5.3)
PR INTERVAL: 210 MS
PROT SERPL-MCNC: 8.3 G/DL (ref 6.4–8.4)
PROTHROMBIN TIME: 14.2 SECONDS (ref 11.6–14.5)
QRS AXIS: 58 DEGREES
QRSD INTERVAL: 102 MS
QT INTERVAL: 454 MS
QTC INTERVAL: 426 MS
RBC # BLD AUTO: 3.87 MILLION/UL (ref 3.88–5.62)
SODIUM SERPL-SCNC: 136 MMOL/L (ref 135–147)
T WAVE AXIS: 86 DEGREES
TSH SERPL DL<=0.05 MIU/L-ACNC: 2.07 UIU/ML (ref 0.45–4.5)
VENTRICULAR RATE: 53 BPM
WBC # BLD AUTO: 5.59 THOUSAND/UL (ref 4.31–10.16)

## 2024-07-10 PROCEDURE — G0257 UNSCHED DIALYSIS ESRD PT HOS: HCPCS

## 2024-07-10 PROCEDURE — 83735 ASSAY OF MAGNESIUM: CPT | Performed by: EMERGENCY MEDICINE

## 2024-07-10 PROCEDURE — 85730 THROMBOPLASTIN TIME PARTIAL: CPT | Performed by: EMERGENCY MEDICINE

## 2024-07-10 PROCEDURE — 83036 HEMOGLOBIN GLYCOSYLATED A1C: CPT | Performed by: NURSE PRACTITIONER

## 2024-07-10 PROCEDURE — 99222 1ST HOSP IP/OBS MODERATE 55: CPT | Performed by: NURSE PRACTITIONER

## 2024-07-10 PROCEDURE — 82948 REAGENT STRIP/BLOOD GLUCOSE: CPT

## 2024-07-10 PROCEDURE — 87081 CULTURE SCREEN ONLY: CPT | Performed by: INTERNAL MEDICINE

## 2024-07-10 PROCEDURE — 93005 ELECTROCARDIOGRAM TRACING: CPT

## 2024-07-10 PROCEDURE — 99285 EMERGENCY DEPT VISIT HI MDM: CPT

## 2024-07-10 PROCEDURE — 84443 ASSAY THYROID STIM HORMONE: CPT | Performed by: EMERGENCY MEDICINE

## 2024-07-10 PROCEDURE — 85025 COMPLETE CBC W/AUTO DIFF WBC: CPT | Performed by: EMERGENCY MEDICINE

## 2024-07-10 PROCEDURE — 70450 CT HEAD/BRAIN W/O DYE: CPT

## 2024-07-10 PROCEDURE — 99214 OFFICE O/P EST MOD 30 MIN: CPT

## 2024-07-10 PROCEDURE — 80053 COMPREHEN METABOLIC PANEL: CPT | Performed by: EMERGENCY MEDICINE

## 2024-07-10 PROCEDURE — 93010 ELECTROCARDIOGRAM REPORT: CPT | Performed by: INTERNAL MEDICINE

## 2024-07-10 PROCEDURE — 99285 EMERGENCY DEPT VISIT HI MDM: CPT | Performed by: EMERGENCY MEDICINE

## 2024-07-10 PROCEDURE — 90935 HEMODIALYSIS ONE EVALUATION: CPT

## 2024-07-10 PROCEDURE — 85610 PROTHROMBIN TIME: CPT | Performed by: EMERGENCY MEDICINE

## 2024-07-10 PROCEDURE — 36415 COLL VENOUS BLD VENIPUNCTURE: CPT | Performed by: EMERGENCY MEDICINE

## 2024-07-10 RX ORDER — CALCITRIOL 0.25 UG/1
0.75 CAPSULE, LIQUID FILLED ORAL 3 TIMES WEEKLY
Status: DISCONTINUED | OUTPATIENT
Start: 2024-07-10 | End: 2024-07-11 | Stop reason: HOSPADM

## 2024-07-10 RX ORDER — GABAPENTIN 100 MG/1
100 CAPSULE ORAL 3 TIMES DAILY
Status: DISCONTINUED | OUTPATIENT
Start: 2024-07-10 | End: 2024-07-11 | Stop reason: HOSPADM

## 2024-07-10 RX ORDER — HEPARIN SODIUM 1000 [USP'U]/ML
4000 INJECTION, SOLUTION INTRAVENOUS; SUBCUTANEOUS ONCE
Status: COMPLETED | OUTPATIENT
Start: 2024-07-10 | End: 2024-07-10

## 2024-07-10 RX ORDER — CLONIDINE 0.3 MG/24H
1 PATCH, EXTENDED RELEASE TRANSDERMAL WEEKLY
COMMUNITY

## 2024-07-10 RX ORDER — QUETIAPINE FUMARATE 25 MG/1
12.5 TABLET, FILM COATED ORAL
Status: DISCONTINUED | OUTPATIENT
Start: 2024-07-10 | End: 2024-07-11 | Stop reason: HOSPADM

## 2024-07-10 RX ORDER — HEPARIN SODIUM 5000 [USP'U]/ML
5000 INJECTION, SOLUTION INTRAVENOUS; SUBCUTANEOUS EVERY 8 HOURS SCHEDULED
Status: DISCONTINUED | OUTPATIENT
Start: 2024-07-10 | End: 2024-07-11 | Stop reason: HOSPADM

## 2024-07-10 RX ORDER — ATORVASTATIN CALCIUM 40 MG/1
40 TABLET, FILM COATED ORAL
Status: DISCONTINUED | OUTPATIENT
Start: 2024-07-10 | End: 2024-07-11 | Stop reason: HOSPADM

## 2024-07-10 RX ORDER — CARVEDILOL 3.12 MG/1
6.25 TABLET ORAL 2 TIMES DAILY WITH MEALS
Status: DISCONTINUED | OUTPATIENT
Start: 2024-07-10 | End: 2024-07-11 | Stop reason: HOSPADM

## 2024-07-10 RX ORDER — CLONIDINE 0.1 MG/24H
1 PATCH, EXTENDED RELEASE TRANSDERMAL WEEKLY
Status: DISCONTINUED | OUTPATIENT
Start: 2024-07-10 | End: 2024-07-10

## 2024-07-10 RX ORDER — CLONIDINE 0.2 MG/24H
1 PATCH, EXTENDED RELEASE TRANSDERMAL WEEKLY
Status: DISCONTINUED | OUTPATIENT
Start: 2024-07-10 | End: 2024-07-10

## 2024-07-10 RX ORDER — INSULIN LISPRO 100 [IU]/ML
1-6 INJECTION, SOLUTION INTRAVENOUS; SUBCUTANEOUS
Status: DISCONTINUED | OUTPATIENT
Start: 2024-07-10 | End: 2024-07-11 | Stop reason: HOSPADM

## 2024-07-10 RX ORDER — CLONIDINE 0.2 MG/24H
0.2 PATCH, EXTENDED RELEASE TRANSDERMAL WEEKLY
Status: DISCONTINUED | OUTPATIENT
Start: 2024-07-15 | End: 2024-07-11 | Stop reason: HOSPADM

## 2024-07-10 RX ORDER — CARVEDILOL 25 MG/1
25 TABLET ORAL 2 TIMES DAILY WITH MEALS
Status: DISCONTINUED | OUTPATIENT
Start: 2024-07-10 | End: 2024-07-10

## 2024-07-10 RX ORDER — ACETAMINOPHEN 325 MG/1
650 TABLET ORAL EVERY 6 HOURS PRN
Status: DISCONTINUED | OUTPATIENT
Start: 2024-07-10 | End: 2024-07-11 | Stop reason: HOSPADM

## 2024-07-10 RX ORDER — AMLODIPINE BESYLATE 10 MG/1
10 TABLET ORAL
Status: DISCONTINUED | OUTPATIENT
Start: 2024-07-10 | End: 2024-07-11 | Stop reason: HOSPADM

## 2024-07-10 RX ORDER — DOXAZOSIN 2 MG/1
2 TABLET ORAL
Status: DISCONTINUED | OUTPATIENT
Start: 2024-07-10 | End: 2024-07-11 | Stop reason: HOSPADM

## 2024-07-10 RX ORDER — CALCIUM ACETATE 667 MG/1
1334 CAPSULE ORAL
Status: DISCONTINUED | OUTPATIENT
Start: 2024-07-10 | End: 2024-07-11 | Stop reason: HOSPADM

## 2024-07-10 RX ORDER — CLONIDINE 0.3 MG/24H
0.3 PATCH, EXTENDED RELEASE TRANSDERMAL WEEKLY
Status: DISCONTINUED | OUTPATIENT
Start: 2024-07-10 | End: 2024-07-10

## 2024-07-10 RX ORDER — LANTHANUM CARBONATE 500 MG/1
500 TABLET, CHEWABLE ORAL
Status: DISCONTINUED | OUTPATIENT
Start: 2024-07-10 | End: 2024-07-11 | Stop reason: HOSPADM

## 2024-07-10 RX ORDER — CLONIDINE 0.1 MG/24H
0.1 PATCH, EXTENDED RELEASE TRANSDERMAL WEEKLY
Status: DISCONTINUED | OUTPATIENT
Start: 2024-07-15 | End: 2024-07-11 | Stop reason: HOSPADM

## 2024-07-10 RX ORDER — INSULIN LISPRO 100 [IU]/ML
3 INJECTION, SOLUTION INTRAVENOUS; SUBCUTANEOUS
Status: DISCONTINUED | OUTPATIENT
Start: 2024-07-10 | End: 2024-07-11 | Stop reason: HOSPADM

## 2024-07-10 RX ORDER — LIDOCAINE 40 MG/G
CREAM TOPICAL 3 TIMES WEEKLY
Status: DISCONTINUED | OUTPATIENT
Start: 2024-07-10 | End: 2024-07-11 | Stop reason: HOSPADM

## 2024-07-10 RX ORDER — LOSARTAN POTASSIUM 50 MG/1
100 TABLET ORAL DAILY
Status: DISCONTINUED | OUTPATIENT
Start: 2024-07-10 | End: 2024-07-11 | Stop reason: HOSPADM

## 2024-07-10 RX ADMIN — HEPARIN SODIUM 4000 UNITS: 1000 INJECTION INTRAVENOUS; SUBCUTANEOUS at 15:42

## 2024-07-10 RX ADMIN — GABAPENTIN 100 MG: 100 CAPSULE ORAL at 18:16

## 2024-07-10 RX ADMIN — GABAPENTIN 100 MG: 100 CAPSULE ORAL at 20:47

## 2024-07-10 RX ADMIN — AMLODIPINE BESYLATE 10 MG: 10 TABLET ORAL at 21:35

## 2024-07-10 RX ADMIN — CALCIUM ACETATE 1334 MG: 667 CAPSULE ORAL at 18:16

## 2024-07-10 RX ADMIN — INSULIN LISPRO 1 UNITS: 100 INJECTION, SOLUTION INTRAVENOUS; SUBCUTANEOUS at 13:08

## 2024-07-10 RX ADMIN — CARVEDILOL 6.25 MG: 3.12 TABLET, FILM COATED ORAL at 18:16

## 2024-07-10 RX ADMIN — QUETIAPINE FUMARATE 12.5 MG: 25 TABLET ORAL at 21:35

## 2024-07-10 RX ADMIN — INSULIN LISPRO 1 UNITS: 100 INJECTION, SOLUTION INTRAVENOUS; SUBCUTANEOUS at 18:17

## 2024-07-10 RX ADMIN — INSULIN LISPRO 6 UNITS: 100 INJECTION, SOLUTION INTRAVENOUS; SUBCUTANEOUS at 21:33

## 2024-07-10 RX ADMIN — HEPARIN SODIUM 5000 UNITS: 5000 INJECTION, SOLUTION INTRAVENOUS; SUBCUTANEOUS at 21:35

## 2024-07-10 RX ADMIN — ATORVASTATIN CALCIUM 40 MG: 40 TABLET, FILM COATED ORAL at 18:16

## 2024-07-10 RX ADMIN — INSULIN LISPRO 3 UNITS: 100 INJECTION, SOLUTION INTRAVENOUS; SUBCUTANEOUS at 18:16

## 2024-07-10 RX ADMIN — DOXAZOSIN 2 MG: 2 TABLET ORAL at 21:35

## 2024-07-10 NOTE — QUICK NOTE
HEMODIALYSIS PROCEDURE NOTE  The patient was seen and examined on hemodialysis.  Time: 4 hours  Sodium: 137 Blood flow: 450   Dialyzer: F160 Potassium: 2 Dialysate flow: 1.5x   Access: RFA AVF Bicarbonate: 33 Ultrafiltration goal: 4   Medications on HD: none      Patient seen and examined on dialysis today.  Tolerating dialysis well, resting comfortably with stable blood pressure.  UF goal of 4L for preweight of 100.5 kg and EDW of 94.5 kg.

## 2024-07-10 NOTE — CONSULTS
CONSULTATION-NEPHROLOGY   Babak Mike 74 y.o. male MRN: [id number]  Unit/Bed#: -01 Encounter: [id number]          Assessment and Plan:      ESRD on HD at [facility] under the care of Dr. Francisco NOWAK 94.5 kg. Access: RFA AVF.  Outpatient orders: 180 dialyzer, 240-minute treatment, 450 BFR, 500 DFR, 2K/2.5 Ca/1 mg, 137 Na and 33 HCO3.   Potassium 5.6 mmol/L, creatinine 10.63 mg/dL sodium 136 mmol/L in setting of missed HD treatment today.  Will range for treatment today.     Essential hypertension  Blood pressure elevated, -200 mmHg.  Fluid status to be managed with HD today  Continue amlodipine 10 mg daily, carvedilol 6.25 mg twice daily, clonidine 0.3 mg/transdermal patch weekly, Cardura 2 mg daily, losartan 100 mg daily.  Continue 2 gm sodium restriction.     Secondary hyperparathyroidism of renal origin  Continue to monitor PTH and phosphorus outpatient  Calcium 9.3 mg/dL, appropriate, today  Continue calcitriol 0.75 mcg 3 times weekly, calcium acetate 1334 mg 3 times daily with meals, Fosrenol 500 mg 3 times daily with meals.    Heart failure with preserved ejection fraction   ECHO EF 61-65%.  Continue management as per cardiology.    Type 2 diabetes mellitus   HA1C 8.6%, elevated, 3/19.  Continue management as per endocrinology.      HPI:    Babak Mike is a 74 y.o. male with ESRD on HD treats with [facility].  Last HD treatment was Monday, 7/8 for full 4-hour treatment as per patient.  Patient presents to [facility] ED today with increased weakness and lethargy, did not go to schedule dialysis today and came to ED instead.  Contact by emergency department physician to dialyze today.    Reason for Consult: ESRD on HD    Review of Systems:    Review of Systems   Constitutional:  Positive for activity change and fatigue. Negative for appetite change, chills and fever.        Increased fatigue, decreased activity.   HENT:  Negative for ear  pain and sore throat.    Eyes:  Negative for pain and visual disturbance.   Respiratory:  Negative for cough and shortness of breath.    Cardiovascular:  Negative for chest pain, palpitations and leg swelling.   Gastrointestinal:  Negative for abdominal pain, constipation, diarrhea and vomiting.   Genitourinary:  Negative for dysuria and hematuria.        ESRD on HD patient, states makes minimal urine on nondialysis days only.   Musculoskeletal:  Negative for arthralgias and back pain.   Skin:  Negative for color change and rash.   Allergic/Immunologic: Negative.    Neurological:  Positive for weakness. Negative for dizziness, seizures, syncope, light-headedness and headaches.        Patient states no dizziness or lightheadedness at this time, only occurs when he is ambulatory.   Hematological: Negative.    Psychiatric/Behavioral: Negative.     All other systems reviewed and are negative.      Historical Information   Past Medical History:   Diagnosis Date    Coronary artery disease     Diabetes mellitus     End stage renal disease     Hyperlipidemia     Hypertension     NSTEMI (non-ST elevated myocardial infarction)     TIA (transient ischemic attack)      Past Surgical History:   Procedure Laterality Date    CARDIAC CATHETERIZATION      70% lesion of a small R PDA and a 99% stenosis of D1 (previously noted on the cath in ).  Neither lesion was amenable to PCI.  Medical management.    DIALYSIS FISTULA CREATION       Social History   Social History     Substance and Sexual Activity   Alcohol Use Never     Social History     Substance and Sexual Activity   Drug Use Never     Social History     Tobacco Use   Smoking Status Former    Current packs/day: 0.00    Average packs/day: 1 pack/day for 20.0 years (20.0 ttl pk-yrs)    Types: Cigarettes    Start date:     Quit date:     Years since quittin.5   Smokeless Tobacco Never   Tobacco Comments    STATES QUIT 30 YEARS AGO/ ABOUT ONE CIGARETTE  DAILY       Family History:   Family History   Problem Relation Age of Onset    Hypertension Mother     Hypertension Father        Medications:  Pertinent medications were reviewed  Current Facility-Administered Medications   Medication Dose Route Frequency Provider Last Rate    acetaminophen  650 mg Oral Q6H PRN LAN [place]      amLODIPine  10 mg Oral HS LAN [place]      aspirin  81 mg Oral Daily LAN [place]      atorvastatin  40 mg Oral After Dinner LAN [place]      calcitriol  0.75 mcg Oral Once per day on Monday Wednesday Friday LAN [place]      calcium acetate  1,334 mg Oral TID With Meals LAN [place]      carvedilol  6.25 mg Oral BID With Meals LAN [place]      [START ON 7/15/2024] cloNIDine  0.1 mg Transdermal Weekly LAN [place]      And    [START ON 7/15/2024] cloNIDine  0.2 mg Transdermal Weekly LAN [place]      doxazosin  2 mg Oral HS LAN [place]      gabapentin  100 mg Oral TID LAN [place]      heparin (porcine)  5,000 Units Subcutaneous Q8H ETHAN LAN [place]      insulin lispro  1-6 Units Subcutaneous TID AC LAN [place]      insulin lispro  1-6 Units Subcutaneous HS LAN [place]      insulin lispro  3 Units Subcutaneous TID With Meals LAN [place]      lanthanum  500 mg Oral TID With Meals LAN [place]      lidocaine   Topical Once per day on Monday Wednesday Friday LAN [place]      losartan  100 mg Oral Daily LAN [place]      QUEtiapine  12.5 mg Oral HS LAN [place]           No Known Allergies      Vitals:   BP (!) 172/102   Pulse (!) 51   Temp 97.6 °F (36.4 °C)   Resp 18   Ht 6' (1.829 m)   Wt 96 kg (211 lb 10.3 oz)   SpO2 96%   BMI 28.70 kg/m²   Body mass index is 28.7 kg/m².  SpO2: 96 %,   SpO2 Activity: At Rest,   O2 Device: None (Room air)      Intake/Output Summary (Last 24 hours) at  7/10/2024 1439  Last data filed at 7/10/2024 1345  Gross per 24 hour   Intake 200 ml   Output --   Net 200 ml     Invasive Devices       Peripheral Intravenous Line  Duration             Peripheral IV 07/10/24 Right Antecubital <1 day              Line  Duration             Hemodialysis AV Fistula Left Forearm -- days                    Physical Exam:    Physical Exam  Vitals and nursing note reviewed.   Constitutional:       General: He is not in acute distress.     Appearance: He is normal weight. He is ill-appearing.   HENT:      Head: Normocephalic and atraumatic.      Nose: Nose normal.      Mouth/Throat:      Mouth: Mucous membranes are moist.      Pharynx: Oropharynx is clear.   Eyes:      Extraocular Movements: Extraocular movements intact.      Conjunctiva/sclera: Conjunctivae normal.      Pupils: Pupils are equal, round, and reactive to light.   Cardiovascular:      Rate and Rhythm: Normal rate and regular rhythm.      Pulses: Normal pulses.      Heart sounds: Normal heart sounds.   Pulmonary:      Effort: Pulmonary effort is normal. No respiratory distress.      Breath sounds: Normal breath sounds. No wheezing or rhonchi.      Comments: Decreased posterior breath sounds  Abdominal:      General: Abdomen is flat. Bowel sounds are normal.      Palpations: Abdomen is soft.   Musculoskeletal:         General: Normal range of motion.      Cervical back: Normal range of motion and neck supple.      Right lower leg: No edema.      Left lower leg: No edema.   Skin:     General: Skin is warm and dry.   Neurological:      General: No focal deficit present.      Mental Status: He is alert and oriented to person, place, and time.   Psychiatric:         Mood and Affect: Mood normal.         Behavior: Behavior normal.         Diagnostic Data:  Lab: I have personally reviewed pertinent lab results.,   CBC:  Results from last 7 days   Lab Units 07/10/24  1012   WBC Thousand/uL 5.59   HEMOGLOBIN g/dL 11.5*   HEMATOCRIT %  "36.8   PLATELETS Thousands/uL 117*      CMP:   Lab Results   Component Value Date    SODIUM 136 07/10/2024    K 5.6 (H) 07/10/2024    CL 95 (L) 07/10/2024    CO2 28 07/10/2024    BUN 68 (H) 07/10/2024    CREATININE 10.63 (H) 07/10/2024    CALCIUM 9.3 07/10/2024    AST 10 (L) 07/10/2024    ALT 8 07/10/2024    ALKPHOS 67 07/10/2024    EGFR 4 07/10/2024   ,   PT/INR:   Lab Results   Component Value Date    INR 1.07 07/10/2024   ,   Magnesium: No components found for: \"MAG\",  Phosphorous: No results found for: \"PHOS\"    Microbiology:  @LABRCNTIP,(urinecx:7)@        LAN Reyes    Portions of the record may have been created with voice recognition software. Occasional wrong word or \"sound a like\" substitutions may have occurred due to the inherent limitations of voice recognition software. Read the chart carefully and recognize, using context, where substitutions have occurred.       "

## 2024-07-10 NOTE — ASSESSMENT & PLAN NOTE
With dizziness especially postdialysis sessions which has been progressively worse since last Thursday  Unclear etiology.  The patient noted to have bradycardia with heart rate in the 50s with elevated blood pressure. This can possibly contribute this symptoms.   Echocardiogram on 6/17/2024 shows LVEF of 55%.  Grade 1 diastolic dysfunction.  Will monitor on telemetry   Decrease carvedilol to 6.25 mg twice daily with holding parameters  Obtain orthostatic blood pressure  Fall precautions  PT/OT evaluation

## 2024-07-10 NOTE — ASSESSMENT & PLAN NOTE
Lab Results   Component Value Date    HGBA1C 8.6 (H) 03/19/2024       Recent Labs     07/10/24  1306   POCGLU 185*       Blood Sugar Average: Last 72 hrs:  (P) 185  Home regimen-meal coverage with humalog   SSI and meal coverage while admitted   Diabetic diet

## 2024-07-10 NOTE — PLAN OF CARE
Pre-tx:  UF 4L as tolerated per order.  Pt hypertensive to start HD.  LLA AVF cannulated without issue.  Pt on 2K bath per last serum K of 5.6.  Pt missed his HD at clinic today d/t not feeling well.    Post-Dialysis RN Treatment Note    Blood Pressure:  Pre 189/102 mm/Hg  Post  165/98 mmHg   EDW  94.5 kg    Weight:  Pre 100.5 kg   Post 97.3 kg   Mode of weight measurement: Bed Scale   Volume Removed  3200 ml    Treatment duration 4 hours    NS given   No   Treatment shortened? No   Medications given during Rx Heparin IV given during HD for HD system clotting.  System clotted 1.5 hrs into tx, heavy clotting still noted after heparin admin/new system change   Estimated Kt/V  1.89   Access type: AV fistula   Access Issues: No    Report called to primary nurse   Yes             Problem: METABOLIC, FLUID AND ELECTROLYTES - ADULT  Goal: Fluid balance maintained  Description: INTERVENTIONS:  - Monitor labs   - Monitor I/O and WT  - Instruct patient on fluid and nutrition as appropriate  - Assess for signs & symptoms of volume excess or deficit  Outcome: Progressing  Goal: Glucose maintained within target range  Description: INTERVENTIONS:  - Monitor Blood Glucose as ordered  - Assess for signs and symptoms of hyperglycemia and hypoglycemia  - Administer ordered medications to maintain glucose within target range  - Assess nutritional intake and initiate nutrition service referral as needed  Outcome: Progressing

## 2024-07-10 NOTE — ED PROVIDER NOTES
"History  Chief Complaint   Patient presents with    Weakness - Generalized     Pt reports generalized weakness, fatigue, and dizziness since last Thursday.      HPI      This is a 74-year-old gentleman history of end-stage renal disease, hypertensive urgency, mixed hyperlipidemia, 2 diabetes, anemia, CHF, presents emergency department via EMS secondary to a chief complaint of dizziness and lysed fatigue occurred last week a day after his normal dialysis treatment which is Monday Wednesday Friday.  Dialysis bladder is at [facility].  Patient reports that he is only dizzy when he walks around.  No associated chest pain, shortness of breath.  Chair time for dialysis is 11 am @ [facility]. Patient only produces a small amount of urine less than 1/2 tsp per day.      Seen by PCP yesterday for cellulitis of the left foot started on Keflex, patient did not make any mention of the above chief complaint to his PCP because it was \"not that bad that time.  Prior to Admission Medications   Prescriptions Last Dose Informant Patient Reported? Taking?   Insulin Pen Needle (UltiCare Short Pen Needles) 31G X 8 MM MISC  Self Yes No   Sig: INJECT 4 TIMES DAILY;E11.9   Lokelma 10 g PACK  Self Yes No   Sig: EMPTY CONTENTS OF 1 PACKET IN 3 TABLESPOONSFUL OF WATER OR AS DIRECTED BY CLINIC. STIR WELL AND DRINK IMMEDIATELY ON NON-DIALYSIS DAYS   QUEtiapine (SEROquel) 25 mg tablet  Self Yes No   Sig: Take 12.5 mg by mouth daily at bedtime. Indications: sleep   amLODIPine (NORVASC) 10 mg tablet   No No   Sig: Take 1 tablet (10 mg total) by mouth daily at bedtime   aspirin (ECOTRIN LOW STRENGTH) 81 mg EC tablet  Self Yes No   Sig: Take 81 mg by mouth daily   atorvastatin (LIPITOR) 40 mg tablet  Self Yes No   Sig: Take 40 mg by mouth daily   calcitriol (ROCALTROL) 0.25 mcg capsule   No No   Sig: Take 3 capsules (0.75 mcg total) by mouth 3 (three) times a week   calcium acetate (PHOSLO) capsule  Self Yes No   Sig: 3 (three) " times a day Not on dialysis days  Pt taking 2 tabs 3x daily   carvedilol (COREG) 25 mg tablet  Self Yes No   Sig: Take 25 mg by mouth 2 (two) times a day with meals    cloNIDine (CATAPRES-TTS-1) 0.1 mg/24 hr   No No   Sig: Place 1 patch (0.1 mg total) on the skin over 7 days once a week Do not start before July 20, 2023.   cloNIDine (CATAPRES-TTS-2) 0.2 mg/24 hr   No No   Sig: Place 1 patch (0.2 mg total) on the skin over 7 days once a week Do not start before July 20, 2023.   doxazosin (CARDURA) 2 mg tablet  Self Yes No   Sig: Take 1 tablet by mouth daily at bedtime   gabapentin (NEURONTIN) 100 mg capsule  Self No No   Sig: Take 1 cap by mouth daily   insulin aspart (NovoLOG FlexPen) 100 UNIT/ML injection pen  Self Yes No   Sig: Inject 1 Units under the skin Three times a day. Pt reports dose as follows if BS over 150  breakfast 8 units  lunch 4 units  supper 5 units  bedtime 6 units if BS over 200  Indications: Type 2 Diabetes   lanthanum (FOSRENOL) 500 mg chewable tablet  Self Yes No   Sig: CRUSH OR CHEW AND SWALLOW 1 TABLET THREE TIMES A DAY WITH MEALS   lidocaine-prilocaine (EMLA) cream  Self Yes No   Sig: APPLY SMALL AMOUNT TO ACCESS SITE (AVF) 1 TO 2 HOURS BEFORE DIALYSIS. COVER WITH OCCLUSIVE DRESSING (SARAN WRAP)   losartan (COZAAR) 100 MG tablet   No No   Sig: Take 1 tablet (100 mg total) by mouth daily      Facility-Administered Medications: None       Past Medical History:   Diagnosis Date    Coronary artery disease     Diabetes mellitus     End stage renal disease     Hyperlipidemia     Hypertension     NSTEMI (non-ST elevated myocardial infarction) 2012    TIA (transient ischemic attack)        Past Surgical History:   Procedure Laterality Date    CARDIAC CATHETERIZATION  2017    70% lesion of a small R PDA and a 99% stenosis of D1 (previously noted on the cath in 2012).  Neither lesion was amenable to PCI.  Medical management.    DIALYSIS FISTULA CREATION         Family History   Problem Relation Age  of Onset    Hypertension Mother     Hypertension Father      I have reviewed and agree with the history as documented.    E-Cigarette/Vaping    E-Cigarette Use Never User      E-Cigarette/Vaping Substances    Nicotine No     THC No     CBD No     Flavoring No     Other No     Unknown No      Social History     Tobacco Use    Smoking status: Former     Current packs/day: 0.00     Average packs/day: 1 pack/day for 20.0 years (20.0 ttl pk-yrs)     Types: Cigarettes     Start date:      Quit date:      Years since quittin.5    Smokeless tobacco: Never    Tobacco comments:     STATES QUIT 30 YEARS AGO/ ABOUT ONE CIGARETTE DAILY   Vaping Use    Vaping status: Never Used   Substance Use Topics    Alcohol use: Never    Drug use: Never       Review of Systems   Constitutional: Negative.  Negative for chills and fever.   HENT: Negative.     Eyes: Negative.    Respiratory: Negative.  Negative for chest tightness and shortness of breath.    Cardiovascular: Negative.  Negative for chest pain, palpitations and leg swelling.   Gastrointestinal: Negative.  Negative for abdominal pain.   Endocrine: Negative.    Genitourinary: Negative.    Musculoskeletal: Negative.    Skin: Negative.    Allergic/Immunologic: Negative.    Neurological:  Positive for dizziness. Negative for syncope, weakness and headaches.   Hematological: Negative.    Psychiatric/Behavioral: Negative.         Physical Exam  Physical Exam  Vitals and nursing note reviewed.   Constitutional:       General: He is not in acute distress.     Appearance: Normal appearance. He is normal weight. He is not ill-appearing, toxic-appearing or diaphoretic.   HENT:      Head: Normocephalic and atraumatic.      Right Ear: External ear normal. There is no impacted cerumen.      Left Ear: External ear normal. There is no impacted cerumen.      Nose: Nose normal. No congestion or rhinorrhea.      Mouth/Throat:      Mouth: Mucous membranes are moist.      Pharynx:  Oropharynx is clear.   Eyes:      Extraocular Movements: Extraocular movements intact.      Conjunctiva/sclera: Conjunctivae normal.      Pupils: Pupils are equal, round, and reactive to light.   Cardiovascular:      Rate and Rhythm: Normal rate and regular rhythm.      Pulses: Normal pulses.      Heart sounds: Normal heart sounds.   Pulmonary:      Effort: Pulmonary effort is normal. No respiratory distress.      Breath sounds: Normal breath sounds. No stridor. No wheezing, rhonchi or rales.   Chest:      Chest wall: No tenderness.   Abdominal:      General: Bowel sounds are normal.   Musculoskeletal:         General: Swelling present. No tenderness, deformity or signs of injury.      Cervical back: Normal range of motion.      Right lower leg: Edema present.      Left lower leg: Edema present.   Skin:     General: Skin is warm.      Capillary Refill: Capillary refill takes less than 2 seconds.   Neurological:      General: No focal deficit present.      Mental Status: He is alert and oriented to person, place, and time. Mental status is at baseline.   Psychiatric:         Mood and Affect: Mood normal.         Behavior: Behavior normal.         Thought Content: Thought content normal.         Judgment: Judgment normal.         Vital Signs  ED Triage Vitals   Temperature Pulse Respirations Blood Pressure SpO2   07/10/24 0942 07/10/24 0943 07/10/24 0943 07/10/24 0943 07/10/24 0943   97.5 °F (36.4 °C) 58 18 (!) 206/93 97 %      Temp Source Heart Rate Source Patient Position - Orthostatic VS BP Location FiO2 (%)   07/10/24 0942 -- -- 07/10/24 0943 --   Oral   Right arm       Pain Score       07/10/24 0943       No Pain           Vitals:    07/10/24 0943 07/10/24 1228   BP: (!) 206/93 (!) 185/78   Pulse: 58 (!) 50         Visual Acuity      ED Medications  Medications   insulin lispro (HumALOG/ADMELOG) 100 units/mL subcutaneous injection 1-6 Units (has no administration in time range)   insulin lispro (HumALOG/ADMELOG)  100 units/mL subcutaneous injection 1-6 Units (has no administration in time range)       Diagnostic Studies  Results Reviewed       Procedure Component Value Units Date/Time    Hemoglobin A1c w/EAG Estimation (Prechecked if no A1C within 90 days) [[id number]] Collected: 07/10/24 1012    Lab Status: In process Specimen: Blood from Hand, Right Updated: 07/10/24 1244    TSH, 3rd generation with Free T4 reflex [[id number]]  (Normal) Collected: 07/10/24 1012    Lab Status: Final result Specimen: Blood from Hand, Right Updated: 07/10/24 1049     TSH 3RD GENERATON 2.069 uIU/mL     CBC and differential [[id number]]  (Abnormal) Collected: 07/10/24 1012    Lab Status: Final result Specimen: Blood from Hand, Right Updated: 07/10/24 1046     WBC 5.59 Thousand/uL      RBC 3.87 Million/uL      Hemoglobin 11.5 g/dL      Hematocrit 36.8 %      MCV 95 fL      MCH 29.7 pg      MCHC 31.3 g/dL      RDW 17.0 %      MPV 10.6 fL      Platelets 117 Thousands/uL      nRBC 0 /100 WBCs      Segmented % 49 %      Immature Grans % 0 %      Lymphocytes % 32 %      Monocytes % 16 %      Eosinophils Relative 3 %      Basophils Relative 0 %      Absolute Neutrophils 2.70 Thousands/µL      Absolute Immature Grans 0.00 Thousand/uL      Absolute Lymphocytes 1.79 Thousands/µL      Absolute Monocytes 0.90 Thousand/µL      Eosinophils Absolute 0.18 Thousand/µL      Basophils Absolute 0.02 Thousands/µL     Comprehensive metabolic panel [[id number]]  (Abnormal) Collected: 07/10/24 1012    Lab Status: Final result Specimen: Blood from Hand, Right Updated: 07/10/24 1037     Sodium 136 mmol/L      Potassium 5.6 mmol/L      Chloride 95 mmol/L      CO2 28 mmol/L      ANION GAP 13 mmol/L      BUN 68 mg/dL      Creatinine 10.63 mg/dL      Glucose 236 mg/dL      Calcium 9.3 mg/dL      AST 10 U/L      ALT 8 U/L      Alkaline Phosphatase 67 U/L      Total Protein 8.3 g/dL      Albumin 4.1 g/dL      Total Bilirubin 0.41 mg/dL      eGFR 4 ml/min/1.73sq m     Narrative:       National Kidney Disease Foundation guidelines for Chronic Kidney Disease (CKD):     Stage 1 with normal or high GFR (GFR > 90 mL/min/1.73 square meters)    Stage 2 Mild CKD (GFR = 60-89 mL/min/1.73 square meters)    Stage 3A Moderate CKD (GFR = 45-59 mL/min/1.73 square meters)    Stage 3B Moderate CKD (GFR = 30-44 mL/min/1.73 square meters)    Stage 4 Severe CKD (GFR = 15-29 mL/min/1.73 square meters)    Stage 5 End Stage CKD (GFR <15 mL/min/1.73 square meters)  Note: GFR calculation is accurate only with a steady state creatinine    Magnesium [[id number]]  (Normal) Collected: 07/10/24 1012    Lab Status: Final result Specimen: Blood from Hand, Right Updated: 07/10/24 1037     Magnesium 2.7 mg/dL     Protime-INR [[id number]]  (Normal) Collected: 07/10/24 1012    Lab Status: Final result Specimen: Blood from Hand, Right Updated: 07/10/24 1030     Protime 14.2 seconds      INR 1.07    APTT [[id number]]  (Normal) Collected: 07/10/24 1012    Lab Status: Final result Specimen: Blood from Hand, Right Updated: 07/10/24 1030     PTT 27 seconds                    CT head without contrast   Final Result by Jam Pascual MD (07/10 1026)      No acute intracranial abnormality.  Chronic microangiopathic changes.                  Workstation performed: [id number]                    Procedures  ECG 12 Lead Documentation Only    Date/Time: 7/10/2024 9:46 AM    Performed by: Dick Torres III, DO  Authorized by: Dick Torres III, DO    Indications / Diagnosis:  Feeling off balance  ECG reviewed by me, the ED Provider: yes    Patient location:  ED  Comments:      I personally reviewed this EKG that was performed with the patient July 10, 2024, EKG was completed at 9:45 AM inter by me at 9:46 AM, sinus bradycardia with a ventricular rate of 53 bpm, remaining portion normals are within normal limits.  Prior EKG compared to July 14, 2023 essentially    No diffuse elevations to indicate pericarditis.  No coved ST  elevations greater than 2mm with negative T waves in V1-3 to indicate concern for brugada.  No biphasic T waves in V2, V3 to indicate Wellens (critical stenosis of LAD).   No elevation in aVR or deviation when compared to V1 (can be associated with ST depression in I,II, V4-6 when left main occlusion is present).            ED Course  ED Course as of 07/10/24 1251   Wed Jul 10, 2024   0950 Patient seen and evaluated, orders placed.  Symptoms of dizziness with walking started last Thursday, HD: [facility],, no treatment today, Dr. Francisco WARREN: Nephrologist, , reviewed pt blood sugar trend from [employer] Anay 3, currently 194, at 5 am: near 350.    Brief focused differential dx in this patient is as follows: Arrhythmia versus hypertensive urgency versus electrolyte abnormality related to missed HD.   1051 Nephrology on-call contacted to arrange HD: Tristan Esqueda PA-C, awaiting communication back via secure chat.   1141 SLIM provider contacted for admission.                                 SBIRT 20yo+      Flowsheet Row Most Recent Value   Initial Alcohol Screen: US AUDIT-C     1. How often do you have a drink containing alcohol? 0 Filed at: 07/10/2024 0937   2. How many drinks containing alcohol do you have on a typical day you are drinking?  0 Filed at: 07/10/2024 0937   3a. Male UNDER 65: How often do you have five or more drinks on one occasion? 0 Filed at: 07/10/2024 0937   3b. FEMALE Any Age, or MALE 65+: How often do you have 4 or more drinks on one occassion? 0 Filed at: 07/10/2024 0937   Audit-C Score 0 Filed at: 07/10/2024 0937   LISA: How many times in the past year have you...    Used an illegal drug or used a prescription medication for non-medical reasons? Never Filed at: 07/10/2024 0937                      Medical Decision Making  End-stage renal disease secondary to diabetes and hypertension, presents emergency department with dizziness over the last few days, CT imaging unremarkable, noted the patient's  "heart rates been in the low 50s, missed dialysis today due to the dizziness that it appears to be positional.  Hemodialysis usually done through [facility], case discussed with on-call nephrology will arrange for dialysis, due to the fact the patient blood pressure was significantly elevate greater than 200 systolic with a heart rate in the low 50s and symptomatic will admit for observation to stabilize patient's vital signs to the hospital service.    Portions of the record may have been created with voice recognition software. Occasional wrong word or \"sound a like\" substitutions may have occurred due to the inherent limitations of voice recognition software. Read the chart carefully and recognize, using context, where substitutions have occurred.       Amount and/or Complexity of Data Reviewed  Labs: ordered.  Radiology: ordered.    Risk  Decision regarding hospitalization.                 Disposition  Final diagnoses:   Sinus bradycardia   ESRD (end stage renal disease) ([facility])   HTN (hypertension)   DM (diabetes mellitus) ([facility])     Time reflects when diagnosis was documented in both MDM as applicable and the Disposition within this note       Time User Action Codes Description Comment    7/10/2024 11:43 AM Dick Torres [R00.1] Sinus bradycardia     7/10/2024 11:44 AM Dick Torres [N18.6] ESRD (end stage renal disease) ([facility])     7/10/2024 11:44 AM Dick Torres [I10] HTN (hypertension)     7/10/2024 11:44 AM Dick Torres [E11.9] DM (diabetes mellitus) ([facility])           ED Disposition       ED Disposition   Admit    Condition   Stable    Date/Time   Wed Jul 10, 2024 1053    Comment   Case was discussed with f and the patient's admission status was agreed to be Admission Status: observation status to the service of Dr. perez .               Follow-up Information    None         Current Discharge Medication List        CONTINUE these medications which have NOT CHANGED    Details   amLODIPine " (NORVASC) 10 mg tablet Take 1 tablet (10 mg total) by mouth daily at bedtime  Qty: 30 tablet, Refills: 0    Associated Diagnoses: Hypertensive urgency      aspirin (ECOTRIN LOW STRENGTH) 81 mg EC tablet Take 81 mg by mouth daily      atorvastatin (LIPITOR) 40 mg tablet Take 40 mg by mouth daily      calcitriol (ROCALTROL) 0.25 mcg capsule Take 3 capsules (0.75 mcg total) by mouth 3 (three) times a week  Qty: 36 capsule, Refills: 0    Associated Diagnoses: Hypertensive CKD, ESRD on dialysis (HCC)      calcium acetate (PHOSLO) capsule 3 (three) times a day Not on dialysis days  Pt taking 2 tabs 3x daily      carvedilol (COREG) 25 mg tablet Take 25 mg by mouth 2 (two) times a day with meals       cloNIDine (CATAPRES-TTS-1) 0.1 mg/24 hr Place 1 patch (0.1 mg total) on the skin over 7 days once a week Do not start before July 20, 2023.  Qty: 4 patch, Refills: 0    Associated Diagnoses: Hypertensive urgency      cloNIDine (CATAPRES-TTS-2) 0.2 mg/24 hr Place 1 patch (0.2 mg total) on the skin over 7 days once a week Do not start before July 20, 2023.  Qty: 4 patch, Refills: 0    Associated Diagnoses: Hypertensive urgency      doxazosin (CARDURA) 2 mg tablet Take 1 tablet by mouth daily at bedtime      gabapentin (NEURONTIN) 100 mg capsule Take 1 cap by mouth daily  Qty: 30 capsule, Refills: 3    Associated Diagnoses: Neuropathy      insulin aspart (NovoLOG FlexPen) 100 UNIT/ML injection pen Inject 1 Units under the skin Three times a day. Pt reports dose as follows if BS over 150  breakfast 8 units  lunch 4 units  supper 5 units  bedtime 6 units if BS over 200  Indications: Type 2 Diabetes      Insulin Pen Needle (UltiCare Short Pen Needles) 31G X 8 MM MISC INJECT 4 TIMES DAILY;E11.9      lanthanum (FOSRENOL) 500 mg chewable tablet CRUSH OR CHEW AND SWALLOW 1 TABLET THREE TIMES A DAY WITH MEALS      lidocaine-prilocaine (EMLA) cream APPLY SMALL AMOUNT TO ACCESS SITE (AVF) 1 TO 2 HOURS BEFORE DIALYSIS. COVER WITH OCCLUSIVE  DRESSING (SARAN WRAP)      Lokelma 10 g PACK EMPTY CONTENTS OF 1 PACKET IN 3 TABLESPOONSFUL OF WATER OR AS DIRECTED BY CLINIC. STIR WELL AND DRINK IMMEDIATELY ON NON-DIALYSIS DAYS      losartan (COZAAR) 100 MG tablet Take 1 tablet (100 mg total) by mouth daily  Qty: 30 tablet, Refills: 0    Associated Diagnoses: Hypertensive urgency      QUEtiapine (SEROquel) 25 mg tablet Take 12.5 mg by mouth daily at bedtime. Indications: sleep             No discharge procedures on file.    PDMP Review       None            ED Provider  Electronically Signed by             Dick Torres III,   07/10/24 [id number]

## 2024-07-10 NOTE — ASSESSMENT & PLAN NOTE
Wt Readings from Last 3 Encounters:   07/10/24 96 kg (211 lb 10.3 oz)   10/31/23 96.3 kg (212 lb 3.2 oz)   08/01/23 96.2 kg (212 lb)     Currently compensated  Volume management with dialysis  Continue medical management

## 2024-07-10 NOTE — H&P
"[facility]  H&P  Name: Babak Mike 74 y.o. male I MRN: [id number]  Unit/Bed#: -01 I Date of Admission: 7/10/2024   Date of Service: 7/10/2024 I Hospital Day: 0      Assessment & Plan   * Weakness generalized  Assessment & Plan  With dizziness especially postdialysis sessions which has been progressively worse since last Thursday  Unclear etiology.  The patient noted to have bradycardia with heart rate in the 50s with elevated blood pressure. This can possibly contribute this symptoms.   Echocardiogram on 6/17/2024 shows LVEF of 55%.  Grade 1 diastolic dysfunction.  Will monitor on telemetry   Decrease carvedilol to 6.25 mg twice daily with holding parameters  Obtain orthostatic blood pressure  Fall precautions  PT/OT evaluation    Primary hypertension  Assessment & Plan  Presented with hypertensive urgency BP of 206/93  Continue with current regimen   Monitor BP closely and adjust medication as indicated     Dizziness  Assessment & Plan  Reported missed hemodialysis session today due to \"dizziness\"  CT head negative    PAD (peripheral artery disease) ([facility])  Assessment & Plan  Continue blood pressure control  Continue aspirin    Chronic diastolic (congestive) heart failure (HCC)  Assessment & Plan  Wt Readings from Last 3 Encounters:   07/10/24 96 kg (211 lb 10.3 oz)   10/31/23 96.3 kg (212 lb 3.2 oz)   08/01/23 96.2 kg (212 lb)     Currently compensated  Volume management with dialysis  Continue medical management           SIVA (obstructive sleep apnea)  Assessment & Plan  CPAP at bedtime    Type 2 diabetes mellitus without complication, with long-term current use of insulin ([facility])  Assessment & Plan  Lab Results   Component Value Date    HGBA1C 8.6 (H) 03/19/2024       Recent Labs     07/10/24  1306   POCGLU 185*       Blood Sugar Average: Last 72 hrs:  (P) 185  Home regimen-meal coverage with humalog   SSI and meal coverage while admitted   Diabetic diet      ESRD (end stage renal " disease) ([facility])  Assessment & Plan  Lab Results   Component Value Date    EGFR 4 07/10/2024    EGFR 6 (L) 03/19/2024    EGFR 6 (L) 03/05/2024    CREATININE 10.63 (H) 07/10/2024    CREATININE 9.05 (H) 03/19/2024    CREATININE 8.36 (H) 03/05/2024   Patient dialysis on Monday, Wednesday, and Friday  Nephrology input appreciated           VTE Pharmacologic Prophylaxis: VTE Score: 3 Moderate Risk (Score 3-4) - Pharmacological DVT Prophylaxis Ordered: heparin.  Code Status: Level 1 - Full Code discussed with patient     Anticipated Length of Stay: Patient will be admitted on an observation basis with an anticipated length of stay of less than 2 midnights secondary to generalized weakness.    Total Time Spent on Date of Encounter in care of patient: 55 mins. This time was spent on one or more of the following: performing physical exam; counseling and coordination of care; obtaining or reviewing history; documenting in the medical record; reviewing/ordering tests, medications or procedures; communicating with other healthcare professionals and discussing with patient's family/caregivers.    Chief Complaint: Generalized weakness and dizziness    History of Present Illness:  Babak Mike is a 74 y.o. male with a PMH of end-stage renal disease on dialysis, diabetes, hypertension, and PAD who presents with generalized weakness and dizziness especially after dialysis session which has started since last Thursday.  The patient states that he has been feeling in his normal state of health up until last week when he started to feel worsening generalized weakness and dizziness.  He does not report any room spinning or narrow/tunnel vision or syncope.  He was noted to have bradycardia in the ED.  He reports no medication changes.  Any recent sick contact or any antibiotic use.  He denies any headaches, blurry vision, headaches, lightheadedness, chest pain, or palpitation.    Review of Systems:  Review of Systems   Constitutional:   Positive for fatigue. Negative for chills and fever.   HENT:  Negative for congestion, ear pain, postnasal drip and sore throat.    Eyes:  Negative for discharge, itching and visual disturbance.   Respiratory:  Negative for cough, chest tightness and shortness of breath.    Cardiovascular:  Negative for chest pain, palpitations and leg swelling.   Gastrointestinal:  Negative for abdominal pain, nausea and vomiting.   Endocrine: Negative for cold intolerance and heat intolerance.   Genitourinary:  Negative for difficulty urinating, dysuria and hematuria.   Musculoskeletal:  Negative for back pain, gait problem and neck pain.   Skin:  Negative for rash and wound.   Neurological:  Positive for dizziness and weakness (generalized). Negative for speech difficulty, light-headedness and headaches.   Psychiatric/Behavioral:  Negative for confusion, decreased concentration and dysphoric mood.        Past Medical and Surgical History:   Past Medical History:   Diagnosis Date    Coronary artery disease     Diabetes mellitus     End stage renal disease     Hyperlipidemia     Hypertension     NSTEMI (non-ST elevated myocardial infarction) 2012    TIA (transient ischemic attack)        Past Surgical History:   Procedure Laterality Date    CARDIAC CATHETERIZATION  2017    70% lesion of a small R PDA and a 99% stenosis of D1 (previously noted on the cath in 2012).  Neither lesion was amenable to PCI.  Medical management.    DIALYSIS FISTULA CREATION         Meds/Allergies:  Prior to Admission medications    Medication Sig Start Date End Date Taking? Authorizing Provider   amLODIPine (NORVASC) 10 mg tablet Take 1 tablet (10 mg total) by mouth daily at bedtime 7/15/23 7/10/24 Yes Eligio Albright MD   aspirin (ECOTRIN LOW STRENGTH) 81 mg EC tablet Take 81 mg by mouth daily   Yes Historical Provider, MD   atorvastatin (LIPITOR) 40 mg tablet Take 40 mg by mouth daily   Yes Historical Provider, MD   calcitriol (ROCALTROL) 0.25 mcg  capsule Take 3 capsules (0.75 mcg total) by mouth 3 (three) times a week 6/23/23 7/10/24 Yes LAN Ricardo   calcium acetate (PHOSLO) capsule 3 (three) times a day Not on dialysis days  Pt taking 2 tabs 3x daily 1/14/21  Yes Historical Provider, MD   carvedilol (COREG) 25 mg tablet Take 25 mg by mouth 2 (two) times a day with meals    Yes Historical Provider, MD   cloNIDine (CATAPRES-TTS-3) 0.3 mg/24 hr Place 1 patch on the skin once a week Monday   Yes Historical Provider, MD   doxazosin (CARDURA) 2 mg tablet Take 1 tablet by mouth daily at bedtime 6/9/23  Yes Historical Provider, MD   gabapentin (NEURONTIN) 100 mg capsule Take 1 cap by mouth daily  Patient taking differently: Take 100 mg by mouth 3 (three) times a day Take 1 cap by mouth daily 6/17/22  Yes Franca Avina MD   insulin aspart (NovoLOG FlexPen) 100 UNIT/ML injection pen Inject 1 Units under the skin Three times a day. Pt reports dose as follows if BS over 150  breakfast 8 units  lunch 4 units  supper 5 units  bedtime 6 units if BS over 200  Indications: Type 2 Diabetes 8/12/20  Yes Historical Provider, MD   lanthanum (FOSRENOL) 500 mg chewable tablet CRUSH OR CHEW AND SWALLOW 1 TABLET THREE TIMES A DAY WITH MEALS 8/23/22  Yes Historical Provider, MD   lidocaine-prilocaine (EMLA) cream APPLY SMALL AMOUNT TO ACCESS SITE (AVF) 1 TO 2 HOURS BEFORE DIALYSIS. COVER WITH OCCLUSIVE DRESSING (SARAN WRAP) 3/26/21  Yes Historical Provider, MD   Lokelma 10 g PACK EMPTY CONTENTS OF 1 PACKET IN 3 TABLESPOONSFUL OF WATER OR AS DIRECTED BY CLINIC. STIR WELL AND DRINK IMMEDIATELY ON NON-DIALYSIS DAYS 12/18/20  Yes Historical Provider, MD   QUEtiapine (SEROquel) 25 mg tablet Take 12.5 mg by mouth daily at bedtime. Indications: sleep   Yes Historical Provider, MD   cloNIDine (CATAPRES-TTS-1) 0.1 mg/24 hr Place 1 patch (0.1 mg total) on the skin over 7 days once a week Do not start before July 20, 2023. 7/20/23 10/31/23  Eligio Albright MD   cloNIDine  (CATAPRES-TTS-2) 0.2 mg/24 hr Place 1 patch (0.2 mg total) on the skin over 7 days once a week Do not start before 2023. 7/20/23 10/31/23  Eligio Albright MD   Insulin Pen Needle (UltiCare Short Pen Needles) 31G X 8 MM MISC INJECT 4 TIMES DAILY;E11.9 3/24/21   Historical Provider, MD   losartan (COZAAR) 100 MG tablet Take 1 tablet (100 mg total) by mouth daily 7/15/23 10/31/23  Eligio Albright MD     I have reviewed home medications with patient personally.    Allergies: No Known Allergies    Social History:  Marital Status: /Civil Union   Occupation: n/a   Patient Pre-hospital Living Situation: Home  Patient Pre-hospital Level of Mobility: walks  Patient Pre-hospital Diet Restrictions: renal, DM  Substance Use History:   Social History     Substance and Sexual Activity   Alcohol Use Never     Social History     Tobacco Use   Smoking Status Former    Current packs/day: 0.00    Average packs/day: 1 pack/day for 20.0 years (20.0 ttl pk-yrs)    Types: Cigarettes    Start date:     Quit date:     Years since quittin.5   Smokeless Tobacco Never   Tobacco Comments    STATES QUIT 30 YEARS AGO/ ABOUT ONE CIGARETTE DAILY     Social History     Substance and Sexual Activity   Drug Use Never       Family History:  Family History   Problem Relation Age of Onset    Hypertension Mother     Hypertension Father        Physical Exam:     Vitals:   Blood Pressure: (!) 172/102 (07/10/24 1400)  Pulse: (!) 51 (07/10/24 1400)  Temperature: 97.6 °F (36.4 °C) (07/10/24 1345)  Temp Source: Oral (07/10/24 0942)  Respirations: 18 (07/10/24 1400)  Height: 6' (182.9 cm) (07/10/24 1319)  Weight - Scale: 96 kg (211 lb 10.3 oz) (07/10/24 1106)  SpO2: 96 % (07/10/24 1238)    Physical Exam  Vitals and nursing note reviewed.   Constitutional:       General: He is not in acute distress.     Appearance: Normal appearance.   HENT:      Head: Normocephalic and atraumatic.      Right Ear: External ear normal.       Left Ear: External ear normal.      Nose: Nose normal.      Mouth/Throat:      Mouth: Mucous membranes are moist.      Pharynx: Oropharynx is clear.   Eyes:      General:         Right eye: No discharge.         Left eye: No discharge.      Extraocular Movements: Extraocular movements intact.      Pupils: Pupils are equal, round, and reactive to light.   Cardiovascular:      Rate and Rhythm: Normal rate and regular rhythm.      Pulses: Normal pulses.      Heart sounds: Normal heart sounds. No murmur heard.     Comments: Left arm fistula, positive for bruits and thrills  Pulmonary:      Effort: Pulmonary effort is normal. No respiratory distress.      Breath sounds: Normal breath sounds. No wheezing or rales.   Abdominal:      General: Bowel sounds are normal. There is no distension.      Palpations: Abdomen is soft. There is no mass.      Tenderness: There is no abdominal tenderness.   Musculoskeletal:         General: Swelling (trace BLEs) present. No tenderness or deformity. Normal range of motion.      Cervical back: Normal range of motion and neck supple. No rigidity.   Skin:     General: Skin is warm and dry.      Capillary Refill: Capillary refill takes less than 2 seconds.      Coloration: Skin is not pale.      Findings: No erythema.   Neurological:      General: No focal deficit present.      Mental Status: He is alert and oriented to person, place, and time. Mental status is at baseline.   Psychiatric:         Mood and Affect: Mood normal.         Behavior: Behavior normal.         Thought Content: Thought content normal.         Judgment: Judgment normal.        Additional Data:     Lab Results:  Results from last 7 days   Lab Units 07/10/24  1012   WBC Thousand/uL 5.59   HEMOGLOBIN g/dL 11.5*   HEMATOCRIT % 36.8   PLATELETS Thousands/uL 117*   SEGS PCT % 49   LYMPHO PCT % 32   MONO PCT % 16*   EOS PCT % 3     Results from last 7 days   Lab Units 07/10/24  1012   SODIUM mmol/L 136   POTASSIUM mmol/L 5.6*    CHLORIDE mmol/L 95*   CO2 mmol/L 28   BUN mg/dL 68*   CREATININE mg/dL 10.63*   ANION GAP mmol/L 13   CALCIUM mg/dL 9.3   ALBUMIN g/dL 4.1   TOTAL BILIRUBIN mg/dL 0.41   ALK PHOS U/L 67   ALT U/L 8   AST U/L 10*   GLUCOSE RANDOM mg/dL 236*     Results from last 7 days   Lab Units 07/10/24  1012   INR  1.07     Results from last 7 days   Lab Units 07/10/24  1306   POC GLUCOSE mg/dl 185*     Lab Results   Component Value Date    HGBA1C 8.6 (H) 03/19/2024    HGBA1C 6.4 (H) 07/12/2023    HGBA1C 7.6 (H) 08/04/2022           Lines/Drains:  Invasive Devices       Peripheral Intravenous Line  Duration             Peripheral IV 07/10/24 Right Antecubital <1 day              Line  Duration             Hemodialysis AV Fistula Left Forearm -- days                        Imaging: Reviewed radiology reports from this admission including: CT head  CT head without contrast   Final Result by Jam Pascual MD (07/10 1026)      No acute intracranial abnormality.  Chronic microangiopathic changes.                  Workstation performed: [id number]             EKG and Other Studies Reviewed on Admission:   EKG: Sinus Bradycardia. HR 53.    ** Please Note: This note has been constructed using a voice recognition system. **

## 2024-07-10 NOTE — ASSESSMENT & PLAN NOTE
Lab Results   Component Value Date    EGFR 4 07/10/2024    EGFR 6 (L) 03/19/2024    EGFR 6 (L) 03/05/2024    CREATININE 10.63 (H) 07/10/2024    CREATININE 9.05 (H) 03/19/2024    CREATININE 8.36 (H) 03/05/2024   Patient dialysis on Monday, Wednesday, and Friday  Nephrology input appreciated

## 2024-07-10 NOTE — ASSESSMENT & PLAN NOTE
Presented with hypertensive urgency BP of 206/93  Continue with current regimen   Monitor BP closely and adjust medication as indicated

## 2024-07-11 VITALS
RESPIRATION RATE: 16 BRPM | OXYGEN SATURATION: 96 % | TEMPERATURE: 98.3 F | BODY MASS INDEX: 27.68 KG/M2 | SYSTOLIC BLOOD PRESSURE: 112 MMHG | WEIGHT: 204.37 LBS | DIASTOLIC BLOOD PRESSURE: 53 MMHG | HEART RATE: 69 BPM | HEIGHT: 72 IN

## 2024-07-11 LAB
ANION GAP SERPL CALCULATED.3IONS-SCNC: 12 MMOL/L (ref 4–13)
BUN SERPL-MCNC: 40 MG/DL (ref 5–25)
CALCIUM SERPL-MCNC: 9.8 MG/DL (ref 8.4–10.2)
CHLORIDE SERPL-SCNC: 93 MMOL/L (ref 96–108)
CO2 SERPL-SCNC: 28 MMOL/L (ref 21–32)
CREAT SERPL-MCNC: 7.88 MG/DL (ref 0.6–1.3)
ERYTHROCYTE [DISTWIDTH] IN BLOOD BY AUTOMATED COUNT: 16.5 % (ref 11.6–15.1)
GFR SERPL CREATININE-BSD FRML MDRD: 6 ML/MIN/1.73SQ M
GLUCOSE P FAST SERPL-MCNC: 119 MG/DL (ref 65–99)
GLUCOSE SERPL-MCNC: 119 MG/DL (ref 65–140)
GLUCOSE SERPL-MCNC: 159 MG/DL (ref 65–140)
GLUCOSE SERPL-MCNC: 202 MG/DL (ref 65–140)
HCT VFR BLD AUTO: 37.5 % (ref 36.5–49.3)
HGB BLD-MCNC: 11.6 G/DL (ref 12–17)
MAGNESIUM SERPL-MCNC: 2.3 MG/DL (ref 1.9–2.7)
MCH RBC QN AUTO: 29 PG (ref 26.8–34.3)
MCHC RBC AUTO-ENTMCNC: 30.9 G/DL (ref 31.4–37.4)
MCV RBC AUTO: 94 FL (ref 82–98)
MRSA NOSE QL CULT: NORMAL
PHOSPHATE SERPL-MCNC: 5.5 MG/DL (ref 2.3–4.1)
PLATELET # BLD AUTO: 86 THOUSANDS/UL (ref 149–390)
PMV BLD AUTO: 11.1 FL (ref 8.9–12.7)
POTASSIUM SERPL-SCNC: 5 MMOL/L (ref 3.5–5.3)
RBC # BLD AUTO: 4 MILLION/UL (ref 3.88–5.62)
SODIUM SERPL-SCNC: 133 MMOL/L (ref 135–147)
WBC # BLD AUTO: 6.63 THOUSAND/UL (ref 4.31–10.16)

## 2024-07-11 PROCEDURE — 97167 OT EVAL HIGH COMPLEX 60 MIN: CPT

## 2024-07-11 PROCEDURE — 99214 OFFICE O/P EST MOD 30 MIN: CPT

## 2024-07-11 PROCEDURE — 80048 BASIC METABOLIC PNL TOTAL CA: CPT | Performed by: NURSE PRACTITIONER

## 2024-07-11 PROCEDURE — 99239 HOSP IP/OBS DSCHRG MGMT >30: CPT | Performed by: NURSE PRACTITIONER

## 2024-07-11 PROCEDURE — 82948 REAGENT STRIP/BLOOD GLUCOSE: CPT

## 2024-07-11 PROCEDURE — 85027 COMPLETE CBC AUTOMATED: CPT | Performed by: NURSE PRACTITIONER

## 2024-07-11 PROCEDURE — 83735 ASSAY OF MAGNESIUM: CPT | Performed by: NURSE PRACTITIONER

## 2024-07-11 PROCEDURE — 97163 PT EVAL HIGH COMPLEX 45 MIN: CPT

## 2024-07-11 PROCEDURE — 84100 ASSAY OF PHOSPHORUS: CPT | Performed by: NURSE PRACTITIONER

## 2024-07-11 RX ORDER — CARVEDILOL 6.25 MG/1
6.25 TABLET ORAL 2 TIMES DAILY WITH MEALS
Qty: 60 TABLET | Refills: 0 | Status: SHIPPED | OUTPATIENT
Start: 2024-07-11 | End: 2024-08-10

## 2024-07-11 RX ADMIN — CALCIUM ACETATE 1334 MG: 667 CAPSULE ORAL at 11:53

## 2024-07-11 RX ADMIN — INSULIN LISPRO 3 UNITS: 100 INJECTION, SOLUTION INTRAVENOUS; SUBCUTANEOUS at 07:34

## 2024-07-11 RX ADMIN — GABAPENTIN 100 MG: 100 CAPSULE ORAL at 10:09

## 2024-07-11 RX ADMIN — ASPIRIN 81 MG: 81 TABLET, COATED ORAL at 10:09

## 2024-07-11 RX ADMIN — LOSARTAN POTASSIUM 100 MG: 50 TABLET, FILM COATED ORAL at 10:09

## 2024-07-11 RX ADMIN — INSULIN LISPRO 3 UNITS: 100 INJECTION, SOLUTION INTRAVENOUS; SUBCUTANEOUS at 11:53

## 2024-07-11 RX ADMIN — CALCIUM ACETATE 1334 MG: 667 CAPSULE ORAL at 07:34

## 2024-07-11 RX ADMIN — INSULIN LISPRO 1 UNITS: 100 INJECTION, SOLUTION INTRAVENOUS; SUBCUTANEOUS at 07:34

## 2024-07-11 RX ADMIN — HEPARIN SODIUM 5000 UNITS: 5000 INJECTION, SOLUTION INTRAVENOUS; SUBCUTANEOUS at 05:13

## 2024-07-11 RX ADMIN — INSULIN LISPRO 2 UNITS: 100 INJECTION, SOLUTION INTRAVENOUS; SUBCUTANEOUS at 11:54

## 2024-07-11 RX ADMIN — ACETAMINOPHEN 650 MG: 325 TABLET ORAL at 02:26

## 2024-07-11 RX ADMIN — CARVEDILOL 6.25 MG: 3.12 TABLET, FILM COATED ORAL at 07:34

## 2024-07-11 RX ADMIN — LANTHANUM CARBONATE 500 MG: 500 TABLET, CHEWABLE ORAL at 07:35

## 2024-07-11 RX ADMIN — LANTHANUM CARBONATE 500 MG: 500 TABLET, CHEWABLE ORAL at 11:53

## 2024-07-11 NOTE — CASE MANAGEMENT
Case Management Assessment & Discharge Planning Note    Patient name Babak Mike  Location /-01 MRN [id number]  : 1950 Date 2024       Current Admission Date: 7/10/2024  Current Admission Diagnosis:Weakness generalized   Patient Active Problem List    Diagnosis Date Noted Date Diagnosed    Dizziness 07/10/2024     Weakness generalized 07/10/2024     Primary hypertension 07/10/2024     Asymptomatic bilateral carotid artery stenosis 10/31/2023     PAD (peripheral artery disease) ([facility]) 10/31/2023     Bilateral lower extremity edema 10/31/2023     Hypertensive emergency 2023     Left arm pain 2023     CAD (coronary artery disease) 2023     Anemia 2023     Chronic diastolic (congestive) heart failure ([facility]) 2023     Flash pulmonary edema ([facility]) 2023     SIRS (systemic inflammatory response syndrome) ([facility]) 2023     Acute respiratory failure with hypoxia ([facility]) 2023     Dependence on renal dialysis ([facility]) 2022     Snoring 2022     Numbness and tingling in both hands 2022     SIVA (obstructive sleep apnea)      TIA (transient ischemic attack) 2022     Elevated troponin 2021     ESRD (end stage renal disease) ([facility]) 2021     Hypertensive urgency 2021     Type 2 diabetes mellitus without complication, with long-term current use of insulin ([facility]) 2021     Mixed hyperlipidemia 2021       LOS (days): 0  Geometric Mean LOS (GMLOS) (days):   Days to GMLOS:     OBJECTIVE:     Current admission status: Observation    Preferred Pharmacy:   [facility]/pharmacy #[id number] - [place], [place] - [address]  [address]  [place] [address]  Phone: [phone] Fax: [phone]    Primary Care Provider: LAN Escobedo    Primary Insurance: [employer]  Secondary Insurance:     ASSESSMENT:  Active Health Care Proxies    There are no active Health Care Proxies on file.       Advance Directives  Does patient have a Health  Care POA?: No  Was patient offered paperwork?: Yes (Has papers at home is updating)  Does patient currently have a Health Care decision maker?: Yes, please see Health Care Proxy section  Does patient have Advance Directives?: No  Was patient offered paperwork?: Yes (HAs the papers at home is updating)  Primary Contact: Tri Mike spouse    Readmission Root Cause  30 Day Readmission: No    Patient Information  Admitted from:: Home  Mental Status: Alert  During Assessment patient was accompanied by: Not accompanied during assessment  Support Systems: Spouse/significant other  County of Residence: [facility] do you live in?: [place]  Home entry access options. Select all that apply.: Stairs  Number of steps to enter home.: 1  Type of Current Residence: 2 story home  Upon entering residence, is there a bedroom on the main floor (no further steps)?: No  A bedroom is located on the following floor levels of residence (select all that apply):: 2nd Floor  Upon entering residence, is there a bathroom on the main floor (no further steps)?: Yes  Number of steps to 2nd floor from main floor: One Flight  Is patient a ?: No    Activities of Daily Living Prior to Admission  Functional Status: Independent  Completes ADLs independently?: Yes  Ambulates independently?: Yes  Does patient use assisted devices?: No  Does patient currently own DME?: Yes  What DME does the patient currently own?: Walker, Straight Cane  Does patient have a history of Outpatient Therapy (PT/OT)?: No  Does the patient have a history of Short-Term Rehab?: No  Does patient have a history of HHC?: Yes ([facility])  Does patient currently have HHC?: No    Patient Information Continued  Income Source: Pension/[facility]  Does patient have prescription coverage?: Yes ([employer] [place])  Does patient receive dialysis treatments?: Yes ([facility])  Does patient have a history of substance abuse?: No  Does patient have a history of Mental Health  Diagnosis?: No    PHQ 2/9 Screening   Reviewed PHQ 2/9 Depression Screening Score?: No    Means of Transportation  Means of Transport to Appts:: Family transport  Social Determinants of Health (SDOH)      Flowsheet Row Most Recent Value   Housing Stability    In the last 12 months, was there a time when you were not able to pay the mortgage or rent on time? N   At any time in the past 12 months, were you homeless or living in a shelter (including now)? N   Transportation Needs    In the past 12 months, has lack of transportation kept you from medical appointments or from getting medications? no   In the past 12 months, has lack of transportation kept you from meetings, work, or from getting things needed for daily living? No   Food Insecurity    Within the past 12 months, you worried that your food would run out before you got the money to buy more. Never true   Within the past 12 months, the food you bought just didn't last and you didn't have money to get more. Never true   Utilities    In the past 12 months has the electric, gas, oil, or water company threatened to shut off services in your home? No            DISCHARGE DETAILS:    Discharge planning discussed with:: Pt     Contacts  Patient Contacts: Perlita Mike  Relationship to Patient:: Family  Contact Method: Phone  Phone Number: [phone]  Reason/Outcome: Discharge Planning    Requested Home Health Care         Is the patient interested in HHC at discharge?: No    DME Referral Provided  Referral made for DME?: No    Would you like to participate in our [facility] service program?  : No - Declined    Treatment Team Recommendation: Home  Discharge Destination Plan:: Home  Transport at Discharge : Family  Spoke to the pt at the bedside.  Pt indicated he is IPA except for driving.  [facility]  Wife transports home from dialysis.  Pt states he has DME at home, however has been walking independently. Wife will transport  home.  Pt made aware he is here under the Obs status.

## 2024-07-11 NOTE — RESPIRATORY THERAPY NOTE
07/10/24 1756   Respiratory Assessment   Assessment Type Assess only   General Appearance Alert;Awake   Respiratory Pattern Normal   Chest Assessment Chest expansion symmetrical   Bilateral Breath Sounds Clear;Diminished   Resp Comments spoke with pt and pt declining cpap for hs, pt aware if needing o2 rn will place pt on NC and if needing cpap will allow cpap if need be, pt asked for cpap to remain in hallway, rn made aware , cpap outside of room in [place] covered   O2 Device ra

## 2024-07-11 NOTE — PROGRESS NOTES
NEPHROLOGY PROGRESS NOTE   Babak Mike 74 y.o. male MRN: [id number]  Unit/Bed#: -01 Encounter: [id number]      HPI/24hr EVENTS:    -74-year-old male with a history of hypertension, chronic diastolic congestive heart failure, type 2 diabetes, ESRD on HD presenting with generalized weakness.  Nephrology consulted for ESRD on HD    -Patient reports feeling much better compared to yesterday.  He is no longer feeling weak and feels like he has a strength back.  He is also no longer feeling dizzy.    ASSESSMENT/PLAN:    ESRD on HD   Patient undergoes dialysis Monday Wednesday Friday at [facility] under the care of Dr. Singh. Access left AV fistula, EDW 94.5 kg.  His last hemodialysis treatment was yesterday for 4 hours with 3.2 L removed and a post weight of 97.3 kg. He had missed HD at his clinic yesterday due to not feeling well. Heparin was given during the tx for HD system clotting.  Patient is planned to be discharged today and will undergo next HD at outpatient dialysis unit    Anemia of ESRD  Hemoglobin is at 11.6.  No iron/MARTHA needed currently    Secondary hyperparathyroidism, renal  Continue with calcitriol 3 times weekly, calcium acetate 3 times daily with meals.  Monitor PTH levels as outpatient    Chronic diastolic heart failure  Currently examines compensated, volume management with HD    Hypertension  Continue amlodipine 10 mg daily, carvedilol 6.25 mg twice daily, doxazosin 2 mg daily, valsartan 80 mg daily    Type 2 diabetes  Blood sugar management per primary team    ROS:  Review of Systems   A complete 10 point review of systems was performed and found to be negative unless otherwise noted above or in the HPI.    OBJECTIVE:  Current Weight: Weight - Scale: 92.7 kg (204 lb 5.9 oz)  Vitals:    07/11/24 0913 07/11/24 0916 07/11/24 1119 07/11/24 1445   BP: 94/58 113/58 113/60 112/53   Pulse: 70 81 61 61   Resp:   14 14   Temp:   (!) 97.4 °F (36.3 °C) (!) 97.3 °F (36.3 °C)   TempSrc:        SpO2: 97% 96% 98% 97%   Weight:       Height:           Intake/Output Summary (Last 24 hours) at 7/11/2024 1451  Last data filed at 7/11/2024 1300  Gross per 24 hour   Intake 940 ml   Output 3488 ml   Net -2548 ml     Physical Exam  Vitals and nursing note reviewed.   Constitutional:       General: He is not in acute distress.     Appearance: He is well-developed.   HENT:      Head: Normocephalic and atraumatic.   Cardiovascular:      Rate and Rhythm: Normal rate and regular rhythm.      Heart sounds: No murmur heard.  Pulmonary:      Effort: Pulmonary effort is normal. No respiratory distress.      Breath sounds: Normal breath sounds.   Abdominal:      Palpations: Abdomen is soft.      Tenderness: There is no abdominal tenderness.   Musculoskeletal:         General: No swelling.   Skin:     General: Skin is warm and dry.      Capillary Refill: Capillary refill takes less than 2 seconds.   Neurological:      Mental Status: He is alert.   Psychiatric:         Mood and Affect: Mood normal.          Medications:    Current Facility-Administered Medications:     acetaminophen (TYLENOL) tablet 650 mg, 650 mg, Oral, Q6H PRN, GLORIA TrinidadNP, 650 mg at 07/11/24 0226    amLODIPine (NORVASC) tablet 10 mg, 10 mg, Oral, HS, Alondra Rosario, CRNP, 10 mg at 07/10/24 2135    aspirin (ECOTRIN LOW STRENGTH) EC tablet 81 mg, 81 mg, Oral, Daily, Alondra Rosario, CRNP, 81 mg at 07/11/24 1009    atorvastatin (LIPITOR) tablet 40 mg, 40 mg, Oral, After Dinner, GLORIA TrinidadNP, 40 mg at 07/10/24 1816    calcitriol (ROCALTROL) capsule 0.75 mcg, 0.75 mcg, Oral, Once per day on Monday Wednesday Friday, LAN Trinidad    calcium acetate (PHOSLO) capsule 1,334 mg, 1,334 mg, Oral, TID With Meals, Alondra Rosario CRNP, 1,334 mg at 07/11/24 1153    carvedilol (COREG) tablet 6.25 mg, 6.25 mg, Oral, BID With Meals, LAN Trinidad, 6.25 mg at 07/11/24 0734    [START ON 7/15/2024] cloNIDine (CATAPRES-TTS-1) 0.1  mg/24 hr TD weekly patch, 0.1 mg, Transdermal, Weekly **AND** [START ON 7/15/2024] cloNIDine (CATAPRES-TTS-2) 0.2 mg/24 hr TD weekly patch, 0.2 mg, Transdermal, Weekly, LAN Trinidad    doxazosin (CARDURA) tablet 2 mg, 2 mg, Oral, HS, LAN Trinidad, 2 mg at 07/10/24 2135    gabapentin (NEURONTIN) capsule 100 mg, 100 mg, Oral, TID, LAN Trinidad, 100 mg at 07/11/24 1009    heparin (porcine) subcutaneous injection 5,000 Units, 5,000 Units, Subcutaneous, Q8H ETHAN, LAN Trinidad, 5,000 Units at 07/11/24 0513    insulin lispro (HumALOG/ADMELOG) 100 units/mL subcutaneous injection 1-6 Units, 1-6 Units, Subcutaneous, TID AC, 2 Units at 07/11/24 1154 **AND** Fingerstick Glucose (POCT), , , TID AC, LAN Trinidad    insulin lispro (HumALOG/ADMELOG) 100 units/mL subcutaneous injection 1-6 Units, 1-6 Units, Subcutaneous, HS, LAN Trinidad, 6 Units at 07/10/24 2133    insulin lispro (HumALOG/ADMELOG) 100 units/mL subcutaneous injection 3 Units, 3 Units, Subcutaneous, TID With Meals, LAN Trinidad, 3 Units at 07/11/24 1153    lanthanum (FOSRENOL) chewable tablet 500 mg, 500 mg, Oral, TID With Meals, LAN Trinidad, 500 mg at 07/11/24 1153    lidocaine (LMX) 4 % cream, , Topical, Once per day on Monday Wednesday Friday, LAN Trinidad    losartan (COZAAR) tablet 100 mg, 100 mg, Oral, Daily, LAN Trinidad, 100 mg at 07/11/24 1009    QUEtiapine (SEROquel) tablet 12.5 mg, 12.5 mg, Oral, HS, LAN Trinidad, 12.5 mg at 07/10/24 9485    Laboratory Results:  Results from last 7 days   Lab Units 07/11/24  0512 07/10/24  1012   WBC Thousand/uL 6.63 5.59   HEMOGLOBIN g/dL 11.6* 11.5*   HEMATOCRIT % 37.5 36.8   PLATELETS Thousands/uL 86* 117*   POTASSIUM mmol/L 5.0 5.6*   CHLORIDE mmol/L 93* 95*   CO2 mmol/L 28 28   BUN mg/dL 40* 68*   CREATININE mg/dL 7.88* 10.63*   CALCIUM mg/dL 9.8 9.3   MAGNESIUM mg/dL 2.3 2.7   PHOSPHORUS mg/dL 5.5*  --         I have personally reviewed the blood work as stated above and in my note.  I have personally reviewed internal medicine note.

## 2024-07-11 NOTE — ASSESSMENT & PLAN NOTE
With dizziness especially post dialysis sessions which has been progressively worse since last Thursday  Unclear etiology.  The patient noted to have bradycardia with heart rate in the 50s with elevated blood pressure. This can possibly contribute to his symptoms.   Echocardiogram on 6/17/2024 shows LVEF of 55%.  Grade 1 diastolic dysfunction.  No serious arrhythmia noted   Much improved   Continue carvedilol at a decrease dose of 6.25 mg twice daily with holding parameters  Orthostatic blood pressure- negative   PT/OT input appreciated- outpatient PT/OT

## 2024-07-11 NOTE — PHYSICAL THERAPY NOTE
PHYSICAL THERAPY EVALUATION  NAME:  Babak Mike  DATE: 07/11/24    AGE:   74 y.o.  Mrn:   [id number]  ADMIT DX:  Sinus bradycardia [R00.1]  HTN (hypertension) [I10]  Weakness generalized [R53.1]  DM (diabetes mellitus) ([facility]) [E11.9]  ESRD (end stage renal disease) ([facility]) [N18.6]  Problem List:   Patient Active Problem List   Diagnosis    ESRD (end stage renal disease) ([facility])    Hypertensive urgency    Type 2 diabetes mellitus without complication, with long-term current use of insulin ([facility])    Mixed hyperlipidemia    Elevated troponin    TIA (transient ischemic attack)    SIVA (obstructive sleep apnea)    Snoring    Numbness and tingling in both hands    Dependence on renal dialysis ([facility])    Flash pulmonary edema ([facility])    SIRS (systemic inflammatory response syndrome) ([facility])    Acute respiratory failure with hypoxia ([facility])    Hypertensive emergency    Left arm pain    CAD (coronary artery disease)    Anemia    Chronic diastolic (congestive) heart failure ([facility])    Asymptomatic bilateral carotid artery stenosis    PAD (peripheral artery disease) ([facility])    Bilateral lower extremity edema    Dizziness    Weakness generalized    Primary hypertension       Past Medical History  Past Medical History:   Diagnosis Date    Coronary artery disease     Diabetes mellitus     End stage renal disease     Hyperlipidemia     Hypertension     NSTEMI (non-ST elevated myocardial infarction) 2012    TIA (transient ischemic attack)        Past Surgical History  Past Surgical History:   Procedure Laterality Date    CARDIAC CATHETERIZATION  2017    70% lesion of a small R PDA and a 99% stenosis of D1 (previously noted on the cath in 2012).  Neither lesion was amenable to PCI.  Medical management.    DIALYSIS FISTULA CREATION         Length Of Stay: 0  Performed at least 2 patient identifiers during session: Name and ID bracelet       07/11/24 0909   PT Last Visit   PT Visit Date 07/11/24   Note Type   Note type Evaluation   Pain Assessment    Pain Assessment Tool 0-10   Pain Score No Pain   Restrictions/Precautions   Weight Bearing Precautions Per Order No   Other Precautions Limb alert;Telemetry   Home Living   Type of Home House   Home Layout Two level;Bed/bath upstairs;Stairs to enter with rails  (1 ROSANNE)   Bathroom Shower/Tub Tub/shower unit   Bathroom Toilet Standard   Bathroom Equipment Grab bars in shower;Shower chair   Home Equipment Walker;Cane  (no AD used at baseline)   Prior Function   Level of [place] Independent with ADLs;Independent with functional mobility;Needs assistance with IADLS   Lives With Spouse   Receives Help From Family   IADLs Independent with medication management;Family/Friend/Other provides transportation;Family/Friend/Other provides meals   Falls in the last 6 months 0   Vocational Retired   General   Family/Caregiver Present No   Cognition   Overall Cognitive Status WFL   Arousal/Participation Alert   Attention Within functional limits   Orientation Level Oriented X4   Memory Within functional limits   Following Commands Follows all commands and directions without difficulty   RLE Assessment   RLE Assessment WFL   LLE Assessment   LLE Assessment WFL   Vision-Basic Assessment   Current Vision Wears glasses only for reading   Coordination   Sensation WFL   Bed Mobility   Supine to Sit 5  Supervision   Additional items HOB elevated   Sit to Supine 5  Supervision   Additional Comments pt reported dizziness with transitional movement; BP sitting 94/58 standing 113/58   Transfers   Sit to Stand 5  Supervision   Additional items Assist x 1;Verbal cues;Increased time required   Stand to Sit 5  Supervision   Additional items Assist x 1;Verbal cues;Increased time required   Ambulation/Elevation   Gait pattern Improper Weight shift;Decreased foot clearance   Gait Assistance 5  Supervision   Additional items Assist x 1;Verbal cues   Assistive Device Rolling walker   Distance 250 ft   Ambulation/Elevation Additional Comments pt  requested use of RW due to gerneralized unsteadiness   Balance   Static Sitting Normal   Dynamic Sitting Good   Static Standing Fair   Dynamic Standing Fair -   Ambulatory Fair -   Endurance Deficit   Endurance Deficit Yes   Endurance Deficit Description pt reported fatigue with activity   Activity Tolerance   Activity Tolerance Patient limited by fatigue   Assessment   Prognosis Good   Assessment Pt is 74 y.o. male seen for PT evaluation s/p admit [facility]10[facility] w/ Weakness generalized. PT consulted to assess pt's functional mobility and d/c needs. Order placed for PT eval and tx, w/ up and OOB as tolerated order. Pt agreeable to PT  session upon arrival, pt found supine in bed.  PTA, pt was independent w/ all functional mobility w/ no AD, has 1 ROSANNE, lives w/ wife in 2 level home, and retired.  Pt to benefit from continued PT tx to address deficits and maximize level of functional independent mobility and consistency. Upon conclusion pt  supine in bed. Complexity: Comorbidities affecting pt's physical performance at time of assessment include: CHF, DM, and CAD. Personal factors affecting pt at time of IE include: ambulating with assistive device and steps to enter home. Please find objective findings from PT assessment regarding body systems outlined above with impairments and limitations including impaired balance, decreased endurance, gait deviations, decreased activity tolerance, decreased functional mobility tolerance, and fall risk.  Pt's clinical presentation is currently unstable/unpredictable seen in pt's presentation of hypotension, abnormal renal values, abnormal sodium levels, and abnormal blood sugar levels. The patient's AM-PAC Basic Mobility Inpatient Short Form Raw Score is 19.  Based on patient presentations and impairments, pt would most appropriately benefit from Level 3  resource intensity upon discharge. Please also refer to the recommendation of the Physical Therapist for  safe discharge planning. RN verbalized pt appropriate for PT session. Pt seen as a co-eval with OT due to the patient's co-morbidities, clinically unstable presentation, and present impairments which are a regression from the patient's baseline.   Barriers to Discharge Inaccessible home environment   Goals   Patient Goals to get more steady   LTG Expiration Date 07/21/24   Long Term Goal #1 Pt will: Perform bed mobility tasks to modified I to improve ease of bed mobility. Perform transfers to modified I to improve ease of transfers. Perform ambulation with MI and LRAD for 250 feet to increase Indep in home environment. Increase dynamic standing balance to F+ to decrease fall risk. Increase OOB activity tolerance to 10 minutes without s/s of exertion to decrease fall risk. Navigate up and down  1 step with MI so patient can enter and exit home.   Plan   Treatment/Interventions Functional transfer training;Therapeutic exercise;Endurance training;Gait training;Bed mobility;Equipment eval/education;Elevations   PT Frequency 3-5x/wk   Discharge Recommendation   Rehab Resource Intensity Level, PT III (Minimum Resource Intensity)   Equipment Recommended Walker   Walker Package Recommended Wheeled walker   AM-PAC Basic Mobility Inpatient   Turning in Flat Bed Without Bedrails 4   Lying on Back to Sitting on Edge of Flat Bed Without Bedrails 3   Moving Bed to Chair 3   Standing Up From Chair Using Arms 3   Walk in Room 3   Climb 3-5 Stairs With Railing 3   Basic Mobility Inpatient Raw Score 19   Basic Mobility Standardized Score 42.48   [facility] Highest Level Of Mobility   -HLM Goal 6: Walk 10 steps or more   JH-HLM Achieved 8: Walk 250 feet ot more       Time In: 0908  Time Out: 0927  Total Evaluation Minutes: 19    Amy Edwards, PT

## 2024-07-11 NOTE — DISCHARGE INSTR - AVS FIRST PAGE
Medication changes-  Carvedilol decreased to 6.25 mg twice daily    Please monitor your blood pressure regularly at home at least once daily at the same time and start blood pressure diary    Follow-up with PCP

## 2024-07-11 NOTE — PLAN OF CARE
Problem: OCCUPATIONAL THERAPY ADULT  Goal: Performs self-care activities at highest level of function for planned discharge setting.  See evaluation for individualized goals.  Description: Treatment Interventions: ADL retraining, Endurance training, Equipment evaluation/education, Compensatory technique education, Energy conservation, Activityengagement          See flowsheet documentation for full assessment, interventions and recommendations.   Note: Limitation: Decreased ADL status, Decreased endurance, Decreased self-care trans, Decreased high-level ADLs  Prognosis: Good  Assessment: Patient is a 74 y.o. male seen for OT evaluation s/p admit to  [facility] on 7/10/2024 w/Weakness generalized + commorbidities/PMHx (as listed in medical record) affecting patient's functional performance c ADL tasks at time of assessment. OT orders placed for evaluation and treatment to assess pt's ADLs, cognitive status + performance during functional tasks in order to formulate appropriate d/c recommendations.     Therapist performed at least two patient identifiers during session including name and wristband. Personal factors affecting patient at time of initial evaluation include: step(s) to enter environment, inability to perform ADLs, new need for AD, and inability to navigate community distances.   Pt's clinical presentation is currently unstable/unpredictable given new onset deficits that effect pt's occupational performance and ability to safely return to OF including decrease standing balance, decrease performance during ADL tasks, decrease activity engagement, and decrease performance during functional transfers combined with medical complications of hypertension , hypotension, poor blood pressure control, abnormal renal lab values, abnormal CBC, abnormal blood sugars, abnormal potassium values, and need for input for mobility technique/safety. This evaluation required an extensive review of medical and/or  therapy records and additional review of physical, cognitive and psychosocial history related to functional performance. Based upon functional performance deficits and assessments, this evaluation has been identified as a  high complexity evaluation.      Patient to benefit from continued Occupational Therapy treatment while in the hospital to address aforementioned deficits and maximize level of functional independence with ADLs and functional mobility. Pt currently demonstrates WFL ability to collaboratively engage in d/c planning.     Rehab Resource Intensity Level, OT: III (Minimum Resource Intensity)

## 2024-07-11 NOTE — ASSESSMENT & PLAN NOTE
Lab Results   Component Value Date    HGBA1C 8.5 (H) 07/10/2024       Recent Labs     07/10/24  1808 07/10/24  2116 07/11/24  0626 07/11/24  1118   POCGLU 164* 360* 159* 202*       Blood Sugar Average: Last 72 hrs:  (P) 214  Home regimen-meal coverage with humalog   Resume home regimen

## 2024-07-11 NOTE — ASSESSMENT & PLAN NOTE
Lab Results   Component Value Date    EGFR 6 07/11/2024    EGFR 4 07/10/2024    EGFR 6 (L) 03/19/2024    CREATININE 7.88 (H) 07/11/2024    CREATININE 10.63 (H) 07/10/2024    CREATININE 9.05 (H) 03/19/2024   Patient dialysis on Monday, Wednesday, and Friday  Nephrology input appreciated

## 2024-07-11 NOTE — OCCUPATIONAL THERAPY NOTE
Occupational Therapy Evaluation     Patient Name: Babak Mike  Today's Date: 7/11/2024  Problem List  Principal Problem:    Weakness generalized  Active Problems:    ESRD (end stage renal disease) ([facility])    Type 2 diabetes mellitus without complication, with long-term current use of insulin ([facility])    SIVA (obstructive sleep apnea)    Chronic diastolic (congestive) heart failure ([facility])    PAD (peripheral artery disease) ([facility])    Primary hypertension    Past Medical History  Past Medical History:   Diagnosis Date    Coronary artery disease     Diabetes mellitus     End stage renal disease     Hyperlipidemia     Hypertension     NSTEMI (non-ST elevated myocardial infarction) 2012    TIA (transient ischemic attack)      Past Surgical History  Past Surgical History:   Procedure Laterality Date    CARDIAC CATHETERIZATION  2017    70% lesion of a small R PDA and a 99% stenosis of D1 (previously noted on the cath in 2012).  Neither lesion was amenable to PCI.  Medical management.    DIALYSIS FISTULA CREATION           07/10/24 2790   OT Last Visit   OT Visit Date 07/10/24   Note Type   Note type Evaluation   Additional Comments Nsg staff verbally cleared pt for OT evaluation.  Pt received  supine in bed on this date reporting no pain + is agreeable to OT session @ this time. @ exit, pt remains in bed c all lines intact, all needs met, call bell in hand + nsg aware of location/disposition.   Pain Assessment   Pain Assessment Tool 0-10   Pain Score No Pain   Restrictions/Precautions   Weight Bearing Precautions Per Order No   Other Precautions Limb alert;Telemetry  (LUE limb alert)   Home Living   Type of Home House   Home Layout Two level;Bed/bath upstairs;Stairs to enter with rails  ((1 ROSANNE, 13 steps to 2nd floor ))   Bathroom Shower/Tub Tub/shower unit   Bathroom Toilet Standard   Bathroom Equipment Grab bars in shower;Shower chair   Bathroom Accessibility Accessible   Home Equipment Walker;Cane  ((no AD used at baseline  "))   Prior Function   Level of [place] Independent with ADLs;Independent with functional mobility;Needs assistance with IADLS   Lives With Spouse   Receives Help From Family   IADLs Independent with medication management;Family/Friend/Other provides transportation;Family/Friend/Other provides meals   Falls in the last 6 months 0   Vocational Retired   Lifestyle   Autonomy Pt lives in  2 story home c wife + @ baseline, performs ADLs  I'ly, IADLs with A + fxl mobility I'ly without AD. (-) . Pt is retired.   Reciprocal Relationships 2 children, 4 grand children   Service to Others Retired   Intrinsic Gratification enjoys reading and watching TV   General   Additional General Comments HD MWF   Subjective   Subjective \"I have had a walker prescribed to me in the past.. when I injured my right foot\"   ADL   Eating Assistance 7  Independent   Grooming Assistance 7  Independent   UB Bathing Assistance 7  Independent   LB Bathing Assistance 4  Minimal Assistance   UB Dressing Assistance 7  Independent   LB Dressing Assistance 3  Moderate Assistance   Toileting Assistance  4  Minimal Assistance   Additional Comments Demonstrates difficulty performing bend / reach during LB D tasks; could benefit from LB AE, however, reports wife is able to A PRN.   Bed Mobility   Supine to Sit 5  Supervision   Additional items HOB elevated   Sit to Supine 5  Supervision   Transfers   Sit to Stand 5  Supervision   Stand to Sit 5  Supervision   Additional Comments RW provided  (BP 94/58 seated EOB + 113/58 while standing)   Functional Mobility   Additional Comments Pt performed short distance ADL related fxl mobility in room/hallway c CGA, with RW simulating toileting distances.   No overt LOB demonstrated + pt denies pain /  denies SOB/ denies dizziness during transitional movements. Reports feeling minimally below baseline c abilities related to ADL-focused fxl mobility and confident c new use of AD (RW). G safety awareness noted " while navigating t/o room. Safely returns to bed for remainder of session.   Balance   Static Sitting Normal   Dynamic Sitting Good   Static Standing Fair   Dynamic Standing Fair -   Ambulatory Fair -   Activity Tolerance   Activity Tolerance Patient tolerated treatment well   Medical Staff Made Aware PT/OT co-eval on this date d/t medical complexity, ambulatory dysfunction c high fall risk, various impeding lines + requirement for skilled interdisciplinary analysis of appropriate d/c recommendations. PT/OT POC/goals I'ly determined per respective discipline. (Amy)   Nurse Made Aware Medical staff made aware of current fxn, recommendations for d/c planning, fall risk + pt's location upon exit. (Maritza)   RUE Assessment   RUE Assessment WFL   LUE Assessment   LUE Assessment WFL   Hand Function   Gross Motor Coordination Functional   Fine Motor Coordination Functional   Vision-Basic Assessment   Current Vision Wears glasses only for reading   Psychosocial   Psychosocial (WDL) WDL   Cognition   Overall Cognitive Status WFL   Arousal/Participation Alert;Responsive;Cooperative   Attention Within functional limits   Orientation Level Oriented X4   Memory Within functional limits   Following Commands Follows all commands and directions without difficulty   Assessment   Limitation Decreased ADL status;Decreased endurance;Decreased self-care trans;Decreased high-level ADLs   Prognosis Good   Assessment Patient is a 74 y.o. male seen for OT evaluation s/p admit to  [facility] on 7/10/2024 w/Weakness generalized + commorbidities/PMHx (as listed in medical record) affecting patient's functional performance c ADL tasks at time of assessment. OT orders placed for evaluation and treatment to assess pt's ADLs, cognitive status + performance during functional tasks in order to formulate appropriate d/c recommendations.     Therapist performed at least two patient identifiers during session including name and wristband.  Personal factors affecting patient at time of initial evaluation include: step(s) to enter environment, inability to perform ADLs, new need for AD, and inability to navigate community distances.   Pt's clinical presentation is currently unstable/unpredictable given new onset deficits that effect pt's occupational performance and ability to safely return to OF including decrease standing balance, decrease performance during ADL tasks, decrease activity engagement, and decrease performance during functional transfers combined with medical complications of hypertension , hypotension, poor blood pressure control, abnormal renal lab values, abnormal CBC, abnormal blood sugars, abnormal potassium values, and need for input for mobility technique/safety. This evaluation required an extensive review of medical and/or therapy records and additional review of physical, cognitive and psychosocial history related to functional performance. Based upon functional performance deficits and assessments, this evaluation has been identified as a  high complexity evaluation.      Patient to benefit from continued Occupational Therapy treatment while in the hospital to address aforementioned deficits and maximize level of functional independence with ADLs and functional mobility. Pt currently demonstrates WFL ability to collaboratively engage in d/c planning.   Plan   Treatment Interventions ADL retraining;Endurance training;Equipment evaluation/education;Compensatory technique education;Energy conservation;Activityengagement   Goal Expiration Date 07/21/24   OT Treatment Day 0   OT Frequency 2-3x/wk   Discharge Recommendation   Rehab Resource Intensity Level, OT III (Minimum Resource Intensity)   AM-PAC Daily Activity Inpatient   Lower Body Dressing 2   Bathing 2   Toileting 3   Upper Body Dressing 4   Grooming 4   Eating 4   Daily Activity Raw Score 19   Daily Activity Standardized Score (Calc for Raw Score >=11) 40.22   AM-PAC  Applied Cognition Inpatient   Following a Speech/Presentation 4   Understanding Ordinary Conversation 4   Taking Medications 4   Remembering Where Things Are Placed or Put Away 4   Remembering List of 4-5 Errands 4   Taking Care of Complicated Tasks 4   Applied Cognition Raw Score 24   Applied Cognition Standardized Score 62.21   Barthel Index   Feeding 10   Bathing 0   Grooming Score 5   Dressing Score 5   Bladder Score 10   Bowels Score 10   Toilet Use Score 5   Transfers (Bed/Chair) Score 10   Mobility (Level Surface) Score 10   Stairs Score 5   Barthel Index Score 70   End of Consult   Patient Position at End of Consult Supine;All needs within reach   Nurse Communication Nurse aware of consult     The patient's raw score on the AM-PAC Daily Activity inpatient short form is 19, standardized score is 40.22, greater than 39.4. Please refer to the recommendation of the Occupational Therapist for safe DC planning.    Resource Intensity Recommendation: Level III (Minimum Resource Intensity)        GOALS:    *ADL transfers with (I) for inc'd independence with ADLs/purposeful tasks    *UB ADL with (I) for inc'd independence with self cares    *LB ADL with (I) using AE prn for inc'd independence with self cares    *Toileting with (I) for clothing management and hygiene for return to PLOF with personal care    *Increase stand tolerance x 10  m for inc'd tolerance with standing purposeful tasks    *Participate in 10m UE therex to increase overall stamina/activity tolerance for purposeful tasks    *Bed mobility- (I) for inc'd independence to manage own comfort and initiate EOB & OOB purposeful tasks    *Patient will verbalize 3 safety awareness/ principles to prevent falls in the home setting.     *Patient will verbalize and demonstrate use of energy conservation/deep breathing techniques and work simplification skills during functional activities with no verbal cues.     *Patient will increase OOB/sitting  tolerance to 2-4 hours per day to increase participation in self-care and leisure tasks with no s/s of exertion.     *Patient will engage in ongoing cognitive assessment to assist with safe discharge planning/recommendations.     *Pt will verbalize and demonstrate understanding of post-op movement precautions 100% (if applicable) in tx sessions for increased safety and functional mobility.      *Pt will demonstrate use of long handled AE (if appropriate) during 100% of tx sessions for increased ADL safety and independence following D/C     Arianna Tobar OTR/L

## 2024-07-11 NOTE — NURSING NOTE
Pt DC to home via wife in stable condition. Belongings packed by this writer and sent with pt. IV catheter removed fully intact. AVS reviewed with pt face-to-face, and education provided on how to monitor BPs daily and changes to current medications. No further questions from pt at the time of DC.

## 2024-07-11 NOTE — ASSESSMENT & PLAN NOTE
Presented with hypertensive urgency BP of 206/93  Continue with current regimen   Much improved   Continue to follow up outpatient for further monitoring and medication adjustment

## 2024-07-11 NOTE — PLAN OF CARE
Problem: PAIN - ADULT  Goal: Verbalizes/displays adequate comfort level or baseline comfort level  Description: Interventions:  - Encourage patient to monitor pain and request assistance  - Assess pain using appropriate pain scale  - Administer analgesics based on type and severity of pain and evaluate response  - Implement non-pharmacological measures as appropriate and evaluate response  - Consider cultural and social influences on pain and pain management  - Notify physician/advanced practitioner if interventions unsuccessful or patient reports new pain  7/11/2024 1349 by Maritza Hernandez RN  Outcome: Adequate for Discharge  7/11/2024 0835 by Maritza Hernandez RN  Outcome: Progressing     Problem: INFECTION - ADULT  Goal: Absence or prevention of progression during hospitalization  Description: INTERVENTIONS:  - Assess and monitor for signs and symptoms of infection  - Monitor lab/diagnostic results  - Monitor all insertion sites, i.e. indwelling lines, tubes, and drains  - Monitor endotracheal if appropriate and nasal secretions for changes in amount and color  - [place] appropriate cooling/warming therapies per order  - Administer medications as ordered  - Instruct and encourage patient and family to use good hand hygiene technique  - Identify and instruct in appropriate isolation precautions for identified infection/condition  7/11/2024 1349 by Maritza Hernandez RN  Outcome: Adequate for Discharge  7/11/2024 0835 by Maritza Hernandez RN  Outcome: Progressing  Goal: Absence of fever/infection during neutropenic period  Description: INTERVENTIONS:  - Monitor WBC    7/11/2024 1349 by Maritza Hernandez RN  Outcome: Adequate for Discharge  7/11/2024 0835 by Maritza Hernandez RN  Outcome: Progressing     Problem: SAFETY ADULT  Goal: Patient will remain free of falls  Description: INTERVENTIONS:  - Educate patient/family on patient safety including physical limitations  - Instruct patient to call for assistance with activity   - Consult  OT/PT to assist with strengthening/mobility   - Keep Call bell within reach  - Keep bed low and locked with side rails adjusted as appropriate  - Keep care items and personal belongings within reach  - Initiate and maintain comfort rounds  - Make Fall Risk Sign visible to staff  - Offer Toileting every  Hours, in advance of need  - Initiate/Maintain alarm  - Obtain necessary fall risk management equipment  - Apply yellow socks and bracelet for high fall risk patients  - Consider moving patient to room near nurses station  7/11/2024 1349 by Maritza Hernandez RN  Outcome: Adequate for Discharge  7/11/2024 0835 by Maritza Hernandez RN  Outcome: Progressing  Goal: Maintain or return to baseline ADL function  Description: INTERVENTIONS:  -  Assess patient's ability to carry out ADLs; assess patient's baseline for ADL function and identify physical deficits which impact ability to perform ADLs (bathing, care of mouth/teeth, toileting, grooming, dressing, etc.)  - Assess/evaluate cause of self-care deficits   - Assess range of motion  - Assess patient's mobility; develop plan if impaired  - Assess patient's need for assistive devices and provide as appropriate  - Encourage maximum independence but intervene and supervise when necessary  - Involve family in performance of ADLs  - Assess for home care needs following discharge   - Consider OT consult to assist with ADL evaluation and planning for discharge  - Provide patient education as appropriate  7/11/2024 1349 by Maritza Hernandez RN  Outcome: Adequate for Discharge  7/11/2024 0835 by Maritza Hernandez RN  Outcome: Progressing     Problem: DISCHARGE PLANNING  Goal: Discharge to home or other facility with appropriate resources  Description: INTERVENTIONS:  - Identify barriers to discharge w/patient and caregiver  - Arrange for needed discharge resources and transportation as appropriate  - Identify discharge learning needs (meds, wound care, etc.)  - Arrange for interpretive services to  assist at discharge as needed  - Refer to Case Management Department for coordinating discharge planning if the patient needs post-hospital services based on physician/advanced practitioner order or complex needs related to functional status, cognitive ability, or social support system  7/11/2024 1349 by Maritza Hernandez RN  Outcome: Adequate for Discharge  7/11/2024 0835 by Maritza Hernandez RN  Outcome: Progressing     Problem: CARDIOVASCULAR - ADULT  Goal: Maintains optimal cardiac output and hemodynamic stability  Description: INTERVENTIONS:  - Monitor I/O, vital signs and rhythm  - Monitor for S/S and trends of decreased cardiac output  - Administer and titrate ordered vasoactive medications to optimize hemodynamic stability  - Assess quality of pulses, skin color and temperature  - Assess for signs of decreased coronary artery perfusion  - Instruct patient to report change in severity of symptoms  7/11/2024 1349 by Maritza Hernandez RN  Outcome: Adequate for Discharge  7/11/2024 0835 by Maritza Hernandez RN  Outcome: Progressing  Goal: Absence of cardiac dysrhythmias or at baseline rhythm  Description: INTERVENTIONS:  - Continuous cardiac monitoring, vital signs, obtain 12 lead EKG if ordered  - Administer antiarrhythmic and heart rate control medications as ordered  - Monitor electrolytes and administer replacement therapy as ordered  7/11/2024 1349 by Maritza Hernandez RN  Outcome: Adequate for Discharge  7/11/2024 0835 by Maritza Hernandez RN  Outcome: Progressing     Problem: RESPIRATORY - ADULT  Goal: Achieves optimal ventilation and oxygenation  Description: INTERVENTIONS:  - Assess for changes in respiratory status  - Assess for changes in mentation and behavior  - Position to facilitate oxygenation and minimize respiratory effort  - Oxygen administered by appropriate delivery if ordered  - Initiate smoking cessation education as indicated  - Encourage broncho-pulmonary hygiene including cough, deep breathe, Incentive Spirometry  -  Assess the need for suctioning and aspirate as needed  - Assess and instruct to report SOB or any respiratory difficulty  - Respiratory Therapy support as indicated  7/11/2024 1349 by Maritza Hernandez RN  Outcome: Adequate for Discharge  7/11/2024 0835 by Maritza Hernandez RN  Outcome: Progressing     Problem: METABOLIC, FLUID AND ELECTROLYTES - ADULT  Goal: Fluid balance maintained  Description: INTERVENTIONS:  - Monitor labs   - Monitor I/O and WT  - Instruct patient on fluid and nutrition as appropriate  - Assess for signs & symptoms of volume excess or deficit  7/11/2024 1349 by Maritza Hernandez RN  Outcome: Adequate for Discharge  7/11/2024 0835 by Maritza Hernandez RN  Outcome: Progressing  Goal: Glucose maintained within target range  Description: INTERVENTIONS:  - Monitor Blood Glucose as ordered  - Assess for signs and symptoms of hyperglycemia and hypoglycemia  - Administer ordered medications to maintain glucose within target range  - Assess nutritional intake and initiate nutrition service referral as needed  7/11/2024 1349 by Maritza Hernandez RN  Outcome: Adequate for Discharge  7/11/2024 0835 by Maritza Hernandez RN  Outcome: Progressing

## 2024-07-11 NOTE — ASSESSMENT & PLAN NOTE
Wt Readings from Last 3 Encounters:   07/11/24 92.7 kg (204 lb 5.9 oz)   10/31/23 96.3 kg (212 lb 3.2 oz)   08/01/23 96.2 kg (212 lb)     Currently compensated  Volume management with dialysis  Continue medical management

## 2024-07-11 NOTE — PLAN OF CARE
Problem: PAIN - ADULT  Goal: Verbalizes/displays adequate comfort level or baseline comfort level  Description: Interventions:  - Encourage patient to monitor pain and request assistance  - Assess pain using appropriate pain scale  - Administer analgesics based on type and severity of pain and evaluate response  - Implement non-pharmacological measures as appropriate and evaluate response  - Consider cultural and social influences on pain and pain management  - Notify physician/advanced practitioner if interventions unsuccessful or patient reports new pain  Outcome: Progressing     Problem: CARDIOVASCULAR - ADULT  Goal: Maintains optimal cardiac output and hemodynamic stability  Description: INTERVENTIONS:  - Monitor I/O, vital signs and rhythm  - Monitor for S/S and trends of decreased cardiac output  - Administer and titrate ordered vasoactive medications to optimize hemodynamic stability  - Assess quality of pulses, skin color and temperature  - Assess for signs of decreased coronary artery perfusion  - Instruct patient to report change in severity of symptoms  Outcome: Progressing     Problem: RESPIRATORY - ADULT  Goal: Achieves optimal ventilation and oxygenation  Description: INTERVENTIONS:  - Assess for changes in respiratory status  - Assess for changes in mentation and behavior  - Position to facilitate oxygenation and minimize respiratory effort  - Oxygen administered by appropriate delivery if ordered  - Initiate smoking cessation education as indicated  - Encourage broncho-pulmonary hygiene including cough, deep breathe, Incentive Spirometry  - Assess the need for suctioning and aspirate as needed  - Assess and instruct to report SOB or any respiratory difficulty  - Respiratory Therapy support as indicated  Outcome: Progressing     Problem: METABOLIC, FLUID AND ELECTROLYTES - ADULT  Goal: Fluid balance maintained  Description: INTERVENTIONS:  - Monitor labs   - Monitor I/O and WT  - Instruct patient on  fluid and nutrition as appropriate  - Assess for signs & symptoms of volume excess or deficit  Outcome: Progressing

## 2024-07-11 NOTE — DISCHARGE SUMMARY
[facility]  Discharge- Babak Mike 1950, 74 y.o. male MRN: [id number]  Unit/Bed#: -[facility] Encounter: [id number]  Primary Care Provider: LAN Escobedo   Date and time admitted to hospital: 7/10/2024  9:35 AM    * Weakness generalized  Assessment & Plan  With dizziness especially post dialysis sessions which has been progressively worse since last Thursday  Unclear etiology.  The patient noted to have bradycardia with heart rate in the 50s with elevated blood pressure. This can possibly contribute to his symptoms.   Echocardiogram on 6/17/2024 shows LVEF of 55%.  Grade 1 diastolic dysfunction.  No serious arrhythmia noted   Much improved   Continue carvedilol at a decrease dose of 6.25 mg twice daily with holding parameters  Orthostatic blood pressure- negative   PT/OT input appreciated- outpatient PT/OT     Primary hypertension  Assessment & Plan  Presented with hypertensive urgency BP of 206/93  Continue with current regimen   Much improved   Continue to follow up outpatient for further monitoring and medication adjustment     PAD (peripheral artery disease) (HCC)  Assessment & Plan  Continue blood pressure control  Continue aspirin     Chronic diastolic (congestive) heart failure (HCC)  Assessment & Plan  Wt Readings from Last 3 Encounters:   07/11/24 92.7 kg (204 lb 5.9 oz)   10/31/23 96.3 kg (212 lb 3.2 oz)   08/01/23 96.2 kg (212 lb)     Currently compensated  Volume management with dialysis  Continue medical management            SIVA (obstructive sleep apnea)  Assessment & Plan  CPAP at bedtime    Type 2 diabetes mellitus without complication, with long-term current use of insulin ([facility])  Assessment & Plan  Lab Results   Component Value Date    HGBA1C 8.5 (H) 07/10/2024       Recent Labs     07/10/24  1808 07/10/24  2116 07/11/24  0626 07/11/24  1118   POCGLU 164* 360* 159* 202*       Blood Sugar Average: Last 72 hrs:  (P) 214  Home regimen-meal coverage with humalog    Resume home regimen       ESRD (end stage renal disease) ([facility])  Assessment & Plan  Lab Results   Component Value Date    EGFR 6 07/11/2024    EGFR 4 07/10/2024    EGFR 6 (L) 03/19/2024    CREATININE 7.88 (H) 07/11/2024    CREATININE 10.63 (H) 07/10/2024    CREATININE 9.05 (H) 03/19/2024   Patient dialysis on Monday, Wednesday, and Friday  Nephrology input appreciated       Discharging Physician / Practitioner: LAN Trinidad  PCP: LAN Escobedo  Admission Date:   Admission Orders (From admission, onward)       Ordered        07/10/24 1145  Place in Observation  Once                          Discharge Date: 07/11/24    Reason for Admission: Generalized weakness and dizziness    Discharge Diagnoses:     Principal Problem:    Weakness generalized  Active Problems:    ESRD (end stage renal disease) ([facility])    Type 2 diabetes mellitus without complication, with long-term current use of insulin ([facility])    SIVA (obstructive sleep apnea)    Chronic diastolic (congestive) heart failure ([facility])    PAD (peripheral artery disease) ([facility])    Primary hypertension  Resolved Problems:    * No resolved hospital problems. *      Consultations During Hospital Stay:  IP CONSULT TO NEPHROLOGY  IP CONSULT TO CASE MANAGEMENT    Procedures Performed:     None    Significant Findings / Test Results:     CT head without contrast  Result Date: 7/10/2024  No acute intracranial abnormality.  Chronic microangiopathic changes.     Incidental Findings:   None      Test Results Pending at Discharge (will require follow up):   None      Outpatient Tests Requested:  Follow up with PCP and nephrology     Complications:  none     Hospital Course:     Babak Mike is a 74 y.o. male patient who originally presented to the hospital on 7/10/2024 due to generalized weakness and dizziness.  The patient subsequently was admitted with worsening generalized weakness and dizziness especially postdialysis sessions since Thursday last week.  His blood  work fortunately was reassuring.  He received a session of dialysis on 7/10 without complication.  He was noted to have bradycardia with heart rate in the low 50s and elevated blood pressure.  He was monitor on telemetry and evaluated by PT/OT.  On the discharge day, patient's symptoms have nearly resolved.  He is feeling well and would like to go home.  Suspect that his presenting symptoms can like be related to bradycardia in which carvedilol dosing was decreased to 6.25 mg twice daily instead of 25 mg twice daily.  The patient is aware that the medication may need to be adjusted and to follow-up with his PCP.    Condition at Discharge: good     Discharge Day Visit / Exam:     Subjective: Patient was seen and examined.  He states that he is feeling much better.  He denies any dizziness or lightheadedness currently.  He would like to go home.    Vitals: Blood Pressure: 113/60 (07/11/24 1119)  Pulse: 61 (07/11/24 1119)  Temperature: (!) 97.4 °F (36.3 °C) (07/11/24 1119)  Temp Source: Oral (07/10/24 2218)  Respirations: 14 (07/11/24 1119)  Height: 6' (182.9 cm) (07/10/24 1319)  Weight - Scale: 92.7 kg (204 lb 5.9 oz) (07/11/24 0600)  SpO2: 98 % (07/11/24 1119)  Exam:   Physical Exam  Vitals and nursing note reviewed.   Constitutional:       General: He is not in acute distress.     Appearance: Normal appearance.   HENT:      Head: Normocephalic and atraumatic.      Right Ear: External ear normal.      Left Ear: External ear normal.      Nose: Nose normal.      Mouth/Throat:      Mouth: Mucous membranes are moist.      Pharynx: Oropharynx is clear.   Eyes:      General:         Right eye: No discharge.         Left eye: No discharge.      Extraocular Movements: Extraocular movements intact.      Pupils: Pupils are equal, round, and reactive to light.   Cardiovascular:      Rate and Rhythm: Normal rate and regular rhythm.      Pulses: Normal pulses.      Heart sounds: Normal heart sounds. No murmur heard.  Pulmonary:       Effort: Pulmonary effort is normal. No respiratory distress.      Breath sounds: Normal breath sounds. No wheezing or rales.   Abdominal:      General: Bowel sounds are normal. There is no distension.      Palpations: Abdomen is soft. There is no mass.      Tenderness: There is no abdominal tenderness.   Musculoskeletal:         General: No swelling, tenderness or deformity. Normal range of motion.      Cervical back: Normal range of motion and neck supple. No rigidity.      Comments: Left arm fistula positive for bruits and thrills      Skin:     General: Skin is warm and dry.      Capillary Refill: Capillary refill takes less than 2 seconds.      Coloration: Skin is not pale.      Findings: No erythema.   Neurological:      General: No focal deficit present.      Mental Status: He is alert and oriented to person, place, and time. Mental status is at baseline.   Psychiatric:         Mood and Affect: Mood normal.         Behavior: Behavior normal.         Thought Content: Thought content normal.         Judgment: Judgment normal.         Discharge instructions/Information to patient and family:   See after visit summary for information provided to patient and family.      Provisions for Follow-Up Care:  See after visit summary for information related to follow-up care and any pertinent home health orders.      Disposition:     Home with [facility] Services (Reminder: Complete face to face encounter)    Planned Readmission: no      Discharge Statement:  I spent 45 minutes discharging the patient. This time was spent on the day of discharge. I had direct contact with the patient on the day of discharge. Greater than 50% of the total time was spent examining patient, answering all patient questions, arranging and discussing plan of care with patient as well as directly providing post-discharge instructions.  Additional time then spent on discharge activities.    Discharge Medications:  See after visit summary for  reconciled discharge medications provided to patient and family.      ** Please Note: This note has been constructed using a voice recognition system **

## 2024-07-11 NOTE — PLAN OF CARE
Problem: PHYSICAL THERAPY ADULT  Goal: Performs mobility at highest level of function for planned discharge setting.  See evaluation for individualized goals.  Description: Treatment/Interventions: Functional transfer training, Therapeutic exercise, Endurance training, Gait training, Bed mobility, Equipment eval/education, Elevations  Equipment Recommended: Walker       See flowsheet documentation for full assessment, interventions and recommendations.  Outcome: Progressing  Note: Prognosis: Good     Assessment: Pt is 74 y.o. male seen for PT evaluation s/p admit to [facility] on 7/10/2024 w/ Weakness generalized. PT consulted to assess pt's functional mobility and d/c needs. Order placed for PT eval and tx, w/ up and OOB as tolerated order. Pt agreeable to PT  session upon arrival, pt found supine in bed.  PTA, pt was independent w/ all functional mobility w/ no AD, has 1 ROSANNE, lives w/ wife in 2 level home, and retired.  Pt to benefit from continued PT tx to address deficits and maximize level of functional independent mobility and consistency. Upon conclusion pt  supine in bed. Complexity: Comorbidities affecting pt's physical performance at time of assessment include: CHF, DM, and CAD. Personal factors affecting pt at time of IE include: ambulating with assistive device and steps to enter home. Please find objective findings from PT assessment regarding body systems outlined above with impairments and limitations including impaired balance, decreased endurance, gait deviations, decreased activity tolerance, decreased functional mobility tolerance, and fall risk.  Pt's clinical presentation is currently unstable/unpredictable seen in pt's presentation of hypotension, abnormal renal values, abnormal sodium levels, and abnormal blood sugar levels. The patient's AM-PAC Basic Mobility Inpatient Short Form Raw Score is 19.  Based on patient presentations and impairments, pt would most appropriately benefit  from Level 3  resource intensity upon discharge. Please also refer to the recommendation of the Physical Therapist for safe discharge planning. RN verbalized pt appropriate for PT session. Pt seen as a co-eval with OT due to the patient's co-morbidities, clinically unstable presentation, and present impairments which are a regression from the patient's baseline.  Barriers to Discharge: Inaccessible home environment     Rehab Resource Intensity Level, PT: III (Minimum Resource Intensity)    See flowsheet documentation for full assessment.

## 2024-08-21 ENCOUNTER — HOSPITAL ENCOUNTER (EMERGENCY)
Facility: HOSPITAL | Age: 74
Discharge: HOME | End: 2024-08-21
Attending: EMERGENCY MEDICINE
Payer: COMMERCIAL

## 2024-08-21 VITALS
WEIGHT: 207.89 LBS | BODY MASS INDEX: 28.16 KG/M2 | SYSTOLIC BLOOD PRESSURE: 179 MMHG | DIASTOLIC BLOOD PRESSURE: 90 MMHG | HEART RATE: 78 BPM | TEMPERATURE: 97.9 F | OXYGEN SATURATION: 97 % | RESPIRATION RATE: 18 BRPM | HEIGHT: 72 IN

## 2024-08-21 DIAGNOSIS — I10 HYPERTENSION, UNSPECIFIED TYPE: Primary | ICD-10-CM

## 2024-08-21 LAB
2HR DELTA HS TROPONIN: 3 NG/L
ALBUMIN SERPL BCG-MCNC: 4.6 G/DL (ref 3.5–5)
ALP SERPL-CCNC: 70 U/L (ref 34–104)
ALT SERPL W P-5'-P-CCNC: 10 U/L (ref 7–52)
ANION GAP SERPL CALCULATED.3IONS-SCNC: 15 MMOL/L (ref 4–13)
AST SERPL W P-5'-P-CCNC: 27 U/L (ref 13–39)
BASOPHILS # BLD AUTO: 0.03 THOUSANDS/ÂΜL (ref 0–0.1)
BASOPHILS NFR BLD AUTO: 1 % (ref 0–1)
BILIRUB SERPL-MCNC: 0.39 MG/DL (ref 0.2–1)
BUN SERPL-MCNC: 22 MG/DL (ref 5–25)
CALCIUM SERPL-MCNC: 9.9 MG/DL (ref 8.4–10.2)
CARDIAC TROPONIN I PNL SERPL HS: 19 NG/L
CARDIAC TROPONIN I PNL SERPL HS: 22 NG/L
CHLORIDE SERPL-SCNC: 89 MMOL/L (ref 96–108)
CO2 SERPL-SCNC: 29 MMOL/L (ref 21–32)
CREAT SERPL-MCNC: 5.31 MG/DL (ref 0.6–1.3)
EOSINOPHIL # BLD AUTO: 0.25 THOUSAND/ÂΜL (ref 0–0.61)
EOSINOPHIL NFR BLD AUTO: 4 % (ref 0–6)
ERYTHROCYTE [DISTWIDTH] IN BLOOD BY AUTOMATED COUNT: 16.3 % (ref 11.6–15.1)
GFR SERPL CREATININE-BSD FRML MDRD: 9 ML/MIN/1.73SQ M
GLUCOSE SERPL-MCNC: 159 MG/DL (ref 65–140)
HCT VFR BLD AUTO: 44.1 % (ref 36.5–49.3)
HGB BLD-MCNC: 13.7 G/DL (ref 12–17)
IMM GRANULOCYTES # BLD AUTO: 0.01 THOUSAND/UL (ref 0–0.2)
IMM GRANULOCYTES NFR BLD AUTO: 0 % (ref 0–2)
LYMPHOCYTES # BLD AUTO: 2.19 THOUSANDS/ÂΜL (ref 0.6–4.47)
LYMPHOCYTES NFR BLD AUTO: 35 % (ref 14–44)
MAGNESIUM SERPL-MCNC: 2.4 MG/DL (ref 1.9–2.7)
MCH RBC QN AUTO: 28.5 PG (ref 26.8–34.3)
MCHC RBC AUTO-ENTMCNC: 31.1 G/DL (ref 31.4–37.4)
MCV RBC AUTO: 92 FL (ref 82–98)
MONOCYTES # BLD AUTO: 0.77 THOUSAND/ÂΜL (ref 0.17–1.22)
MONOCYTES NFR BLD AUTO: 12 % (ref 4–12)
NEUTROPHILS # BLD AUTO: 3.03 THOUSANDS/ÂΜL (ref 1.85–7.62)
NEUTS SEG NFR BLD AUTO: 48 % (ref 43–75)
NRBC BLD AUTO-RTO: 0 /100 WBCS
PHOSPHATE SERPL-MCNC: 3.5 MG/DL (ref 2.3–4.1)
PLATELET # BLD AUTO: 123 THOUSANDS/UL (ref 149–390)
PMV BLD AUTO: 10.2 FL (ref 8.9–12.7)
POTASSIUM SERPL-SCNC: 4.5 MMOL/L (ref 3.5–5.3)
PROT SERPL-MCNC: 9.7 G/DL (ref 6.4–8.4)
RBC # BLD AUTO: 4.81 MILLION/UL (ref 3.88–5.62)
SODIUM SERPL-SCNC: 133 MMOL/L (ref 135–147)
WBC # BLD AUTO: 6.28 THOUSAND/UL (ref 4.31–10.16)

## 2024-08-21 PROCEDURE — 99284 EMERGENCY DEPT VISIT MOD MDM: CPT

## 2024-08-21 PROCEDURE — 83735 ASSAY OF MAGNESIUM: CPT | Performed by: EMERGENCY MEDICINE

## 2024-08-21 PROCEDURE — 36415 COLL VENOUS BLD VENIPUNCTURE: CPT | Performed by: EMERGENCY MEDICINE

## 2024-08-21 PROCEDURE — 84484 ASSAY OF TROPONIN QUANT: CPT | Performed by: EMERGENCY MEDICINE

## 2024-08-21 PROCEDURE — 84100 ASSAY OF PHOSPHORUS: CPT | Performed by: EMERGENCY MEDICINE

## 2024-08-21 PROCEDURE — 93005 ELECTROCARDIOGRAM TRACING: CPT

## 2024-08-21 PROCEDURE — 99284 EMERGENCY DEPT VISIT MOD MDM: CPT | Performed by: EMERGENCY MEDICINE

## 2024-08-21 PROCEDURE — 85025 COMPLETE CBC W/AUTO DIFF WBC: CPT | Performed by: EMERGENCY MEDICINE

## 2024-08-21 PROCEDURE — 80053 COMPREHEN METABOLIC PANEL: CPT | Performed by: EMERGENCY MEDICINE

## 2024-08-21 RX ORDER — CARVEDILOL 3.12 MG/1
6.25 TABLET ORAL ONCE
Status: COMPLETED | OUTPATIENT
Start: 2024-08-21 | End: 2024-08-21

## 2024-08-21 RX ORDER — CLONIDINE HYDROCHLORIDE 0.1 MG/1
0.2 TABLET ORAL ONCE
Status: COMPLETED | OUTPATIENT
Start: 2024-08-21 | End: 2024-08-21

## 2024-08-21 RX ORDER — CARVEDILOL 3.12 MG/1
6.25 TABLET ORAL 2 TIMES DAILY WITH MEALS
Status: DISCONTINUED | OUTPATIENT
Start: 2024-08-22 | End: 2024-08-21

## 2024-08-21 RX ADMIN — CARVEDILOL 6.25 MG: 3.12 TABLET, FILM COATED ORAL at 19:19

## 2024-08-21 RX ADMIN — CLONIDINE HYDROCHLORIDE 0.2 MG: 0.1 TABLET ORAL at 16:55

## 2024-08-21 NOTE — DISCHARGE INSTRUCTIONS
Return to the ER with any new, concerning, worsening issues.    Restart your high blood pressure medicines tomorrow the way they were prescribed.    Follow-up with your family doctor in 2 to 4 days for repeat evaluation.

## 2024-08-21 NOTE — ED NOTES
Provider notified of Pt Blood pressure of 220/101. No additional orders at this time      María Elkins RN  08/21/24 1805       María Elkins RN  08/21/24 180

## 2024-08-21 NOTE — ED PROVIDER NOTES
"History  Chief Complaint   Patient presents with    Hypertension     Pt had dialysis today, blood pressure was \"over 200\", vomited in waiting room     74-year-old male presents emergency department complaining of issues with his blood pressure.  Patient states that he has a history of end-stage renal disease and currently is on dialysis.  Patient relates that he did not take his medications today and upon the end of his dialysis he did become nauseated and threw up x 1.  Patient notes that this has happened to him before with dialysis.  The patient did not take his medication and was found to have a blood pressure over 200 systolic.  Patient's family brought him into the ER for evaluation.  Patient denies chest pain headache fever chills or abdominal discomfort at this time.        Prior to Admission Medications   Prescriptions Last Dose Informant Patient Reported? Taking?   Insulin Pen Needle (UltiCare Short Pen Needles) 31G X 8 MM MISC  Self Yes No   Sig: INJECT 4 TIMES DAILY;E11.9   Lokelma 10 g PACK  Self Yes No   Sig: EMPTY CONTENTS OF 1 PACKET IN 3 TABLESPOONSFUL OF WATER OR AS DIRECTED BY CLINIC. STIR WELL AND DRINK IMMEDIATELY ON NON-DIALYSIS DAYS   QUEtiapine (SEROquel) 25 mg tablet  Self Yes No   Sig: Take 12.5 mg by mouth daily at bedtime. Indications: sleep   amLODIPine (NORVASC) 10 mg tablet   No No   Sig: Take 1 tablet (10 mg total) by mouth daily at bedtime   aspirin (ECOTRIN LOW STRENGTH) 81 mg EC tablet  Self Yes No   Sig: Take 81 mg by mouth daily   atorvastatin (LIPITOR) 40 mg tablet  Self Yes No   Sig: Take 40 mg by mouth daily   calcitriol (ROCALTROL) 0.25 mcg capsule   No No   Sig: Take 3 capsules (0.75 mcg total) by mouth 3 (three) times a week   calcium acetate (PHOSLO) capsule  Self Yes No   Sig: 3 (three) times a day Not on dialysis days  Pt taking 2 tabs 3x daily   carvedilol (COREG) 6.25 mg tablet   No No   Sig: Take 1 tablet (6.25 mg total) by mouth 2 (two) times a day with meals "   cloNIDine (CATAPRES-TTS-3) 0.3 mg/24 hr   Yes No   Sig: Place 1 patch on the skin once a week Monday   doxazosin (CARDURA) 2 mg tablet  Self Yes No   Sig: Take 1 tablet by mouth daily at bedtime   gabapentin (NEURONTIN) 100 mg capsule  Self No No   Sig: Take 1 cap by mouth daily   Patient taking differently: Take 100 mg by mouth 3 (three) times a day Take 1 cap by mouth daily   insulin aspart (NovoLOG FlexPen) 100 UNIT/ML injection pen  Self Yes No   Sig: Inject 1 Units under the skin Three times a day. Pt reports dose as follows if BS over 150  breakfast 8 units  lunch 4 units  supper 5 units  bedtime 6 units if BS over 200  Indications: Type 2 Diabetes   lanthanum (FOSRENOL) 500 mg chewable tablet  Self Yes No   Sig: CRUSH OR CHEW AND SWALLOW 1 TABLET THREE TIMES A DAY WITH MEALS   lidocaine-prilocaine (EMLA) cream  Self Yes No   Sig: APPLY SMALL AMOUNT TO ACCESS SITE (AVF) 1 TO 2 HOURS BEFORE DIALYSIS. COVER WITH OCCLUSIVE DRESSING (SARAN WRAP)   losartan (COZAAR) 100 MG tablet   No No   Sig: Take 1 tablet (100 mg total) by mouth daily      Facility-Administered Medications: None       Past Medical History:   Diagnosis Date    Coronary artery disease     Diabetes mellitus     End stage renal disease     Hyperlipidemia     Hypertension     NSTEMI (non-ST elevated myocardial infarction) 2012    TIA (transient ischemic attack)        Past Surgical History:   Procedure Laterality Date    CARDIAC CATHETERIZATION  2017    70% lesion of a small R PDA and a 99% stenosis of D1 (previously noted on the cath in 2012).  Neither lesion was amenable to PCI.  Medical management.    DIALYSIS FISTULA CREATION         Family History   Problem Relation Age of Onset    Hypertension Mother     Hypertension Father      I have reviewed and agree with the history as documented.    E-Cigarette/Vaping    E-Cigarette Use Never User      E-Cigarette/Vaping Substances    Nicotine No     THC No     CBD No     Flavoring No     Other No      Unknown No      Social History     Tobacco Use    Smoking status: Former     Current packs/day: 0.00     Average packs/day: 1 pack/day for 20.0 years (20.0 ttl pk-yrs)     Types: Cigarettes     Start date:      Quit date:      Years since quittin.6    Smokeless tobacco: Never    Tobacco comments:     STATES QUIT 30 YEARS AGO/ ABOUT ONE CIGARETTE DAILY   Vaping Use    Vaping status: Never Used   Substance Use Topics    Alcohol use: Never    Drug use: Never       Review of Systems   Constitutional:  Positive for activity change. Negative for chills and fever.   HENT:  Negative for ear pain and sore throat.    Eyes:  Negative for pain and visual disturbance.   Respiratory:  Negative for cough and shortness of breath.    Cardiovascular:  Negative for chest pain and palpitations.   Gastrointestinal:  Positive for nausea and vomiting. Negative for abdominal pain.   Genitourinary:  Negative for dysuria and hematuria.   Musculoskeletal:  Negative for arthralgias and back pain.   Skin:  Negative for color change and rash.   Neurological:  Negative for seizures and syncope.   All other systems reviewed and are negative.      Physical Exam  Physical Exam  Constitutional:       General: He is not in acute distress.     Appearance: Normal appearance. He is normal weight. He is not ill-appearing.   HENT:      Head: Normocephalic and atraumatic.      Right Ear: External ear normal.      Left Ear: External ear normal.      Nose: Nose normal.      Mouth/Throat:      Mouth: Mucous membranes are moist.   Eyes:      Conjunctiva/sclera: Conjunctivae normal.   Cardiovascular:      Rate and Rhythm: Normal rate and regular rhythm.      Pulses: Normal pulses.      Heart sounds: Normal heart sounds.   Pulmonary:      Effort: Pulmonary effort is normal.      Breath sounds: Normal breath sounds.   Abdominal:      General: Abdomen is flat. There is no distension.      Palpations: Abdomen is soft. There is no mass.    Musculoskeletal:         General: No swelling, tenderness or deformity. Normal range of motion.      Cervical back: Normal range of motion.   Skin:     General: Skin is warm and dry.      Capillary Refill: Capillary refill takes 2 to 3 seconds.      Coloration: Skin is not pale.   Neurological:      General: No focal deficit present.      Mental Status: He is alert and oriented to person, place, and time. Mental status is at baseline.   Psychiatric:         Mood and Affect: Mood normal.         Vital Signs  ED Triage Vitals   Temperature Pulse Respirations Blood Pressure SpO2   08/21/24 1625 08/21/24 1622 08/21/24 1622 08/21/24 1622 08/21/24 1622   97.9 °F (36.6 °C) 83 18 (!) 245/109 98 %      Temp Source Heart Rate Source Patient Position - Orthostatic VS BP Location FiO2 (%)   08/21/24 1625 08/21/24 1622 08/21/24 1622 08/21/24 1622 --   Oral Monitor Lying Right arm       Pain Score       08/21/24 1622       No Pain           Vitals:    08/21/24 1800 08/21/24 1815 08/21/24 1830 08/21/24 1930   BP: (!) 207/100 (!) 204/100 (!) 198/95 (!) 179/90   Pulse: 77 77 77 78   Patient Position - Orthostatic VS: Sitting Sitting Sitting          Visual Acuity      ED Medications  Medications   cloNIDine (CATAPRES) tablet 0.2 mg (0.2 mg Oral Given 8/21/24 1655)   carvedilol (COREG) tablet 6.25 mg (6.25 mg Oral Given 8/21/24 1919)       Diagnostic Studies  Results Reviewed       Procedure Component Value Units Date/Time    HS Troponin I 2hr [[id number]]  (Normal) Collected: 08/21/24 1841    Lab Status: Final result Specimen: Blood from Arm, Right Updated: 08/21/24 1912     hs TnI 2hr 22 ng/L      Delta 2hr hsTnI 3 ng/L     HS Troponin I 4hr [[id number]]     Lab Status: No result Specimen: Blood     Comprehensive metabolic panel [[id number]]  (Abnormal) Collected: 08/21/24 1723    Lab Status: Final result Specimen: Blood from Arm, Right Updated: 08/21/24 1808     Sodium 133 mmol/L      Potassium 4.5 mmol/L      Chloride 89 mmol/L       CO2 29 mmol/L      ANION GAP 15 mmol/L      BUN 22 mg/dL      Creatinine 5.31 mg/dL      Glucose 159 mg/dL      Calcium 9.9 mg/dL      AST 27 U/L      ALT 10 U/L      Alkaline Phosphatase 70 U/L      Total Protein 9.7 g/dL      Albumin 4.6 g/dL      Total Bilirubin 0.39 mg/dL      eGFR 9 ml/min/1.73sq m     Narrative:      National Kid[employer] Foundation guidelines for Chronic Kidney Disease (CKD):     Stage 1 with normal or high GFR (GFR > 90 mL/min/1.73 square meters)    Stage 2 Mild CKD (GFR = 60-89 mL/min/1.73 square meters)    Stage 3A Moderate CKD (GFR = 45-59 mL/min/1.73 square meters)    Stage 3B Moderate CKD (GFR = 30-44 mL/min/1.73 square meters)    Stage 4 Severe CKD (GFR = 15-29 mL/min/1.73 square meters)    Stage 5 End Stage CKD (GFR <15 mL/min/1.73 square meters)  Note: GFR calculation is accurate only with a steady state creatinine    Magnesium [[id number]]  (Normal) Collected: 08/21/24 1723    Lab Status: Final result Specimen: Blood from Arm, Right Updated: 08/21/24 1808     Magnesium 2.4 mg/dL     Phosphorus [[id number]]  (Normal) Collected: 08/21/24 1723    Lab Status: Final result Specimen: Blood from Arm, Right Updated: 08/21/24 1808     Phosphorus 3.5 mg/dL     HS Troponin 0hr (reflex protocol) [[id number]]  (Normal) Collected: 08/21/24 1656    Lab Status: Final result Specimen: Blood from Arm, Right Updated: 08/21/24 1723     hs TnI 0hr 19 ng/L     CBC and differential [[id number]]  (Abnormal) Collected: 08/21/24 1656    Lab Status: Final result Specimen: Blood from Arm, Right Updated: 08/21/24 1708     WBC 6.28 Thousand/uL      RBC 4.81 Million/uL      Hemoglobin 13.7 g/dL      Hematocrit 44.1 %      MCV 92 fL      MCH 28.5 pg      MCHC 31.1 g/dL      RDW 16.3 %      MPV 10.2 fL      Platelets 123 Thousands/uL      nRBC 0 /100 WBCs      Segmented % 48 %      Immature Grans % 0 %      Lymphocytes % 35 %      Monocytes % 12 %      Eosinophils Relative 4 %      Basophils Relative 1 %       Absolute Neutrophils 3.03 Thousands/µL      Absolute Immature Grans 0.01 Thousand/uL      Absolute Lymphocytes 2.19 Thousands/µL      Absolute Monocytes 0.77 Thousand/µL      Eosinophils Absolute 0.25 Thousand/µL      Basophils Absolute 0.03 Thousands/µL                    No orders to display              Procedures  ECG 12 Lead Documentation Only    Date/Time: 8/21/2024 4:54 PM    Performed by: Tian Florian Jr., DO  Authorized by: Tian Florian Jr., DO    ECG reviewed by me, the ED Provider: yes    Patient location:  ED  Comments:      EKG is a sinus rhythm at 86 beats a minute.  There is atrial enlargement noted.  Patient's axis is normal.  Patient has no other definitive acute ST or T wave changes noted.           ED Course  ED Course as of 08/21/24 2102   Wed Aug 21, 2024   1813 hs TnI 0hr: 19 1814 Creatinine(!): 5.31                                 SBIRT 22yo+      Flowsheet Row Most Recent Value   Initial Alcohol Screen: US AUDIT-C     1. How often do you have a drink containing alcohol? 0 Filed at: 08/21/2024 1621   2. How many drinks containing alcohol do you have on a typical day you are drinking?  0 Filed at: 08/21/2024 1621   3b. FEMALE Any Age, or MALE 65+: How often do you have 4 or more drinks on one occassion? 0 Filed at: 08/21/2024 1620   Audit-C Score 0 Filed at: 08/21/2024 1621   LISA: How many times in the past year have you...    Used an illegal drug or used a prescription medication for non-medical reasons? Never Filed at: 08/21/2024 1620                      Medical Decision Making  74-year-old male presented to the emergency department secondary to high blood pressure as well as nausea and vomiting.  The patient has a history of renal failure from diabetes and is currently getting dialysis.  After dialysis the patient's blood pressure was up and for that reason the patient's family decided to bring him to the hospital.  Patient denied headache chest pain shortness of breath, or  other complaints at present.  Patient apparently has had nausea vomiting after dialysis in the past and it appears to be a random symptom that he develops.  Differential diagnosis is hypertension emergency versus urgency versus untreated hypertension due to the patient having dialysis and not taking his p.o. medications.  Upon further discussion with the patient, he notes that he did not take his morning medication.  Patient given clonidine as well as his normal daily medication and his blood pressure decreased to 180/90.  At this point I feel the patient can go home.  He stayed for blood work with 2 troponins which were nonacute at this time.  Patient will be discharged to home noting to resume his current medications.    Amount and/or Complexity of Data Reviewed  Labs: ordered. Decision-making details documented in ED Course.    Risk  Prescription drug management.                 Disposition  Final diagnoses:   Hypertension, unspecified type     Time reflects when diagnosis was documented in both MDM as applicable and the Disposition within this note       Time User Action Codes Description Comment    8/21/2024  7:49 PM Tian Florian Add [I10] Hypertension, unspecified type           ED Disposition       ED Disposition   Discharge    Condition   Stable    Date/Time   Wed Aug 21, 2024 1949    Comment   Babak Mike discharge to home/self care.                   Follow-up Information       Follow up With Specialties Details Why Contact Info    [facility], Nurse Practitioner On 8/26/2024  [facility]  [address], [address] [place] [id number]  [phone]              Discharge Medication List as of 8/21/2024  7:49 PM        CONTINUE these medications which have NOT CHANGED    Details   amLODIPine (NORVASC) 10 mg tablet Take 1 tablet (10 mg total) by mouth daily at bedtime, Starting Sat 7/15/2023, Until Wed 7/10/2024, Normal      aspirin (ECOTRIN LOW  STRENGTH) 81 mg EC tablet Take 81 mg by mouth daily, Historical Med      atorvastatin (LIPITOR) 40 mg tablet Take 40 mg by mouth daily, Historical Med      calcitriol (ROCALTROL) 0.25 mcg capsule Take 3 capsules (0.75 mcg total) by mouth 3 (three) times a week, Starting Fri 6/23/2023, Until Wed 7/10/2024, Normal      calcium acetate (PHOSLO) capsule 3 (three) times a day Not on dialysis days  Pt taking 2 tabs 3x daily, Starting Thu 1/14/2021, Historical Med      carvedilol (COREG) 6.25 mg tablet Take 1 tablet (6.25 mg total) by mouth 2 (two) times a day with meals, Starting Thu 7/11/2024, Until Sat 8/10/2024, Normal      cloNIDine (CATAPRES-TTS-3) 0.3 mg/24 hr Place 1 patch on the skin once a week Monday, Historical Med      doxazosin (CARDURA) 2 mg tablet Take 1 tablet by mouth daily at bedtime, Starting Fri 6/9/2023, Historical Med      gabapentin (NEURONTIN) 100 mg capsule Take 1 cap by mouth daily, Normal      insulin aspart (NovoLOG FlexPen) 100 UNIT/ML injection pen Inject 1 Units under the skin Three times a day. Pt reports dose as follows if BS over 150  breakfast 8 units  lunch 4 units  supper 5 units  bedtime 6 units if BS over 200  Indications: Type 2 Diabetes, Starting Wed 8/12/2020, Historical Med      Insulin Pen Needle (UltiCare Short Pen Needles) 31G X 8 MM MISC INJECT 4 TIMES DAILY;E11.9, Historical Med      lanthanum (FOSRENOL) 500 mg chewable tablet CRUSH OR CHEW AND SWALLOW 1 TABLET THREE TIMES A DAY WITH MEALS, Historical Med      lidocaine-prilocaine (EMLA) cream APPLY SMALL AMOUNT TO ACCESS SITE (AVF) 1 TO 2 HOURS BEFORE DIALYSIS. COVER WITH OCCLUSIVE DRESSING (SARAN WRAP), Historical Med      Lokelma 10 g PACK EMPTY CONTENTS OF 1 PACKET IN 3 TABLESPOONSFUL OF WATER OR AS DIRECTED BY CLINIC. STIR WELL AND DRINK IMMEDIATELY ON NON-DIALYSIS DAYS, Historical Med      losartan (COZAAR) 100 MG tablet Take 1 tablet (100 mg total) by mouth daily, Starting Sat 7/15/2023, Until Tue 10/31/2023, Normal       QUEtiapine (SEROquel) 25 mg tablet Take 12.5 mg by mouth daily at bedtime. Indications: sleep, Historical Med             No discharge procedures on file.    PDMP Review       None            ED Provider  Electronically Signed by             Tian Florian Jr.,   08/21/24 [id number]

## 2024-08-22 LAB
ATRIAL RATE: 80 BPM
ATRIAL RATE: 86 BPM
P AXIS: 76 DEGREES
P AXIS: 80 DEGREES
PR INTERVAL: 178 MS
PR INTERVAL: 188 MS
QRS AXIS: 65 DEGREES
QRS AXIS: 70 DEGREES
QRSD INTERVAL: 100 MS
QRSD INTERVAL: 104 MS
QT INTERVAL: 392 MS
QT INTERVAL: 404 MS
QTC INTERVAL: 465 MS
QTC INTERVAL: 469 MS
T WAVE AXIS: 88 DEGREES
T WAVE AXIS: 91 DEGREES
VENTRICULAR RATE: 80 BPM
VENTRICULAR RATE: 86 BPM

## 2024-08-22 PROCEDURE — 93010 ELECTROCARDIOGRAM REPORT: CPT | Performed by: INTERNAL MEDICINE

## 2024-08-29 ENCOUNTER — APPOINTMENT (OUTPATIENT)
Dept: LAB | Facility: CLINIC | Age: 74
End: 2024-08-29
Payer: COMMERCIAL

## 2024-08-29 DIAGNOSIS — E27.9 ADRENAL HYPERTENSION (HCC): ICD-10-CM

## 2024-08-29 DIAGNOSIS — I15.2 ADRENAL HYPERTENSION (HCC): ICD-10-CM

## 2024-08-29 DIAGNOSIS — N18.6 END STAGE RENAL DISEASE (HCC): ICD-10-CM

## 2024-08-29 DIAGNOSIS — I10 ESSENTIAL HYPERTENSION, MALIGNANT: ICD-10-CM

## 2024-08-29 DIAGNOSIS — E27.9 ADRENAL DISORDER (HCC): ICD-10-CM

## 2024-08-29 DIAGNOSIS — I10 HYPERTENSION, UNSPECIFIED TYPE: ICD-10-CM

## 2024-08-29 PROCEDURE — 84244 ASSAY OF RENIN: CPT

## 2024-08-29 PROCEDURE — 36415 COLL VENOUS BLD VENIPUNCTURE: CPT

## 2024-08-29 PROCEDURE — 83835 ASSAY OF METANEPHRINES: CPT

## 2024-08-29 PROCEDURE — 82088 ASSAY OF ALDOSTERONE: CPT

## 2024-09-01 ENCOUNTER — HOSPITAL ENCOUNTER (OUTPATIENT)
Dept: CT IMAGING | Facility: HOSPITAL | Age: 74
Discharge: HOME | End: 2024-09-01

## 2024-09-01 DIAGNOSIS — I10 HYPERTENSION, UNSPECIFIED TYPE: ICD-10-CM

## 2024-09-03 LAB
METANEPH FREE SERPL-MCNC: 43 PG/ML (ref 0–88)
NORMETANEPHRINE SERPL-MCNC: 57.9 PG/ML (ref 0–285.2)

## 2024-09-09 LAB
ALDOST SERPL-MCNC: 2.9 NG/DL (ref 0–30)
ALDOST/RENIN PLAS-RTO: 7.3 {RATIO} (ref 0–30)
RENIN PLAS-CCNC: 0.4 NG/ML/HR (ref 0.17–5.38)

## 2024-09-21 ENCOUNTER — HOSPITAL ENCOUNTER (OUTPATIENT)
Dept: CT IMAGING | Facility: HOSPITAL | Age: 74
Discharge: HOME | End: 2024-09-21
Payer: COMMERCIAL

## 2024-09-21 PROCEDURE — 74170 CT ABD WO CNTRST FLWD CNTRST: CPT

## 2024-09-21 RX ADMIN — IOHEXOL 100 ML: 350 INJECTION, SOLUTION INTRAVENOUS at 17:51

## 2024-11-07 ENCOUNTER — HOSPITAL ENCOUNTER (OUTPATIENT)
Dept: NON INVASIVE DIAGNOSTICS | Facility: HOSPITAL | Age: 74
Discharge: HOME | End: 2024-11-07
Attending: SURGERY
Payer: COMMERCIAL

## 2024-11-07 DIAGNOSIS — I73.9 PAD (PERIPHERAL ARTERY DISEASE) (HCC): ICD-10-CM

## 2024-11-07 PROCEDURE — 93925 LOWER EXTREMITY STUDY: CPT

## 2024-11-07 PROCEDURE — 93923 UPR/LXTR ART STDY 3+ LVLS: CPT

## 2024-11-08 PROCEDURE — 93925 LOWER EXTREMITY STUDY: CPT | Performed by: SURGERY

## 2024-11-08 PROCEDURE — 93922 UPR/L XTREMITY ART 2 LEVELS: CPT | Performed by: SURGERY

## 2025-04-03 ENCOUNTER — HOSPITAL ENCOUNTER (INPATIENT)
Facility: HOSPITAL | Age: 75
LOS: 2 days | Discharge: HOME HEALTH | End: 2025-04-05
Attending: EMERGENCY MEDICINE | Admitting: INTERNAL MEDICINE
Payer: COMMERCIAL

## 2025-04-03 ENCOUNTER — APPOINTMENT (EMERGENCY)
Dept: CT IMAGING | Facility: HOSPITAL | Age: 75
End: 2025-04-03
Payer: COMMERCIAL

## 2025-04-03 DIAGNOSIS — N17.9: ICD-10-CM

## 2025-04-03 DIAGNOSIS — R53.1 WEAKNESS GENERALIZED: ICD-10-CM

## 2025-04-03 DIAGNOSIS — I63.9 CVA (CEREBRAL VASCULAR ACCIDENT) (HCC): Primary | ICD-10-CM

## 2025-04-03 DIAGNOSIS — N18.6 ANEMIA IN ESRD (END-STAGE RENAL DISEASE)  (HCC): ICD-10-CM

## 2025-04-03 DIAGNOSIS — D63.1 ANEMIA IN ESRD (END-STAGE RENAL DISEASE)  (HCC): ICD-10-CM

## 2025-04-03 DIAGNOSIS — N18.9: ICD-10-CM

## 2025-04-03 DIAGNOSIS — I67.4 HYPERTENSIVE ENCEPHALOPATHY: ICD-10-CM

## 2025-04-03 DIAGNOSIS — I10 UNCONTROLLED HYPERTENSION: ICD-10-CM

## 2025-04-03 LAB
2HR DELTA HS TROPONIN: 0 NG/L
ANION GAP SERPL CALCULATED.3IONS-SCNC: 12 MMOL/L (ref 4–13)
BASOPHILS # BLD AUTO: 0.02 THOUSANDS/ÂΜL (ref 0–0.1)
BASOPHILS NFR BLD AUTO: 0 % (ref 0–1)
BUN SERPL-MCNC: 56 MG/DL (ref 5–25)
CALCIUM SERPL-MCNC: 9 MG/DL (ref 8.4–10.2)
CARDIAC TROPONIN I PNL SERPL HS: 16 NG/L (ref ?–50)
CARDIAC TROPONIN I PNL SERPL HS: 16 NG/L (ref ?–50)
CHLORIDE SERPL-SCNC: 93 MMOL/L (ref 96–108)
CO2 SERPL-SCNC: 31 MMOL/L (ref 21–32)
CREAT SERPL-MCNC: 8.57 MG/DL (ref 0.6–1.3)
EOSINOPHIL # BLD AUTO: 0.22 THOUSAND/ÂΜL (ref 0–0.61)
EOSINOPHIL NFR BLD AUTO: 4 % (ref 0–6)
ERYTHROCYTE [DISTWIDTH] IN BLOOD BY AUTOMATED COUNT: 17.1 % (ref 11.6–15.1)
GFR SERPL CREATININE-BSD FRML MDRD: 5 ML/MIN/1.73SQ M
GLUCOSE SERPL-MCNC: 324 MG/DL (ref 65–140)
HCT VFR BLD AUTO: 34.5 % (ref 36.5–49.3)
HGB BLD-MCNC: 10.5 G/DL (ref 12–17)
IMM GRANULOCYTES # BLD AUTO: 0.01 THOUSAND/UL (ref 0–0.2)
IMM GRANULOCYTES NFR BLD AUTO: 0 % (ref 0–2)
LYMPHOCYTES # BLD AUTO: 1.54 THOUSANDS/ÂΜL (ref 0.6–4.47)
LYMPHOCYTES NFR BLD AUTO: 26 % (ref 14–44)
MCH RBC QN AUTO: 27.6 PG (ref 26.8–34.3)
MCHC RBC AUTO-ENTMCNC: 30.4 G/DL (ref 31.4–37.4)
MCV RBC AUTO: 91 FL (ref 82–98)
MONOCYTES # BLD AUTO: 0.75 THOUSAND/ÂΜL (ref 0.17–1.22)
MONOCYTES NFR BLD AUTO: 13 % (ref 4–12)
NEUTROPHILS # BLD AUTO: 3.34 THOUSANDS/ÂΜL (ref 1.85–7.62)
NEUTS SEG NFR BLD AUTO: 57 % (ref 43–75)
NRBC BLD AUTO-RTO: 0 /100 WBCS
PLATELET # BLD AUTO: 103 THOUSANDS/UL (ref 149–390)
PMV BLD AUTO: 10 FL (ref 8.9–12.7)
POTASSIUM SERPL-SCNC: 5.6 MMOL/L (ref 3.5–5.3)
RBC # BLD AUTO: 3.81 MILLION/UL (ref 3.88–5.62)
SODIUM SERPL-SCNC: 136 MMOL/L (ref 135–147)
WBC # BLD AUTO: 5.88 THOUSAND/UL (ref 4.31–10.16)

## 2025-04-03 PROCEDURE — 99285 EMERGENCY DEPT VISIT HI MDM: CPT

## 2025-04-03 PROCEDURE — 80048 BASIC METABOLIC PNL TOTAL CA: CPT | Performed by: EMERGENCY MEDICINE

## 2025-04-03 PROCEDURE — 99285 EMERGENCY DEPT VISIT HI MDM: CPT | Performed by: EMERGENCY MEDICINE

## 2025-04-03 PROCEDURE — 93005 ELECTROCARDIOGRAM TRACING: CPT

## 2025-04-03 PROCEDURE — 36415 COLL VENOUS BLD VENIPUNCTURE: CPT | Performed by: EMERGENCY MEDICINE

## 2025-04-03 PROCEDURE — 70450 CT HEAD/BRAIN W/O DYE: CPT

## 2025-04-03 PROCEDURE — 85025 COMPLETE CBC W/AUTO DIFF WBC: CPT | Performed by: EMERGENCY MEDICINE

## 2025-04-03 PROCEDURE — 84484 ASSAY OF TROPONIN QUANT: CPT | Performed by: EMERGENCY MEDICINE

## 2025-04-03 RX ORDER — ASPIRIN 81 MG/1
81 TABLET, CHEWABLE ORAL ONCE
Status: COMPLETED | OUTPATIENT
Start: 2025-04-03 | End: 2025-04-04

## 2025-04-03 RX ORDER — AMLODIPINE BESYLATE 5 MG/1
10 TABLET ORAL ONCE
Status: COMPLETED | OUTPATIENT
Start: 2025-04-03 | End: 2025-04-03

## 2025-04-03 RX ADMIN — AMLODIPINE BESYLATE 10 MG: 5 TABLET ORAL at 21:13

## 2025-04-04 ENCOUNTER — APPOINTMENT (INPATIENT)
Dept: DIALYSIS | Facility: HOSPITAL | Age: 75
End: 2025-04-04
Payer: COMMERCIAL

## 2025-04-04 ENCOUNTER — APPOINTMENT (INPATIENT)
Dept: MRI IMAGING | Facility: HOSPITAL | Age: 75
End: 2025-04-04
Payer: COMMERCIAL

## 2025-04-04 ENCOUNTER — APPOINTMENT (INPATIENT)
Dept: NON INVASIVE DIAGNOSTICS | Facility: HOSPITAL | Age: 75
End: 2025-04-04
Payer: COMMERCIAL

## 2025-04-04 PROBLEM — R27.0 ATAXIA: Status: ACTIVE | Noted: 2025-04-04

## 2025-04-04 PROBLEM — N25.81 SECONDARY HYPERPARATHYROIDISM OF RENAL ORIGIN (HCC): Status: ACTIVE | Noted: 2025-04-04

## 2025-04-04 PROBLEM — I67.4 HYPERTENSIVE ENCEPHALOPATHY: Status: ACTIVE | Noted: 2025-04-04

## 2025-04-04 PROBLEM — E87.5 HYPERKALEMIA: Status: ACTIVE | Noted: 2025-04-04

## 2025-04-04 LAB
ANION GAP SERPL CALCULATED.3IONS-SCNC: 10 MMOL/L (ref 4–13)
AORTIC ROOT: 3.6 CM
AORTIC VALVE MEAN VELOCITY: 15 M/S
ASCENDING AORTA: 3.6 CM
ATRIAL RATE: 60 BPM
ATRIAL RATE: 66 BPM
AV AREA BY CONTINUOUS VTI: 1.4 CM2
AV AREA PEAK VELOCITY: 1.3 CM2
AV LVOT MEAN GRADIENT: 2 MMHG
AV LVOT PEAK GRADIENT: 3 MMHG
AV MEAN PRESS GRAD SYS DOP V1V2: 10 MMHG
AV ORIFICE AREA US: 1.4 CM2
AV PEAK GRADIENT: 20 MMHG
AV VELOCITY RATIO: 0.44
AV VMAX SYS DOP: 2.23 M/S
BSA FOR ECHO PROCEDURE: 2.18 M2
BUN SERPL-MCNC: 61 MG/DL (ref 5–25)
CALCIUM SERPL-MCNC: 9.1 MG/DL (ref 8.4–10.2)
CHLORIDE SERPL-SCNC: 97 MMOL/L (ref 96–108)
CHOLEST SERPL-MCNC: 93 MG/DL (ref ?–200)
CO2 SERPL-SCNC: 31 MMOL/L (ref 21–32)
CREAT SERPL-MCNC: 9.39 MG/DL (ref 0.6–1.3)
DOP CALC AO VTI: 60.28 CM
DOP CALC LVOT AREA: 3.14 CM2
DOP CALC LVOT CARDIAC INDEX: 2.34 L/MIN/M2
DOP CALC LVOT CARDIAC OUTPUT: 5.09 L/MIN
DOP CALC LVOT DIAMETER: 2 CM
DOP CALC LVOT PEAK VEL VTI: 26.81 CM
DOP CALC LVOT PEAK VEL: 0.93 M/S
DOP CALC LVOT STROKE INDEX: 39.4 ML/M2
DOP CALC LVOT STROKE VOLUME: 84.18
E WAVE DECELERATION TIME: 235 MS
E/A RATIO: 1.02
ERYTHROCYTE [DISTWIDTH] IN BLOOD BY AUTOMATED COUNT: 16.9 % (ref 11.6–15.1)
EST. AVERAGE GLUCOSE BLD GHB EST-MCNC: 189 MG/DL
EST. AVERAGE GLUCOSE BLD GHB EST-MCNC: 192 MG/DL
FRACTIONAL SHORTENING: 36 (ref 28–44)
GFR SERPL CREATININE-BSD FRML MDRD: 4 ML/MIN/1.73SQ M
GLUCOSE SERPL-MCNC: 109 MG/DL (ref 65–140)
GLUCOSE SERPL-MCNC: 118 MG/DL (ref 65–140)
GLUCOSE SERPL-MCNC: 130 MG/DL (ref 65–140)
GLUCOSE SERPL-MCNC: 130 MG/DL (ref 65–140)
GLUCOSE SERPL-MCNC: 154 MG/DL (ref 65–140)
GLUCOSE SERPL-MCNC: 183 MG/DL (ref 65–140)
GLUCOSE SERPL-MCNC: 73 MG/DL (ref 65–140)
HBA1C MFR BLD: 8.2 %
HBA1C MFR BLD: 8.3 %
HCT VFR BLD AUTO: 32.3 % (ref 36.5–49.3)
HDLC SERPL-MCNC: 34 MG/DL
HGB BLD-MCNC: 10 G/DL (ref 12–17)
INTERVENTRICULAR SEPTUM IN DIASTOLE (PARASTERNAL SHORT AXIS VIEW): 1.3 CM
INTERVENTRICULAR SEPTUM: 1.3 CM (ref 0.6–1.1)
LAAS-AP2: 27.2 CM2
LAAS-AP4: 22.9 CM2
LDLC SERPL CALC-MCNC: 50 MG/DL (ref 0–100)
LEFT ATRIUM SIZE: 4.6 CM
LEFT ATRIUM VOLUME (MOD BIPLANE): 89 ML
LEFT ATRIUM VOLUME INDEX (MOD BIPLANE): 40.8 ML/M2
LEFT INTERNAL DIMENSION IN SYSTOLE: 3.4 CM (ref 2.1–4)
LEFT VENTRICULAR INTERNAL DIMENSION IN DIASTOLE: 5.3 CM (ref 3.5–6)
LEFT VENTRICULAR POSTERIOR WALL IN END DIASTOLE: 1.3 CM
LEFT VENTRICULAR STROKE VOLUME: 89 ML
LV EF US.2D.A4C+ESTIMATED: 55 %
LVSV (TEICH): 89 ML
MCH RBC QN AUTO: 28 PG (ref 26.8–34.3)
MCHC RBC AUTO-ENTMCNC: 31 G/DL (ref 31.4–37.4)
MCV RBC AUTO: 91 FL (ref 82–98)
MV E'TISSUE VEL-LAT: 7 CM/S
MV E'TISSUE VEL-SEP: 6 CM/S
MV PEAK A VEL: 1.23 M/S
MV PEAK E VEL: 125 CM/S
MV STENOSIS PRESSURE HALF TIME: 68 MS
MV VALVE AREA P 1/2 METHOD: 3.24
P AXIS: 66 DEGREES
P AXIS: 71 DEGREES
PLATELET # BLD AUTO: 99 THOUSANDS/UL (ref 149–390)
PMV BLD AUTO: 10.3 FL (ref 8.9–12.7)
POTASSIUM SERPL-SCNC: 5 MMOL/L (ref 3.5–5.3)
PR INTERVAL: 200 MS
PR INTERVAL: 214 MS
QRS AXIS: 66 DEGREES
QRS AXIS: 69 DEGREES
QRSD INTERVAL: 100 MS
QRSD INTERVAL: 102 MS
QT INTERVAL: 418 MS
QT INTERVAL: 436 MS
QTC INTERVAL: 436 MS
QTC INTERVAL: 438 MS
RBC # BLD AUTO: 3.57 MILLION/UL (ref 3.88–5.62)
RIGHT ATRIUM AREA SYSTOLE A4C: 18.8 CM2
RIGHT VENTRICLE ID DIMENSION: 3.8 CM
SL CV LEFT ATRIUM LENGTH A2C: 5.4 CM
SL CV LV EF: 60
SL CV PED ECHO LEFT VENTRICLE DIASTOLIC VOLUME (MOD BIPLANE) 2D: 137 ML
SL CV PED ECHO LEFT VENTRICLE SYSTOLIC VOLUME (MOD BIPLANE) 2D: 48 ML
SODIUM SERPL-SCNC: 138 MMOL/L (ref 135–147)
T WAVE AXIS: 81 DEGREES
T WAVE AXIS: 83 DEGREES
TRICUSPID ANNULAR PLANE SYSTOLIC EXCURSION: 2.2 CM
TRIGL SERPL-MCNC: 44 MG/DL (ref ?–150)
TSH SERPL DL<=0.05 MIU/L-ACNC: 2.59 UIU/ML (ref 0.45–4.5)
VENTRICULAR RATE: 60 BPM
VENTRICULAR RATE: 66 BPM
WBC # BLD AUTO: 5.51 THOUSAND/UL (ref 4.31–10.16)

## 2025-04-04 PROCEDURE — 82948 REAGENT STRIP/BLOOD GLUCOSE: CPT

## 2025-04-04 PROCEDURE — 93306 TTE W/DOPPLER COMPLETE: CPT | Performed by: INTERNAL MEDICINE

## 2025-04-04 PROCEDURE — 80061 LIPID PANEL: CPT | Performed by: INTERNAL MEDICINE

## 2025-04-04 PROCEDURE — 80048 BASIC METABOLIC PNL TOTAL CA: CPT | Performed by: INTERNAL MEDICINE

## 2025-04-04 PROCEDURE — 85027 COMPLETE CBC AUTOMATED: CPT | Performed by: INTERNAL MEDICINE

## 2025-04-04 PROCEDURE — 87147 CULTURE TYPE IMMUNOLOGIC: CPT | Performed by: INTERNAL MEDICINE

## 2025-04-04 PROCEDURE — 83036 HEMOGLOBIN GLYCOSYLATED A1C: CPT | Performed by: INTERNAL MEDICINE

## 2025-04-04 PROCEDURE — 97163 PT EVAL HIGH COMPLEX 45 MIN: CPT

## 2025-04-04 PROCEDURE — 93005 ELECTROCARDIOGRAM TRACING: CPT

## 2025-04-04 PROCEDURE — 93306 TTE W/DOPPLER COMPLETE: CPT

## 2025-04-04 PROCEDURE — 93010 ELECTROCARDIOGRAM REPORT: CPT | Performed by: INTERNAL MEDICINE

## 2025-04-04 PROCEDURE — 94760 N-INVAS EAR/PLS OXIMETRY 1: CPT

## 2025-04-04 PROCEDURE — 99223 1ST HOSP IP/OBS HIGH 75: CPT

## 2025-04-04 PROCEDURE — 87081 CULTURE SCREEN ONLY: CPT | Performed by: INTERNAL MEDICINE

## 2025-04-04 PROCEDURE — 99447 NTRPROF PH1/NTRNET/EHR 11-20: CPT | Performed by: PSYCHIATRY & NEUROLOGY

## 2025-04-04 PROCEDURE — 70551 MRI BRAIN STEM W/O DYE: CPT

## 2025-04-04 PROCEDURE — 36415 COLL VENOUS BLD VENIPUNCTURE: CPT | Performed by: INTERNAL MEDICINE

## 2025-04-04 PROCEDURE — 99222 1ST HOSP IP/OBS MODERATE 55: CPT | Performed by: INTERNAL MEDICINE

## 2025-04-04 PROCEDURE — 84443 ASSAY THYROID STIM HORMONE: CPT | Performed by: INTERNAL MEDICINE

## 2025-04-04 RX ORDER — HEPARIN SODIUM 5000 [USP'U]/ML
5000 INJECTION, SOLUTION INTRAVENOUS; SUBCUTANEOUS EVERY 8 HOURS SCHEDULED
Status: DISCONTINUED | OUTPATIENT
Start: 2025-04-04 | End: 2025-04-05 | Stop reason: HOSPADM

## 2025-04-04 RX ORDER — GABAPENTIN 100 MG/1
200 CAPSULE ORAL
Status: DISCONTINUED | OUTPATIENT
Start: 2025-04-04 | End: 2025-04-05 | Stop reason: HOSPADM

## 2025-04-04 RX ORDER — AMLODIPINE BESYLATE 10 MG/1
10 TABLET ORAL
Status: DISCONTINUED | OUTPATIENT
Start: 2025-04-04 | End: 2025-04-05 | Stop reason: HOSPADM

## 2025-04-04 RX ORDER — CARVEDILOL 3.12 MG/1
6.25 TABLET ORAL 2 TIMES DAILY WITH MEALS
Status: DISCONTINUED | OUTPATIENT
Start: 2025-04-04 | End: 2025-04-05 | Stop reason: HOSPADM

## 2025-04-04 RX ORDER — PATIROMER 8.4 G/1
8.4 POWDER, FOR SUSPENSION ORAL
COMMUNITY

## 2025-04-04 RX ORDER — INSULIN LISPRO 100 [IU]/ML
1-5 INJECTION, SOLUTION INTRAVENOUS; SUBCUTANEOUS
Status: DISCONTINUED | OUTPATIENT
Start: 2025-04-04 | End: 2025-04-05 | Stop reason: HOSPADM

## 2025-04-04 RX ORDER — QUETIAPINE FUMARATE 25 MG/1
12.5 TABLET, FILM COATED ORAL
Status: DISCONTINUED | OUTPATIENT
Start: 2025-04-04 | End: 2025-04-05 | Stop reason: HOSPADM

## 2025-04-04 RX ORDER — LANTHANUM CARBONATE 500 MG/1
500 TABLET, CHEWABLE ORAL
Status: DISCONTINUED | OUTPATIENT
Start: 2025-04-04 | End: 2025-04-05 | Stop reason: HOSPADM

## 2025-04-04 RX ORDER — CLONIDINE 0.3 MG/24H
1 PATCH, EXTENDED RELEASE TRANSDERMAL WEEKLY
Status: DISCONTINUED | OUTPATIENT
Start: 2025-04-04 | End: 2025-04-04

## 2025-04-04 RX ORDER — CLONIDINE 0.1 MG/24H
0.1 PATCH, EXTENDED RELEASE TRANSDERMAL WEEKLY
Status: DISCONTINUED | OUTPATIENT
Start: 2025-04-04 | End: 2025-04-05 | Stop reason: HOSPADM

## 2025-04-04 RX ORDER — DOXAZOSIN 2 MG/1
2 TABLET ORAL
Status: DISCONTINUED | OUTPATIENT
Start: 2025-04-04 | End: 2025-04-05 | Stop reason: HOSPADM

## 2025-04-04 RX ORDER — CLONIDINE 0.2 MG/24H
0.2 PATCH, EXTENDED RELEASE TRANSDERMAL WEEKLY
Status: DISCONTINUED | OUTPATIENT
Start: 2025-04-04 | End: 2025-04-05 | Stop reason: HOSPADM

## 2025-04-04 RX ORDER — ATORVASTATIN CALCIUM 40 MG/1
40 TABLET, FILM COATED ORAL
Status: DISCONTINUED | OUTPATIENT
Start: 2025-04-04 | End: 2025-04-05 | Stop reason: HOSPADM

## 2025-04-04 RX ORDER — CALCITRIOL 0.25 UG/1
0.75 CAPSULE, LIQUID FILLED ORAL 3 TIMES WEEKLY
Status: DISCONTINUED | OUTPATIENT
Start: 2025-04-04 | End: 2025-04-05

## 2025-04-04 RX ORDER — ASPIRIN 81 MG/1
81 TABLET ORAL DAILY
Status: DISCONTINUED | OUTPATIENT
Start: 2025-04-05 | End: 2025-04-05 | Stop reason: HOSPADM

## 2025-04-04 RX ADMIN — CALCITRIOL 0.75 MCG: 0.25 CAPSULE, LIQUID FILLED ORAL at 08:58

## 2025-04-04 RX ADMIN — HEPARIN SODIUM 5000 UNITS: 5000 INJECTION INTRAVENOUS; SUBCUTANEOUS at 22:27

## 2025-04-04 RX ADMIN — CLONIDINE 0.1 MG: 0.1 PATCH TRANSDERMAL at 12:00

## 2025-04-04 RX ADMIN — ATORVASTATIN CALCIUM 40 MG: 40 TABLET, FILM COATED ORAL at 20:32

## 2025-04-04 RX ADMIN — INSULIN HUMAN 5 UNITS: 100 INJECTION, SOLUTION PARENTERAL at 03:10

## 2025-04-04 RX ADMIN — AMLODIPINE BESYLATE 10 MG: 10 TABLET ORAL at 22:28

## 2025-04-04 RX ADMIN — LANTHANUM CARBONATE 500 MG: 500 TABLET, CHEWABLE ORAL at 12:07

## 2025-04-04 RX ADMIN — INSULIN LISPRO 1 UNITS: 100 INJECTION, SOLUTION INTRAVENOUS; SUBCUTANEOUS at 03:10

## 2025-04-04 RX ADMIN — DOXAZOSIN 2 MG: 4 TABLET ORAL at 22:27

## 2025-04-04 RX ADMIN — CARVEDILOL 6.25 MG: 3.12 TABLET, FILM COATED ORAL at 03:11

## 2025-04-04 RX ADMIN — CLONIDINE 0.2 MG: 0.2 PATCH, EXTENDED RELEASE TRANSDERMAL at 12:00

## 2025-04-04 RX ADMIN — GABAPENTIN 200 MG: 100 CAPSULE ORAL at 05:30

## 2025-04-04 RX ADMIN — GABAPENTIN 200 MG: 100 CAPSULE ORAL at 22:28

## 2025-04-04 RX ADMIN — ASPIRIN 81 MG: 81 TABLET, CHEWABLE ORAL at 00:11

## 2025-04-04 RX ADMIN — INSULIN LISPRO 1 UNITS: 100 INJECTION, SOLUTION INTRAVENOUS; SUBCUTANEOUS at 12:06

## 2025-04-04 RX ADMIN — DOXAZOSIN 2 MG: 4 TABLET ORAL at 03:11

## 2025-04-04 RX ADMIN — LANTHANUM CARBONATE 500 MG: 500 TABLET, CHEWABLE ORAL at 20:32

## 2025-04-04 RX ADMIN — LANTHANUM CARBONATE 500 MG: 500 TABLET, CHEWABLE ORAL at 08:58

## 2025-04-04 RX ADMIN — CARVEDILOL 6.25 MG: 3.12 TABLET, FILM COATED ORAL at 20:32

## 2025-04-04 RX ADMIN — QUETIAPINE FUMARATE 12.5 MG: 25 TABLET ORAL at 22:28

## 2025-04-04 RX ADMIN — HEPARIN SODIUM 5000 UNITS: 5000 INJECTION INTRAVENOUS; SUBCUTANEOUS at 05:31

## 2025-04-04 RX ADMIN — QUETIAPINE FUMARATE 12.5 MG: 25 TABLET ORAL at 03:11

## 2025-04-04 NOTE — ASSESSMENT & PLAN NOTE
Wt Readings from Last 3 Encounters:   04/04/25 95.8 kg (211 lb 3.2 oz)   04/04/25 96.2 kg (212 lb)   08/21/24 94.3 kg (207 lb 14.3 oz)     Euvolemic on exam  Fluid management with hemodialysis-patient had HD 4/4  Next  scheduled HD 4/7 outpatient  Appreciate nephrology who followed

## 2025-04-04 NOTE — QUICK NOTE
Presented with ataxia unsteady gait.  Found to have hypertensive urgency on arrival.  Workup is negative.  Neurology feels presentation likely hypertensive encephalopathy.  PT OT note no discharge needs.  Will continue to monitor patient's blood pressures overnight plan to discharge in the AM.  He is receiving hemodialysis treatment now.

## 2025-04-04 NOTE — UTILIZATION REVIEW
"Initial Clinical Review    Admission: Date/Time/Statement:   Admission Orders (From admission, onward)       Ordered        04/03/25 2338  INPATIENT ADMISSION  Once                          Orders Placed This Encounter   Procedures    INPATIENT ADMISSION     Standing Status:   Standing     Number of Occurrences:   1     Level of Care:   Med Surg [16]     Estimated length of stay:   More than 2 Midnights     Certification:   I certify that inpatient services are medically necessary for this patient for a duration of greater than two midnights. See H&P and MD Progress Notes for additional information about the patient's course of treatment.     ED Arrival Information       Expected   -    Arrival   4/3/2025 19:53    Acuity   Emergent              Means of arrival   Wheelchair    Escorted by   Spouse    Service   Hospitalist    Admission type   Emergency              Arrival complaint   unbalanced             Chief Complaint   Patient presents with    Medical Problem     Pt states he is \"off balance\" since this morning with occasional room spinning sensation. Had dialysis session yesterday.        Initial Presentation: 75 y.o. male patient presented to ED from home as inpatient admission for ataxia. PMHx HTN, HLD, DM, ESRD, CAD, and CHF that presents with gait imbalance and generalized weakness. Pt had woken up yesterday morning and felt like he was significantly weak overall and having difficulty ambulating. He was feeling imbalanced and leaning to both sides. He has had similar after dialysis previously but this felt worse than prior. On exam ill appearing (+) murmur abdominal distension noted. LUE AVF. Plan consult neurology, telemetry, neuro q 1 hr x 4 hrs q 2 hrs x 8 hrs q 4 hrs x 72 hrs, VS q 1 hrs , renal diet with 1800 ml of fluid restriction accu checks with SSI, CPAP @ hs PT/OT SCD and supportive care.     Anticipated Length of Stay/Certification Statement:  Patient will be admitted on an inpatient basis with " an anticipated length of stay of greater than 2 midnights secondary to above.     Date: 04-04-25   Day 2: continue telemetry, frequent VS & neuro checks, accu checks with SSI, HD arranged for today, continue ASA/statin, goal normotension with gradual reduction; avoid hypotension continue   Neurology consult:  ASSESSMENT:  74 y/o M with HTN, HLD, DM, ESRD, CAD, and CHF that presents with gait imbalance and generalized weakness. Pt had woken up yesterday morning and felt like he was significantly weak overall and having difficulty ambulating. He was feeling imbalanced and leaning to both sides. He has had similar after dialysis previously but this felt worse than prior. HD M/W/F.He was having intermittent vertigo as well. He came to the ED when it was persistent and had no particular focal deficit noted. He received a head CT that was unremarkable and CTA was deferred given his renal function. He was subsequently admitted and obtained an MRI brain, which was unremarkable for acute ischemia. BP noted to be 239/102 on arrival.  Presentation likely most consistent with hypertensive encephalopathy. No clinical or radiographic evidence to suggest stroke/TIA.  RECOMMENDATIONS:  -continue neurochecks; notify with changes  -HCT reviewed - unremarkable  -MRI brain reviewed - no evidence of acute stroke  -TTE results pending  -telemetry  -no need for vascular imaging at this time  -continue home ASA/statin  -LDL 50, A1c 8.3  -goal normotension with gradual reduction; avoid hypotension  -PT/OT sandro    Nephrology consult:  Assessment & Plan  Ataxia  Presented to ED with ataxia and ambulatory dysfunction with concern for CVA and was admitted for stroke pathway.  ESRD (end stage renal disease) ([facility])        Lab Results   Component Value Date     EGFR 4 04/04/2025     EGFR 5 04/03/2025     EGFR 10 (L) 12/20/2024     CREATININE 9.39 (H) 04/04/2025     CREATININE 8.57 (H) 04/03/2025     CREATININE 5.53 (H) 12/20/2024   ESRD on HD MWF  at  [facility] under the care of Dr. Singh.  Last HD 4/2 for 2.7 L UF with postweight 96.5 KG  OP orders: Access LFA AVF EDW: 95 kg  4-hour treatment, F180 dialyzer, 450 BFR/500 DFR, 137 NA, 135 HCO3, 2K  Continue MWF schedule while admitted.  Will plan for dialysis today, orders entered.  Hypertensive urgency  Blood pressure 160/74 mmHg this morning.  Improving from admission blood pressure 239/102 at 10 PM yesterday.  Recommend maintain blood pressure at current levels over next 24 hours.  Secondary hyperparathyroidism of renal origin (HCC)   PG/mL, Phosphorus 4.9 mg/dL, 3/17/2025 per Care Everywhere [facility] records.    OP meds at HD include Cinacalcet at 120 mg 3 times, calcitriol 0.5 mcg 3 times a week, Sensipar 90 mg 3 times weekly.  Continue calcium acetate 1334 mg 3 times daily with meals  Type 2 diabetes mellitus without complication, with kelly    ED Treatment-Medication Administration from 04/03/2025 1953 to 04/04/2025 1223         Date/Time Order Dose Route Action     04/03/2025 2113 amLODIPine (NORVASC) tablet 10 mg 10 mg Oral Given     04/04/2025 0011 aspirin chewable tablet 81 mg 81 mg Oral Given     04/04/2025 0310 insulin regular (HumuLIN R,NovoLIN R) injection 5 Units 5 Units Subcutaneous Given     04/04/2025 1206 insulin lispro (HumALOG/ADMELOG) 100 units/mL subcutaneous injection 1-5 Units 1 Units Subcutaneous Given     04/04/2025 0310 insulin lispro (HumALOG/ADMELOG) 100 units/mL subcutaneous injection 1-5 Units 1 Units Subcutaneous Given     04/04/2025 0858 calcitriol (ROCALTROL) capsule 0.75 mcg 0.75 mcg Oral Given     04/04/2025 0311 carvedilol (COREG) tablet 6.25 mg 6.25 mg Oral Given     04/04/2025 0311 doxazosin (CARDURA) tablet 2 mg 2 mg Oral Given     04/04/2025 0858 lanthanum (FOSRENOL) chewable tablet 500 mg 500 mg Oral Given     04/04/2025 1207 lanthanum (FOSRENOL) chewable tablet 500 mg 500 mg Oral Given     04/04/2025 0311 QUEtiapine (SEROquel) tablet 12.5 mg 12.5 mg  Oral Given     04/04/2025 0531 heparin (porcine) subcutaneous injection 5,000 Units 5,000 Units Subcutaneous Given     04/04/2025 0530 gabapentin (NEURONTIN) capsule 200 mg 200 mg Oral Given     04/04/2025 1200 cloNIDine (CATAPRES-TTS-1) 0.1 mg/24 hr TD weekly patch 0.1 mg Transdermal Medication Applied     04/04/2025 1200 cloNIDine (CATAPRES-TTS-2) 0.2 mg/24 hr TD weekly patch 0.2 mg Transdermal Medication Applied            Scheduled Medications:  amLODIPine, 10 mg, Oral, HS  [START ON 4/5/2025] aspirin, 81 mg, Oral, Daily  atorvastatin, 40 mg, Oral, Daily With Dinner  calcitriol, 0.75 mcg, Oral, Once per day on Monday Wednesday Friday  carvedilol, 6.25 mg, Oral, BID With Meals  cloNIDine, 0.1 mg, Transdermal, Weekly   And  cloNIDine, 0.2 mg, Transdermal, Weekly  doxazosin, 2 mg, Oral, HS  gabapentin, 200 mg, Oral, HS  heparin (porcine), 5,000 Units, Subcutaneous, Q8H ETHAN  insulin lispro, 1-5 Units, Subcutaneous, TID AC  insulin lispro, 1-5 Units, Subcutaneous, HS  lanthanum, 500 mg, Oral, TID With Meals  QUEtiapine, 12.5 mg, Oral, HS      Continuous IV Infusions:     PRN Meds:     ED Triage Vitals   Temperature Pulse Respirations Blood Pressure SpO2 Pain Score   04/03/25 2003 04/03/25 2003 04/03/25 2003 04/03/25 2005 04/03/25 2003 04/03/25 2100   99.3 °F (37.4 °C) 77 18 (!) 239/102 98 % No Pain     Weight (last 2 days)       Date/Time Weight    04/04/25 0826 96.2 (212)            Vital Signs (last 3 days)       Date/Time Temp Pulse Resp BP MAP (mmHg) SpO2 O2 Device Patient Position - Orthostatic VS Aman Coma Scale Score Pain    04/04/25 1000 -- 63 18 177/79 113 95 % None (Room air) -- 15 --    04/04/25 0850 97.9 °F (36.6 °C) 62 18 144/73 -- 98 % None (Room air) -- 15 --    04/04/25 0826 -- 60 -- 160/74 -- -- -- -- -- --    04/04/25 0600 97.9 °F (36.6 °C) 60 14 160/74 106 96 % None (Room air) Lying 15 No Pain    04/04/25 0515 -- -- -- -- -- 96 % None (Room air) -- -- --    04/04/25 0400 98.6 °F (37 °C) 62 15  177/77 111 95 % None (Room air) Lying 15 No Pain    04/04/25 0300 98.5 °F (36.9 °C) 60 16 166/78 112 95 % None (Room air) Lying 15 No Pain    04/04/25 0200 98.6 °F (37 °C) 61 16 171/77 110 95 % None (Room air) Lying 15 No Pain    04/04/25 0100 98.4 °F (36.9 °C) 59 15 186/79 113 96 % None (Room air) Lying 15 No Pain    04/04/25 0000 98.7 °F (37.1 °C) 63 15 184/80 115 96 % None (Room air) Lying 15 No Pain    04/03/25 2300 98.9 °F (37.2 °C) 63 15 201/88 127 95 % None (Room air) Lying 15 No Pain    04/03/25 2200 98.4 °F (36.9 °C) 62 16 179/86 -- 97 % None (Room air) Lying -- No Pain    04/03/25 2117 -- -- -- -- -- -- None (Room air) -- -- --    04/03/25 2116 -- -- -- -- -- -- -- -- 15 No Pain    04/03/25 2100 98.9 °F (37.2 °C) 65 17 192/86 124 95 % None (Room air) Lying 15 No Pain    04/03/25 2005 -- -- -- 239/102 146 -- -- -- -- --    04/03/25 2003 99.3 °F (37.4 °C) 77 18 -- -- 98 % None (Room air) -- -- --              Pertinent Labs/Diagnostic Test Results:   Radiology:  MRI brain wo contrast   Final Interpretation by Tres Rivera MD (04/04 0832)      No acute intracranial abnormality.      Mild chronic microangiopathy.      Workstation performed: [id number]         CT head without contrast   Final Interpretation by Kenneth Tapia MD (04/03 2137)      No acute intracranial abnormality.                  Workstation performed: [id number]           Cardiology:  Echo complete w/ contrast if indicated   Final Result by Fito Chen MD (04/04 1123)        Left Ventricle: Left ventricular cavity size is normal. Wall thickness    is normal. There is borderline concentric hypertrophy. The left    ventricular ejection fraction is 60%. Systolic function is normal. Wall    motion is normal. Diastolic function is normal for age.     Aortic Valve: The aortic valve is trileaflet. The leaflets are    moderately calcified. There is mildly reduced mobility. There is mild    stenosis. The aortic valve peak velocity is  "2.23 m/s. The aortic valve    mean gradient is 10 mmHg.     Mitral Valve: There is moderate annular calcification. There is mild    regurgitation.         ECG 12 lead   Final Result by Fito Chen MD (04/04 1129)   Sinus rhythm with 1st degree A-V block   Otherwise normal ECG   When compared with ECG of 03-Apr-2025 21:06, (unconfirmed)   No significant change was found   Confirmed by Fito Chen ([id number]) on 4/4/2025 11:29:10 AM      ECG 12 lead   Final Result by Fito Chen MD (04/04 1027)   Normal sinus rhythm   Normal ECG   When compared with ECG of 21-Aug-2024 18:40,   No significant change was found   Confirmed by Fito Chen ([id number]) on 4/4/2025 10:27:37 AM        GI:  No orders to display           Results from last 7 days   Lab Units 04/04/25 0536 04/03/25 2035   WBC Thousand/uL 5.51 5.88   HEMOGLOBIN g/dL 10.0* 10.5*   HEMATOCRIT % 32.3* 34.5*   PLATELETS Thousands/uL 99* 103*   TOTAL NEUT ABS Thousands/µL  --  3.34         Results from last 7 days   Lab Units 04/04/25  0536 04/03/25 2035   SODIUM mmol/L 138 136   POTASSIUM mmol/L 5.0 5.6*   CHLORIDE mmol/L 97 93*   CO2 mmol/L 31 31   ANION GAP mmol/L 10 12   BUN mg/dL 61* 56*   CREATININE mg/dL 9.39* 8.57*   EGFR ml/min/1.73sq m 4 5   CALCIUM mg/dL 9.1 9.0         Results from last 7 days   Lab Units 04/04/25  1206 04/04/25  0656 04/04/25  0520 04/04/25  0238   POC GLUCOSE mg/dl 154* 73 130 183*     Results from last 7 days   Lab Units 04/04/25  0536 04/03/25 2035   GLUCOSE RANDOM mg/dL 118 324*         Results from last 7 days   Lab Units 04/04/25  0317   HEMOGLOBIN A1C % 8.3*   EAG mg/dl 192     No results found for: \"BETA-HYDROXYBUTYRATE\"                   Results from last 7 days   Lab Units 04/03/25  2316 04/03/25 2035   HS TNI 0HR ng/L  --  16   HS TNI 2HR ng/L 16  --    HSTNI D2 ng/L 0  --              Results from last 7 days   Lab Units 04/04/25  0536   TSH 3RD GENERATON uIU/mL 2.593                                           "                                                                 Past Medical History:   Diagnosis Date    Coronary artery disease     Diabetes mellitus     End stage renal disease     Hyperlipidemia     Hypertension     NSTEMI (non-ST elevated myocardial infarction) 2012    TIA (transient ischemic attack)      Present on Admission:   Chronic diastolic (congestive) heart failure (HCC)   CAD (coronary artery disease)   Hypertensive urgency   Mixed hyperlipidemia   SIVA (obstructive sleep apnea)   Weakness generalized   ESRD (end stage renal disease) ([facility])      Admitting Diagnosis: Known medical problems [Z78.9]  Age/Sex: 75 y.o. male    Network Utilization Review Department  ATTENTION: Please call with any questions or concerns to [phone] and carefully listen to the prompts so that you are directed to the right person. All voicemails are confidential.   For Discharge needs, contact Care Management DC Support Team at [phone] opt. 2  Send all requests for admission clinical reviews, approved or denied determinations and any other requests to dedicated fax number below belonging to the campus where the patient is receiving treatment. List of dedicated fax numbers for the Facilities:  FACILITY NAME UR FAX NUMBER   ADMISSION DENIALS (Administrative/Medical Necessity) [phone]   DISCHARGE SUPPORT TEAM (NETWORK) [phone]   PARENT CHILD HEALTH (Maternity/NICU/Pediatrics) [phone]   [facility] [phone]   [facility] [phone]   [facility] [phone]   [facility] [phone]   [facility] [phone]   [facility] [phone]   [facility] [phone]   [facility] [phone]   [facility] [phone]   [facility]  [phone]   [facility] [phone]   [facility] [phone]

## 2025-04-04 NOTE — PLAN OF CARE
Problem: PHYSICAL THERAPY ADULT  Goal: Performs mobility at highest level of function for planned discharge setting.  See evaluation for individualized goals.  Description: Treatment/Interventions: LE strengthening/ROM, Functional transfer training, Elevations, Therapeutic exercise, Endurance training, Gait training  Equipment Recommended: Walker       See flowsheet documentation for full assessment, interventions and recommendations.  Outcome: Progressing  Note: Prognosis: Good  Problem List: Decreased endurance, Decreased mobility, Decreased strength  Assessment: Pt is 75 y.o. male seen for PT evaluation s/p admit to [facility] on 4/3/2025 w/ Ataxia. PT consulted to assess pt's functional mobility and d/c needs. Order placed for PT eval and tx, w/ activity as tolerated order. Pt agreeable to PT  session upon arrival, pt found supine in bed.  PTA, pt was independent w/ all functional mobility w/ rw and lives w/ spouse in 2 level home .  Pt to benefit from continued PT tx to address deficits and maximize level of functional independent mobility and consistency. Upon conclusion pt  supine in bed and with all needs in reach. Complexity: Comorbidities affecting pt's physical performance at time of assessment include:  ataxia, weakness, htn, shortness of breath . Personal factors affecting pt at time of IE include: advanced age, limited mobility, lives in multistory home, ambulating with assistive device, and steps to enter home. Please find objective findings from PT assessment regarding body systems outlined above with impairments and limitations including weakness, decreased activity tolerance, decreased functional mobility tolerance, and fall risk.  Pt's clinical presentation is currently unstable/unpredictable seen in pt's presentation of hypertension, abnormal renal values, abnormal blood sugar levels, and pending procedures. The patient's AM-PAC Basic Mobility Inpatient Short Form Raw Score is 21 .   Based on patient presentations and impairments, pt would most appropriately benefit from Level 3 resource intensity upon discharge. A Raw score of greater than 16 suggests the patient may benefit from discharge to home. Please also refer to the recommendation of the Physical Therapist for safe discharge planning. RN verbalized pt appropriate for PT session.        Rehab Resource Intensity Level, PT: III (Minimum Resource Intensity)    See flowsheet documentation for full assessment.

## 2025-04-04 NOTE — ASSESSMENT & PLAN NOTE
K today 5.6. Patient took veltassa today   Telemetry monitoring   S/p 5u insulin  Repeat K level   For hemodialysis tomorrow

## 2025-04-04 NOTE — ASSESSMENT & PLAN NOTE
Lab Results   Component Value Date    HGBA1C 8.4 (H) 10/16/2024     Recent Labs     04/04/25  0238 04/04/25  0520 04/04/25  0656   POCGLU 183* 130 73   Blood Sugar Average: Last 72 hrs:  (P) [id number]

## 2025-04-04 NOTE — ASSESSMENT & PLAN NOTE
Blood pressure 160/74 mmHg this morning.  Improving from admission blood pressure 239/102 at 10 PM yesterday.  Recommend maintain blood pressure at current levels over next 24 hours.

## 2025-04-04 NOTE — ASSESSMENT & PLAN NOTE
PG/mL, Phosphorus 4.9 mg/dL, 3/17/2025 per Care Everywhere [facility] records.    OP meds at HD include Cinacalcet at 120 mg 3 times, calcitriol 0.5 mcg 3 times a week, Sensipar 90 mg 3 times weekly.  Continue calcium acetate 1334 mg 3 times daily with meals

## 2025-04-04 NOTE — ED PROVIDER NOTES
Time reflects when diagnosis was documented in both MDM as applicable and the Disposition within this note       Time User Action Codes Description Comment    4/3/2025 11:36 PM Jose Bowman [I63.9] CVA (cerebral vascular accident) (HCC)     4/3/2025 11:36 PM Jose Bowman [I10] Uncontrolled hypertension     4/3/2025 11:37 PM Jose Bowman Add [N17.9,  N18.9] Renal failure (ARF), acute on chronic  (HCC)     4/4/2025  1:00 AM Anny Gomez [R53.1] Weakness generalized           ED Disposition       ED Disposition   Admit    Condition   Stable    Date/Time   Thu Apr 3, 2025 11:36 PM    Comment   Case was discussed with Dr Gomez and the patient's admission status was agreed to be Admission Status: inpatient status to the service of Dr. Burton .               Assessment & Plan       Medical Decision Making  76 yo male presenting with ambulatory dysfunction with inability to walk without holding onto things which is very abnormal for him.  Denies headache, dizziness, lightheadedness, any other symptoms.  Started reportedly at 1600 yesterday so is not a TNK or neuro vascular candidate as it has been over 24 hours.  As patient on dialysis will get CT Noncon of the head along with baseline cardiac labs.  Case discussed with on-call neurology recommending hospital admission for stroke pathway.    Amount and/or Complexity of Data Reviewed  Labs: ordered.  Radiology: ordered.    Risk  OTC drugs.  Prescription drug management.  Decision regarding hospitalization.        ED Course as of 04/04/25 0722   Thu Apr 03, 2025 2036 Discussed case with on call neuro and as LKW 1600 yesterday patient not a candidate for any intervention but recommending stroke pathway admission at this time. Continuing home meds with no loading or addition at this time.        Medications   insulin lispro (HumALOG/ADMELOG) 100 units/mL subcutaneous injection 1-5 Units ( Subcutaneous Not Given 4/4/25 0656)   insulin  lispro (HumALOG/ADMELOG) 100 units/mL subcutaneous injection 1-5 Units (1 Units Subcutaneous Given 4/4/25 0310)   amLODIPine (NORVASC) tablet 10 mg (has no administration in time range)   aspirin (ECOTRIN LOW STRENGTH) EC tablet 81 mg (has no administration in time range)   atorvastatin (LIPITOR) tablet 40 mg (has no administration in time range)   calcitriol (ROCALTROL) capsule 0.75 mcg (has no administration in time range)   carvedilol (COREG) tablet 6.25 mg (6.25 mg Oral Given 4/4/25 0311)   doxazosin (CARDURA) tablet 2 mg (2 mg Oral Given 4/4/25 0311)   lanthanum (FOSRENOL) chewable tablet 500 mg (has no administration in time range)   QUEtiapine (SEROquel) tablet 12.5 mg (12.5 mg Oral Given 4/4/25 0311)   heparin (porcine) subcutaneous injection 5,000 Units (5,000 Units Subcutaneous Given 4/4/25 0531)   gabapentin (NEURONTIN) capsule 200 mg (200 mg Oral Given 4/4/25 0530)   amLODIPine (NORVASC) tablet 10 mg (10 mg Oral Given 4/3/25 2113)   aspirin chewable tablet 81 mg (81 mg Oral Given 4/4/25 0011)   insulin regular (HumuLIN R,NovoLIN R) injection 5 Units (5 Units Subcutaneous Given 4/4/25 0310)       ED Risk Strat Scores   HEART Risk Score      Flowsheet Row Most Recent Value   Heart Score Risk Calculator    History 0 Filed at: 04/03/2025 2125   ECG 0 Filed at: 04/03/2025 2125   Age 2 Filed at: 04/03/2025 2125   Risk Factors 2 Filed at: 04/03/2025 2125   Troponin 0 Filed at: 04/03/2025 2125   HEART Score 4 Filed at: 04/03/2025 2125          HEART Risk Score      Flowsheet Row Most Recent Value   Heart Score Risk Calculator    History 0 Filed at: 04/03/2025 2125   ECG 0 Filed at: 04/03/2025 2125   Age 2 Filed at: 04/03/2025 2125   Risk Factors 2 Filed at: 04/03/2025 2125   Troponin 0 Filed at: 04/03/2025 2125   HEART Score 4 Filed at: 04/03/2025 2125                              SBIRT 20yo+      Flowsheet Row Most Recent Value   Initial Alcohol Screen: US AUDIT-C     1. How often do you have a drink containing  "alcohol? 0 Filed at: 2025   2. How many drinks containing alcohol do you have on a typical day you are drinking?  0 Filed at: 2025   3a. Male UNDER 65: How often do you have five or more drinks on one occasion? 0 Filed at: 2025   3b. FEMALE Any Age, or MALE 65+: How often do you have 4 or more drinks on one occassion? 0 Filed at: 2025   Audit-C Score 0 Filed at: 2025   LISA: How many times in the past year have you...    Used an illegal drug or used a prescription medication for non-medical reasons? Never Filed at: 2025                            History of Present Illness       Chief Complaint   Patient presents with    Medical Problem     Pt states he is \"off balance\" since this morning with occasional room spinning sensation. Had dialysis session yesterday.        Past Medical History:   Diagnosis Date    Coronary artery disease     Diabetes mellitus     End stage renal disease     Hyperlipidemia     Hypertension     NSTEMI (non-ST elevated myocardial infarction)     TIA (transient ischemic attack)       Past Surgical History:   Procedure Laterality Date    CARDIAC CATHETERIZATION      70% lesion of a small R PDA and a 99% stenosis of D1 (previously noted on the cath in 2012).  Neither lesion was amenable to PCI.  Medical management.    DIALYSIS FISTULA CREATION        Family History   Problem Relation Age of Onset    Hypertension Mother     Hypertension Father       Social History     Tobacco Use    Smoking status: Former     Current packs/day: 0.00     Average packs/day: 1 pack/day for 20.0 years (20.0 ttl pk-yrs)     Types: Cigarettes     Start date:      Quit date:      Years since quittin.2    Smokeless tobacco: Never    Tobacco comments:     STATES QUIT 30 YEARS AGO/ ABOUT ONE CIGARETTE DAILY   Vaping Use    Vaping status: Never Used   Substance Use Topics    Alcohol use: Never    Drug use: Never      E-Cigarette/Vaping "    E-Cigarette Use Never User       E-Cigarette/Vaping Substances    Nicotine No     THC No     CBD No     Flavoring No     Other No     Unknown No       I have reviewed and agree with the history as documented.     76 yo male presenting to the ed for reports of new difficulty with walking which him and wife state started yesterday after his dialysis session and that they noticed it around 4-430pm yesterday. Denies any other symptoms at this time.       Medical Problem      Review of Systems   Musculoskeletal:  Positive for gait problem.   All other systems reviewed and are negative.          Objective       ED Triage Vitals   Temperature Pulse Blood Pressure Respirations SpO2 Patient Position - Orthostatic VS   04/03/25 2003 04/03/25 2003 04/03/25 2005 04/03/25 2003 04/03/25 2003 04/03/25 2100   99.3 °F (37.4 °C) 77 (!) 239/102 18 98 % Lying      Temp Source Heart Rate Source BP Location FiO2 (%) Pain Score    04/03/25 2003 04/03/25 2100 04/03/25 2100 -- 04/03/25 2100    Temporal Monitor Right arm  No Pain      Vitals      Date and Time Temp Pulse SpO2 Resp BP Pain Score FACES Pain Rating User   04/04/25 0600 97.9 °F (36.6 °C) 60 96 % 14 160/74 No Pain -- TB   04/04/25 0515 -- -- 96 % -- -- -- -- TA   04/04/25 0400 98.6 °F (37 °C) 62 95 % 15 177/77 No Pain -- TB   04/04/25 0300 98.5 °F (36.9 °C) 60 95 % 16 166/78 No Pain -- TB   04/04/25 0200 98.6 °F (37 °C) 61 95 % 16 171/77 No Pain -- TB   04/04/25 0100 98.4 °F (36.9 °C) 59 96 % 15 186/79 No Pain -- TB   04/04/25 0000 98.7 °F (37.1 °C) 63 96 % 15 184/80 No Pain -- TB   04/03/25 2300 98.9 °F (37.2 °C) 63 95 % 15 201/88 No Pain -- TB   04/03/25 2200 98.4 °F (36.9 °C) 62 97 % 16 179/86 No Pain -- TB   04/03/25 2116 -- -- -- -- -- No Pain -- TB   04/03/25 2100 98.9 °F (37.2 °C) 65 95 % 17 192/86 No Pain -- TB   04/03/25 2005 -- -- -- -- 239/102 -- -- MELI   04/03/25 2003 99.3 °F (37.4 °C) 77 98 % 18 -- -- -- MELI            Physical Exam  Vitals and nursing note  reviewed.   Constitutional:       General: He is not in acute distress.     Appearance: He is well-developed. He is not ill-appearing, toxic-appearing or diaphoretic.   HENT:      Head: Normocephalic and atraumatic.      Right Ear: External ear normal.      Left Ear: External ear normal.      Nose: Nose normal.   Eyes:      General: No scleral icterus.        Right eye: No discharge.         Left eye: No discharge.      Conjunctiva/sclera: Conjunctivae normal.   Cardiovascular:      Rate and Rhythm: Normal rate and regular rhythm.      Heart sounds: Normal heart sounds. No murmur heard.     No friction rub. No gallop.   Pulmonary:      Effort: Pulmonary effort is normal. No respiratory distress.      Breath sounds: Normal breath sounds. No wheezing or rales.   Abdominal:      General: Bowel sounds are normal. There is no distension.      Palpations: Abdomen is soft. There is no mass.      Tenderness: There is no abdominal tenderness. There is no guarding.   Musculoskeletal:         General: No tenderness or deformity. Normal range of motion.      Cervical back: Normal range of motion and neck supple.   Skin:     General: Skin is warm and dry.      Coloration: Skin is not pale.      Findings: No erythema or rash.   Neurological:      General: No focal deficit present.      Mental Status: He is alert and oriented to person, place, and time. Mental status is at baseline.      Cranial Nerves: No cranial nerve deficit.      Sensory: No sensory deficit.      Gait: Gait abnormal.      Comments: Unable to perform heel to toe ambulation falling to the R  Able to ambulate however very unsteady   Sensation intact grossly  No pronator drift  5/5 strength in all 4 extremities   Psychiatric:         Behavior: Behavior normal.         Thought Content: Thought content normal.         Judgment: Judgment normal.         Results Reviewed       Procedure Component Value Units Date/Time    Fingerstick Glucose (POCT) [[id number]]   (Normal) Collected: 04/04/25 0656    Lab Status: Final result Specimen: Blood Updated: 04/04/25 0657     POC Glucose 73 mg/dl     TSH, 3rd generation with Free T4 reflex [[id number]]  (Normal) Collected: 04/04/25 0536    Lab Status: Final result Specimen: Blood from Arm, Right Updated: 04/04/25 0621     TSH 3RD GENERATON 2.593 uIU/mL     Basic metabolic panel [[id number]]  (Abnormal) Collected: 04/04/25 0536    Lab Status: Final result Specimen: Blood from Arm, Right Updated: 04/04/25 0621     Sodium 138 mmol/L      Potassium 5.0 mmol/L      Chloride 97 mmol/L      CO2 31 mmol/L      ANION GAP 10 mmol/L      BUN 61 mg/dL      Creatinine 9.39 mg/dL      Glucose 118 mg/dL      Calcium 9.1 mg/dL      eGFR 4 ml/min/1.73sq m     Narrative:      National Kid[employer] Disease Foundation guidelines for Chronic Kidney Disease (CKD):     Stage 1 with normal or high GFR (GFR > 90 mL/min/1.73 square meters)    Stage 2 Mild CKD (GFR = 60-89 mL/min/1.73 square meters)    Stage 3A Moderate CKD (GFR = 45-59 mL/min/1.73 square meters)    Stage 3B Moderate CKD (GFR = 30-44 mL/min/1.73 square meters)    Stage 4 Severe CKD (GFR = 15-29 mL/min/1.73 square meters)    Stage 5 End Stage CKD (GFR <15 mL/min/1.73 square meters)  Note: GFR calculation is accurate only with a steady state creatinine    Lipid Panel with Direct LDL reflex [[id number]]  (Abnormal) Collected: 04/04/25 0536    Lab Status: Final result Specimen: Blood from Arm, Right Updated: 04/04/25 0621     Cholesterol 93 mg/dL      Triglycerides 44 mg/dL      HDL, Direct 34 mg/dL      LDL Calculated 50 mg/dL     CBC (With Platelets) [[id number]]  (Abnormal) Collected: 04/04/25 0536    Lab Status: Final result Specimen: Blood from Arm, Right Updated: 04/04/25 0557     WBC 5.51 Thousand/uL      RBC 3.57 Million/uL      Hemoglobin 10.0 g/dL      Hematocrit 32.3 %      MCV 91 fL      MCH 28.0 pg      MCHC 31.0 g/dL      RDW 16.9 %      Platelets 99 Thousands/uL      MPV 10.3 fL     Hemoglobin  A1C [[id number]] Collected: 04/04/25 0536    Lab Status: In process Specimen: Blood from Arm, Right Updated: 04/04/25 0549    Fingerstick Glucose (POCT) [[id number]]  (Normal) Collected: 04/04/25 0520    Lab Status: Final result Specimen: Blood Updated: 04/04/25 0522     POC Glucose 130 mg/dl     Hemoglobin A1c w/EAG Estimation (Prechecked if no A1C within 90 days) [[id number]] Collected: 04/04/25 0317    Lab Status: In process Specimen: Blood from Arm, Right Updated: 04/04/25 0325    Fingerstick Glucose (POCT) [[id number]]  (Abnormal) Collected: 04/04/25 0238    Lab Status: Final result Specimen: Blood Updated: 04/04/25 0239     POC Glucose 183 mg/dl     HS Troponin I 2hr [[id number]]  (Normal) Collected: 04/03/25 2316    Lab Status: Final result Specimen: Blood from Arm, Right Updated: 04/03/25 2341     hs TnI 2hr 16 ng/L      Delta 2hr hsTnI 0 ng/L     HS Troponin 0hr (reflex protocol) [[id number]]  (Normal) Collected: 04/03/25 2035    Lab Status: Final result Specimen: Blood from Arm, Right Updated: 04/03/25 2102     hs TnI 0hr 16 ng/L     Basic metabolic panel [[id number]]  (Abnormal) Collected: 04/03/25 2035    Lab Status: Final result Specimen: Blood from Arm, Right Updated: 04/03/25 2055     Sodium 136 mmol/L      Potassium 5.6 mmol/L      Chloride 93 mmol/L      CO2 31 mmol/L      ANION GAP 12 mmol/L      BUN 56 mg/dL      Creatinine 8.57 mg/dL      Glucose 324 mg/dL      Calcium 9.0 mg/dL      eGFR 5 ml/min/1.73sq m     Narrative:      National Kidney Disease Foundation guidelines for Chronic Kidney Disease (CKD):     Stage 1 with normal or high GFR (GFR > 90 mL/min/1.73 square meters)    Stage 2 Mild CKD (GFR = 60-89 mL/min/1.73 square meters)    Stage 3A Moderate CKD (GFR = 45-59 mL/min/1.73 square meters)    Stage 3B Moderate CKD (GFR = 30-44 mL/min/1.73 square meters)    Stage 4 Severe CKD (GFR = 15-29 mL/min/1.73 square meters)    Stage 5 End Stage CKD (GFR <15 mL/min/1.73 square meters)  Note: GFR  calculation is accurate only with a steady state creatinine    CBC and differential [[id number]]  (Abnormal) Collected: 04/03/25 2035    Lab Status: Final result Specimen: Blood from Arm, Right Updated: 04/03/25 2045     WBC 5.88 Thousand/uL      RBC 3.81 Million/uL      Hemoglobin 10.5 g/dL      Hematocrit 34.5 %      MCV 91 fL      MCH 27.6 pg      MCHC 30.4 g/dL      RDW 17.1 %      MPV 10.0 fL      Platelets 103 Thousands/uL      nRBC 0 /100 WBCs      Segmented % 57 %      Immature Grans % 0 %      Lymphocytes % 26 %      Monocytes % 13 %      Eosinophils Relative 4 %      Basophils Relative 0 %      Absolute Neutrophils 3.34 Thousands/µL      Absolute Immature Grans 0.01 Thousand/uL      Absolute Lymphocytes 1.54 Thousands/µL      Absolute Monocytes 0.75 Thousand/µL      Eosinophils Absolute 0.22 Thousand/µL      Basophils Absolute 0.02 Thousands/µL             CT head without contrast   Final Interpretation by Kenneth Tapia MD (04/03 2137)      No acute intracranial abnormality.                  Workstation performed: [id number]         MRI brain wo contrast    (Results Pending)       Procedures    ED Medication and Procedure Management   Prior to Admission Medications   Prescriptions Last Dose Informant Patient Reported? Taking?   QUEtiapine (SEROquel) 25 mg tablet  Self Yes No   Sig: Take 12.5 mg by mouth daily at bedtime. Indications: sleep   Veltassa 8.4 g PACK   Yes No   Sig: Take 8.4 g by mouth 4 (four) times a week Tues, Thursday, Saturday and Sunday. Also takes as needed when K is elevated on dialysis days   amLODIPine (NORVASC) 10 mg tablet   No No   Sig: Take 1 tablet (10 mg total) by mouth daily at bedtime   aspirin (ECOTRIN LOW STRENGTH) 81 mg EC tablet  Self Yes No   Sig: Take 81 mg by mouth daily   atorvastatin (LIPITOR) 40 mg tablet  Self Yes No   Sig: Take 40 mg by mouth daily   calcitriol (ROCALTROL) 0.25 mcg capsule   No No   Sig: Take 3 capsules (0.75 mcg total) by mouth 3 (three) times a week    calcium acetate (PHOSLO) capsule  Self Yes No   Sig: 3 (three) times a day Not on dialysis days  Pt taking 2 tabs 3x daily   carvedilol (COREG) 6.25 mg tablet   No No   Sig: Take 1 tablet (6.25 mg total) by mouth 2 (two) times a day with meals   cloNIDine (CATAPRES-TTS-3) 0.3 mg/24 hr   Yes No   Sig: Place 1 patch on the skin once a week Monday   doxazosin (CARDURA) 2 mg tablet  Self Yes No   Sig: Take 1 tablet by mouth daily at bedtime   gabapentin (NEURONTIN) 100 mg capsule  Self No No   Sig: Take 1 cap by mouth daily   Patient taking differently: Take 200 mg by mouth daily at bedtime Take 1 cap by mouth daily   insulin aspart (NovoLOG FlexPen) 100 UNIT/ML injection pen  Self Yes No   Sig: Inject 1 Units under the skin Three times a day. Pt reports dose as follows if BS over 150  breakfast 8 units  lunch 4 units  supper 5 units  bedtime 6 units if BS over 200  Indications: Type 2 Diabetes   lanthanum (FOSRENOL) 500 mg chewable tablet  Self Yes No   Sig: CRUSH OR CHEW AND SWALLOW 1 TABLET THREE TIMES A DAY WITH MEALS   lidocaine-prilocaine (EMLA) cream  Self Yes No   Sig: APPLY SMALL AMOUNT TO ACCESS SITE (AVF) 1 TO 2 HOURS BEFORE DIALYSIS. COVER WITH OCCLUSIVE DRESSING (SARAN WRAP)   losartan (COZAAR) 100 MG tablet   No No   Sig: Take 1 tablet (100 mg total) by mouth daily      Facility-Administered Medications: None     Patient's Medications   Discharge Prescriptions    No medications on file     No discharge procedures on file.  ED SEPSIS DOCUMENTATION   Time reflects when diagnosis was documented in both MDM as applicable and the Disposition within this note       Time User Action Codes Description Comment    4/3/2025 11:36 PM Jose Bowman [I63.9] CVA (cerebral vascular accident) (HCC)     4/3/2025 11:36 PM Jose Bowman [I10] Uncontrolled hypertension     4/3/2025 11:37 PM Jose Bowman [N17.9,  N18.9] Renal failure (ARF), acute on chronic  (HCC)     4/4/2025  1:00 AM Patricia  Anny Add [R53.1] Weakness generalized                  Jam Dorantes, DO  04/04/25 0722

## 2025-04-04 NOTE — ASSESSMENT & PLAN NOTE
Wt Readings from Last 3 Encounters:   04/04/25 96.2 kg (212 lb)   08/21/24 94.3 kg (207 lb 14.3 oz)   07/11/24 92.7 kg (204 lb 5.9 oz)     Euvolemic  Continue with fluid restriction

## 2025-04-04 NOTE — ASSESSMENT & PLAN NOTE
Patient presents with complaints of unsteady gait since waking up this am associated with dizziness.   BP elevated to 239/102  CT brain negative for acute pathology  No stroke alert called as LKW > 24 hrs  Will place on stroke pathway under observation   Telemetry monitoring   Neuro checks as per protocol   Check tsh, lipid panel, A1c  Continue with aspirin, plavix daily   MRI brain ordered  Echo if + findings   Neurology input   PT/OT eval

## 2025-04-04 NOTE — ASSESSMENT & PLAN NOTE
Presented with gait disturbance generalized weakness  Stroke workup was negative  Neurology input appreciated-felt presentation most likely consistent with hypertensive encephalopathy  Symptoms have resolved, blood pressure controlled

## 2025-04-04 NOTE — ED NOTES
Clonidine patch on right upper arm; per pt changes every Monday; last placed 31 Mar 2025     Giana Javier RN  04/03/25 2109       Giana Javier RN  04/04/25 0029

## 2025-04-04 NOTE — ASSESSMENT & PLAN NOTE
POA  Outpatient antihypertensives resumed when stroke workup was negative  Continue prehospital antihypertensive regimen on discharge  Blood pressures are well-controlled today hypertensive urgency resolved

## 2025-04-04 NOTE — PLAN OF CARE
Problem: METABOLIC, FLUID AND ELECTROLYTES - ADULT  Goal: Electrolytes maintained within normal limits  Description: INTERVENTIONS:- Monitor labs and assess patient for signs and symptoms of electrolyte imbalances- Administer electrolyte replacement as ordered- Monitor response to electrolyte replacements, including repeat lab results as appropriate- Instruct patient on fluid and nutrition as appropriate  4/4/2025 1914 by Leah Olsen RN  Outcome: Progressing  4/4/2025 1914 by Leah Olsen RN  Outcome: Progressing  Goal: Fluid balance maintained  Description: INTERVENTIONS:- Monitor labs - Monitor I/O and WT- Instruct patient on fluid and nutrition as appropriate- Assess for signs & symptoms of volume excess or deficit  4/4/2025 1914 by Leah Olsen RN  Outcome: Progressing  4/4/2025 1914 by Leah Olsen RN  Outcome: Progressing     Problem: PAIN - ADULT  Goal: Verbalizes/displays adequate comfort level or baseline comfort level  Description: Interventions:- Encourage patient to monitor pain and request assistance- Assess pain using appropriate pain scale- Administer analgesics based on type and severity of pain and evaluate response- Implement non-pharmacological measures as appropriate and evaluate response- Consider cultural and social influences on pain and pain management- Notify physician/advanced practitioner if interventions unsuccessful or patient reports new pain  Outcome: Progressing     Problem: INFECTION - ADULT  Goal: Absence or prevention of progression during hospitalization  Description: INTERVENTIONS:- Assess and monitor for signs and symptoms of infection- Monitor lab/diagnostic results- Monitor all insertion sites, i.e. indwelling lines, tubes, and drains- Monitor endotracheal if appropriate and nasal secretions for changes in amount and color- [place] appropriate cooling/warming therapies per order- Administer medications as ordered- Instruct and encourage patient and family to use  good hand hygiene technique- Identify and instruct in appropriate isolation precautions for identified infection/condition  Outcome: Progressing  Goal: Absence of fever/infection during neutropenic period  Description: INTERVENTIONS:- Monitor WBC  Outcome: Progressing     Problem: SAFETY ADULT  Goal: Patient will remain free of falls  Description: INTERVENTIONS:- Educate patient/family on patient safety including physical limitations- Instruct patient to call for assistance with activity - Consult OT/PT to assist with strengthening/mobility - Keep Call bell within reach- Keep bed low and locked with side rails adjusted as appropriate- Keep care items and personal belongings within reach- Initiate and maintain comfort rounds- Make Fall Risk Sign visible to staff- Offer Toileting every 2 Hours, in advance of need- Initiate/Maintain bed alarm- Obtain necessary fall risk management equipment: non slip socks - Apply yellow socks and bracelet for high fall risk patients- Consider moving patient to room near nurses station  Outcome: Progressing  Goal: Maintain or return to baseline ADL function  Description: INTERVENTIONS:-  Assess patient's ability to carry out ADLs; assess patient's baseline for ADL function and identify physical deficits which impact ability to perform ADLs (bathing, care of mouth/teeth, toileting, grooming, dressing, etc.)- Assess/evaluate cause of self-care deficits - Assess range of motion- Assess patient's mobility; develop plan if impaired- Assess patient's need for assistive devices and provide as appropriate- Encourage maximum independence but intervene and supervise when necessary- Involve family in performance of ADLs- Assess for home care needs following discharge - Consider OT consult to assist with ADL evaluation and planning for discharge- Provide patient education as appropriate  Outcome: Progressing  Goal: Maintains/Returns to pre admission functional level  Description: INTERVENTIONS:-  Perform AM-PAC 6 Click Basic Mobility/ Daily Activity assessment daily.- Set and communicate daily mobility goal to care team and patient/family/caregiver. - Collaborate with rehabilitation services on mobility goals if consulted- Perform Range of Motion 3 times a day.- Reposition patient every 2 hours.- Dangle patient 3 times a day- Stand patient 3 times a day- Ambulate patient 3 times a day- Out of bed to chair 3 times a day - Out of bed for meals 3 times a day- Out of bed for toileting- Record patient progress and toleration of activity level   Outcome: Progressing     Problem: DISCHARGE PLANNING  Goal: Discharge to home or other facility with appropriate resources  Description: INTERVENTIONS:- Identify barriers to discharge w/patient and caregiver- Arrange for needed discharge resources and transportation as appropriate- Identify discharge learning needs (meds, wound care, etc.)- Arrange for interpretive services to assist at discharge as needed- Refer to Case Management Department for coordinating discharge planning if the patient needs post-hospital services based on physician/advanced practitioner order or complex needs related to functional status, cognitive ability, or social support system  Outcome: Progressing     Problem: Knowledge Deficit  Goal: Patient/family/caregiver demonstrates understanding of disease process, treatment plan, medications, and discharge instructions  Description: Complete learning assessment and assess knowledge base.Interventions:- Provide teaching at level of understanding- Provide teaching via preferred learning methods  Outcome: Progressing

## 2025-04-04 NOTE — ASSESSMENT & PLAN NOTE
Lab Results   Component Value Date    EGFR 4 04/04/2025    EGFR 5 04/03/2025    EGFR 10 (L) 12/20/2024    CREATININE 9.39 (H) 04/04/2025    CREATININE 8.57 (H) 04/03/2025    CREATININE 5.53 (H) 12/20/2024     Appreciate nephrology who are followed  Last HD session 4/4  Euvolemic on exam medically stable for discharge  Next HD session 4/7 outpatient

## 2025-04-04 NOTE — ED PROVIDER NOTES
Time reflects when diagnosis was documented in both MDM as applicable and the Disposition within this note       Time User Action Codes Description Comment    4/3/2025 11:36 PM Jose Bowman Add [I63.9] CVA (cerebral vascular accident) (HCC)     4/3/2025 11:36 PM Jose Bowman Add [I10] Uncontrolled hypertension     4/3/2025 11:37 PM Jose Bowman Add [N17.9,  N18.9] Renal failure (ARF), acute on chronic  (HCC)           ED Disposition       ED Disposition   Admit    Condition   Stable    Date/Time   Thu Apr 3, 2025 11:36 PM    Comment   Case was discussed with Dr Gomez and the patient's admission status was agreed to be Admission Status: inpatient status to the service of Dr. Burton .               Assessment & Plan   {Hyperlinks  Risk Stratification - NIHSS - HEART SCORE - Fill out sepsis note and make sure you call [id number] if severe or septic shock:[id number]}    Medical Decision Making  Amount and/or Complexity of Data Reviewed  Labs: ordered.  Radiology: ordered.    Risk  Prescription drug management.  Decision regarding hospitalization.             Medications   amLODIPine (NORVASC) tablet 10 mg (10 mg Oral Given 4/3/25 2113)       ED Risk Strat Scores   HEART Risk Score      Flowsheet Row Most Recent Value   Heart Score Risk Calculator    History 0 Filed at: 04/03/2025 2125   ECG 0 Filed at: 04/03/2025 2125   Age 2 Filed at: 04/03/2025 2125   Risk Factors 2 Filed at: 04/03/2025 2125   Troponin 0 Filed at: 04/03/2025 2125   HEART Score 4 Filed at: 04/03/2025 2125          HEART Risk Score      Flowsheet Row Most Recent Value   Heart Score Risk Calculator    History 0 Filed at: 04/03/2025 2125   ECG 0 Filed at: 04/03/2025 2125   Age 2 Filed at: 04/03/2025 2125   Risk Factors 2 Filed at: 04/03/2025 2125   Troponin 0 Filed at: 04/03/2025 2125   HEART Score 4 Filed at: 04/03/2025 2125                              SBIRT 22yo+      Flowsheet Row Most Recent Value   Initial Alcohol Screen: US AUDIT-C   "   1. How often do you have a drink containing alcohol? 0 Filed at: 2025   2. How many drinks containing alcohol do you have on a typical day you are drinking?  0 Filed at: 2025   3a. Male UNDER 65: How often do you have five or more drinks on one occasion? 0 Filed at: 2025   3b. FEMALE Any Age, or MALE 65+: How often do you have 4 or more drinks on one occassion? 0 Filed at: 2025   Audit-C Score 0 Filed at: 2025   LISA: How many times in the past year have you...    Used an illegal drug or used a prescription medication for non-medical reasons? Never Filed at: 2025                            History of Present Illness   {Hyperlinks  History (Med, Surg, Fam, Social) - Current Medications - Allergies  :[id number]}    Chief Complaint   Patient presents with    Medical Problem     Pt states he is \"off balance\" since this morning with occasional room spinning sensation. Had dialysis session yesterday.        Past Medical History:   Diagnosis Date    Coronary artery disease     Diabetes mellitus     End stage renal disease     Hyperlipidemia     Hypertension     NSTEMI (non-ST elevated myocardial infarction)     TIA (transient ischemic attack)       Past Surgical History:   Procedure Laterality Date    CARDIAC CATHETERIZATION      70% lesion of a small R PDA and a 99% stenosis of D1 (previously noted on the cath in ).  Neither lesion was amenable to PCI.  Medical management.    DIALYSIS FISTULA CREATION        Family History   Problem Relation Age of Onset    Hypertension Mother     Hypertension Father       Social History     Tobacco Use    Smoking status: Former     Current packs/day: 0.00     Average packs/day: 1 pack/day for 20.0 years (20.0 ttl pk-yrs)     Types: Cigarettes     Start date:      Quit date:      Years since quittin.2    Smokeless tobacco: Never    Tobacco comments:     STATES QUIT 30 YEARS AGO/ ABOUT ONE " CIGARETTE DAILY   Vaping Use    Vaping status: Never Used   Substance Use Topics    Alcohol use: Never    Drug use: Never      E-Cigarette/Vaping    E-Cigarette Use Never User       E-Cigarette/Vaping Substances    Nicotine No     THC No     CBD No     Flavoring No     Other No     Unknown No       I have reviewed and agree with the history as documented.       Medical Problem      Review of Systems        Objective   {Hyperlinks  Historical Vitals - Historical Labs - Chart Review/Microbiology - Last Echo - Code Status  :[id number]}    ED Triage Vitals   Temperature Pulse Blood Pressure Respirations SpO2 Patient Position - Orthostatic VS   04/03/25 2003 04/03/25 2003 04/03/25 2005 04/03/25 2003 04/03/25 2003 04/03/25 2100   99.3 °F (37.4 °C) 77 (!) 239/102 18 98 % Lying      Temp Source Heart Rate Source BP Location FiO2 (%) Pain Score    04/03/25 2003 04/03/25 2100 04/03/25 2100 -- 04/03/25 2100    Temporal Monitor Right arm  No Pain      Vitals      Date and Time Temp Pulse SpO2 Resp BP Pain Score FACES Pain Rating User   04/03/25 2116 -- -- -- -- -- No Pain -- TB   04/03/25 2100 -- 65 95 % 17 192/86 No Pain -- TB   04/03/25 2005 -- -- -- -- 239/102 -- -- MELI   04/03/25 2003 99.3 °F (37.4 °C) 77 98 % 18 -- -- -- MELI            Physical Exam    Results Reviewed       Procedure Component Value Units Date/Time    HS Troponin I 2hr [[id number]] Collected: 04/03/25 2316    Lab Status: In process Specimen: Blood from Arm, Right Updated: 04/03/25 2318    HS Troponin I 4hr [[id number]]     Lab Status: No result Specimen: Blood     HS Troponin 0hr (reflex protocol) [[id number]]  (Normal) Collected: 04/03/25 2035    Lab Status: Final result Specimen: Blood from Arm, Right Updated: 04/03/25 2102     hs TnI 0hr 16 ng/L     Basic metabolic panel [[id number]]  (Abnormal) Collected: 04/03/25 2035    Lab Status: Final result Specimen: Blood from Arm, Right Updated: 04/03/25 2055     Sodium 136 mmol/L      Potassium 5.6 mmol/L       Chloride 93 mmol/L      CO2 31 mmol/L      ANION GAP 12 mmol/L      BUN 56 mg/dL      Creatinine 8.57 mg/dL      Glucose 324 mg/dL      Calcium 9.0 mg/dL      eGFR 5 ml/min/1.73sq m     Narrative:      National Kid[employer] guidelines for Chronic Kidney Disease (CKD):     Stage 1 with normal or high GFR (GFR > 90 mL/min/1.73 square meters)    Stage 2 Mild CKD (GFR = 60-89 mL/min/1.73 square meters)    Stage 3A Moderate CKD (GFR = 45-59 mL/min/1.73 square meters)    Stage 3B Moderate CKD (GFR = 30-44 mL/min/1.73 square meters)    Stage 4 Severe CKD (GFR = 15-29 mL/min/1.73 square meters)    Stage 5 End Stage CKD (GFR <15 mL/min/1.73 square meters)  Note: GFR calculation is accurate only with a steady state creatinine    CBC and differential [[id number]]  (Abnormal) Collected: 04/03/25 2035    Lab Status: Final result Specimen: Blood from Arm, Right Updated: 04/03/25 2045     WBC 5.88 Thousand/uL      RBC 3.81 Million/uL      Hemoglobin 10.5 g/dL      Hematocrit 34.5 %      MCV 91 fL      MCH 27.6 pg      MCHC 30.4 g/dL      RDW 17.1 %      MPV 10.0 fL      Platelets 103 Thousands/uL      nRBC 0 /100 WBCs      Segmented % 57 %      Immature Grans % 0 %      Lymphocytes % 26 %      Monocytes % 13 %      Eosinophils Relative 4 %      Basophils Relative 0 %      Absolute Neutrophils 3.34 Thousands/µL      Absolute Immature Grans 0.01 Thousand/uL      Absolute Lymphocytes 1.54 Thousands/µL      Absolute Monocytes 0.75 Thousand/µL      Eosinophils Absolute 0.22 Thousand/µL      Basophils Absolute 0.02 Thousands/µL             CT head without contrast   Final Interpretation by Kenneth Tapia MD (04/03 2137)      No acute intracranial abnormality.                  Workstation performed: [id number]             Procedures    ED Medication and Procedure Management   Prior to Admission Medications   Prescriptions Last Dose Informant Patient Reported? Taking?   Insulin Pen Needle (UltiCare Short Pen Needles) 31G X 8 MM  MISC  Self Yes No   Sig: INJECT 4 TIMES DAILY;E11.9   Lokelma 10 g PACK  Self Yes No   Sig: EMPTY CONTENTS OF 1 PACKET IN 3 TABLESPOONSFUL OF WATER OR AS DIRECTED BY CLINIC. STIR WELL AND DRINK IMMEDIATELY ON NON-DIALYSIS DAYS   QUEtiapine (SEROquel) 25 mg tablet  Self Yes No   Sig: Take 12.5 mg by mouth daily at bedtime. Indications: sleep   amLODIPine (NORVASC) 10 mg tablet   No No   Sig: Take 1 tablet (10 mg total) by mouth daily at bedtime   aspirin (ECOTRIN LOW STRENGTH) 81 mg EC tablet  Self Yes No   Sig: Take 81 mg by mouth daily   atorvastatin (LIPITOR) 40 mg tablet  Self Yes No   Sig: Take 40 mg by mouth daily   calcitriol (ROCALTROL) 0.25 mcg capsule   No No   Sig: Take 3 capsules (0.75 mcg total) by mouth 3 (three) times a week   calcium acetate (PHOSLO) capsule  Self Yes No   Sig: 3 (three) times a day Not on dialysis days  Pt taking 2 tabs 3x daily   carvedilol (COREG) 6.25 mg tablet   No No   Sig: Take 1 tablet (6.25 mg total) by mouth 2 (two) times a day with meals   cloNIDine (CATAPRES-TTS-3) 0.3 mg/24 hr   Yes No   Sig: Place 1 patch on the skin once a week Monday   doxazosin (CARDURA) 2 mg tablet  Self Yes No   Sig: Take 1 tablet by mouth daily at bedtime   gabapentin (NEURONTIN) 100 mg capsule  Self No No   Sig: Take 1 cap by mouth daily   Patient taking differently: Take 100 mg by mouth 3 (three) times a day Take 1 cap by mouth daily   insulin aspart (NovoLOG FlexPen) 100 UNIT/ML injection pen  Self Yes No   Sig: Inject 1 Units under the skin Three times a day. Pt reports dose as follows if BS over 150  breakfast 8 units  lunch 4 units  supper 5 units  bedtime 6 units if BS over 200  Indications: Type 2 Diabetes   lanthanum (FOSRENOL) 500 mg chewable tablet  Self Yes No   Sig: CRUSH OR CHEW AND SWALLOW 1 TABLET THREE TIMES A DAY WITH MEALS   lidocaine-prilocaine (EMLA) cream  Self Yes No   Sig: APPLY SMALL AMOUNT TO ACCESS SITE (AVF) 1 TO 2 HOURS BEFORE DIALYSIS. COVER WITH OCCLUSIVE DRESSING  (SARAN WRAP)   losartan (COZAAR) 100 MG tablet   No No   Sig: Take 1 tablet (100 mg total) by mouth daily      Facility-Administered Medications: None     Patient's Medications   Discharge Prescriptions    No medications on file     No discharge procedures on file.  ED SEPSIS DOCUMENTATION   Time reflects when diagnosis was documented in both MDM as applicable and the Disposition within this note       Time User Action Codes Description Comment    4/3/2025 11:36 PM Jose Bowman [I63.9] CVA (cerebral vascular accident) (HCC)     4/3/2025 11:36 PM Jose Bowman [I10] Uncontrolled hypertension     4/3/2025 11:37 PM Jose Bowman [N17.9,  N18.9] Renal failure (ARF), acute on chronic  (HCC)

## 2025-04-04 NOTE — ASSESSMENT & PLAN NOTE
Lab Results   Component Value Date    HGBA1C 8.4 (H) 10/16/2024       Recent Labs     04/04/25  0238   POCGLU 183*       Blood Sugar Average: Last 72 hrs:  (P) 183  ISS as needed

## 2025-04-04 NOTE — ASSESSMENT & PLAN NOTE
Presented to ED with ataxia and ambulatory dysfunction with concern for CVA and was admitted for stroke pathway.

## 2025-04-04 NOTE — H&P
H&P - Hospitalist   Name: Babak Mike 75 y.o. male I MRN: [id number]  Unit/Bed#: ED 24 I Date of Admission: 4/3/2025   Date of Service: 4/4/2025 I Hospital Day: 1     Assessment & Plan  Ataxia  Patient presents with complaints of unsteady gait since waking up this am associated with dizziness.   BP elevated to 239/102  CT brain negative for acute pathology  No stroke alert called as LKW > 24 hrs  Will place on stroke pathway under observation   Telemetry monitoring   Neuro checks as per protocol   Check tsh, lipid panel, A1c  Continue with aspirin, plavix daily   MRI brain ordered  Echo if + findings   Neurology input   PT/OT eval   Chronic hyperkalemia  K today 5.6. Patient took veltassa today   Telemetry monitoring   S/p 5u insulin  Repeat K level   For hemodialysis tomorrow  ESRD (end stage renal disease) ([facility])  Lab Results   Component Value Date    EGFR 5 04/03/2025    EGFR 10 (L) 12/20/2024    EGFR 5 (L) 10/18/2024    CREATININE 8.57 (H) 04/03/2025    CREATININE 5.53 (H) 12/20/2024    CREATININE 9.70 (H) 10/18/2024   Continue with HD as scheduled  Nephrology consulted   Hypertensive urgency  BP initially very elevated, now improved  Continue home clonidine patch (patient has one placed on shoulder since Monday) weekly  Continue with cardura nightly   Continue with coreg BID   Continue with amlodipine daily   Type 2 diabetes mellitus without complication, with long-term current use of insulin ([facility])  Lab Results   Component Value Date    HGBA1C 8.4 (H) 10/16/2024       Recent Labs     04/04/25  0238   POCGLU 183*       Blood Sugar Average: Last 72 hrs:  (P) 183  ISS as needed  Mixed hyperlipidemia  Checking Lipid panel   Continue with lipitor  SIVA (obstructive sleep apnea)  Continue with cpap nightly   CAD (coronary artery disease)  Continue with aspirin, plavix, statin  Chronic diastolic (congestive) heart failure (HCC)  Wt Readings from Last 3 Encounters:   04/04/25 96.2 kg (212 lb)   08/21/24 94.3 kg (207 lb  14.3 oz)   07/11/24 92.7 kg (204 lb 5.9 oz)     Euvolemic  Continue with fluid restriction   Weakness generalized  Plan as above  PT/OT eval       VTE Pharmacologic Prophylaxis:   Moderate Risk (Score 3-4) - Pharmacological DVT Prophylaxis Ordered: heparin.  Code Status: Level 1 - Full Code   Discussion with family: Patient declined call to .     Anticipated Length of Stay: Patient will be admitted on an inpatient basis with an anticipated length of stay of greater than 2 midnights secondary to above.    History of Present Illness   Chief Complaint: Unsteady gait, weakness    Babak Mike is a 75 y.o. male with a PMH of of ESRD on HD MWF, SIVA on CPAP, insulin-dependent diabetes mellitus, presenting with imbalance, severe hypertension, peripheral arterial disease, CAD, heart failure with preserved EF presenting with weakness, unsteady gait.  Patient noted when he woke up this morning, he felt weak. When he attempted to walk, patient was 'toppling over' both sides. Patient has chronic weakness especially after dialysis but noted weakness was worse this AM despite not having dialysis today. He did not have any numbness/tingling,  lightheadedness, nausea, tinnitus, chest pains but did note intermittent room spinning dizziness. Patient waited it out until his wife got home and decided he comes to the emergency room. He has a hx of this type of weakness, occurring approximately one yesr ago. At that time he came to the ER and stroke was ruled out. It was thought his coreg medication dose was too much and it was reduced from 25mg to 6.25mg. He has been doing fairly well since then, no complications. Continues to have dialysis MWF, last session wednesday without complication. There has been no changes in medications recently which he takes as prescribed. Patient denies any toxic habits.    In ER, BP as high as 201/88  Labs significant for K 5.6, BUN 56, BG 5.6, Cr 8.57, Platelet 103  CT brain in ER showed no  acute pathology     Case discussed with Neurology with recommendation for further stroke eval, MRI brain     Patient received aspirin 81 mg once and will be admitted for further management.     Review of Systems   Constitutional:  Positive for fatigue. Negative for chills and fever.   HENT:  Negative for ear pain, sinus pressure, sneezing, sore throat and trouble swallowing.    Eyes:  Negative for pain and visual disturbance.   Respiratory:  Negative for cough, shortness of breath and wheezing.    Cardiovascular:  Negative for chest pain, palpitations and leg swelling.   Gastrointestinal:  Negative for abdominal pain and vomiting.   Genitourinary:  Negative for decreased urine volume, dysuria and hematuria.   Musculoskeletal:  Positive for gait problem. Negative for arthralgias, back pain and myalgias.   Skin:  Negative for color change and rash.   Neurological:  Positive for dizziness and weakness. Negative for tremors, seizures, syncope, facial asymmetry, speech difficulty, light-headedness and numbness.   Psychiatric/Behavioral:  Negative for sleep disturbance.    All other systems reviewed and are negative.      Historical Information   Past Medical History:   Diagnosis Date    Coronary artery disease     Diabetes mellitus     End stage renal disease     Hyperlipidemia     Hypertension     NSTEMI (non-ST elevated myocardial infarction)     TIA (transient ischemic attack)      Past Surgical History:   Procedure Laterality Date    CARDIAC CATHETERIZATION      70% lesion of a small R PDA and a 99% stenosis of D1 (previously noted on the cath in ).  Neither lesion was amenable to PCI.  Medical management.    DIALYSIS FISTULA CREATION       Social History     Tobacco Use    Smoking status: Former     Current packs/day: 0.00     Average packs/day: 1 pack/day for 20.0 years (20.0 ttl pk-yrs)     Types: Cigarettes     Start date:      Quit date:      Years since quittin.2    Smokeless tobacco:  Never    Tobacco comments:     STATES QUIT 30 YEARS AGO/ ABOUT ONE CIGARETTE DAILY   Vaping Use    Vaping status: Never Used   Substance and Sexual Activity    Alcohol use: Never    Drug use: Never    Sexual activity: Not Currently     E-Cigarette/Vaping    E-Cigarette Use Never User      E-Cigarette/Vaping Substances    Nicotine No     THC No     CBD No     Flavoring No     Other No     Unknown No      Family History   Problem Relation Age of Onset    Hypertension Mother     Hypertension Father      Social History:  Marital Status: /Civil Union       Meds/Allergies   I have reviewed home medications with patient personally.  Prior to Admission medications    Medication Sig Start Date End Date Taking? Authorizing Provider   amLODIPine (NORVASC) 10 mg tablet Take 1 tablet (10 mg total) by mouth daily at bedtime 7/15/23 7/10/24  Eligio Albright MD   aspirin (ECOTRIN LOW STRENGTH) 81 mg EC tablet Take 81 mg by mouth daily    Historical Provider, MD   atorvastatin (LIPITOR) 40 mg tablet Take 40 mg by mouth daily    Historical Provider, MD   calcitriol (ROCALTROL) 0.25 mcg capsule Take 3 capsules (0.75 mcg total) by mouth 3 (three) times a week 6/23/23 7/10/24  LAN Ricardo   calcium acetate (PHOSLO) capsule 3 (three) times a day Not on dialysis days  Pt taking 2 tabs 3x daily 1/14/21   Historical Provider, MD   carvedilol (COREG) 6.25 mg tablet Take 1 tablet (6.25 mg total) by mouth 2 (two) times a day with meals 7/11/24 8/10/24  LAN Trinidad   cloNIDine (CATAPRES-TTS-3) 0.3 mg/24 hr Place 1 patch on the skin once a week Monday    Historical Provider, MD   doxazosin (CARDURA) 2 mg tablet Take 1 tablet by mouth daily at bedtime 6/9/23   Historical Provider, MD   gabapentin (NEURONTIN) 100 mg capsule Take 1 cap by mouth daily  Patient taking differently: Take 100 mg by mouth 3 (three) times a day Take 1 cap by mouth daily 6/17/22   Franca Avina MD   insulin aspart (NovoLOG FlexPen) 100  UNIT/ML injection pen Inject 1 Units under the skin Three times a day. Pt reports dose as follows if BS over 150  breakfast 8 units  lunch 4 units  supper 5 units  bedtime 6 units if BS over 200  Indications: Type 2 Diabetes 8/12/20   Historical Provider, MD   Insulin Pen Needle (UltiCare Short Pen Needles) 31G X 8 MM MISC INJECT 4 TIMES DAILY;E11.9 3/24/21   Historical Provider, MD   lanthanum (FOSRENOL) 500 mg chewable tablet CRUSH OR CHEW AND SWALLOW 1 TABLET THREE TIMES A DAY WITH MEALS 8/23/22   Historical Provider, MD   lidocaine-prilocaine (EMLA) cream APPLY SMALL AMOUNT TO ACCESS SITE (AVF) 1 TO 2 HOURS BEFORE DIALYSIS. COVER WITH OCCLUSIVE DRESSING (SARAN WRAP) 3/26/21   Historical Provider, MD   Lokelma 10 g PACK EMPTY CONTENTS OF 1 PACKET IN 3 TABLESPOONSFUL OF WATER OR AS DIRECTED BY CLINIC. STIR WELL AND DRINK IMMEDIATELY ON NON-DIALYSIS DAYS 12/18/20   Historical Provider, MD   losartan (COZAAR) 100 MG tablet Take 1 tablet (100 mg total) by mouth daily 7/15/23 10/31/23  Eligio Albright MD   QUEtiapine (SEROquel) 25 mg tablet Take 12.5 mg by mouth daily at bedtime. Indications: sleep    Historical Provider, MD     No Known Allergies    Objective :  Temp:  [98.4 °F (36.9 °C)-99.3 °F (37.4 °C)] 98.6 °F (37 °C)  HR:  [59-77] 62  BP: (166-239)/() 177/77  Resp:  [15-18] 15  SpO2:  [95 %-98 %] 95 %  O2 Device: None (Room air)    Physical Exam  Vitals and nursing note reviewed.   Constitutional:       General: He is not in acute distress.     Appearance: He is well-developed. He is ill-appearing. He is not toxic-appearing.   HENT:      Head: Normocephalic and atraumatic.      Nose: No rhinorrhea.      Mouth/Throat:      Pharynx: No posterior oropharyngeal erythema.   Eyes:      General: No scleral icterus.     Conjunctiva/sclera: Conjunctivae normal.   Cardiovascular:      Rate and Rhythm: Normal rate and regular rhythm.      Heart sounds: Murmur heard.   Pulmonary:      Effort: Pulmonary effort is  normal. No respiratory distress.      Breath sounds: Normal breath sounds. No wheezing or rales.   Abdominal:      General: Bowel sounds are normal. There is distension.      Palpations: Abdomen is soft.      Tenderness: There is no abdominal tenderness. There is no guarding.   Musculoskeletal:         General: No swelling.      Cervical back: Neck supple.      Right lower leg: No edema.      Left lower leg: No edema.      Comments: VENESSAE AVTABATHA   Skin:     General: Skin is warm and dry.      Capillary Refill: Capillary refill takes less than 2 seconds.      Findings: No erythema.   Neurological:      General: No focal deficit present.      Mental Status: He is alert. Mental status is at baseline.      Sensory: No sensory deficit.      Motor: No weakness.   Psychiatric:         Mood and Affect: Mood normal.          Lines/Drains:        Lab Results: I have reviewed the following results:  Results from last 7 days   Lab Units 04/03/25 2035   WBC Thousand/uL 5.88   HEMOGLOBIN g/dL 10.5*   HEMATOCRIT % 34.5*   PLATELETS Thousands/uL 103*   SEGS PCT % 57   LYMPHO PCT % 26   MONO PCT % 13*   EOS PCT % 4     Results from last 7 days   Lab Units 04/03/25 2035   SODIUM mmol/L 136   POTASSIUM mmol/L 5.6*   CHLORIDE mmol/L 93*   CO2 mmol/L 31   BUN mg/dL 56*   CREATININE mg/dL 8.57*   ANION GAP mmol/L 12   CALCIUM mg/dL 9.0   GLUCOSE RANDOM mg/dL 324*         Results from last 7 days   Lab Units 04/04/25  0238   POC GLUCOSE mg/dl 183*     Lab Results   Component Value Date    HGBA1C 8.4 (H) 10/16/2024    HGBA1C 8.5 (H) 07/10/2024    HGBA1C 8.6 (H) 03/19/2024           Imaging Results Review: I reviewed radiology reports from this admission including: CT head.  Other Study Results Review: EKG was reviewed.     Administrative Statements   I have spent a total time of 70 minutes in caring for this patient on the day of the visit/encounter including Instructions for management.    ** Please Note: This note has been constructed using  a voice recognition system. **

## 2025-04-04 NOTE — ED NOTES
Patient transported to MRI    Clonidine patch removed prior to going to MRI     Giana Javier RN  04/04/25 0762

## 2025-04-04 NOTE — ASSESSMENT & PLAN NOTE
Lab Results   Component Value Date    EGFR 5 04/03/2025    EGFR 10 (L) 12/20/2024    EGFR 5 (L) 10/18/2024    CREATININE 8.57 (H) 04/03/2025    CREATININE 5.53 (H) 12/20/2024    CREATININE 9.70 (H) 10/18/2024   Continue with HD as scheduled  Nephrology consulted

## 2025-04-04 NOTE — ASSESSMENT & PLAN NOTE
Wt Readings from Last 3 Encounters:   04/04/25 96.2 kg (212 lb)   08/21/24 94.3 kg (207 lb 14.3 oz)   07/11/24 92.7 kg (204 lb 5.9 oz)   Echo pending.  Previous EF 55%, 6/17/2024.  Continue management as per cardiology and hospitalist.

## 2025-04-04 NOTE — ASSESSMENT & PLAN NOTE
BP initially very elevated, now improved  Continue home clonidine patch (patient has one placed on shoulder since Monday) weekly  Continue with cardura nightly   Continue with coreg BID   Continue with amlodipine daily

## 2025-04-04 NOTE — PHYSICAL THERAPY NOTE
PHYSICAL THERAPY EVALUATION  NAME:  Babak Mike  DATE: 04/04/25    AGE:   75 y.o.  Mrn:   [id number]  ADMIT DX:  Known medical problems [Z78.9]  Problem List:   Patient Active Problem List   Diagnosis    ESRD (end stage renal disease) ([facility])    Hypertensive urgency    Type 2 diabetes mellitus without complication, with long-term current use of insulin (HCC)    Mixed hyperlipidemia    Elevated troponin    TIA (transient ischemic attack)    SIVA (obstructive sleep apnea)    Snoring    Numbness and tingling in both hands    Dependence on renal dialysis ([facility])    Flash pulmonary edema ([facility])    SIRS (systemic inflammatory response syndrome) ([facility])    Acute respiratory failure with hypoxia ([facility])    Hypertensive emergency    Left arm pain    CAD (coronary artery disease)    Anemia    Chronic diastolic (congestive) heart failure ([facility])    Asymptomatic bilateral carotid artery stenosis    PAD (peripheral artery disease) ([facility])    Bilateral lower extremity edema    Dizziness    Weakness generalized    Primary hypertension    Ataxia    Chronic hyperkalemia    Secondary hyperparathyroidism of renal origin ([facility])       Past Medical History  Past Medical History:   Diagnosis Date    Coronary artery disease     Diabetes mellitus     End stage renal disease     Hyperlipidemia     Hypertension     NSTEMI (non-ST elevated myocardial infarction) 2012    TIA (transient ischemic attack)        Past Surgical History  Past Surgical History:   Procedure Laterality Date    CARDIAC CATHETERIZATION  2017    70% lesion of a small R PDA and a 99% stenosis of D1 (previously noted on the cath in 2012).  Neither lesion was amenable to PCI.  Medical management.    DIALYSIS FISTULA CREATION         Length Of Stay: 1  Performed at least 2 patient identifiers during session: Name and Epic photo       04/04/25 1349   PT Last Visit   PT Visit Date 04/04/25   Note Type   Note type Evaluation   Pain Assessment   Pain Assessment Tool 0-10   Pain Score No  Pain   Restrictions/Precautions   Other Precautions Fall Risk   Home Living   Type of Home House   Home Layout Two level;Stairs to enter with rails;Stairs to enter without rails  (stair lift)   Bathroom Shower/Tub Tub/shower unit   Bathroom Toilet Standard   Bathroom Equipment Grab bars in shower   Bathroom Accessibility Accessible   Home Equipment Walker;Cane  (ad variability, some days uses and others does not)   Prior Function   Level of [place] Independent with ADLs;Independent with functional mobility;Independent with IADLS   Lives With Spouse   Receives Help From Family   IADLs Family/Friend/Other provides transportation;Family/Friend/Other provides meals;Family/Friend/Other provides medication management   Falls in the last 6 months 0   Vocational Retired   Cognition   Overall Cognitive Status WFL   Arousal/Participation Alert   Orientation Level Oriented X4   Memory Within functional limits   Following Commands Follows all commands and directions without difficulty   RLE Assessment   RLE Assessment WFL   LLE Assessment   LLE Assessment WFL   Vision-Basic Assessment   Current Vision No visual deficits   Coordination   Sensation WFL   Bed Mobility   Supine to Sit 6  Modified independent   Additional items HOB elevated   Sit to Supine 5  Supervision   Additional items Assist x 1;HOB elevated   Transfers   Sit to Stand 5  Supervision   Additional items Assist x 1;Bedrails   Stand to Sit 5  Supervision   Additional items Assist x 1   Ambulation/Elevation   Gait pattern Improper Weight shift;Forward Flexion   Gait Assistance 5  Supervision   Additional items Assist x 1   Assistive Device Rolling walker   Distance 75 ft   Balance   Static Sitting Good   Dynamic Sitting Fair +   Static Standing Fair   Dynamic Standing Fair   Ambulatory Fair   Endurance Deficit   Endurance Deficit Yes   Activity Tolerance   Activity Tolerance Patient limited by fatigue   Medical Staff Made Aware cm made aware   Assessment    Prognosis Good   Problem List Decreased endurance;Decreased mobility;Decreased strength   Assessment Pt is 75 y.o. male seen for PT evaluation s/p admit to [facility] on 4/3/2025 w/ Ataxia. PT consulted to assess pt's functional mobility and d/c needs. Order placed for PT eval and tx, w/ activity as tolerated order. Pt agreeable to PT  session upon arrival, pt found supine in bed.  PTA, pt was independent w/ all functional mobility w/ rw and lives w/ spouse in 2 level home .  Pt to benefit from continued PT tx to address deficits and maximize level of functional independent mobility and consistency. Upon conclusion pt  supine in bed and with all needs in reach. Complexity: Comorbidities affecting pt's physical performance at time of assessment include:  ataxia, weakness, htn, shortness of breath . Personal factors affecting pt at time of IE include: advanced age, limited mobility, lives in multistory home, ambulating with assistive device, and steps to enter home. Please find objective findings from PT assessment regarding body systems outlined above with impairments and limitations including weakness, decreased activity tolerance, decreased functional mobility tolerance, and fall risk.  Pt's clinical presentation is currently unstable/unpredictable seen in pt's presentation of hypertension, abnormal renal values, abnormal blood sugar levels, and pending procedures. The patient's AM-PAC Basic Mobility Inpatient Short Form Raw Score is 21 .  Based on patient presentations and impairments, pt would most appropriately benefit from Level 3 resource intensity upon discharge. A Raw score of greater than 16 suggests the patient may benefit from discharge to home. Please also refer to the recommendation of the Physical Therapist for safe discharge planning. RN verbalized pt appropriate for PT session.   Goals   Patient Goals to get stronger   LTG Expiration Date 04/14/25   Long Term Goal #1 Perform transfers to  modified I to increase Indep in home environment, decrease risk for falls, and improve ease of transfers. Perform ambulation with rw for 250 ft to  increase Indep in home environment, decrease risk for falls, improve activity tolerance, and improve gait quality. Increase dynamic standing balance to F+ to decrease fall risk.   Increase OOB activity tolerance to 10 minutes without s/s of exertion to decrease fall risk.   PT Treatment Day 0   Plan   Treatment/Interventions LE strengthening/ROM;Functional transfer training;Elevations;Therapeutic exercise;Endurance training;Gait training   PT Frequency 3-5x/wk   Discharge Recommendation   Rehab Resource Intensity Level, PT III (Minimum Resource Intensity)   Equipment Recommended Walker   AM-PAC Basic Mobility Inpatient   Turning in Flat Bed Without Bedrails 4   Lying on Back to Sitting on Edge of Flat Bed Without Bedrails 4   Moving Bed to Chair 4   Standing Up From Chair Using Arms 3   Walk in Room 3   Climb 3-5 Stairs With Railing 3   Basic Mobility Inpatient Raw Score 21   Basic Mobility Standardized Score 45.55   [facility] Highest Level Of Mobility   JH-HLM Goal 6: Walk 10 steps or more   JH-HLM Achieved 7: Walk 25 feet or more       Time In: 1317   Time Out: 1335  Total Evaluation Minutes: 18    Anderson Hearn, PT

## 2025-04-04 NOTE — ED NOTES
8am 2hr vitals missed due to patient being in MRI followed by echo     Bekah Dickson RN  04/04/25 0927

## 2025-04-04 NOTE — ASSESSMENT & PLAN NOTE
Lab Results   Component Value Date    EGFR 4 04/04/2025    EGFR 5 04/03/2025    EGFR 10 (L) 12/20/2024    CREATININE 9.39 (H) 04/04/2025    CREATININE 8.57 (H) 04/03/2025    CREATININE 5.53 (H) 12/20/2024   ESRD on HD MWF at  [facility] under the care of Dr. Singh.  Last HD 4/2 for 2.7 L UF with postweight 96.5 KG  OP orders: Access LFA AVF EDW: 95 kg  4-hour treatment, F180 dialyzer, 450 BFR/500 DFR, 137 NA, 135 HCO3, 2K  Continue MWF schedule while admitted.  Will plan for dialysis today, orders entered.

## 2025-04-04 NOTE — ASSESSMENT & PLAN NOTE
Lab Results   Component Value Date    HGBA1C 8.2 (H) 04/04/2025       Recent Labs     04/04/25  1916 04/05/25  0511 04/05/25  0701 04/05/25  1112   POCGLU 109 148* 183* 203*       Blood Sugar Average: Last 72 hrs:  (P) [id number]    Blood sugars well-controlled  Continue renal diabetic diet on discharge  Resume prehospital insulin on discharge

## 2025-04-04 NOTE — ED CARE HANDOFF
Emergency Department Sign Out Note        Sign out and transfer of care from the [facility]. See Separate Emergency Department note.     The patient, Babak Mike, was evaluated by the previous provider for uncontrolled hypertension renal failure acute on chronic, strokelike symptoms..    Workup Completed:  Labs resulted CT head is negative.  Patient has hyperkalemia of 5.6 discussed with neurology by previous ER physician.    ED Course / Workup Pending (followup):  Patient is awake and alert blood pressures improved to 201/88.  Resting comfortably patient will be admitted to inpatient service for MRI brain tomorrow and dialysis.  Discussed with hospitalist patient agreeable with plan.      HEART Risk Score      Flowsheet Row Most Recent Value   Heart Score Risk Calculator    History 0 Filed at: 04/03/2025 2125   ECG 0 Filed at: 04/03/2025 2125   Age 2 Filed at: 04/03/2025 2125   Risk Factors 2 Filed at: 04/03/2025 2125   Troponin 0 Filed at: 04/03/2025 2125   HEART Score 4 Filed at: 04/03/2025 2125                                       Procedures  Medical Decision Making  Amount and/or Complexity of Data Reviewed  Labs: ordered.  Radiology: ordered.    Risk  Prescription drug management.  Decision regarding hospitalization.            Disposition  Final diagnoses:   CVA (cerebral vascular accident) (HCC)   Uncontrolled hypertension   Renal failure (ARF), acute on chronic  (HCC)     Time reflects when diagnosis was documented in both MDM as applicable and the Disposition within this note       Time User Action Codes Description Comment    4/3/2025 11:36 PM Jose Bowman [I63.9] CVA (cerebral vascular accident) (HCC)     4/3/2025 11:36 PM Jose Bowman Add [I10] Uncontrolled hypertension     4/3/2025 11:37 PM Jose Bowman Add [N17.9,  N18.9] Renal failure (ARF), acute on chronic  (HCC)           ED Disposition       ED Disposition   Admit    Condition   Stable    Date/Time   Thu Apr 3, 2025 2861     Comment   Case was discussed with Dr Gomez and the patient's admission status was agreed to be Admission Status: inpatient status to the service of Dr. Burton .               Follow-up Information    None       Patient's Medications   Discharge Prescriptions    No medications on file     No discharge procedures on file.       ED Provider  Electronically Signed by     Jose Bowman MD  04/03/25 7690

## 2025-04-04 NOTE — TELEMEDICINE
e-Consult (IPC) - Neurology   Name: Babak Mike 75 y.o. male I MRN: [id number]  Unit/Bed#: ED 24 I Date of Admission: 4/3/2025   Date of Service: 4/4/2025 I Hospital Day: 1   Inpatient consult to Neurology  Consult performed by: Jose Antonio Quan DO  Consult ordered by: Anny Gomez MD        Physician Requesting Evaluation: Zhao Manrique DO   Reason for Evaluation / Principal Problem: c/f stroke    ASSESSMENT:  76 y/o M with HTN, HLD, DM, ESRD, CAD, and CHF that presents with gait imbalance and generalized weakness. Pt had woken up yesterday morning and felt like he was significantly weak overall and having difficulty ambulating. He was feeling imbalanced and leaning to both sides. He has had similar after dialysis previously but this felt worse than prior. He was having intermittent vertigo as well. He came to the ED when it was persistent and had no particular focal deficit noted. He received a head CT that was unremarkable and CTA was deferred given his renal function. He was subsequently admitted and obtained an MRI brain, which was unremarkable for acute ischemia. BP noted to be 239/102 on arrival.    Presentation likely most consistent with hypertensive encephalopathy. No clinical or radiographic evidence to suggest stroke/TIA.    RECOMMENDATIONS:  -continue neurochecks; notify with changes  -HCT reviewed - unremarkable  -MRI brain reviewed - no evidence of acute stroke  -TTE results pending  -telemetry  -no need for vascular imaging at this time  -continue home ASA/statin  -LDL 50, A1c 8.3  -goal normotension with gradual reduction; avoid hypotension  -PT/OT evals  -no further inpatient recs at this time; call with questions    Babak Mike will not need outpatient follow up with Neurology.  He will not require outpatient neurological testing.    11-20 minutes, >50% of the total time devoted to medical consultative verbal/EMR discussion between providers. Written report will be generated in the  EMR.

## 2025-04-04 NOTE — ASSESSMENT & PLAN NOTE
Patient presented with complaints of unsteady gait which started the morning of arrival  See also plan for hypertensive urgency  No stroke alert as last known well was greater than 24 hours  Admitted to stroke pathway  CT brain no acute intracranial abnormality  Negative troponins  Lipid panel reviewed  Hemoglobin A1c 8.2  TTE results reviewed  MRI brain: No acute intracranial abnormality.  Mild chronic microangiopathic  PT/OT no needs for discharge  Appreciate input of neurology-felt presentation most likely due to hypertensive encephalopathy.  Continue aspirin statin on discharge  Ataxia resolved

## 2025-04-04 NOTE — PLAN OF CARE
Target UF Goal  2.5  L as tolerated. Patient dialyzing for 4 hours on 2 K bath for serum K of 5 per protocol, drawn on 4/4/25. Treatment plan reviewed with Nephrology.       Post-Dialysis RN Treatment Note    Blood Pressure:  Pre 171/87 mm/Hg  Post 180/103 mmHg   EDW  95 kg    Weight:  Pre 98.2 kg   Post 95.8 kg   Mode of weight measurement: Standing Scale   Volume Removed  2400 ml    Treatment duration 4 hours   NS given  No    Treatment shortened? No   Medications given during Rx None Reported   Estimated Kt/V  1.48   Access type: AV fistula   Access Issues:  Yes; arterial needle with difficult cannulation and many areas of scarred skin to avoid; pressure high at start and had to run at a lower BFR for first 20 mins until successful manipulation of needle achieved, then able to run at prescribed blood flow  Needle Gauge: 15G   Report called to primary nurse   Yes, oncoming RN on medical floor where patient is transferred from ER to HD to medical floor 2        Problem: METABOLIC, FLUID AND ELECTROLYTES - ADULT  Goal: Electrolytes maintained within normal limits  Description: INTERVENTIONS:- Monitor labs and assess patient for signs and symptoms of electrolyte imbalances- Administer electrolyte replacement as ordered- Monitor response to electrolyte replacements, including repeat lab results as appropriate- Instruct patient on fluid and nutrition as appropriate  Outcome: Progressing  Goal: Fluid balance maintained  Description: INTERVENTIONS:- Monitor labs - Monitor I/O and WT- Instruct patient on fluid and nutrition as appropriate- Assess for signs & symptoms of volume excess or deficit  Outcome: Progressing

## 2025-04-04 NOTE — CONSULTS
Consultation - Nephrology   Name: Babak Mike 75 y.o. male I MRN: [id number]  Unit/Bed#: ED 24 I Date of Admission: 4/3/2025   Date of Service: 4/4/2025 I Hospital Day: 1   Inpatient consult to Nephrology  Consult performed by: LAN Reyes  Consult ordered by: Anny Gomez MD        Physician Requesting Evaluation: Zhao Manrique DO   Reason for Evaluation / Principal Problem: ESRD on HD    Assessment & Plan  Ataxia  Presented to ED with ataxia and ambulatory dysfunction with concern for CVA and was admitted for stroke pathway.  ESRD (end stage renal disease) ([facility])  Lab Results   Component Value Date    EGFR 4 04/04/2025    EGFR 5 04/03/2025    EGFR 10 (L) 12/20/2024    CREATININE 9.39 (H) 04/04/2025    CREATININE 8.57 (H) 04/03/2025    CREATININE 5.53 (H) 12/20/2024   ESRD on HD MWF at  [facility] under the care of Dr. Singh.  Last HD 4/2 for 2.7 L UF with postweight 96.5 KG  OP orders: Access LFA AVF EDW: 95 kg  4-hour treatment, F180 dialyzer, 450 BFR/500 DFR, 137 NA, 135 HCO3, 2K  Continue MWF schedule while admitted.  Will plan for dialysis today, orders entered.  Hypertensive urgency  Blood pressure 160/74 mmHg this morning.  Improving from admission blood pressure 239/102 at 10 PM yesterday.  Recommend maintain blood pressure at current levels over next 24 hours.  Secondary hyperparathyroidism of renal origin ([facility])   PG/mL, Phosphorus 4.9 mg/dL, 3/17/2025 per Care Everywhere [facility] records.    OP meds at HD include Cinacalcet at 120 mg 3 times, calcitriol 0.5 mcg 3 times a week, Sensipar 90 mg 3 times weekly.  Continue calcium acetate 1334 mg 3 times daily with meals  Type 2 diabetes mellitus without complication, with long-term current use of insulin ([facility])  Lab Results   Component Value Date    HGBA1C 8.4 (H) 10/16/2024     Recent Labs     04/04/25  0238 04/04/25  0520 04/04/25  0656   POCGLU 183* 130 73   Blood Sugar Average: Last 72 hrs:  (P)  [id number]    Chronic hyperkalemia  Potassium 5.0 mmol/L   OP meds: Veltassa 8.4 g daily.  Chronic diastolic (congestive) heart failure (HCC)  Wt Readings from Last 3 Encounters:   04/04/25 96.2 kg (212 lb)   08/21/24 94.3 kg (207 lb 14.3 oz)   07/11/24 92.7 kg (204 lb 5.9 oz)   Echo pending.  Previous EF 55%, 6/17/2024.  Continue management as per cardiology and hospitalist.  Weakness generalized  Continue management as per hospitalist  CAD (coronary artery disease)    SIVA (obstructive sleep apnea)    Mixed hyperlipidemia        I have reviewed the nephrology recommendations including hemodialysis schedule with the ED and we are in agreement with renal plan including the information outlined above.     History of Present Illness   Babak Mike is a 75 y.o. male with PMH of ESRD on HD, CAD, DM, HLD, NSTEMI, TIA and HTN who presenting to  [facility] 4/4 with ataxia with concern for CVA with stroke pathway. A renal consultation is requested today for assistance in the management of ESRD on HD.    Review of Systems   Constitutional:  Negative for activity change, appetite change, chills, fatigue and fever.   HENT:  Negative for ear pain and sore throat.    Eyes:  Negative for pain and visual disturbance.   Respiratory:  Negative for cough and shortness of breath.    Cardiovascular:  Negative for chest pain, palpitations and leg swelling.   Gastrointestinal:  Negative for abdominal pain, constipation, diarrhea, nausea and vomiting.   Genitourinary:  Negative for dysuria and hematuria.        Hemodialysis patient, states he no longer makes urine   Musculoskeletal:  Negative for arthralgias and back pain.   Skin:  Negative for color change and rash.   Neurological:  Negative for dizziness, seizures, syncope, facial asymmetry, speech difficulty, light-headedness and headaches.   Hematological: Negative.    Psychiatric/Behavioral: Negative.     All other systems reviewed and are negative.    Historical Information    Past Medical History:   Diagnosis Date    Coronary artery disease     Diabetes mellitus     End stage renal disease     Hyperlipidemia     Hypertension     NSTEMI (non-ST elevated myocardial infarction)     TIA (transient ischemic attack)      Past Surgical History:   Procedure Laterality Date    CARDIAC CATHETERIZATION  2017    70% lesion of a small R PDA and a 99% stenosis of D1 (previously noted on the cath in ).  Neither lesion was amenable to PCI.  Medical management.    DIALYSIS FISTULA CREATION       Social History     Tobacco Use    Smoking status: Former     Current packs/day: 0.00     Average packs/day: 1 pack/day for 20.0 years (20.0 ttl pk-yrs)     Types: Cigarettes     Start date:      Quit date:      Years since quittin.2    Smokeless tobacco: Never    Tobacco comments:     STATES QUIT 30 YEARS AGO/ ABOUT ONE CIGARETTE DAILY   Vaping Use    Vaping status: Never Used   Substance and Sexual Activity    Alcohol use: Never    Drug use: Never    Sexual activity: Not Currently     E-Cigarette/Vaping    E-Cigarette Use Never User      E-Cigarette/Vaping Substances    Nicotine No     THC No     CBD No     Flavoring No     Other No     Unknown No          Objective :  Temp:  [97.9 °F (36.6 °C)-99.3 °F (37.4 °C)] 97.9 °F (36.6 °C)  HR:  [59-77] 60  BP: (160-239)/() 160/74  Resp:  [14-18] 14  SpO2:  [95 %-98 %] 96 %  O2 Device: None (Room air)    Current Weight:    First Weight:    I/O       None          Physical Exam  Vitals and nursing note reviewed.   Constitutional:       General: He is not in acute distress.     Appearance: Normal appearance. He is well-developed and normal weight.   HENT:      Head: Normocephalic and atraumatic.      Right Ear: External ear normal.      Left Ear: External ear normal.      Nose: Nose normal.      Mouth/Throat:      Mouth: Mucous membranes are moist.      Pharynx: Oropharynx is clear.   Eyes:      Extraocular Movements: Extraocular movements  intact.      Conjunctiva/sclera: Conjunctivae normal.      Pupils: Pupils are equal, round, and reactive to light.   Cardiovascular:      Rate and Rhythm: Normal rate and regular rhythm.      Heart sounds: Normal heart sounds. No murmur heard.  Pulmonary:      Effort: Pulmonary effort is normal. No respiratory distress.      Breath sounds: Normal breath sounds.   Abdominal:      Palpations: Abdomen is soft.      Tenderness: There is no abdominal tenderness.   Musculoskeletal:         General: No swelling.      Cervical back: Neck supple.      Right lower leg: No edema.      Left lower leg: No edema.   Skin:     General: Skin is warm and dry.      Capillary Refill: Capillary refill takes less than 2 seconds.   Neurological:      General: No focal deficit present.      Mental Status: He is alert and oriented to person, place, and time.   Psychiatric:         Mood and Affect: Mood normal.         Behavior: Behavior normal.         Medications:    Current Facility-Administered Medications:     amLODIPine (NORVASC) tablet 10 mg, 10 mg, Oral, HS, Anny Gomez MD    [START ON 4/5/2025] aspirin (ECOTRIN LOW STRENGTH) EC tablet 81 mg, 81 mg, Oral, Daily, Anny Gomez MD    atorvastatin (LIPITOR) tablet 40 mg, 40 mg, Oral, Daily With Dinner, Anny Gomez MD    calcitriol (ROCALTROL) capsule 0.75 mcg, 0.75 mcg, Oral, Once per day on Monday Wednesday Friday, Anny Gomez MD    carvedilol (COREG) tablet 6.25 mg, 6.25 mg, Oral, BID With Meals, Anny Gomez MD, 6.25 mg at 04/04/25 0311    doxazosin (CARDURA) tablet 2 mg, 2 mg, Oral, HS, Anny Gomez MD, 2 mg at 04/04/25 0311    gabapentin (NEURONTIN) capsule 200 mg, 200 mg, Oral, HS, Anny Gomez MD, 200 mg at 04/04/25 0530    heparin (porcine) subcutaneous injection 5,000 Units, 5,000 Units, Subcutaneous, Q8H [facility], Anny Gomez MD, 5,000 Units at 04/04/25 0531    insulin lispro (HumALOG/ADMELOG) 100 units/mL  subcutaneous injection 1-5 Units, 1-5 Units, Subcutaneous, TID AC **AND** Fingerstick Glucose (POCT), , , TID AC, Anny Gomez MD    insulin lispro (HumALOG/ADMELOG) 100 units/mL subcutaneous injection 1-5 Units, 1-5 Units, Subcutaneous, HS, Anny Gomez MD, 1 Units at 04/04/25 0310    lanthanum (FOSRENOL) chewable tablet 500 mg, 500 mg, Oral, TID With Meals, Anny Gomez MD    QUEtiapine (SEROquel) tablet 12.5 mg, 12.5 mg, Oral, HS, Anny Gomez MD, 12.5 mg at 04/04/25 0311    Current Outpatient Medications:     amLODIPine (NORVASC) 10 mg tablet, Take 1 tablet (10 mg total) by mouth daily at bedtime, Disp: 30 tablet, Rfl: 0    aspirin (ECOTRIN LOW STRENGTH) 81 mg EC tablet, Take 81 mg by mouth daily, Disp: , Rfl:     atorvastatin (LIPITOR) 40 mg tablet, Take 40 mg by mouth daily, Disp: , Rfl:     calcitriol (ROCALTROL) 0.25 mcg capsule, Take 3 capsules (0.75 mcg total) by mouth 3 (three) times a week, Disp: 36 capsule, Rfl: 0    calcium acetate (PHOSLO) capsule, 3 (three) times a day Not on dialysis days  Pt taking 2 tabs 3x daily, Disp: , Rfl:     carvedilol (COREG) 6.25 mg tablet, Take 1 tablet (6.25 mg total) by mouth 2 (two) times a day with meals, Disp: 60 tablet, Rfl: 0    cloNIDine (CATAPRES-TTS-3) 0.3 mg/24 hr, Place 1 patch on the skin once a week Monday, Disp: , Rfl:     doxazosin (CARDURA) 2 mg tablet, Take 1 tablet by mouth daily at bedtime, Disp: , Rfl:     gabapentin (NEURONTIN) 100 mg capsule, Take 1 cap by mouth daily (Patient taking differently: Take 200 mg by mouth daily at bedtime Take 1 cap by mouth daily), Disp: 30 capsule, Rfl: 3    insulin aspart (NovoLOG FlexPen) 100 UNIT/ML injection pen, Inject 1 Units under the skin Three times a day. Pt reports dose as follows if BS over 150 breakfast 8 units lunch 4 units supper 5 units bedtime 6 units if BS over 200  Indications: Type 2 Diabetes, Disp: , Rfl:     lanthanum (FOSRENOL) 500 mg chewable tablet, CRUSH OR CHEW  "AND SWALLOW 1 TABLET THREE TIMES A DAY WITH MEALS, Disp: , Rfl:     lidocaine-prilocaine (EMLA) cream, APPLY SMALL AMOUNT TO ACCESS SITE (AVF) 1 TO 2 HOURS BEFORE DIALYSIS. COVER WITH OCCLUSIVE DRESSING (SARAN WRAP), Disp: , Rfl:     losartan (COZAAR) 100 MG tablet, Take 1 tablet (100 mg total) by mouth daily, Disp: 30 tablet, Rfl: 0    QUEtiapine (SEROquel) 25 mg tablet, Take 12.5 mg by mouth daily at bedtime. Indications: sleep, Disp: , Rfl:     Veltassa 8.4 g PACK, Take 8.4 g by mouth 4 (four) times a week Tues, Thursday, Saturday and Sunday. Also takes as needed when K is elevated on dialysis days, Disp: , Rfl:       Lab Results: I have reviewed the following results:  Results from last 7 days   Lab Units 04/04/25  0536 04/03/25  2035   WBC Thousand/uL 5.51 5.88   HEMOGLOBIN g/dL 10.0* 10.5*   HEMATOCRIT % 32.3* 34.5*   PLATELETS Thousands/uL 99* 103*   POTASSIUM mmol/L 5.0 5.6*   CHLORIDE mmol/L 97 93*   CO2 mmol/L 31 31   BUN mg/dL 61* 56*   CREATININE mg/dL 9.39* 8.57*   CALCIUM mg/dL 9.1 9.0       Administrative Statements     Portions of the record may have been created with voice recognition software. Occasional wrong word or \"sound a like\" substitutions may have occurred due to the inherent limitations of voice recognition software. Read the chart carefully and recognize, using context, where substitutions have occurred.If you have any questions, please contact the dictating provider.  "

## 2025-04-05 VITALS
SYSTOLIC BLOOD PRESSURE: 146 MMHG | RESPIRATION RATE: 20 BRPM | HEIGHT: 72 IN | DIASTOLIC BLOOD PRESSURE: 70 MMHG | OXYGEN SATURATION: 97 % | HEART RATE: 67 BPM | BODY MASS INDEX: 28.61 KG/M2 | WEIGHT: 211.2 LBS | TEMPERATURE: 98.1 F

## 2025-04-05 PROBLEM — D63.1 ANEMIA IN ESRD (END-STAGE RENAL DISEASE)  (HCC): Status: ACTIVE | Noted: 2025-04-05

## 2025-04-05 PROBLEM — N18.6 ANEMIA IN ESRD (END-STAGE RENAL DISEASE)  (HCC): Status: ACTIVE | Noted: 2025-04-05

## 2025-04-05 LAB
GLUCOSE SERPL-MCNC: 148 MG/DL (ref 65–140)
GLUCOSE SERPL-MCNC: 183 MG/DL (ref 65–140)
GLUCOSE SERPL-MCNC: 203 MG/DL (ref 65–140)

## 2025-04-05 PROCEDURE — 82948 REAGENT STRIP/BLOOD GLUCOSE: CPT

## 2025-04-05 PROCEDURE — 99239 HOSP IP/OBS DSCHRG MGMT >30: CPT

## 2025-04-05 PROCEDURE — 99232 SBSQ HOSP IP/OBS MODERATE 35: CPT | Performed by: NURSE PRACTITIONER

## 2025-04-05 PROCEDURE — 5A1D70Z PERFORMANCE OF URINARY FILTRATION, INTERMITTENT, LESS THAN 6 HOURS PER DAY: ICD-10-PCS | Performed by: INTERNAL MEDICINE

## 2025-04-05 RX ORDER — CINACALCET 30 MG/1
120 TABLET, FILM COATED ORAL 3 TIMES WEEKLY
Status: DISCONTINUED | OUTPATIENT
Start: 2025-04-05 | End: 2025-04-05 | Stop reason: HOSPADM

## 2025-04-05 RX ORDER — ACETAMINOPHEN 325 MG/1
650 TABLET ORAL EVERY 6 HOURS PRN
Status: DISCONTINUED | OUTPATIENT
Start: 2025-04-05 | End: 2025-04-05 | Stop reason: HOSPADM

## 2025-04-05 RX ORDER — ASCORBIC ACID, THIAMINE MONONITRATE,RIBOFLAVIN, NIACINAMIDE, PYRIDOXINE HYDROCHLORIDE, FOLIC ACID, CYANOCOBALAMIN, BIOTIN, CALCIUM PANTOTHENATE, 100; 1.5; 1.7; 20; 10; 1; 6000; 150000; 5 MG/1; MG/1; MG/1; MG/1; MG/1; MG/1; UG/1; UG/1; MG/1
1 CAPSULE, LIQUID FILLED ORAL
Status: DISCONTINUED | OUTPATIENT
Start: 2025-04-05 | End: 2025-04-05 | Stop reason: HOSPADM

## 2025-04-05 RX ORDER — CALCIUM ACETATE 667 MG/1
1334 CAPSULE ORAL
Status: DISCONTINUED | OUTPATIENT
Start: 2025-04-05 | End: 2025-04-05 | Stop reason: HOSPADM

## 2025-04-05 RX ORDER — CALCITRIOL 0.25 UG/1
0.5 CAPSULE, LIQUID FILLED ORAL 3 TIMES WEEKLY
Status: DISCONTINUED | OUTPATIENT
Start: 2025-04-07 | End: 2025-04-05 | Stop reason: HOSPADM

## 2025-04-05 RX ADMIN — INSULIN LISPRO 1 UNITS: 100 INJECTION, SOLUTION INTRAVENOUS; SUBCUTANEOUS at 09:06

## 2025-04-05 RX ADMIN — HEPARIN SODIUM 5000 UNITS: 5000 INJECTION INTRAVENOUS; SUBCUTANEOUS at 05:12

## 2025-04-05 RX ADMIN — ASPIRIN 81 MG: 81 TABLET, COATED ORAL at 09:02

## 2025-04-05 RX ADMIN — LANTHANUM CARBONATE 500 MG: 500 TABLET, CHEWABLE ORAL at 11:22

## 2025-04-05 RX ADMIN — LANTHANUM CARBONATE 500 MG: 500 TABLET, CHEWABLE ORAL at 09:02

## 2025-04-05 RX ADMIN — CALCIUM ACETATE 1334 MG: 667 CAPSULE ORAL at 11:22

## 2025-04-05 RX ADMIN — CARVEDILOL 6.25 MG: 3.12 TABLET, FILM COATED ORAL at 09:02

## 2025-04-05 RX ADMIN — ACETAMINOPHEN 650 MG: 325 TABLET ORAL at 06:25

## 2025-04-05 RX ADMIN — CALCIUM ACETATE 1334 MG: 667 CAPSULE ORAL at 09:05

## 2025-04-05 RX ADMIN — INSULIN LISPRO 1 UNITS: 100 INJECTION, SOLUTION INTRAVENOUS; SUBCUTANEOUS at 11:23

## 2025-04-05 NOTE — ASSESSMENT & PLAN NOTE
Lab Results   Component Value Date    EGFR 4 04/04/2025    EGFR 5 04/03/2025    EGFR 10 (L) 12/20/2024    CREATININE 9.39 (H) 04/04/2025    CREATININE 8.57 (H) 04/03/2025    CREATININE 5.53 (H) 12/20/2024   ESRD on HD MWF at  [facility] under the care of Dr. Singh.  Target weight 95 kg.    Most recent hemodialysis session was yesterday, 4/4, for postweight of 95.8 kg and 2.4 L ultrafiltered.  There was issues with cannulation however following manipulation of needle achieved prescribed blood flow rate.    Next hemodialysis session will be 4/7.  Continue renal diet with fluid restriction.  Add Nephrocaps

## 2025-04-05 NOTE — ASSESSMENT & PLAN NOTE
Takes Veltassa daily as outpt. Trend potassium- this is not on formulary here. Can bring from home versus place on [facility].

## 2025-04-05 NOTE — ASSESSMENT & PLAN NOTE
PG/mL, Phosphorus 4.9 mg/dL, calcium 9.1 mg/dL    Continue sensipar 120 mg thrice weekly, calcitriol 0.5 mcg thrice weekly, PhosLo 2 tabs TID with meals, lanthanum carbonate 500 mg TID with meals (if not on formulary- bring from home). Periodically check phosphorus level

## 2025-04-05 NOTE — PROGRESS NOTES
[facility]  Progress Note   Babak Mike 75 y.o. male MRN: [id number]  Unit/Bed#: -01 Encounter: [id number]      Assessment & Plan  ESRD (end stage renal disease) ([facility])  Lab Results   Component Value Date    EGFR 4 04/04/2025    EGFR 5 04/03/2025    EGFR 10 (L) 12/20/2024    CREATININE 9.39 (H) 04/04/2025    CREATININE 8.57 (H) 04/03/2025    CREATININE 5.53 (H) 12/20/2024   ESRD on HD MWF at  [facility] under the care of Dr. Singh.  Target weight 95 kg.    Most recent hemodialysis session was yesterday, 4/4, for postweight of 95.8 kg and 2.4 L ultrafiltered.  There was issues with cannulation however following manipulation of needle achieved prescribed blood flow rate.    Next hemodialysis session will be 4/7.  Continue renal diet with fluid restriction.  Add Nephrocaps  Hypertensive urgency  Blood pressure trends mildly elevated at present.  Mentation seems appropriate.  Continue current regimen amlodipine 10 mg daily, clonidine patch, Cardura 2 mg at bedtime, Coreg 6.25 mg twice daily.  Secondary hyperparathyroidism of renal origin ([facility])   PG/mL, Phosphorus 4.9 mg/dL, calcium 9.1 mg/dL    Continue sensipar 120 mg thrice weekly, calcitriol 0.5 mcg thrice weekly, PhosLo 2 tabs TID with meals, lanthanum carbonate 500 mg TID with meals (if not on formulary- bring from home). Periodically check phosphorus level   Chronic hyperkalemia  Takes Veltassa daily as outpt. Trend potassium- this is not on formulary here. Can bring from home versus place on [facility].   Chronic diastolic (congestive) heart failure (HCC)  Wt Readings from Last 3 Encounters:   04/04/25 95.8 kg (211 lb 3.2 oz)   04/04/25 96.2 kg (212 lb)   08/21/24 94.3 kg (207 lb 14.3 oz)   Volume status managed with HD. Continue daily weight, IO, low sodium diet.   Anemia in ESRD (end-stage renal disease)  ([facility])  Lab Results   Component Value Date    EGFR 4 04/04/2025    EGFR 5 04/03/2025    EGFR 10 (L)  2024    CREATININE 9.39 (H) 2025    CREATININE 8.57 (H) 2025    CREATININE 5.53 (H) 2024   Receives mircera as op. Can resume Venofer with HD if no s/s infection      I have reviewed the nephrology recommendations including next hemodialysis session will be Monday, , with primary team, and we are in agreement with renal plan including the information outlined above.    SUBJECTIVE / 24H INTERVAL HISTORY:  Examined lying in bed complaining of neuropathy that was severe overnight causing inability to sleep.  Took Tylenol this morning    Historical Information   Past Medical History:   Diagnosis Date    Coronary artery disease     Diabetes mellitus     End stage renal disease     Hyperlipidemia     Hypertension     NSTEMI (non-ST elevated myocardial infarction)     TIA (transient ischemic attack)      Past Surgical History:   Procedure Laterality Date    CARDIAC CATHETERIZATION      70% lesion of a small R PDA and a 99% stenosis of D1 (previously noted on the cath in ).  Neither lesion was amenable to PCI.  Medical management.    DIALYSIS FISTULA CREATION       Social History   Social History     Substance and Sexual Activity   Alcohol Use Never     Social History     Substance and Sexual Activity   Drug Use Never     Social History     Tobacco Use   Smoking Status Former    Current packs/day: 0.00    Average packs/day: 1 pack/day for 20.0 years (20.0 ttl pk-yrs)    Types: Cigarettes    Start date:     Quit date:     Years since quittin.2   Smokeless Tobacco Never   Tobacco Comments    STATES QUIT 30 YEARS AGO/ ABOUT ONE CIGARETTE DAILY       Family History:   Family History   Problem Relation Age of Onset    Hypertension Mother     Hypertension Father        Medications:  Pertinent medications were reviewed  Current Facility-Administered Medications   Medication Dose Route Frequency Provider Last Rate    acetaminophen  650 mg Oral Q6H PRN Sylvia Mckeon PA-C       amLODIPine  10 mg Oral HS Anny Gomez MD      aspirin  81 mg Oral Daily Anny Gomez MD      atorvastatin  40 mg Oral Daily With Dinner Anny Gomez MD      calcitriol  0.75 mcg Oral Once per day on Monday Wednesday Friday Anny Gomez MD      carvedilol  6.25 mg Oral BID With Meals Anny Gomez MD      cloNIDine  0.1 mg Transdermal Weekly Zhao Manrique DO      And    cloNIDine  0.2 mg Transdermal Weekly Zhao Manrique DO      doxazosin  2 mg Oral HS Anny Gomez MD      gabapentin  200 mg Oral HS Anny Gomez MD      heparin (porcine)  5,000 Units Subcutaneous Q8H ETHAN Anny Gomez MD      insulin lispro  1-5 Units Subcutaneous TID AC Anny Gomez MD      insulin lispro  1-5 Units Subcutaneous HS Anny Gomez MD      lanthanum  500 mg Oral TID With Meals Anny Gomez MD      QUEtiapine  12.5 mg Oral HS Anny Gomez MD           No Known Allergies      Vitals:   /70   Pulse 67   Temp 98.1 °F (36.7 °C)   Resp 20   Ht 6' (1.829 m)   Wt 95.8 kg (211 lb 3.2 oz)   SpO2 97%   BMI 28.64 kg/m²   Body mass index is 28.64 kg/m².  SpO2: 97 %,   SpO2 Activity: At Rest,   O2 Device: None (Room air)      Intake/Output Summary (Last 24 hours) at 4/5/2025 0847  Last data filed at 4/4/2025 2227  Gross per 24 hour   Intake 840 ml   Output 2708 ml   Net -1868 ml     Invasive Devices       Peripheral Intravenous Line  Duration             Peripheral IV 04/03/25 Right Antecubital 1 day              Line  Duration             Hemodialysis AV Fistula Left Forearm -- days                    Physical Exam  General: conscious, cooperative, in no acute distress  Eyes: conjunctivae pink, anicteric sclerae  ENT: lips and mucous membranes moist  Neck: supple, no JVD, no masses  Chest: clear breath sounds bilaterally, no crackles, ronchus or wheezings  CVS: S1 & S2, normal rate, regular rhythm  Abdomen: soft, non-tender,  "non-distended, normoactive bowel sounds  Extremities: no edema of both legs left AV fistula with bruit and thrill  Skin: no rash  Neuro: awake, alert, oriented    Diagnostic Data:  Lab: I have personally reviewed pertinent lab results.  CBC:  Results from last 7 days   Lab Units 04/04/25  0536   WBC Thousand/uL 5.51   HEMOGLOBIN g/dL 10.0*   HEMATOCRIT % 32.3*   PLATELETS Thousands/uL 99*      CMP: No results found for: \"SODIUM\", \"K\", \"CL\", \"CO2\", \"ANIONGAP\", \"BUN\", \"CREATININE\", \"GLUCOSE\", \"CALCIUM\", \"AST\", \"ALT\", \"ALKPHOS\", \"PROT\", \"BILITOT\", \"EGFR\",   PT/INR: No results found for: \"PT\", \"INR\",   Magnesium: No components found for: \"MAG\",  Phosphorous: No results found for: \"PHOS\"    Microbiology:  @[facility],(urinecx:7)@        LAN Mueller    Portions of the record may have been created with voice recognition software. Occasional wrong word or \"sound a like\" substitutions may have occurred due to the inherent limitations of voice recognition software. Read the chart carefully and recognize, using context, where substitutions have occurred.  "

## 2025-04-05 NOTE — RESPIRATORY THERAPY NOTE
04/04/25 4954   Respiratory Assessment   Resp Comments pt refusing cpap this hs     Pt made awware he may have his home unit brought in to wear, pt stated about possibly going home tomorrow

## 2025-04-05 NOTE — ASSESSMENT & PLAN NOTE
Lab Results   Component Value Date    EGFR 4 04/04/2025    EGFR 5 04/03/2025    EGFR 10 (L) 12/20/2024    CREATININE 9.39 (H) 04/04/2025    CREATININE 8.57 (H) 04/03/2025    CREATININE 5.53 (H) 12/20/2024   Receives mircera as op. Can resume Venofer with HD if no s/s infection

## 2025-04-05 NOTE — DISCHARGE SUMMARY
Discharge Summary - Hospitalist   Name: Babak Mike 75 y.o. male I MRN: [id number]  Unit/Bed#: -01 I Date of Admission: 4/3/2025   Date of Service: 4/5/2025 I Hospital Day: 2     Assessment & Plan  Ataxia  Patient presented with complaints of unsteady gait which started the morning of arrival  See also plan for hypertensive urgency  No stroke alert as last known well was greater than 24 hours  Admitted to stroke pathway  CT brain no acute intracranial abnormality  Negative troponins  Lipid panel reviewed  Hemoglobin A1c 8.2  TTE results reviewed  MRI brain: No acute intracranial abnormality.  Mild chronic microangiopathic  PT/OT no needs for discharge  Appreciate input of neurology-felt presentation most likely due to hypertensive encephalopathy.  Continue aspirin statin on discharge  Ataxia resolved  Chronic hyperkalemia  K 5.6 on arrival   S/p 5 units regular insulin on 4/3  HD treatment 4/4  Hyperkalemia resolved  ESRD (end stage renal disease) ([facility])  Lab Results   Component Value Date    EGFR 4 04/04/2025    EGFR 5 04/03/2025    EGFR 10 (L) 12/20/2024    CREATININE 9.39 (H) 04/04/2025    CREATININE 8.57 (H) 04/03/2025    CREATININE 5.53 (H) 12/20/2024     Appreciate nephrology who are followed  Last HD session 4/4  Euvolemic on exam medically stable for discharge  Next HD session 4/7 outpatient  Hypertensive urgency  POA  Outpatient antihypertensives resumed when stroke workup was negative  Continue prehospital antihypertensive regimen on discharge  Blood pressures are well-controlled today hypertensive urgency resolved  Type 2 diabetes mellitus without complication, with long-term current use of insulin ([facility])  Lab Results   Component Value Date    HGBA1C 8.2 (H) 04/04/2025       Recent Labs     04/04/25  1916 04/05/25  0511 04/05/25  0701 04/05/25  1112   POCGLU 109 148* 183* 203*       Blood Sugar Average: Last 72 hrs:  (P) 145.9191628874532652    Blood sugars well-controlled  Continue renal diabetic  diet on discharge  Resume prehospital insulin on discharge  Mixed hyperlipidemia  Lipid panel reviewed  Continue prehospital statin on discharge  SIVA (obstructive sleep apnea)  Continue prehospital CPAP at bedtime on discharge  CAD (coronary artery disease)  Continue with aspirin, plavix, statin  Chronic diastolic (congestive) heart failure ([facility])  Wt Readings from Last 3 Encounters:   04/04/25 95.8 kg (211 lb 3.2 oz)   04/04/25 96.2 kg (212 lb)   08/21/24 94.3 kg (207 lb 14.3 oz)     Euvolemic on exam  Fluid management with hemodialysis-patient had HD 4/4  Next  scheduled HD 4/7 outpatient  Appreciate nephrology who followed    Weakness generalized  PT OT evaluation-no needs on discharge  Weakness resolved  Secondary hyperparathyroidism of renal origin ([facility])  Continue Calcitrol  Hypertensive encephalopathy  Presented with gait disturbance generalized weakness  Stroke workup was negative  Neurology input appreciated-felt presentation most likely consistent with hypertensive encephalopathy  Symptoms have resolved, blood pressure controlled  Anemia in ESRD (end-stage renal disease)  ([facility])  Lab Results   Component Value Date    EGFR 4 04/04/2025    EGFR 5 04/03/2025    EGFR 10 (L) 12/20/2024    CREATININE 9.39 (H) 04/04/2025    CREATININE 8.57 (H) 04/03/2025    CREATININE 5.53 (H) 12/20/2024     Hemoglobin at baseline  No signs of bleeding     Medical Problems       Resolved Problems  Date Reviewed: 4/5/2025   None       Discharging Physician / Practitioner: LAN Bertrand  PCP: LAN Escobedo  Admission Date:   Admission Orders (From admission, onward)       Ordered        04/03/25 2338  INPATIENT ADMISSION  Once                          Discharge Date: 04/05/25    Consultations During Hospital Stay:  Neurology, nephrology    Procedures Performed:   None    Significant Findings / Test Results:   4/3 CT head: No acute intracranial abnormality  4/4 MRI brain: No acute intracranial abnormality.  Mild  chronic microangiopathy  4/4 hemoglobin A1c 8.2  4/4 TTE: LVEF 60% with normal systolic function normal wall motion and normal diastolic function    Incidental Findings:   None    Test Results Pending at Discharge (will require follow up):   None     Outpatient Tests Requested:  None    Complications: None    Reason for Admission: Ataxia    Hospital Course:   For full details regarding patient's hospitalization please refer to the contents of the chart and the history and physical as dictated by Dr. Gomez.  In brief, Babak Mike is a 75 y.o. male patient with a past medical history of type 2 diabetes on insulin, hyperlipidemia, essential hypertension, end-stage renal disease on dialysis, chronic diastolic CHF, CAD, SIVA on CPAP, anemia and end-stage renal disease, and hyperparathyroidism who originally presented to the hospital on 4/3/2025 due to ataxia.  Patient reported unsteady gait and dizziness upon awakening.  Of note his BP was elevated to 239/102 in the ED.  CT of the head was negative.  Patient was admitted for stroke workup which was negative.  He is a hemodialysis patient who had his last treatment 4/4.  His blood pressure is now stabilized.  Did discuss with nephrology stable for discharge today.  He will have his next scheduled dialysis on 4/7 in the outpatient setting.  Neurology also evaluated patient and felt his presentation was likely related to hypertensive encephalopathy.  His ataxia has resolved.  Patient will continue his home aspirin and statin on discharge.  PT OT evaluated patient and noted patient to be minimum resource intensity no discharge needs.  Medically stable discharged home today with visiting nurses as arranged by case management.  Follow-up PCP within a week.          Please see above list of diagnoses and related plan for additional information.     Condition at Discharge: good    Discharge Day Visit / Exam:   Subjective: Denies dizziness, lightness, chest pain or  palpitations.  Denies pain or discomfort.  Vitals: Blood Pressure: 146/70 (04/05/25 0701)  Pulse: 67 (04/05/25 0701)  Temperature: 98.1 °F (36.7 °C) (04/05/25 0701)  Temp Source: Temporal (04/04/25 2230)  Respirations: 20 (04/05/25 0701)  Height: 6' (182.9 cm) (04/04/25 1911)  Weight - Scale: 95.8 kg (211 lb 3.2 oz) (04/04/25 1911)  SpO2: 97 % (04/05/25 0701)  Physical Exam  Vitals reviewed.   Constitutional:       General: He is not in acute distress.     Appearance: He is well-developed.   HENT:      Head: Normocephalic and atraumatic.   Eyes:      Extraocular Movements: Extraocular movements intact.      Conjunctiva/sclera: Conjunctivae normal.      Pupils: Pupils are equal, round, and reactive to light.   Cardiovascular:      Rate and Rhythm: Normal rate and regular rhythm.      Heart sounds: No murmur heard.  Pulmonary:      Effort: Pulmonary effort is normal. No respiratory distress.      Breath sounds: Normal breath sounds. No wheezing or rales.   Abdominal:      General: There is no distension.      Palpations: Abdomen is soft.      Tenderness: There is no abdominal tenderness. There is no guarding.   Musculoskeletal:         General: No swelling.      Cervical back: Neck supple.   Skin:     General: Skin is warm and dry.      Capillary Refill: Capillary refill takes less than 2 seconds.   Neurological:      General: No focal deficit present.      Mental Status: He is alert and oriented to person, place, and time. Mental status is at baseline.      Cranial Nerves: No cranial nerve deficit.      Sensory: No sensory deficit.      Motor: No weakness.      Coordination: Coordination normal.      Gait: Gait normal.   Psychiatric:         Mood and Affect: Mood normal.         Behavior: Behavior normal.         Thought Content: Thought content normal.         Judgment: Judgment normal.          Discussion with Family: Updated  (wife) via phone.    Discharge instructions/Information to patient and  family:   See after visit summary for information provided to patient and family.      Provisions for Follow-Up Care:  See after visit summary for information related to follow-up care and any pertinent home health orders.      Mobility at time of Discharge:   Basic Mobility Inpatient Raw Score: 21  JH-HLM Goal: 6: Walk 10 steps or more  JH-HLM Achieved: 7: Walk 25 feet or more  HLM Goal achieved. Continue to encourage appropriate mobility.     Disposition:   Home with VNA Services (Reminder: Complete face to face encounter)    Planned Readmission: No    Discharge Medications:  See after visit summary for reconciled discharge medications provided to patient and/or family.      Administrative Statements   Discharge Statement:  I have spent a total time of 42 minutes in caring for this patient on the day of the visit/encounter. >30 minutes of time was spent on: Diagnostic results, Patient and family education, Importance of tx compliance, Counseling / Coordination of care, Documenting in the medical record, Reviewing / ordering tests, medicine, procedures  , and Communicating with other healthcare professionals .    **Please Note: This note may have been constructed using a voice recognition system**

## 2025-04-05 NOTE — CASE MANAGEMENT
Case Management Assessment & Discharge Planning Note    Patient name Babak Mike  Location /-01 MRN [id number]  : 1950 Date 2025       Current Admission Date: 4/3/2025  Current Admission Diagnosis:Ataxia   Patient Active Problem List    Diagnosis Date Noted Date Diagnosed    Anemia in ESRD (end-stage renal disease)  ([facility]) 2025     Ataxia 2025     Chronic hyperkalemia 2025     Secondary hyperparathyroidism of renal origin ([facility]) 2025     Hypertensive encephalopathy 2025     Dizziness 07/10/2024     Weakness generalized 07/10/2024     Primary hypertension 07/10/2024     Asymptomatic bilateral carotid artery stenosis 10/31/2023     PAD (peripheral artery disease) ([facility]) 10/31/2023     Bilateral lower extremity edema 10/31/2023     Hypertensive emergency 2023     Left arm pain 2023     CAD (coronary artery disease) 2023     Anemia 2023     Chronic diastolic (congestive) heart failure ([facility]) 2023     Flash pulmonary edema ([facility]) 2023     SIRS (systemic inflammatory response syndrome) ([facility]) 2023     Acute respiratory failure with hypoxia ([facility]) 2023     Dependence on renal dialysis ([facility]) 2022     Snoring 2022     Numbness and tingling in both hands 2022     SIVA (obstructive sleep apnea)      TIA (transient ischemic attack) 2022     Elevated troponin 2021     ESRD (end stage renal disease) ([facility]) 2021     Hypertensive urgency 2021     Type 2 diabetes mellitus without complication, with long-term current use of insulin ([facility]) 2021     Mixed hyperlipidemia 2021       LOS (days): 2  Geometric Mean LOS (GMLOS) (days): 3.1  Days to GMLOS:1.5     OBJECTIVE:    Risk of Unplanned Readmission Score: 20.64         Current admission status: Inpatient  Referral Reason: Other (d/c planning)    Preferred Pharmacy:   [id number] - [place], [place] - [address]  [address]  [place] [address]  Phone: [phone] Fax: [phone]    Primary Care Provider: LAN Escobedo    Primary Insurance: ARLENE DOS SANTOS  Secondary Insurance:     ASSESSMENT:  Active Health Care Proxies       Ashia Mike Health Care Agent   Primary Phone: [phone] (Mobile)  Home Phone: [phone]                           Readmission Root Cause  30 Day Readmission: No    Patient Information  Admitted from:: Home  Mental Status: Alert  During Assessment patient was accompanied by: Not accompanied during assessment  Assessment information provided by:: Patient  Primary Caregiver: Self  Support Systems: Spouse/significant other  County of Residence: [facility] do you live in?: [place]  Home entry access options. Select all that apply.: Stairs (1 step & then a stair glide)  Number of steps to enter home.: 1  Do the steps have railings?: No  Type of Current Residence: 2 story home  Upon entering residence, is there a bedroom on the main floor (no further steps)?: No  A bedroom is located on the following floor levels of residence (select all that apply):: 2nd Floor  Upon entering residence, is there a bathroom on the main floor (no further steps)?: Yes  Number of steps to 2nd floor from main floor: One Flight (stair lift)  Living Arrangements: Lives w/ Spouse/significant other  Is patient a ?: No    Activities of Daily Living Prior to Admission  Functional Status: Independent  Completes ADLs independently?: Yes  Ambulates independently?: Yes  Does patient use assisted devices?: Yes  Assisted Devices (DME) used: Walker, Stair Chair/[place], CPAP  DME Company Name (respiratory supplies): pt unsure of the comapny  Does patient currently own DME?: Yes  What DME does the patient currently own?: Walker, Stair Chair/[place], CPAP, Other (Comment) (grab bars, bp cuff,)  Does patient have a history of Outpatient Therapy (PT/OT)?: Yes  Does the patient have a history of Short-Term Rehab?: No  Does  patient have a history of HHC?: Yes (pt unsure of the name)  Does patient currently have HHC?: No         Patient Information Continued  Does patient have prescription coverage?: Yes  Can the patient afford their medications and any related supplies (such as glucometers or test strips)?: Yes  Does patient receive dialysis treatments?: Yes ([facility] 10[facility])  Does patient have a history of substance abuse?: No  Does patient have a history of Mental Health Diagnosis?: No  Is patient receiving treatment for mental health?:  (medication pcp)         Means of Transportation  Means of Transport to Appts:: Roxanne Azul (ccct & family)          DISCHARGE DETAILS:    Discharge planning discussed with:: patient & wife was called at 11:17 am  Freedom of Choice: Yes  Comments - Freedom of Choice: recommendation minimum resources  - cm was given permission to send referrals viaidin  for hhc -  pt reviewed the list wiht him- his choice was [facility]  CM contacted family/caregiver?: Yes  Were Treatment Team discharge recommendations reviewed with patient/caregiver?: Yes  Did patient/caregiver verbalize understanding of patient care needs?: Yes  Were patient/caregiver advised of the risks associated with not following Treatment Team discharge recommendations?: Yes    Contacts  Patient Contacts: Perlita Mike  Relationship to Patient:: Family  Contact Method: Phone  Phone Number: [phone]  Reason/Outcome: Discharge Planning    Requested Home Health Care         Is the patient interested in HHC at discharge?: Yes         Other Referral/Resources/Interventions Provided:  Interventions: Dialysis, HHC  Referral Comments: pt was accepted by [facility]   avs was faxed to [phone]  pt will continue with his outpt dialysis [facility] - pt taked the ccct to dialysis    Would you like to participate in our [facility] service program?  : No - Declined    Treatment Team Recommendation: Home with  Home Health Care (home with spouse & [facility] & dialysis & outpt follow up - family)  Discharge Destination Plan:: Home with Home Health Care (home with spouse & [facility] & outpt dialysis & outpt follow up- family)  Transport at Discharge : Family      Pt & family are in agreement with the d/c & d/c plan                             Family notified:: wife was called

## 2025-04-05 NOTE — PROGRESS NOTES
Patient:    MRN:  [id number]    Xu Request ID:  [id number]    Level of care reserved:  Home Health Agency    Partner Reserved:  [facility] - [place] [address] [phone]    Clinical needs requested:    Geography searched:  [address]    Start of Service:    Request sent:  9:35am EDT on 4/5/2025 by Judie Lepe    Partner reserved:  10:52am EDT on 4/5/2025 by Judie Lepe    Choice list shared:  10:16am EDT on 4/5/2025 by Judie Lepe

## 2025-04-05 NOTE — DISCHARGE INSTR - AVS FIRST PAGE
Please continue on your prehospital hemodialysis schedule with your next dialysis treatment being due 4/7  Please follow with your primary care physician within a week  I am making no changes to your prehospital medications  Your stroke workup was negative

## 2025-04-05 NOTE — ASSESSMENT & PLAN NOTE
Wt Readings from Last 3 Encounters:   04/04/25 95.8 kg (211 lb 3.2 oz)   04/04/25 96.2 kg (212 lb)   08/21/24 94.3 kg (207 lb 14.3 oz)   Volume status managed with HD. Continue daily weight, IO, low sodium diet.

## 2025-04-05 NOTE — ASSESSMENT & PLAN NOTE
Lab Results   Component Value Date    EGFR 4 04/04/2025    EGFR 5 04/03/2025    EGFR 10 (L) 12/20/2024    CREATININE 9.39 (H) 04/04/2025    CREATININE 8.57 (H) 04/03/2025    CREATININE 5.53 (H) 12/20/2024     Hemoglobin at baseline  No signs of bleeding

## 2025-04-05 NOTE — NURSING NOTE
Went over discharge info with patient. All questions answered. PCA took patient to main lobby via wheel chair.

## 2025-04-05 NOTE — PLAN OF CARE
Problem: PAIN - ADULT  Goal: Verbalizes/displays adequate comfort level or baseline comfort level  Description: Interventions:- Encourage patient to monitor pain and request assistance- Assess pain using appropriate pain scale- Administer analgesics based on type and severity of pain and evaluate response- Implement non-pharmacological measures as appropriate and evaluate response- Consider cultural and social influences on pain and pain management- Notify physician/advanced practitioner if interventions unsuccessful or patient reports new pain  Outcome: Progressing   Prn meds given for hand pain.   Behavioral Health Psychotherapy Progress Note    Psychotherapy Provided: Individual Psychotherapy     1. PTSD (post-traumatic stress disorder)            Goals addressed in session: Goal 1 and Goal 2     DATA: Raghav presents as a BENY from previous therapist who left the practice. The focus of the session was on building therapeutic rapport and gathering background information. Raghav reports to be having a hard time adjusting to her old therapist leaving but is doing her best to be open minded with a new one. Raghav expressed the issues she is facing with the city and the renovations with her house. Raghav reports to be figuring out what she wants to do with someone she is dating vs. being with her  and her other partner. Raghav reports not being on her meds for about 2 months and is trying her best to push through. Raghav reports that since not being on her meds she notices he can not can concentrate on everyday ADL's and work has been really difficult for her. Raghav reports feeling depressive symptoms as well.     During this session, this clinician used the following therapeutic modalities: Engagement Strategies, Client-centered Therapy, Cognitive Behavioral Therapy, Motivational Interviewing, Solution-Focused Therapy, and Supportive Psychotherapy    Substance Abuse was not addressed during this session. If the client is diagnosed with a co-occurring substance use disorder, please indicate any changes in the frequency or amount of use: N/A. Stage of change for addressing substance use diagnoses: No substance use/Not applicable    ASSESSMENT:  Raghav Smith presents with a Euthymic/ normal and Anxious mood.    her affect is Normal range and intensity, which is congruent, with her mood and the content of the session. The client has made progress on their goals.    Raghav was tearful during session.     Raghav Smith presents with a none risk of suicide, none risk of  "self-harm, and none risk of harm to others.    For any risk assessment that surpasses a \"low\" rating, a safety plan must be developed.    A safety plan was indicated: no  If yes, describe in detail N/A    PLAN: Between sessions, Raghav Smith will utilize coping skills. At the next session, the therapist will use Engagement Strategies, Client-centered Therapy, Cognitive Behavioral Therapy, Cognitive Processing Therapy, Motivational Interviewing, Solution-Focused Therapy, and Supportive Psychotherapy to address adjusting to new therapist.    Behavioral Health Treatment Plan and Discharge Planning: Raghav Smith is aware of and agrees to continue to work on their treatment plan. They have identified and are working toward their discharge goals. yes    Visit start and stop times:    11/06/24  Start Time: 1500  Stop Time: 1600  Total Visit Time: 60 minutes  "

## 2025-04-05 NOTE — ASSESSMENT & PLAN NOTE
Blood pressure trends mildly elevated at present.  Mentation seems appropriate.  Continue current regimen amlodipine 10 mg daily, clonidine patch, Cardura 2 mg at bedtime, Coreg 6.25 mg twice daily.

## 2025-04-06 LAB
MRSA NOSE QL CULT: ABNORMAL
MRSA NOSE QL CULT: ABNORMAL

## 2025-04-07 NOTE — UTILIZATION REVIEW
NOTIFICATION OF ADMISSION DISCHARGE   This is a Notification of Discharge from [facility]. Please be advised that this patient has been discharge from our facility. Below you will find the admission and discharge date and time including the patient’s disposition.   UTILIZATION REVIEW CONTACT:  Utilization Review Assistants  Network Utilization Review Department  Phone: [phone] x carefully listen to the prompts. All voicemails are confidential.  Email: [email or web address]     ADMISSION INFORMATION  PRESENTATION DATE: 4/3/2025  7:58 PM  OBERVATION ADMISSION DATE: N/A  INPATIENT ADMISSION DATE: 4/3/25 11:38 PM   DISCHARGE DATE: 4/5/2025  1:13 PM   DISPOSITION:Home with Home Health Care    [facility] Utilization Review Department  ATTENTION: Please call with any questions or concerns to [phone] and carefully listen to the prompts so that you are directed to the right person. All voicemails are confidential.   For Discharge needs, contact Care Management DC Support Team at [phone] opt. 2  Send all requests for admission clinical reviews, approved or denied determinations and any other requests to dedicated fax number below belonging to the campus where the patient is receiving treatment. List of dedicated fax numbers for the Facilities:  FACILITY NAME UR FAX NUMBER   ADMISSION DENIALS (Administrative/Medical Necessity) [phone]   DISCHARGE SUPPORT TEAM ([facility]) [phone]   PARENT CHILD HEALTH (Maternity/NICU/Pediatrics) [phone]   [facility] [phone]   [facility] [phone]   [facility] [phone]   [facility] [phone]   [facility] [phone]   [facility] [phone]   [facility] [phone]   [facility] [phone]   [facility]  [facility] [phone]   [facility] [phone]   [facility] [phone]   [facility] [phone]

## 2025-07-04 ENCOUNTER — HOSPITAL ENCOUNTER (EMERGENCY)
Facility: HOSPITAL | Age: 75
Discharge: HOME | End: 2025-07-04
Attending: STUDENT IN AN ORGANIZED HEALTH CARE EDUCATION/TRAINING PROGRAM | Admitting: STUDENT IN AN ORGANIZED HEALTH CARE EDUCATION/TRAINING PROGRAM
Payer: COMMERCIAL

## 2025-07-04 VITALS
RESPIRATION RATE: 18 BRPM | OXYGEN SATURATION: 98 % | TEMPERATURE: 97.8 F | BODY MASS INDEX: 29.8 KG/M2 | WEIGHT: 220 LBS | HEIGHT: 72 IN | SYSTOLIC BLOOD PRESSURE: 189 MMHG | DIASTOLIC BLOOD PRESSURE: 87 MMHG | HEART RATE: 85 BPM

## 2025-07-04 DIAGNOSIS — R58 BLEEDING: Primary | ICD-10-CM

## 2025-07-04 PROCEDURE — 99283 EMERGENCY DEPT VISIT LOW MDM: CPT

## 2025-07-04 PROCEDURE — 99283 EMERGENCY DEPT VISIT LOW MDM: CPT | Performed by: STUDENT IN AN ORGANIZED HEALTH CARE EDUCATION/TRAINING PROGRAM

## 2025-07-04 NOTE — ED TRIAGE NOTES
Via EMS/BLS from dialysis w/report of bleeding from right lower arm fistula; bleeding controlled upon EMS arrival; pt offers no complaints & received entire dialysis treatment

## 2025-07-04 NOTE — ED PROVIDER NOTES
Time reflects when diagnosis was documented in both MDM as applicable and the Disposition within this note       Time User Action Codes Description Comment    7/4/2025  4:17 PM Jam Wilde Add [R58] Bleeding           ED Disposition       ED Disposition   Discharge    Condition   Stable    Date/Time   Fri Jul 4, 2025  4:14 PM    Comment   Babak Mike discharge to home/self care.                   Assessment & Plan       Medical Decision Making           Medications - No data to display    ED Risk Strat Scores                    (ISAR) Identification of Seniors at Risk  Before the illness or injury that brought you to the Emergency, did you need someone to help you on a regular basis?: 0  In the last 24 hours, have you needed more help than usual?: 0  Have you been hospitalized for one or more nights during the past 6 months?: 0  In general, do you see well?: 0  In general, do you have serious problems with your memory?: 0  Do you take more than three different medications every day?: 1  ISAR Score: 1            SBIRT 22yo+      Flowsheet Row Most Recent Value   Initial Alcohol Screen: US AUDIT-C     1. How often do you have a drink containing alcohol? 0 Filed at: 07/04/2025 1614   2. How many drinks containing alcohol do you have on a typical day you are drinking?  0 Filed at: 07/04/2025 1614   3b. FEMALE Any Age, or MALE 65+: How often do you have 4 or more drinks on one occassion? 0 Filed at: 07/04/2025 1614   Audit-C Score 0 Filed at: 07/04/2025 1614   LISA: How many times in the past year have you...    Used an illegal drug or used a prescription medication for non-medical reasons? Never Filed at: 07/04/2025 1614                            History of Present Illness       Chief Complaint   Patient presents with    Medical Problem     Via EMS/BLS from dialysis w/report of bleeding from right lower arm fistula; bleeding controlled upon EMS arrival; pt offers no complaints & received entire dialysis treatment        Past Medical History[1]   Past Surgical History[2]   Family History[3]   Social History[4]   E-Cigarette/Vaping    E-Cigarette Use Never User       E-Cigarette/Vaping Substances    Nicotine No     THC No     CBD No     Flavoring No     Other No     Unknown No       I have reviewed and agree with the history as documented.     75-year-old male presents to the emergency department via EMS after concerns of bleeding from his fistula.  Patient was at his dialysis treatment he completed his treatment however when they went to remove the needle from his left fistula there was concerns that he was bleeding uncontrollably.  EMS arrived and found that the bleeding was well-controlled with direct pressure however dialysis facility insisted that patient come to the emergency department for evaluation.    Here on arrival patient denies any acute concerns, his dressing was removed and bleeding was well-controlled with no additional interventions needed      Medical Problem  Associated symptoms: no abdominal pain, no chest pain, no congestion, no cough, no diarrhea, no ear pain, no fatigue, no fever, no headaches, no myalgias, no nausea, no rhinorrhea and no shortness of breath        Review of Systems   Constitutional:  Negative for activity change, appetite change, chills, fatigue and fever.   HENT:  Negative for congestion, dental problem, drooling, ear discharge, ear pain, facial swelling, postnasal drip, rhinorrhea and sinus pain.    Eyes:  Negative for photophobia, pain, discharge and itching.   Respiratory:  Negative for apnea, cough, chest tightness and shortness of breath.    Cardiovascular:  Negative for chest pain and leg swelling.   Gastrointestinal:  Negative for abdominal distention, abdominal pain, anal bleeding, constipation, diarrhea and nausea.   Endocrine: Negative for cold intolerance, heat intolerance and polydipsia.   Genitourinary:  Negative for difficulty urinating.   Musculoskeletal:  Negative for  arthralgias, gait problem, joint swelling and myalgias.   Skin:  Negative for color change and pallor.   Allergic/Immunologic: Negative for immunocompromised state.   Neurological:  Negative for dizziness, seizures, facial asymmetry, weakness, light-headedness, numbness and headaches.   Psychiatric/Behavioral:  Negative for agitation, behavioral problems, confusion, decreased concentration and dysphoric mood.    All other systems reviewed and are negative.          Objective       ED Triage Vitals [07/04/25 1611]   Temperature Pulse BP Respirations SpO2 Patient Position - Orthostatic VS   97.7 °F (36.5 °C) 80 -- 19 99 % Lying      Temp Source Heart Rate Source BP Location FiO2 (%) Pain Score    Oral Monitor Left arm -- No Pain      Vitals      Date and Time Temp Pulse SpO2 Resp BP Pain Score FACES Pain Rating User   07/04/25 1611 97.7 °F (36.5 °C) 80 99 % 19 -- No Pain -- TB            Physical Exam  Constitutional:       Appearance: He is well-developed.   HENT:      Head: Normocephalic.     Eyes:      Pupils: Pupils are equal, round, and reactive to light.       Cardiovascular:      Rate and Rhythm: Normal rate and regular rhythm.   Pulmonary:      Effort: Pulmonary effort is normal.      Breath sounds: Normal breath sounds.   Abdominal:      General: Bowel sounds are normal.      Palpations: Abdomen is soft.     Musculoskeletal:         General: Normal range of motion.      Cervical back: Normal range of motion and neck supple.      Comments: Left forearm, well-appearing fistula no active bleeding     Skin:     General: Skin is warm.         Results Reviewed       None            No orders to display       Procedures    ED Medication and Procedure Management   Prior to Admission Medications   Prescriptions Last Dose Informant Patient Reported? Taking?   QUEtiapine (SEROquel) 25 mg tablet  Self Yes No   Sig: Take 12.5 mg by mouth daily at bedtime. Indications: sleep   Veltassa 8.4 g PACK   Yes No   Sig: Take 8.4 g  by mouth 4 (four) times a week Tues, Thursday, Saturday and Sunday. Also takes as needed when K is elevated on dialysis days   amLODIPine (NORVASC) 10 mg tablet   No No   Sig: Take 1 tablet (10 mg total) by mouth daily at bedtime   aspirin (ECOTRIN LOW STRENGTH) 81 mg EC tablet  Self Yes No   Sig: Take 81 mg by mouth daily   atorvastatin (LIPITOR) 40 mg tablet  Self Yes No   Sig: Take 40 mg by mouth daily   calcitriol (ROCALTROL) 0.25 mcg capsule   No No   Sig: Take 3 capsules (0.75 mcg total) by mouth 3 (three) times a week   calcium acetate (PHOSLO) capsule  Self Yes No   Sig: 3 (three) times a day Not on dialysis days  Pt taking 2 tabs 3x daily   carvedilol (COREG) 6.25 mg tablet   No No   Sig: Take 1 tablet (6.25 mg total) by mouth 2 (two) times a day with meals   cloNIDine (CATAPRES-TTS-3) 0.3 mg/24 hr   Yes No   Sig: Place 1 patch on the skin once a week Monday   doxazosin (CARDURA) 2 mg tablet  Self Yes No   Sig: Take 1 tablet by mouth daily at bedtime   gabapentin (NEURONTIN) 100 mg capsule  Self No No   Sig: Take 1 cap by mouth daily   Patient taking differently: Take 200 mg by mouth daily at bedtime Take 1 cap by mouth daily   insulin aspart (NovoLOG FlexPen) 100 UNIT/ML injection pen  Self Yes No   Sig: Inject 1 Units under the skin Three times a day. Pt reports dose as follows if BS over 150  breakfast 8 units  lunch 4 units  supper 5 units  bedtime 6 units if BS over 200  Indications: Type 2 Diabetes   lanthanum (FOSRENOL) 500 mg chewable tablet  Self Yes No   Sig: CRUSH OR CHEW AND SWALLOW 1 TABLET THREE TIMES A DAY WITH MEALS   lidocaine-prilocaine (EMLA) cream  Self Yes No   Sig: APPLY SMALL AMOUNT TO ACCESS SITE (AVF) 1 TO 2 HOURS BEFORE DIALYSIS. COVER WITH OCCLUSIVE DRESSING (SARAN WRAP)   losartan (COZAAR) 100 MG tablet   No No   Sig: Take 1 tablet (100 mg total) by mouth daily      Facility-Administered Medications: None     Patient's Medications   Discharge Prescriptions    No medications on  file     No discharge procedures on file.  ED SEPSIS DOCUMENTATION   Time reflects when diagnosis was documented in both MDM as applicable and the Disposition within this note       Time User Action Codes Description Comment    2025  4:17 PM Jam Wilde Add [R58] Bleeding                    [1]   Past Medical History:  Diagnosis Date    Coronary artery disease     Diabetes mellitus     End stage renal disease     Hyperlipidemia     Hypertension     NSTEMI (non-ST elevated myocardial infarction)     TIA (transient ischemic attack)    [2]   Past Surgical History:  Procedure Laterality Date    CARDIAC CATHETERIZATION      70% lesion of a small R PDA and a 99% stenosis of D1 (previously noted on the cath in ).  Neither lesion was amenable to PCI.  Medical management.    DIALYSIS FISTULA CREATION     [3]   Family History  Problem Relation Name Age of Onset    Hypertension Mother      Hypertension Father     [4]   Social History  Tobacco Use    Smoking status: Former     Current packs/day: 0.00     Average packs/day: 1 pack/day for 20.0 years (20.0 ttl pk-yrs)     Types: Cigarettes     Start date:      Quit date:      Years since quittin.5    Smokeless tobacco: Never    Tobacco comments:     STATES QUIT 30 YEARS AGO/ ABOUT ONE CIGARETTE DAILY   Vaping Use    Vaping status: Never Used   Substance Use Topics    Alcohol use: Never    Drug use: Never        Jam Wilde MD  [phone]

## 2025-07-30 ENCOUNTER — APPOINTMENT (EMERGENCY)
Dept: RADIOLOGY | Facility: HOSPITAL | Age: 75
End: 2025-07-30
Payer: COMMERCIAL

## 2025-07-30 ENCOUNTER — HOSPITAL ENCOUNTER (EMERGENCY)
Facility: HOSPITAL | Age: 75
Discharge: HOME | End: 2025-07-30
Attending: EMERGENCY MEDICINE | Admitting: EMERGENCY MEDICINE
Payer: COMMERCIAL

## 2025-07-30 ENCOUNTER — APPOINTMENT (EMERGENCY)
Dept: NON INVASIVE DIAGNOSTICS | Facility: HOSPITAL | Age: 75
End: 2025-07-30
Payer: COMMERCIAL

## 2025-07-30 ENCOUNTER — APPOINTMENT (EMERGENCY)
Dept: CT IMAGING | Facility: HOSPITAL | Age: 75
End: 2025-07-30
Payer: COMMERCIAL

## 2025-07-30 VITALS
SYSTOLIC BLOOD PRESSURE: 154 MMHG | TEMPERATURE: 97.6 F | WEIGHT: 221.78 LBS | OXYGEN SATURATION: 94 % | BODY MASS INDEX: 30.04 KG/M2 | DIASTOLIC BLOOD PRESSURE: 70 MMHG | RESPIRATION RATE: 16 BRPM | HEIGHT: 72 IN | HEART RATE: 55 BPM

## 2025-07-30 DIAGNOSIS — I65.03 VERTEBRAL ARTERY STENOSIS, BILATERAL: ICD-10-CM

## 2025-07-30 DIAGNOSIS — R42 POSITIONAL LIGHTHEADEDNESS: ICD-10-CM

## 2025-07-30 DIAGNOSIS — R53.1 GENERALIZED WEAKNESS: Primary | ICD-10-CM

## 2025-07-30 DIAGNOSIS — G62.9 NEUROPATHY: ICD-10-CM

## 2025-07-30 DIAGNOSIS — M79.642 LEFT HAND PAIN: ICD-10-CM

## 2025-07-30 DIAGNOSIS — N18.6 ESRD (END STAGE RENAL DISEASE) ON DIALYSIS (HCC): ICD-10-CM

## 2025-07-30 DIAGNOSIS — I65.22 STENOSIS OF LEFT INTERNAL CAROTID ARTERY: ICD-10-CM

## 2025-07-30 DIAGNOSIS — Z99.2 ESRD (END STAGE RENAL DISEASE) ON DIALYSIS (HCC): ICD-10-CM

## 2025-07-30 LAB
2HR DELTA HS TROPONIN: 0 NG/L
ALBUMIN SERPL BCG-MCNC: 4 G/DL (ref 3.5–5)
ALP SERPL-CCNC: 52 U/L (ref 34–104)
ALT SERPL W P-5'-P-CCNC: 8 U/L (ref 7–52)
ANION GAP SERPL CALCULATED.3IONS-SCNC: 12 MMOL/L (ref 4–13)
AST SERPL W P-5'-P-CCNC: 14 U/L (ref 13–39)
BASOPHILS # BLD AUTO: 0.03 THOUSANDS/ÂΜL (ref 0–0.1)
BASOPHILS NFR BLD AUTO: 1 % (ref 0–1)
BILIRUB SERPL-MCNC: 0.37 MG/DL (ref 0.2–1)
BUN SERPL-MCNC: 63 MG/DL (ref 5–25)
CALCIUM SERPL-MCNC: 9 MG/DL (ref 8.4–10.2)
CARDIAC TROPONIN I PNL SERPL HS: 20 NG/L (ref ?–50)
CARDIAC TROPONIN I PNL SERPL HS: 20 NG/L (ref ?–50)
CHLORIDE SERPL-SCNC: 92 MMOL/L (ref 96–108)
CO2 SERPL-SCNC: 29 MMOL/L (ref 21–32)
CREAT SERPL-MCNC: 10.31 MG/DL (ref 0.6–1.3)
EOSINOPHIL # BLD AUTO: 0.24 THOUSAND/ÂΜL (ref 0–0.61)
EOSINOPHIL NFR BLD AUTO: 4 % (ref 0–6)
ERYTHROCYTE [DISTWIDTH] IN BLOOD BY AUTOMATED COUNT: 18.7 % (ref 11.6–15.1)
GFR SERPL CREATININE-BSD FRML MDRD: 4 ML/MIN/1.73SQ M
GLUCOSE SERPL-MCNC: 160 MG/DL (ref 65–140)
HCT VFR BLD AUTO: 36.4 % (ref 36.5–49.3)
HGB BLD-MCNC: 10.9 G/DL (ref 12–17)
IMM GRANULOCYTES # BLD AUTO: 0.02 THOUSAND/UL (ref 0–0.2)
IMM GRANULOCYTES NFR BLD AUTO: 0 % (ref 0–2)
LYMPHOCYTES # BLD AUTO: 1.87 THOUSANDS/ÂΜL (ref 0.6–4.47)
LYMPHOCYTES NFR BLD AUTO: 28 % (ref 14–44)
MAGNESIUM SERPL-MCNC: 2.5 MG/DL (ref 1.9–2.7)
MCH RBC QN AUTO: 27.1 PG (ref 26.8–34.3)
MCHC RBC AUTO-ENTMCNC: 29.9 G/DL (ref 31.4–37.4)
MCV RBC AUTO: 91 FL (ref 82–98)
MONOCYTES # BLD AUTO: 0.83 THOUSAND/ÂΜL (ref 0.17–1.22)
MONOCYTES NFR BLD AUTO: 13 % (ref 4–12)
NEUTROPHILS # BLD AUTO: 3.65 THOUSANDS/ÂΜL (ref 1.85–7.62)
NEUTS SEG NFR BLD AUTO: 54 % (ref 43–75)
NRBC BLD AUTO-RTO: 0 /100 WBCS
PLATELET # BLD AUTO: 122 THOUSANDS/UL (ref 149–390)
PMV BLD AUTO: 10.7 FL (ref 8.9–12.7)
POTASSIUM SERPL-SCNC: 5.2 MMOL/L (ref 3.5–5.3)
PROT SERPL-MCNC: 8.1 G/DL (ref 6.4–8.4)
RBC # BLD AUTO: 4.02 MILLION/UL (ref 3.88–5.62)
SODIUM SERPL-SCNC: 133 MMOL/L (ref 135–147)
WBC # BLD AUTO: 6.64 THOUSAND/UL (ref 4.31–10.16)

## 2025-07-30 PROCEDURE — 70498 CT ANGIOGRAPHY NECK: CPT

## 2025-07-30 PROCEDURE — 70496 CT ANGIOGRAPHY HEAD: CPT

## 2025-07-30 PROCEDURE — 99285 EMERGENCY DEPT VISIT HI MDM: CPT | Performed by: PHYSICIAN ASSISTANT

## 2025-07-30 PROCEDURE — 84484 ASSAY OF TROPONIN QUANT: CPT | Performed by: PHYSICIAN ASSISTANT

## 2025-07-30 PROCEDURE — 93005 ELECTROCARDIOGRAM TRACING: CPT

## 2025-07-30 PROCEDURE — 70450 CT HEAD/BRAIN W/O DYE: CPT

## 2025-07-30 PROCEDURE — 36415 COLL VENOUS BLD VENIPUNCTURE: CPT | Performed by: PHYSICIAN ASSISTANT

## 2025-07-30 PROCEDURE — 93990 DOPPLER FLOW TESTING: CPT

## 2025-07-30 PROCEDURE — 85025 COMPLETE CBC W/AUTO DIFF WBC: CPT | Performed by: PHYSICIAN ASSISTANT

## 2025-07-30 PROCEDURE — 93990 DOPPLER FLOW TESTING: CPT | Performed by: SURGERY

## 2025-07-30 PROCEDURE — 80053 COMPREHEN METABOLIC PANEL: CPT | Performed by: PHYSICIAN ASSISTANT

## 2025-07-30 PROCEDURE — 83735 ASSAY OF MAGNESIUM: CPT | Performed by: PHYSICIAN ASSISTANT

## 2025-07-30 PROCEDURE — 99285 EMERGENCY DEPT VISIT HI MDM: CPT

## 2025-07-30 PROCEDURE — 96374 THER/PROPH/DIAG INJ IV PUSH: CPT

## 2025-07-30 PROCEDURE — 71045 X-RAY EXAM CHEST 1 VIEW: CPT

## 2025-07-30 RX ORDER — TORSEMIDE 100 MG/1
100 TABLET ORAL ONCE
Qty: 1 TABLET | Refills: 0 | Status: SHIPPED | OUTPATIENT
Start: 2025-07-31 | End: 2025-07-31

## 2025-07-30 RX ORDER — ACETAMINOPHEN 325 MG/1
975 TABLET ORAL ONCE
Status: COMPLETED | OUTPATIENT
Start: 2025-07-30 | End: 2025-07-30

## 2025-07-30 RX ORDER — HYDROMORPHONE HCL IN WATER/PF 6 MG/30 ML
0.2 PATIENT CONTROLLED ANALGESIA SYRINGE INTRAVENOUS ONCE
Status: COMPLETED | OUTPATIENT
Start: 2025-07-30 | End: 2025-07-30

## 2025-07-30 RX ORDER — GABAPENTIN 100 MG/1
100 CAPSULE ORAL ONCE
Status: COMPLETED | OUTPATIENT
Start: 2025-07-30 | End: 2025-07-30

## 2025-07-30 RX ORDER — TORSEMIDE 20 MG/1
100 TABLET ORAL ONCE
Status: COMPLETED | OUTPATIENT
Start: 2025-07-30 | End: 2025-07-30

## 2025-07-30 RX ADMIN — IOHEXOL 80 ML: 350 INJECTION, SOLUTION INTRAVENOUS at 11:48

## 2025-07-30 RX ADMIN — GABAPENTIN 100 MG: 100 CAPSULE ORAL at 13:34

## 2025-07-30 RX ADMIN — TORSEMIDE 100 MG: 20 TABLET ORAL at 16:12

## 2025-07-30 RX ADMIN — ACETAMINOPHEN 975 MG: 325 TABLET ORAL at 13:34

## 2025-07-30 RX ADMIN — HYDROMORPHONE HYDROCHLORIDE 0.2 MG: 0.2 INJECTION, SOLUTION INTRAMUSCULAR; INTRAVENOUS; SUBCUTANEOUS at 11:11

## 2025-07-31 LAB
ATRIAL RATE: 60 BPM
ATRIAL RATE: 70 BPM
P AXIS: 79 DEGREES
P AXIS: 80 DEGREES
PR INTERVAL: 228 MS
PR INTERVAL: 228 MS
QRS AXIS: 61 DEGREES
QRS AXIS: 63 DEGREES
QRSD INTERVAL: 104 MS
QRSD INTERVAL: 110 MS
QT INTERVAL: 410 MS
QT INTERVAL: 446 MS
QTC INTERVAL: 442 MS
QTC INTERVAL: 446 MS
T WAVE AXIS: 81 DEGREES
T WAVE AXIS: 82 DEGREES
VENTRICULAR RATE: 60 BPM
VENTRICULAR RATE: 70 BPM

## 2025-07-31 PROCEDURE — 93010 ELECTROCARDIOGRAM REPORT: CPT | Performed by: INTERNAL MEDICINE
